# Patient Record
Sex: FEMALE | Race: WHITE | Employment: OTHER | URBAN - METROPOLITAN AREA
[De-identification: names, ages, dates, MRNs, and addresses within clinical notes are randomized per-mention and may not be internally consistent; named-entity substitution may affect disease eponyms.]

---

## 2018-04-30 ENCOUNTER — HOSPITAL ENCOUNTER (INPATIENT)
Age: 83
LOS: 11 days | Discharge: NURSING, REHAB OR LONG-TERM CARE | DRG: 280 | End: 2018-05-11
Attending: EMERGENCY MEDICINE | Admitting: INTERNAL MEDICINE
Payer: MEDICARE

## 2018-04-30 ENCOUNTER — APPOINTMENT (OUTPATIENT)
Dept: ULTRASOUND IMAGING | Age: 83
DRG: 280 | End: 2018-04-30
Attending: EMERGENCY MEDICINE
Payer: MEDICARE

## 2018-04-30 ENCOUNTER — APPOINTMENT (OUTPATIENT)
Dept: GENERAL RADIOLOGY | Age: 83
DRG: 280 | End: 2018-04-30
Attending: EMERGENCY MEDICINE
Payer: MEDICARE

## 2018-04-30 DIAGNOSIS — N18.30 CKD (CHRONIC KIDNEY DISEASE) STAGE 3, GFR 30-59 ML/MIN (HCC): ICD-10-CM

## 2018-04-30 DIAGNOSIS — I48.91 RAPID ATRIAL FIBRILLATION (HCC): ICD-10-CM

## 2018-04-30 DIAGNOSIS — I50.31 ACUTE DIASTOLIC CHF (CONGESTIVE HEART FAILURE) (HCC): ICD-10-CM

## 2018-04-30 DIAGNOSIS — I48.91 ATRIAL FIBRILLATION WITH RAPID VENTRICULAR RESPONSE (HCC): Primary | ICD-10-CM

## 2018-04-30 DIAGNOSIS — J96.01 ACUTE RESPIRATORY FAILURE WITH HYPOXEMIA (HCC): ICD-10-CM

## 2018-04-30 DIAGNOSIS — J44.9 CHRONIC OBSTRUCTIVE PULMONARY DISEASE, UNSPECIFIED COPD TYPE (HCC): ICD-10-CM

## 2018-04-30 DIAGNOSIS — I50.21 ACUTE SYSTOLIC CONGESTIVE HEART FAILURE (HCC): ICD-10-CM

## 2018-04-30 DIAGNOSIS — N39.0 LOWER URINARY TRACT INFECTIOUS DISEASE: ICD-10-CM

## 2018-04-30 DIAGNOSIS — I21.4 NSTEMI (NON-ST ELEVATED MYOCARDIAL INFARCTION) (HCC): ICD-10-CM

## 2018-04-30 LAB
ALBUMIN SERPL-MCNC: 3.3 G/DL (ref 3.5–5)
ALBUMIN/GLOB SERPL: 0.8 {RATIO} (ref 1.1–2.2)
ALP SERPL-CCNC: 67 U/L (ref 45–117)
ALT SERPL-CCNC: 23 U/L (ref 12–78)
ANION GAP SERPL CALC-SCNC: 9 MMOL/L (ref 5–15)
APPEARANCE UR: ABNORMAL
AST SERPL-CCNC: 28 U/L (ref 15–37)
ATRIAL RATE: 127 BPM
BACTERIA URNS QL MICRO: NEGATIVE /HPF
BASOPHILS # BLD: 0.1 K/UL (ref 0–0.1)
BASOPHILS NFR BLD: 1 % (ref 0–1)
BILIRUB SERPL-MCNC: 1.1 MG/DL (ref 0.2–1)
BILIRUB UR QL: NEGATIVE
BNP SERPL-MCNC: ABNORMAL PG/ML (ref 0–450)
BUN SERPL-MCNC: 25 MG/DL (ref 6–20)
BUN/CREAT SERPL: 17 (ref 12–20)
CALCIUM SERPL-MCNC: 9.8 MG/DL (ref 8.5–10.1)
CALCULATED R AXIS, ECG10: -26 DEGREES
CALCULATED T AXIS, ECG11: 32 DEGREES
CHLORIDE SERPL-SCNC: 103 MMOL/L (ref 97–108)
CK SERPL-CCNC: 108 U/L (ref 26–192)
CO2 SERPL-SCNC: 28 MMOL/L (ref 21–32)
COLOR UR: ABNORMAL
CREAT SERPL-MCNC: 1.45 MG/DL (ref 0.55–1.02)
DIAGNOSIS, 93000: NORMAL
DIFFERENTIAL METHOD BLD: ABNORMAL
EOSINOPHIL # BLD: 0 K/UL (ref 0–0.4)
EOSINOPHIL NFR BLD: 0 % (ref 0–7)
EPITH CASTS URNS QL MICRO: ABNORMAL /LPF
ERYTHROCYTE [DISTWIDTH] IN BLOOD BY AUTOMATED COUNT: 13.5 % (ref 11.5–14.5)
GLOBULIN SER CALC-MCNC: 4.3 G/DL (ref 2–4)
GLUCOSE SERPL-MCNC: 141 MG/DL (ref 65–100)
GLUCOSE UR STRIP.AUTO-MCNC: NEGATIVE MG/DL
HCT VFR BLD AUTO: 38.5 % (ref 35–47)
HGB BLD-MCNC: 12.6 G/DL (ref 11.5–16)
HGB UR QL STRIP: NEGATIVE
HYALINE CASTS URNS QL MICRO: ABNORMAL /LPF (ref 0–5)
IMM GRANULOCYTES # BLD: 0.1 K/UL (ref 0–0.04)
IMM GRANULOCYTES NFR BLD AUTO: 1 % (ref 0–0.5)
KETONES UR QL STRIP.AUTO: NEGATIVE MG/DL
LEUKOCYTE ESTERASE UR QL STRIP.AUTO: NEGATIVE
LYMPHOCYTES # BLD: 0.9 K/UL (ref 0.8–3.5)
LYMPHOCYTES NFR BLD: 8 % (ref 12–49)
MAGNESIUM SERPL-MCNC: 2.2 MG/DL (ref 1.6–2.4)
MCH RBC QN AUTO: 29.9 PG (ref 26–34)
MCHC RBC AUTO-ENTMCNC: 32.7 G/DL (ref 30–36.5)
MCV RBC AUTO: 91.2 FL (ref 80–99)
MONOCYTES # BLD: 0.8 K/UL (ref 0–1)
MONOCYTES NFR BLD: 8 % (ref 5–13)
NEUTS SEG # BLD: 8.8 K/UL (ref 1.8–8)
NEUTS SEG NFR BLD: 83 % (ref 32–75)
NITRITE UR QL STRIP.AUTO: NEGATIVE
NRBC # BLD: 0 K/UL (ref 0–0.01)
NRBC BLD-RTO: 0 PER 100 WBC
PH UR STRIP: 6 [PH] (ref 5–8)
PLATELET # BLD AUTO: 299 K/UL (ref 150–400)
PMV BLD AUTO: 10.9 FL (ref 8.9–12.9)
POTASSIUM SERPL-SCNC: 4.1 MMOL/L (ref 3.5–5.1)
PROT SERPL-MCNC: 7.6 G/DL (ref 6.4–8.2)
PROT UR STRIP-MCNC: 30 MG/DL
Q-T INTERVAL, ECG07: 302 MS
QRS DURATION, ECG06: 76 MS
QTC CALCULATION (BEZET), ECG08: 457 MS
RBC # BLD AUTO: 4.22 M/UL (ref 3.8–5.2)
RBC #/AREA URNS HPF: ABNORMAL /HPF (ref 0–5)
SODIUM SERPL-SCNC: 140 MMOL/L (ref 136–145)
SP GR UR REFRACTOMETRY: 1.02 (ref 1–1.03)
TROPONIN I SERPL-MCNC: 0.33 NG/ML
TSH SERPL DL<=0.05 MIU/L-ACNC: 1.38 UIU/ML (ref 0.36–3.74)
UA: UC IF INDICATED,UAUC: ABNORMAL
UROBILINOGEN UR QL STRIP.AUTO: 0.2 EU/DL (ref 0.2–1)
VENTRICULAR RATE, ECG03: 138 BPM
WBC # BLD AUTO: 10.6 K/UL (ref 3.6–11)
WBC URNS QL MICRO: ABNORMAL /HPF (ref 0–4)

## 2018-04-30 PROCEDURE — 74011250636 HC RX REV CODE- 250/636: Performed by: EMERGENCY MEDICINE

## 2018-04-30 PROCEDURE — 96375 TX/PRO/DX INJ NEW DRUG ADDON: CPT

## 2018-04-30 PROCEDURE — 81001 URINALYSIS AUTO W/SCOPE: CPT | Performed by: EMERGENCY MEDICINE

## 2018-04-30 PROCEDURE — 93005 ELECTROCARDIOGRAM TRACING: CPT

## 2018-04-30 PROCEDURE — 74011000250 HC RX REV CODE- 250: Performed by: EMERGENCY MEDICINE

## 2018-04-30 PROCEDURE — 84484 ASSAY OF TROPONIN QUANT: CPT | Performed by: EMERGENCY MEDICINE

## 2018-04-30 PROCEDURE — 96365 THER/PROPH/DIAG IV INF INIT: CPT

## 2018-04-30 PROCEDURE — 74011250637 HC RX REV CODE- 250/637: Performed by: INTERNAL MEDICINE

## 2018-04-30 PROCEDURE — 99285 EMERGENCY DEPT VISIT HI MDM: CPT

## 2018-04-30 PROCEDURE — 77030038269 HC DRN EXT URIN PURWCK BARD -A

## 2018-04-30 PROCEDURE — 65660000000 HC RM CCU STEPDOWN

## 2018-04-30 PROCEDURE — 80053 COMPREHEN METABOLIC PANEL: CPT | Performed by: EMERGENCY MEDICINE

## 2018-04-30 PROCEDURE — 51701 INSERT BLADDER CATHETER: CPT

## 2018-04-30 PROCEDURE — 74011250636 HC RX REV CODE- 250/636: Performed by: INTERNAL MEDICINE

## 2018-04-30 PROCEDURE — 96366 THER/PROPH/DIAG IV INF ADDON: CPT

## 2018-04-30 PROCEDURE — 74011250637 HC RX REV CODE- 250/637: Performed by: EMERGENCY MEDICINE

## 2018-04-30 PROCEDURE — 93970 EXTREMITY STUDY: CPT

## 2018-04-30 PROCEDURE — 83880 ASSAY OF NATRIURETIC PEPTIDE: CPT | Performed by: EMERGENCY MEDICINE

## 2018-04-30 PROCEDURE — 82550 ASSAY OF CK (CPK): CPT | Performed by: EMERGENCY MEDICINE

## 2018-04-30 PROCEDURE — 83735 ASSAY OF MAGNESIUM: CPT | Performed by: EMERGENCY MEDICINE

## 2018-04-30 PROCEDURE — 71045 X-RAY EXAM CHEST 1 VIEW: CPT

## 2018-04-30 PROCEDURE — 36415 COLL VENOUS BLD VENIPUNCTURE: CPT | Performed by: EMERGENCY MEDICINE

## 2018-04-30 PROCEDURE — 85025 COMPLETE CBC W/AUTO DIFF WBC: CPT | Performed by: EMERGENCY MEDICINE

## 2018-04-30 PROCEDURE — 84443 ASSAY THYROID STIM HORMONE: CPT | Performed by: EMERGENCY MEDICINE

## 2018-04-30 PROCEDURE — 77010033678 HC OXYGEN DAILY

## 2018-04-30 RX ORDER — AMLODIPINE BESYLATE 5 MG/1
2.5 TABLET ORAL DAILY
Status: DISCONTINUED | OUTPATIENT
Start: 2018-05-01 | End: 2018-05-02

## 2018-04-30 RX ORDER — OXYBUTYNIN CHLORIDE 5 MG/1
15 TABLET, EXTENDED RELEASE ORAL DAILY
Status: DISCONTINUED | OUTPATIENT
Start: 2018-05-01 | End: 2018-05-02

## 2018-04-30 RX ORDER — CETIRIZINE HCL 10 MG
10 TABLET ORAL
COMMUNITY

## 2018-04-30 RX ORDER — FUROSEMIDE 10 MG/ML
40 INJECTION INTRAMUSCULAR; INTRAVENOUS 2 TIMES DAILY
Status: DISCONTINUED | OUTPATIENT
Start: 2018-04-30 | End: 2018-05-01

## 2018-04-30 RX ORDER — METOPROLOL TARTRATE 25 MG/1
25 TABLET, FILM COATED ORAL 2 TIMES DAILY
Status: DISCONTINUED | OUTPATIENT
Start: 2018-04-30 | End: 2018-05-10

## 2018-04-30 RX ORDER — POLYETHYLENE GLYCOL 3350 17 G/17G
17 POWDER, FOR SOLUTION ORAL DAILY
Status: DISCONTINUED | OUTPATIENT
Start: 2018-05-01 | End: 2018-05-11 | Stop reason: HOSPADM

## 2018-04-30 RX ORDER — OXYBUTYNIN CHLORIDE 5 MG/1
5 TABLET ORAL 3 TIMES DAILY
Status: DISCONTINUED | OUTPATIENT
Start: 2018-04-30 | End: 2018-04-30 | Stop reason: SDUPTHER

## 2018-04-30 RX ORDER — GUAIFENESIN 100 MG/5ML
162 LIQUID (ML) ORAL
Status: COMPLETED | OUTPATIENT
Start: 2018-04-30 | End: 2018-04-30

## 2018-04-30 RX ORDER — SODIUM CHLORIDE 0.9 % (FLUSH) 0.9 %
5-10 SYRINGE (ML) INJECTION EVERY 8 HOURS
Status: DISCONTINUED | OUTPATIENT
Start: 2018-04-30 | End: 2018-05-11 | Stop reason: HOSPADM

## 2018-04-30 RX ORDER — SODIUM CHLORIDE 0.9 % (FLUSH) 0.9 %
5-10 SYRINGE (ML) INJECTION AS NEEDED
Status: DISCONTINUED | OUTPATIENT
Start: 2018-04-30 | End: 2018-05-11 | Stop reason: HOSPADM

## 2018-04-30 RX ORDER — GUAIFENESIN 100 MG/5ML
81 LIQUID (ML) ORAL DAILY
Status: DISCONTINUED | OUTPATIENT
Start: 2018-05-01 | End: 2018-05-11 | Stop reason: HOSPADM

## 2018-04-30 RX ORDER — CLOBETASOL PROPIONATE 0.5 MG/G
CREAM TOPICAL DAILY
COMMUNITY

## 2018-04-30 RX ORDER — AMIODARONE HYDROCHLORIDE 200 MG/1
100 TABLET ORAL DAILY
Status: DISCONTINUED | OUTPATIENT
Start: 2018-05-03 | End: 2018-05-11 | Stop reason: HOSPADM

## 2018-04-30 RX ORDER — ALBUTEROL SULFATE 90 UG/1
2 AEROSOL, METERED RESPIRATORY (INHALATION)
Status: DISCONTINUED | OUTPATIENT
Start: 2018-04-30 | End: 2018-05-11 | Stop reason: HOSPADM

## 2018-04-30 RX ORDER — CLOPIDOGREL BISULFATE 75 MG/1
75 TABLET ORAL DAILY
Status: DISCONTINUED | OUTPATIENT
Start: 2018-05-01 | End: 2018-05-02

## 2018-04-30 RX ORDER — PANTOPRAZOLE SODIUM 40 MG/1
40 GRANULE, DELAYED RELEASE ORAL DAILY
Status: DISCONTINUED | OUTPATIENT
Start: 2018-05-01 | End: 2018-05-11 | Stop reason: HOSPADM

## 2018-04-30 RX ORDER — AMIODARONE HYDROCHLORIDE 200 MG/1
200 TABLET ORAL 3 TIMES DAILY
Status: COMPLETED | OUTPATIENT
Start: 2018-04-30 | End: 2018-05-02

## 2018-04-30 RX ORDER — FUROSEMIDE 10 MG/ML
40 INJECTION INTRAMUSCULAR; INTRAVENOUS
Status: COMPLETED | OUTPATIENT
Start: 2018-04-30 | End: 2018-04-30

## 2018-04-30 RX ORDER — HEPARIN SODIUM 5000 [USP'U]/ML
5000 INJECTION, SOLUTION INTRAVENOUS; SUBCUTANEOUS EVERY 12 HOURS
Status: DISCONTINUED | OUTPATIENT
Start: 2018-04-30 | End: 2018-05-02

## 2018-04-30 RX ORDER — OXYBUTYNIN CHLORIDE 5 MG/1
5 TABLET ORAL 3 TIMES DAILY
Status: ON HOLD | COMMUNITY
End: 2018-05-01

## 2018-04-30 RX ORDER — OLOPATADINE HYDROCHLORIDE 1 MG/ML
1 SOLUTION/ DROPS OPHTHALMIC 2 TIMES DAILY
COMMUNITY

## 2018-04-30 RX ORDER — ONDANSETRON 2 MG/ML
4 INJECTION INTRAMUSCULAR; INTRAVENOUS
Status: DISCONTINUED | OUTPATIENT
Start: 2018-04-30 | End: 2018-05-11 | Stop reason: HOSPADM

## 2018-04-30 RX ORDER — ATORVASTATIN CALCIUM 40 MG/1
40 TABLET, FILM COATED ORAL
Status: DISCONTINUED | OUTPATIENT
Start: 2018-04-30 | End: 2018-05-11 | Stop reason: HOSPADM

## 2018-04-30 RX ORDER — ACETAMINOPHEN 325 MG/1
650 TABLET ORAL
Status: DISCONTINUED | OUTPATIENT
Start: 2018-04-30 | End: 2018-05-11 | Stop reason: HOSPADM

## 2018-04-30 RX ADMIN — Medication 10 ML: at 21:38

## 2018-04-30 RX ADMIN — METOPROLOL TARTRATE 25 MG: 25 TABLET ORAL at 18:57

## 2018-04-30 RX ADMIN — ASPIRIN 81 MG 162 MG: 81 TABLET ORAL at 10:53

## 2018-04-30 RX ADMIN — Medication 10 ML: at 18:58

## 2018-04-30 RX ADMIN — AMIODARONE HYDROCHLORIDE 200 MG: 200 TABLET ORAL at 13:02

## 2018-04-30 RX ADMIN — ATORVASTATIN CALCIUM 40 MG: 40 TABLET, FILM COATED ORAL at 21:31

## 2018-04-30 RX ADMIN — METOPROLOL TARTRATE 25 MG: 25 TABLET ORAL at 13:02

## 2018-04-30 RX ADMIN — FUROSEMIDE 40 MG: 10 INJECTION, SOLUTION INTRAMUSCULAR; INTRAVENOUS at 11:40

## 2018-04-30 RX ADMIN — HEPARIN SODIUM 5000 UNITS: 5000 INJECTION, SOLUTION INTRAVENOUS; SUBCUTANEOUS at 18:57

## 2018-04-30 RX ADMIN — DEXTROSE MONOHYDRATE 5 MG/HR: 50 INJECTION, SOLUTION INTRAVENOUS at 10:44

## 2018-04-30 RX ADMIN — AMIODARONE HYDROCHLORIDE 200 MG: 200 TABLET ORAL at 21:31

## 2018-04-30 RX ADMIN — AMIODARONE HYDROCHLORIDE 200 MG: 200 TABLET ORAL at 18:57

## 2018-04-30 RX ADMIN — NITROGLYCERIN 1.5 INCH: 20 OINTMENT TOPICAL at 10:54

## 2018-04-30 RX ADMIN — FUROSEMIDE 40 MG: 10 INJECTION, SOLUTION INTRAMUSCULAR; INTRAVENOUS at 18:57

## 2018-04-30 NOTE — ED NOTES
MD Lucie Anna at bedside to evaluate patient. Patient urinated into suction cannister, however, it was a small amount in comparison to the lasix that was given. Patient's abdomen is distended. RN bladder scanned patient which showed 420ml of urine. MD Lucie Anna made aware of this and states to straight cath patient one time.  Recheck later to see if the patient continues to retain and then reassess need for gonsalves with MD.

## 2018-04-30 NOTE — H&P
Hospitalist Admission Note    NAME: Bettie Cantu   :  1928   MRN:  [id number]     Date/Time:  2018 5:29 PM    Patient PCP: Tashia Lopez MD  ______________________________________________________________________  Given the patient's current clinical presentation, I have a high level of concern for decompensation if discharged from the emergency department. Complex decision making was performed, which includes reviewing the patient's available past medical records, laboratory results, and x-ray films. My assessment of this patient's clinical condition and my plan of care is as follows. Assessment / Plan:  CHF, POA with hypoxia   Prn oxygen  Titrate as needed  Acute likely diastolic failure ef in 6303 55%   Patient needs to be admitted to Hospital for further work up and management   Admit to Tele, Needs IV LAsix Diuresis, Echo to assess EF, Valves and Pulm pressures, Work up in ED shows no dvt  Cardiology Consult     Afib with RVR:  +troponin  nstemi   Cardiology in the case   Asa/plavix/sttain/ b blcoker/amio  agree with non invasive  procedures, medical management for now  In the ED: on Cardizem drip - cont wean off  EKG  Afib with RVR,   - Amiodarone per cardiology  - will need evl regarding long term ac high risk for falls  - Echo  Plavix/asa/b blocker/statin      Renal:MARGIE  mostly Pre-renal due to hypoperfusion 2/2 afib  - F/U Cr, while lasix if worsen get renal to see      HTN   Home meds  Amlodipine/ b blocker /lasix  Dyslipidemia lipitor  S/p cva s/p tpa in   See above plavix/asa  H/o PVD lft cea  COPD home meds  GERD home meds  R/o uti check ua  Cont ditropan    Code Status: DNR after full d/wpt and family   Surrogate Decision Maker:DTR Sikes Shoulders     DVT Prophylaxis: heparin   GI Prophylaxis: HOME PPI    Baseline: wheelchair   Pt resides @ [facility]        Subjective:   CHIEF COMPLAINT: sob    HISTORY OF PRESENT ILLNESS:     Patience Montaño is a 80 y.o. female with PMHx significant for COPD and stroke, presents via EMS to the ED with cc of SOB x 2-3 days. sx exacerbated by exertion. She also c/o palpitations since onset. Per EMS, pt had SpO2 in 70s at time of their arrival denies pain with inspiration. Daughter states pt does not have hx of MI or stent. Pt denies hx of DM. Per daughter, pt has ambulates with wheelchair since cva. . Pt specifically denies CP, chest pressure, jaw pain, ABD pain, nausea, vomiting, or chills. Per daughter, pt is former smoker. ?uti sx. Poor appetite. + palpitations. Pt resides at [facility]          We were asked to admit for work up and evaluation of the above problems. Past Medical History:   Diagnosis Date    Arthritis     generalized    COPD     GERD (gastroesophageal reflux disease)     Hypertension     Ill-defined condition     Vitamin deficiency    Other ill-defined conditions(799.89)     high cholesterol    Peripheral artery disease (HCC)     Psychiatric disorder     Anxiety disorder    Sleep disorder     Insomnia    TIA (transient ischemic attack)             Social History   Substance Use Topics    Smoking status: Former Smoker    Smokeless tobacco: Not on file      Comment: quit 3 years ago    Alcohol use Yes      Comment: glass of wine rarely        Family History   Problem Relation Age of Onset    Other Mother      carotid endarterectomy/cardiomegaly    Other Father      carotid endarterectomy     Allergies   Allergen Reactions    Penicillins Swelling    Sulfa (Sulfonamide Antibiotics) Itching    Propoxyphene-Acetaminophen Unknown (comments)     Pt. Not sure, thinks it was hallucination.         Prior to Admission medications              Dispense Refill       cetirizine (ZYRTEC) 10 mg tablet Take 10 mg by mouth.        clobetasol (TEMOVATE) 0.05 % topical cream Apply  to affected area two (2) times a day.        olopatadine (PATANOL) 0.1 % ophthalmic solution Administer 2 Drops to both eyes two (2) times a day.        oxybutynin (DITROPAN) 5 mg tablet Take 5 mg by mouth three (3) times daily.        B.infantis-B.ani-B.long-B.bifi (PROBIOTIC 4X) 10-15 mg  Take  by mouth.        amLODIPine (NORVASC) 2.5 mg tablet Take 1 Tab by mouth daily. 30 Tab 2    metoprolol (LOPRESSOR) 25 mg tablet Take 25 mg by mouth two (2) times a day.        pantoprazole (PROTONIX) 40 mg granules for oral suspension Take 40 mg by mouth daily.        albuterol (PROVENTIL HFA, VENTOLIN HFA, PROAIR HFA) 90 mcg/actuation inhaler Take 2 Puffs by inhalation every four (4) hours as needed for Wheezing.        CALCIUM CARBONATE/VITAMIN D3 (CALTRATE-600 PLUS VITAMIN D3 PO) Take 600 mg by mouth two (2) times a day.        MULTIVITS W-FE,OTHER MIN/LUT (CENTRUM SILVER ULTRA WOMEN'S PO) Take 1,000 mg by mouth daily.        atorvastatin (LIPITOR) 40 mg tablet Take 1 Tab by mouth nightly. 30 Tab 2    clopidogrel (PLAVIX) 75 mg tablet Take 1 Tab by mouth daily. 30 Tab 2    trimethoprim (TRIMPEX) 100 mg tablet Take 100 mg by mouth daily.        ezetimibe (ZETIA) 10 mg tablet Take 10 mg by mouth.        alprazolam (XANAX) 0.5 mg tablet Take 0.5 mg by mouth two (2) times a day. Indications: INSOMNIA        calcitonin, salmon, (MIACALCIN) 200 unit/Actuation nasal spray 1 [place] by Nasal route daily. Administer to RX NASAL each/right/left nostril:41777} nostril.         aspirin 81 mg tablet Take 81 mg by mouth daily.        tiotropium (SPIRIVA WITH HANDIHALER) 18 mcg inhalation capsule Take 1 Cap by inhalation daily. Indications: CHRONIC OBSTRUCTIVE PULMONARY DISEASE WITH BRONCHOSPASM           REVIEW OF SYSTEMS:     I am not able to complete the review of systems because:    The patient is intubated and sedated    The patient has altered mental status due to his acute medical problems    The patient has baseline aphasia from prior stroke(s)    The patient has baseline dementia and is not reliable historian    The patient is in acute medical distress and unable to provide information           Total of 12 systems reviewed as follows:       POSITIVE= underlined text  Negative = text not underlined  General:  fever, chills, sweats, generalized weakness, weight loss/gain,      loss of appetite   Eyes:    blurred vision, eye pain, loss of vision, double vision  ENT:    rhinorrhea, pharyngitis   Respiratory:   cough, sputum production, SOB, ALCARAZ, orthopnea wheezing, pleuritic pain   Cardiology:   chest pain, palpitations, orthopnea, PND, edema, syncope   Gastrointestinal:  abdominal pain , N/V, diarrhea, dysphagia, constipation, bleeding   Genitourinary:  frequency, urgency, dysuria, hematuria, incontinence   Muskuloskeletal :  arthralgia, myalgia, back pain  Hematology:  easy bruising, nose or gum bleeding, lymphadenopathy   Dermatological: rash, ulceration, pruritis, color change / jaundice  Endocrine:   hot flashes or polydipsia   Neurological:  headache, dizziness, confusion, focal weakness, paresthesia,     Speech difficulties, memory loss, gait difficulty  Psychological: Feelings of anxiety, depression, agitation    Objective:   VITALS:    Visit Vitals    /87    Pulse 70    Temp 99.3 °F (37.4 °C)    Resp 21    Ht 5' 4\" (1.626 m)    Wt 72.2 kg (159 lb 2.8 oz)    SpO2 93%    BMI 27.32 kg/m2       PHYSICAL EXAM:    General:    Alert, cooperative, no distress, appears stated age. +dentures speaking full sentences   HEENT: Atraumatic, anicteric sclerae, pink conjunctivae     No oral ulcers, mucosa moist, throat clear, see above  Neck:  Supple, symmetrical,  thyroid: non tender  Lungs:   Decrease bs  bilaterally. + Wheezing no Rhonchi. + rales. Chest wall:  No tenderness  No Accessory muscle use. Heart:                 irregularly irregular,  No  murmur   michelle pedal edema no calf pain  Abdomen:   Soft, non-tender. Obese Not distended. Bowel sounds normal  Extremities: No cyanosis.   No clubbing,      Skin turgor normal, Capillary refill normal, Radial dial pulse 2+  Skin:     Not pale. Not Jaundiced  No rashes abrasions rt le   Psych:  Good insight. Not depressed. Not anxious or agitated. Neurologic: EOMs intact. No facial asymmetry. No aphasia or slurred speech. Symmetrical strength, Sensation grossly intact. Alert and oriented X 4.     _______________________________________________________________________  Care Plan discussed with:    Comments   Patient x    Family  x    RN x    Care Manager                    Consultant:      _______________________________________________________________________  Expected  Disposition:   Home with Family    HH/PT/OT/RN    SNF/LTC x   [facility]    ________________________________________________________________________  TOTAL TIME:  79  Minutes    Critical Care Provided     Minutes non procedure based      Comments    x Reviewed previous records   >50% of visit spent in counseling and coordination of care x Discussion with patient and/or family and questions answered       ________________________________________________________________________  Signed: Jhoana Villegas MD    Procedures: see electronic medical records for all procedures/Xrays and details which were not copied into this note but were reviewed prior to creation of Plan. LAB DATA REVIEWED:    Recent Results (from the past 24 hour(s))   EKG, 12 LEAD, INITIAL    Collection Time: 04/30/18  9:34 AM   Result Value Ref Range    Ventricular Rate 138 BPM    Atrial Rate 127 BPM    QRS Duration 76 ms    Q-T Interval 302 ms    QTC Calculation (Bezet) 457 ms    Calculated R Axis -26 degrees    Calculated T Axis 32 degrees    Diagnosis       Atrial fibrillation with rapid ventricular response  Abnormal ECG  When compared with ECG of 18-FEB-2015 11:14,  Atrial fibrillation has replaced Sinus rhythm  Vent.  rate has increased BY  79 BPM  Confirmed by Butch Griffin ([id number]) on 4/30/2018 1:46:13 PM     CBC WITH AUTOMATED DIFF    Collection Time: 04/30/18  9:47 AM   Result Value Ref Range    WBC 10.6 3.6 - 11.0 K/uL    RBC 4.22 3.80 - 5.20 M/uL    HGB 12.6 11.5 - 16.0 g/dL    HCT 38.5 35.0 - 47.0 %    MCV 91.2 80.0 - 99.0 FL    MCH 29.9 26.0 - 34.0 PG    MCHC 32.7 30.0 - 36.5 g/dL    RDW 13.5 11.5 - 14.5 %    PLATELET 058 360 - 014 K/uL    MPV 10.9 8.9 - 12.9 FL    NRBC 0.0 0  WBC    ABSOLUTE NRBC 0.00 0.00 - 0.01 K/uL    NEUTROPHILS 83 (H) 32 - 75 %    LYMPHOCYTES 8 (L) 12 - 49 %    MONOCYTES 8 5 - 13 %    EOSINOPHILS 0 0 - 7 %    BASOPHILS 1 0 - 1 %    IMMATURE GRANULOCYTES 1 (H) 0.0 - 0.5 %    ABS. NEUTROPHILS 8.8 (H) 1.8 - 8.0 K/UL    ABS. LYMPHOCYTES 0.9 0.8 - 3.5 K/UL    ABS. MONOCYTES 0.8 0.0 - 1.0 K/UL    ABS. EOSINOPHILS 0.0 0.0 - 0.4 K/UL    ABS. BASOPHILS 0.1 0.0 - 0.1 K/UL    ABS. IMM. GRANS. 0.1 (H) 0.00 - 0.04 K/UL    DF AUTOMATED     METABOLIC PANEL, COMPREHENSIVE    Collection Time: 04/30/18  9:47 AM   Result Value Ref Range    Sodium 140 136 - 145 mmol/L    Potassium 4.1 3.5 - 5.1 mmol/L    Chloride 103 97 - 108 mmol/L    CO2 28 21 - 32 mmol/L    Anion gap 9 5 - 15 mmol/L    Glucose 141 (H) 65 - 100 mg/dL    BUN 25 (H) 6 - 20 MG/DL    Creatinine 1.45 (H) 0.55 - 1.02 MG/DL    BUN/Creatinine ratio 17 12 - 20      GFR est AA 41 (L) >60 ml/min/1.73m2    GFR est non-AA 34 (L) >60 ml/min/1.73m2    Calcium 9.8 8.5 - 10.1 MG/DL    Bilirubin, total 1.1 (H) 0.2 - 1.0 MG/DL    ALT (SGPT) 23 12 - 78 U/L    AST (SGOT) 28 15 - 37 U/L    Alk.  phosphatase 67 45 - 117 U/L    Protein, total 7.6 6.4 - 8.2 g/dL    Albumin 3.3 (L) 3.5 - 5.0 g/dL    Globulin 4.3 (H) 2.0 - 4.0 g/dL    A-G Ratio 0.8 (L) 1.1 - 2.2     CK W/ REFLX CKMB    Collection Time: 04/30/18  9:47 AM   Result Value Ref Range     26 - 192 U/L   TROPONIN I    Collection Time: 04/30/18  9:47 AM   Result Value Ref Range    Troponin-I, Qt. 0.33 (H) <0.05 ng/mL   NT-PRO BNP    Collection Time: 04/30/18  9:47 AM   Result Value Ref Range    NT pro-BNP >56425 (H) 0 - 450 PG/ML   MAGNESIUM    Collection Time: 04/30/18 9:47 AM   Result Value Ref Range    Magnesium 2.2 1.6 - 2.4 mg/dL   TSH 3RD GENERATION    Collection Time: 04/30/18  9:47 AM   Result Value Ref Range    TSH 1.38 0.36 - 3.74 uIU/mL   URINALYSIS W/ REFLEX CULTURE    Collection Time: 04/30/18 10:21 AM   Result Value Ref Range    Color YELLOW/STRAW      Appearance CLOUDY (A) CLEAR      Specific gravity 1.017 1.003 - 1.030      pH (UA) 6.0 5.0 - 8.0      Protein 30 (A) NEG mg/dL    Glucose NEGATIVE  NEG mg/dL    Ketone NEGATIVE  NEG mg/dL    Bilirubin NEGATIVE  NEG      Blood NEGATIVE  NEG      Urobilinogen 0.2 0.2 - 1.0 EU/dL    Nitrites NEGATIVE  NEG      Leukocyte Esterase NEGATIVE  NEG      WBC 0-4 0 - 4 /hpf    RBC 5-10 0 - 5 /hpf    Epithelial cells MODERATE (A) FEW /lpf    Bacteria NEGATIVE  NEG /hpf    UA:UC IF INDICATED CULTURE NOT INDICATED BY UA RESULT CNI      Hyaline cast 5-10 0 - 5 /lpf   EKG, 12 LEAD, INITIAL    Collection Time: 04/30/18  4:01 PM   Result Value Ref Range    Ventricular Rate 73 BPM    Atrial Rate 73 BPM    P-R Interval 152 ms    QRS Duration 82 ms    Q-T Interval 448 ms    QTC Calculation (Bezet) 493 ms    Calculated R Axis -8 degrees    Calculated T Axis 7 degrees    Diagnosis       Sinus rhythm with premature atrial complexes  Inferior infarct , age undetermined  Possible Anterior infarct , age undetermined  Abnormal ECG  When compared with ECG of 30-APR-2018 09:34,  Sinus rhythm has replaced Atrial fibrillation  Vent.  rate has decreased BY  65 BPM  Inferior infarct is now present  Inverted T waves have replaced nonspecific T wave abnormality in Inferior   leads

## 2018-04-30 NOTE — ED NOTES
Patient removed from non rebreather at this time and placed on 6L of oxygen NC. Patient instructed to breathe in her nose and out her mouth. Patient maintaining oxygen saturations in 93-95% at this time.

## 2018-04-30 NOTE — ED NOTES
Xray at bedside. Xray tech states he will have to come back because of issues with machine. MD Lara Guillory at bedside evaluating patient.

## 2018-04-30 NOTE — ED NOTES
Patient placed on 4L NC and increased to 86%. Patient a mouth breather and had to be repeatedly reminded to breathe in her nose and out her mouth. Patient placed on 10L non rebreather and increased to 96%.

## 2018-04-30 NOTE — PROGRESS NOTES
Bedside and Verbal shift change report given to PORFIRIO flores (oncoming nurse). Report included the following information SBAR, Kardex, ED Summary, Procedure Summary, Intake/Output, MAR and Recent Results. SHIFT SUMMARY:  Pt arrived at 645 pm. Vitals stable. Pt denies pain. Pt given meds and \"tucked in\" for night shift.

## 2018-04-30 NOTE — ED NOTES
Patient presents to ED via EMS from [facility]. When EMS arrived, the patient was 70% on room air. She was receiving a nebulizer treatment when EMS arrived on the scene. EMS administered another breathing treatment and was able to bring the patient's saturations to low 90%. Patient on the monitor x3, call bell within reach. Daughter at bedside. Patient ambulated to stretcher with x2 assist. Patient states when she exerts herself, that is when she has trouble breathing.

## 2018-04-30 NOTE — ED NOTES
Patient no longer in A. Fib. Patient converted to a sinus rhythm. Cardiologist is aware as the patient was in this rhythm while he was in the room. Spoke with MD Mira Alfaro who states there is no need to call cardiologist and to remove patient from cardizem drip at this time.

## 2018-04-30 NOTE — ED NOTES
RN offered patient meal tray. Patient denies. Patient urinated a small amount in suction canister via purewick. Patient states \"I had a hard time trying to go. \"

## 2018-04-30 NOTE — ED NOTES
Removed patient off of non rebreather and placed on 6L NC while she drank orange juice. Patient's oxygen saturations decreased to 83%. Patient placed back on 8L of oxygen via non rebreather. Pure Wick applied to patient at this time. Family remains at bedside. Patient and family updated on plan of care.

## 2018-04-30 NOTE — ED NOTES
TRANSFER - OUT REPORT:    Verbal report given to Calista Mares RN (name) on Jacqueline Fowler  being transferred to [address] (unit) for routine progression of care       Report consisted of patients Situation, Background, Assessment and   Recommendations(SBAR). Information from the following report(s) SBAR, Kardex, ED Summary, Intake/Output, MAR, Recent Results, Med Rec Status and Cardiac Rhythm Sinus Rhythm was reviewed with the receiving nurse. Lines:   Peripheral IV 04/30/18 Left Hand (Active)   Site Assessment Clean, dry, & intact 4/30/2018 12:24 PM   Phlebitis Assessment 0 4/30/2018 12:24 PM   Infiltration Assessment 0 4/30/2018 12:24 PM   Dressing Status Clean, dry, & intact 4/30/2018 12:24 PM   Dressing Type Tape;Transparent 4/30/2018 12:24 PM   Hub Color/Line Status Pink;Flushed 4/30/2018 12:24 PM        Opportunity for questions and clarification was provided.       Patient transported with:   O2 @ 6 liters   Family  Patient's belongings (shoes, socks, pants, and shirt)

## 2018-04-30 NOTE — IP AVS SNAPSHOT
Hernandes Sophiaside [facility]. 
[phone] Patient: Cordell Storey MRN: [id number] JRD:[birth year] A check juli indicates which time of day the medication should be taken. My Medications START taking these medications Instructions Each Dose to Equal  
 Morning Noon Evening Bedtime  
 amiodarone 100 mg tablet Commonly known as:  Olegario Modi Your last dose was: Your next dose is: Take 1 Tab by mouth daily. 100 mg  
    
   
   
   
  
 apixaban 2.5 mg tablet Commonly known as:  Mary Ann Miami Your last dose was: Your next dose is: Take 1 Tab by mouth two (2) times a day. 2.5 mg  
    
   
   
   
  
 aspirin 81 mg chewable tablet Replaces:  aspirin 81 mg tablet Your last dose was: Your next dose is: Take 1 Tab by mouth daily. 81 mg  
    
   
   
   
  
 furosemide 40 mg tablet Commonly known as:  LASIX Your last dose was: Your next dose is: Take 1 Tab by mouth two (2) times a day. 40 mg  
    
   
   
   
  
 metoprolol succinate 25 mg XL tablet Commonly known as:  TOPROL-XL Your last dose was: Your next dose is: Take 1 Tab by mouth daily. 25 mg  
    
   
   
   
  
 potassium chloride SR 10 mEq tablet Commonly known as:  KLOR-CON 10 Your last dose was: Your next dose is: Take 1 Tab by mouth two (2) times a day. 10 mEq CHANGE how you take these medications Instructions Each Dose to Equal  
 Morning Noon Evening Bedtime  
 albuterol-ipratropium 2.5 mg-0.5 mg/3 ml Nebu Commonly known as:  Lisa Cleary What changed:  Another medication with the same name was removed. Continue taking this medication, and follow the directions you see here. Your last dose was: Your next dose is: 3 mL by Nebulization route every four (4) hours as needed (Shortness of Breath). 3 mL CONTINUE taking these medications Instructions Each Dose to Equal  
 Morning Noon Evening Bedtime  
 albuterol 90 mcg/actuation inhaler Commonly known as:  PROVENTIL HFA, VENTOLIN HFA, PROAIR HFA Your last dose was: Your next dose is: Take 2 Puffs by inhalation every six (6) hours as needed for Wheezing. 2 Puff  
    
   
   
   
  
 amLODIPine 5 mg tablet Commonly known as:  Isaura Galvez Your last dose was: Your next dose is: Take 5 mg by mouth daily. 5 mg  
    
   
   
   
  
 ammonium lactate 12 % lotion Commonly known as:  LAC-HYDRIN Your last dose was: Your next dose is:    
   
   
 Apply  to affected area every twelve (12) hours as needed (Apply to chest, back, arms as needed for eczema). rub in to affected area well  
     
   
   
   
  
 atorvastatin 40 mg tablet Commonly known as:  LIPITOR Your last dose was: Your next dose is: Take 1 Tab by mouth nightly. 40 mg CENTRUM SILVER ULTRA WOMEN'S PO Your last dose was: Your next dose is: Take 1 Tab by mouth daily. 1 Tab  
    
   
   
   
  
 cetirizine 10 mg tablet Commonly known as:  ZYRTEC Your last dose was: Your next dose is: Take 10 mg by mouth nightly. 10 mg  
    
   
   
   
  
 clobetasol 0.05 % topical cream  
Commonly known as:  Brian Juba Your last dose was: Your next dose is:    
   
   
 Apply  to affected area every Monday, Wednesday, Friday. In the evening on Monday, Wednesday, and Friday  
     
   
   
   
  
 diphenhydrAMINE-zinc acetate 1%-0.1% topical cream  
Commonly known as:  BENADRYL Your last dose was: Your next dose is: Apply  to affected area every six (6) hours as needed for Itching. DULCOLAX (BISACODYL) 10 mg suppository Generic drug:  bisacodyl Your last dose was: Your next dose is: Insert 10 mg into rectum as needed (Every 96 hours as needed for constipation). 10 mg  
    
   
   
   
  
 FLEET ENEMA 19-7 gram/118 mL enema Generic drug:  sodium phosphate Your last dose was: Your next dose is: Insert 1 Enema into rectum. Every 120 hours as needed for constipation if no results from suppository 1 Enema  
    
   
   
   
  
 guaiFENesin 100 mg/5 mL liquid Commonly known as:  ROBITUSSIN Your last dose was: Your next dose is: Take 200 mg by mouth every four (4) hours as needed for Cough. 200 mg LUBRISKIN Lotn lotion Generic drug:  emollient combination no. 1000 StRayspan Your last dose was: Your next dose is:    
   
   
 Apply  to affected area every eight (8) hours as needed for Dry Skin (Apply to both hands). magnesium hydroxide 400 mg/5 mL suspension Commonly known as:  MILK OF MAGNESIA Your last dose was: Your next dose is: Take 10 mL by mouth every seventy-two (72) hours as needed for Constipation (If no BM in 3 days). 10 mL  
    
   
   
   
  
 olopatadine 0.1 % ophthalmic solution Commonly known as:  PATANOL Your last dose was: Your next dose is:    
   
   
 Administer 1 Drop to both eyes two (2) times a day. 1 Drop  
    
   
   
   
  
 oxybutynin chloride XL 5 mg CR tablet Commonly known as:  DITROPAN XL Your last dose was: Your next dose is: Take 5 mg by mouth daily. 5 mg PROBIOTIC 4X 10-15 mg Tbec Generic drug:  B.infantis-B.ani-B.long-B.bifi Your last dose was: Your next dose is: Take 1 Tab by mouth two (2) times a day. 1 Tab  
    
   
   
   
  
 promethazine 12.5 mg tablet Commonly known as:  PHENERGAN Your last dose was: Your next dose is: Take 12.5 mg by mouth every six (6) hours as needed for Nausea. 12.5 mg PROTONIX 20 mg tablet Generic drug:  pantoprazole Your last dose was: Your next dose is: Take 20 mg by mouth Before breakfast and dinner. 20 mg  
    
   
   
   
  
 RULOX 200-200-20 mg/5 mL Susp Generic drug:  alum-mag hydroxide-simeth Your last dose was: Your next dose is: Take 20 mL by mouth every four (4) hours as needed. 20 mL  
    
   
   
   
  
 tiotropium 18 mcg inhalation capsule Commonly known as:  [address] Your last dose was: Your next dose is: Take 1 Cap by inhalation daily. Indications: Chronic Obstructive Pulmonary Disease with Bronchospasms 1 Cap  
    
   
   
   
  
 triamcinolone 0.5 % topical cream  
Commonly known as:  ARISTOCORT Your last dose was: Your next dose is:    
   
   
 Apply  to affected area two (2) times daily as needed for Skin Irritation. use thin layer TYLENOL 325 mg tablet Generic drug:  acetaminophen Your last dose was: Your next dose is: Take 650 mg by mouth every four (4) hours as needed for Pain. 650 mg  
    
   
   
   
  
  
STOP taking these medications   
 aspirin 81 mg tablet Replaced by:  aspirin 81 mg chewable tablet  
   
  
 metoprolol tartrate 25 mg tablet Commonly known as:  LOPRESSOR  
   
  
 OYSTER SHELL CALCIUM-VIT D3 250-125 mg-unit tablet Generic drug:  calcium-vitamin D Where to Get Your Medications Information on where to get these meds will be given to you by the nurse or doctor. ! Ask your nurse or doctor about these medications amiodarone 100 mg tablet  
 apixaban 2.5 mg tablet  
 atorvastatin 40 mg tablet  
 furosemide 40 mg tablet  
 metoprolol succinate 25 mg XL tablet  
 potassium chloride SR 10 mEq tablet  
 tiotropium 18 mcg inhalation capsule

## 2018-04-30 NOTE — CONSULTS
Consult    NAME: Nohemy Maldonado   :  1928   MRN:  [id number]     Date/Time:  2018 12:39 PM    Patient PCP: Jose Ramon Parra MD  ________________________________________________________________________     Assessment:     1. Hypoxia, acute diastolic CHF, prox afib with RVR back in sinus, increased troponin consistent with type II NSTEMI  2. No hx of CAD  3. Echo  with EF 55%, mild to mod MR, mild AI  4. Prox Afib currently in sinus with LVH  5. HTN, with hypertensive heart disease with heart failure, and CKD cr 1.4  6. Dyslipidemia  7. Hx of CVA s/p TPA in   8. PVD with hx of left CEA  9. COPD has had home O2 in past  10. GERD  11. DJD  12. , lives at [facility], poor functional capacity in a wheelchair, sleeps most of the day        Plan:     Poor functional capacity, since CVA mostly in wheelchair, sleeps in between meals. Some increase in dyspnea. No chest pain. Afib with RVR back in sinus on cardizem ggt  pulm edema on cxr currently on non-rebreather  Increased troponin, consistent with type II NSTEMI, discussed with family would like to avoid invasive therapy, plan for medical management of CAD. Check echo    1. Cont ASA  2. Cont plavix for now, consider change to eliquis not ideal anticoagulation candidate  3. Back in sinus, would stop cardizem ggt after this bag, add amiodarone 200mg tid, discharge on 100mg daily  4. Cont metoprolol 25mg bid  5. Cont amlodipine  6. Diurese with lasix 40mg iv bid, follow bmp, taper O2  7. Cont atorvastatin      [x]        High complexity decision making was performed        Subjective:   CHIEF COMPLAINT: Dyspnea    HISTORY OF PRESENT ILLNESS:       Nohemy Maldonado, 80 y.o. female with PMHx significant for COPD and stroke, presents via EMS to the ED with cc of SOB x 2-3 days. Pt reports her symptoms are exacerbated by exertion. She also c/o palpitations since onset.  Per EMS, pt had SpO2 in 70s at time of their arrival. Daughter states pt does not use O2 at baseline, but notes \"her oxygen has been dropping for a few days\" per nursing staff at [facility]. Pt denies orthopnea since onset, and denies pain with inspiration. Daughter states pt does not have hx of MI or stent. Pt denies hx of DM. Per daughter, pt has difficulty ambulating at baseline. Pt specifically denies CP, chest pressure, jaw pain, ABD pain, nausea, vomiting, or chills. Per daughter, pt is former smoker. In ED, still on non-rebreather, no chest pain, +dyspnea, mild edema, no palptiations, no syncope. We were asked to consult for work up and evaluation of the above problems. Past Medical History:   Diagnosis Date    Arthritis     generalized    COPD     GERD (gastroesophageal reflux disease)     Hypertension     Ill-defined condition     Vitamin deficiency    Other ill-defined conditions(799.89)     high cholesterol    Peripheral artery disease (HCC)     Psychiatric disorder     Anxiety disorder    Sleep disorder     Insomnia    TIA (transient ischemic attack)       Past Surgical History:   Procedure Laterality Date    HX CAROTID ENDARTERECTOMY      left 2 or 3 years ago.  HX OTHER SURGICAL      colonoscopy    HX TONSILLECTOMY       Allergies   Allergen Reactions    Penicillins Swelling    Sulfa (Sulfonamide Antibiotics) Itching    Propoxyphene-Acetaminophen Unknown (comments)     Pt. Not sure, thinks it was hallucination. Meds:  See below  Social History   Substance Use Topics    Smoking status: Former Smoker    Smokeless tobacco: Not on file      Comment: quit 3 years ago    Alcohol use Yes      Comment: glass of wine rarely      Family History   Problem Relation Age of Onset    Other Mother      carotid endarterectomy/cardiomegaly    Other Father      carotid endarterectomy       REVIEW OF SYSTEMS:     []         Unable to obtain  ROS due to ---   [x]         Total of 12 systems reviewed as follows:     Total of 12 systems reviewed as follows:       POSITIVE= Bold text  Negative = normal text  General:  fever, chills, sweats, generalized weakness, weight loss/gain,      loss of appetite   Eyes:    blurred vision, eye pain, loss of vision, double vision  ENT:    rhinorrhea, pharyngitis   Respiratory:   cough, sputum production, SOB, ALCARAZ, wheezing, pleuritic pain   Cardiology:   chest pain, palpitations, orthopnea, PND, edema, syncope   Gastrointestinal:  abdominal pain , N/V, diarrhea, dysphagia, constipation, bleeding   Genitourinary:  frequency, urgency, dysuria, hematuria, incontinence   Muskuloskeletal :  arthralgia, myalgia, back pain  Hematology:  easy bruising, nose or gum bleeding, lymphadenopathy   Dermatological: rash, ulceration, pruritis, color change / jaundice  Endocrine:   hot flashes or polydipsia   Neurological:  headache, dizziness, confusion, focal weakness, paresthesia,     Speech difficulties, memory loss, gait difficulty  Psychological: Feelings of anxiety, depression, agitation    Objective:      Physical Exam:    Last 24hrs VS reviewed since prior progress note. Most recent are:    Visit Vitals    BP (!) 166/100    Pulse 95    Temp 99.3 °F (37.4 °C)    Resp (!) 37    Ht 5' 4\" (1.626 m)    Wt 72.2 kg (159 lb 2.8 oz)    SpO2 95%    BMI 27.32 kg/m2     No intake or output data in the 24 hours ending 04/30/18 1239     General Appearance: Well developed, elderly alert & oriented x 3,    no acute distress. Ears/Nose/Mouth/Throat: Pupils equal and round, Hearing grossly normal.  Neck: Supple. JVP within normal limits. Carotids good upstrokes, with no bruit. Chest: Lungs clear to auscultation bilaterally. Cardiovascular: Regular rate and rhythm, S1S2 normal, no murmur, rubs, gallops. Abdomen: Soft, non-tender, bowel sounds are active. No organomegaly. Extremities: Mild edema bilaterally. Femoral pulses +2, Distal Pulses +1. Skin: Warm and dry.   Neuro: CN II-XII grossly intact, Strength and sensation grossly intact. Data:          Recent Results (from the past 24 hour(s))   EKG, 12 LEAD, INITIAL    Collection Time: 04/30/18  9:34 AM   Result Value Ref Range    Ventricular Rate 138 BPM    Atrial Rate 127 BPM    QRS Duration 76 ms    Q-T Interval 302 ms    QTC Calculation (Bezet) 457 ms    Calculated R Axis -26 degrees    Calculated T Axis 32 degrees    Diagnosis       Atrial fibrillation with rapid ventricular response  Abnormal ECG  When compared with ECG of 18-FEB-2015 11:14,  Atrial fibrillation has replaced Sinus rhythm  Vent. rate has increased BY  79 BPM     CBC WITH AUTOMATED DIFF    Collection Time: 04/30/18  9:47 AM   Result Value Ref Range    WBC 10.6 3.6 - 11.0 K/uL    RBC 4.22 3.80 - 5.20 M/uL    HGB 12.6 11.5 - 16.0 g/dL    HCT 38.5 35.0 - 47.0 %    MCV 91.2 80.0 - 99.0 FL    MCH 29.9 26.0 - 34.0 PG    MCHC 32.7 30.0 - 36.5 g/dL    RDW 13.5 11.5 - 14.5 %    PLATELET 331 367 - 248 K/uL    MPV 10.9 8.9 - 12.9 FL    NRBC 0.0 0  WBC    ABSOLUTE NRBC 0.00 0.00 - 0.01 K/uL    NEUTROPHILS 83 (H) 32 - 75 %    LYMPHOCYTES 8 (L) 12 - 49 %    MONOCYTES 8 5 - 13 %    EOSINOPHILS 0 0 - 7 %    BASOPHILS 1 0 - 1 %    IMMATURE GRANULOCYTES 1 (H) 0.0 - 0.5 %    ABS. NEUTROPHILS 8.8 (H) 1.8 - 8.0 K/UL    ABS. LYMPHOCYTES 0.9 0.8 - 3.5 K/UL    ABS. MONOCYTES 0.8 0.0 - 1.0 K/UL    ABS. EOSINOPHILS 0.0 0.0 - 0.4 K/UL    ABS. BASOPHILS 0.1 0.0 - 0.1 K/UL    ABS. IMM.  GRANS. 0.1 (H) 0.00 - 0.04 K/UL    DF AUTOMATED     METABOLIC PANEL, COMPREHENSIVE    Collection Time: 04/30/18  9:47 AM   Result Value Ref Range    Sodium 140 136 - 145 mmol/L    Potassium 4.1 3.5 - 5.1 mmol/L    Chloride 103 97 - 108 mmol/L    CO2 28 21 - 32 mmol/L    Anion gap 9 5 - 15 mmol/L    Glucose 141 (H) 65 - 100 mg/dL    BUN 25 (H) 6 - 20 MG/DL    Creatinine 1.45 (H) 0.55 - 1.02 MG/DL    BUN/Creatinine ratio 17 12 - 20      GFR est AA 41 (L) >60 ml/min/1.73m2    GFR est non-AA 34 (L) >60 ml/min/1.73m2    Calcium 9.8 8.5 - 10.1 MG/DL    Bilirubin, total 1.1 (H) 0.2 - 1.0 MG/DL    ALT (SGPT) 23 12 - 78 U/L    AST (SGOT) 28 15 - 37 U/L    Alk.  phosphatase 67 45 - 117 U/L    Protein, total 7.6 6.4 - 8.2 g/dL    Albumin 3.3 (L) 3.5 - 5.0 g/dL    Globulin 4.3 (H) 2.0 - 4.0 g/dL    A-G Ratio 0.8 (L) 1.1 - 2.2     CK W/ REFLX CKMB    Collection Time: 04/30/18  9:47 AM   Result Value Ref Range     26 - 192 U/L   TROPONIN I    Collection Time: 04/30/18  9:47 AM   Result Value Ref Range    Troponin-I, Qt. 0.33 (H) <0.05 ng/mL   NT-PRO BNP    Collection Time: 04/30/18  9:47 AM   Result Value Ref Range    NT pro-BNP >08726 (H) 0 - 450 PG/ML   MAGNESIUM    Collection Time: 04/30/18  9:47 AM   Result Value Ref Range    Magnesium 2.2 1.6 - 2.4 mg/dL   TSH 3RD GENERATION    Collection Time: 04/30/18  9:47 AM   Result Value Ref Range    TSH 1.38 0.36 - 3.74 uIU/mL   URINALYSIS W/ REFLEX CULTURE    Collection Time: 04/30/18 10:21 AM   Result Value Ref Range    Color YELLOW/STRAW      Appearance CLOUDY (A) CLEAR      Specific gravity 1.017 1.003 - 1.030      pH (UA) 6.0 5.0 - 8.0      Protein 30 (A) NEG mg/dL    Glucose NEGATIVE  NEG mg/dL    Ketone NEGATIVE  NEG mg/dL    Bilirubin NEGATIVE  NEG      Blood NEGATIVE  NEG      Urobilinogen 0.2 0.2 - 1.0 EU/dL    Nitrites NEGATIVE  NEG      Leukocyte Esterase NEGATIVE  NEG      WBC 0-4 0 - 4 /hpf    RBC 5-10 0 - 5 /hpf    Epithelial cells MODERATE (A) FEW /lpf    Bacteria NEGATIVE  NEG /hpf    UA:UC IF INDICATED CULTURE NOT INDICATED BY UA RESULT CNI      Hyaline cast 5-10 0 - 5 /lpf

## 2018-04-30 NOTE — PROGRESS NOTES
TRANSFER - IN REPORT:    Verbal report received from francine (name) on Raoul Fudge  being received from ED (unit) for routine progression of care      Report consisted of patients Situation, Background, Assessment and   Recommendations(SBAR). Information from the following report(s) SBAR, Kardex, ED Summary, Procedure Summary, Intake/Output, MAR and Recent Results was reviewed with the receiving nurse. Opportunity for questions and clarification was provided. Assessment completed upon patients arrival to unit and care assumed.

## 2018-04-30 NOTE — IP AVS SNAPSHOT
[address] 
[phone] Patient: Bettie Cantu MRN: [id number] NZL:1[birth year]0/[birth year]20 About your hospitalization You were admitted on:  April 30, 2018 You last received care in the:  [facility] 2 CARDIOPULMONARY CARE You were discharged on:  May 11, 2018 Why you were hospitalized Your primary diagnosis was:  Acute Diastolic Chf (Congestive Heart Failure) ([facility]) Your diagnoses also included:  Rapid Atrial Fibrillation ([facility]), Nstemi (Non-St Elevated Myocardial Infarction) ([facility]), Copd, Hypertension, Ckd (Chronic Kidney Disease) Stage 3, Gfr 30-59 Ml/Min, Acute Respiratory Failure With Hypoxemia (Hcc), Hypokalemia, Lower Urinary Tract Infectious Disease Follow-up Information Follow up With Details Comments Contact Info Tashia Lopez MD Go on 5/16/2018 Hospital follow-up scheduled at 9:40am (If you have questions or need to reschedule please call [address] [facility] 
[phone] Discharge Orders None A check ujli indicates which time of day the medication should be taken. My Medications START taking these medications Instructions Each Dose to Equal  
 Morning Noon Evening Bedtime  
 amiodarone 100 mg tablet Commonly known as:  Saida Zhu Your last dose was: Your next dose is: Take 1 Tab by mouth daily. 100 mg  
    
   
   
   
  
 apixaban 2.5 mg tablet Commonly known as:  Steve Marsha Your last dose was: Your next dose is: Take 1 Tab by mouth two (2) times a day. 2.5 mg  
    
   
   
   
  
 aspirin 81 mg chewable tablet Replaces:  aspirin 81 mg tablet Your last dose was: Your next dose is: Take 1 Tab by mouth daily. 81 mg  
    
   
   
   
  
 furosemide 40 mg tablet Commonly known as:  LASIX Your last dose was: Your next dose is: Take 1 Tab by mouth two (2) times a day. 40 mg  
    
   
   
   
  
 metoprolol succinate 25 mg XL tablet Commonly known as:  TOPROL-XL Your last dose was: Your next dose is: Take 1 Tab by mouth daily. 25 mg  
    
   
   
   
  
 potassium chloride SR 10 mEq tablet Commonly known as:  KLOR-CON 10 Your last dose was: Your next dose is: Take 1 Tab by mouth two (2) times a day. 10 mEq CHANGE how you take these medications Instructions Each Dose to Equal  
 Morning Noon Evening Bedtime  
 albuterol-ipratropium 2.5 mg-0.5 mg/3 ml Nebu Commonly known as:  Ember Ramsay What changed:  Another medication with the same name was removed. Continue taking this medication, and follow the directions you see here. Your last dose was: Your next dose is:    
   
   
 3 mL by Nebulization route every four (4) hours as needed (Shortness of Breath). 3 mL CONTINUE taking these medications Instructions Each Dose to Equal  
 Morning Noon Evening Bedtime  
 albuterol 90 mcg/actuation inhaler Commonly known as:  PROVENTIL HFA, VENTOLIN HFA, PROAIR HFA Your last dose was: Your next dose is: Take 2 Puffs by inhalation every six (6) hours as needed for Wheezing. 2 Puff  
    
   
   
   
  
 amLODIPine 5 mg tablet Commonly known as:  Yoan Gold Your last dose was: Your next dose is: Take 5 mg by mouth daily. 5 mg  
    
   
   
   
  
 ammonium lactate 12 % lotion Commonly known as:  LAC-HYDRIN Your last dose was: Your next dose is:    
   
   
 Apply  to affected area every twelve (12) hours as needed (Apply to chest, back, arms as needed for eczema). rub in to affected area well  
     
   
   
   
  
 atorvastatin 40 mg tablet Commonly known as:  LIPITOR Your last dose was: Your next dose is: Take 1 Tab by mouth nightly. 40 mg CENTRUM SILVER ULTRA WOMEN'S PO Your last dose was: Your next dose is: Take 1 Tab by mouth daily. 1 Tab  
    
   
   
   
  
 cetirizine 10 mg tablet Commonly known as:  ZYRTEC Your last dose was: Your next dose is: Take 10 mg by mouth nightly. 10 mg  
    
   
   
   
  
 clobetasol 0.05 % topical cream  
Commonly known as:  Theopolis Rahul Your last dose was: Your next dose is:    
   
   
 Apply  to affected area every Monday, Wednesday, Friday. In the evening on Monday, Wednesday, and Friday  
     
   
   
   
  
 diphenhydrAMINE-zinc acetate 1%-0.1% topical cream  
Commonly known as:  BENADRYL Your last dose was: Your next dose is:    
   
   
 Apply  to affected area every six (6) hours as needed for Itching. DULCOLAX (BISACODYL) 10 mg suppository Generic drug:  bisacodyl Your last dose was: Your next dose is: Insert 10 mg into rectum as needed (Every 96 hours as needed for constipation). 10 mg  
    
   
   
   
  
 FLEET ENEMA 19-7 gram/118 mL enema Generic drug:  sodium phosphate Your last dose was: Your next dose is: Insert 1 Enema into rectum. Every 120 hours as needed for constipation if no results from suppository 1 Enema  
    
   
   
   
  
 guaiFENesin 100 mg/5 mL liquid Commonly known as:  ROBITUSSIN Your last dose was: Your next dose is: Take 200 mg by mouth every four (4) hours as needed for Cough. 200 mg LUBRISKIN Lotn lotion Generic drug:  emollient combination no. [facility] Your last dose was: Your next dose is:    
   
   
 Apply  to affected area every eight (8) hours as needed for Dry Skin (Apply to both hands). magnesium hydroxide 400 mg/5 mL suspension Commonly known as:  MILK OF MAGNESIA Your last dose was: Your next dose is: Take 10 mL by mouth every seventy-two (72) hours as needed for Constipation (If no BM in 3 days). 10 mL  
    
   
   
   
  
 olopatadine 0.1 % ophthalmic solution Commonly known as:  PATANOL Your last dose was: Your next dose is:    
   
   
 Administer 1 Drop to both eyes two (2) times a day. 1 Drop  
    
   
   
   
  
 oxybutynin chloride XL 5 mg CR tablet Commonly known as:  DITROPAN XL Your last dose was: Your next dose is: Take 5 mg by mouth daily. 5 mg PROBIOTIC 4X 10-15 mg Tbec Generic drug:  B.infantis-B.ani-B.long-B.bifi Your last dose was: Your next dose is: Take 1 Tab by mouth two (2) times a day. 1 Tab  
    
   
   
   
  
 promethazine 12.5 mg tablet Commonly known as:  PHENERGAN Your last dose was: Your next dose is: Take 12.5 mg by mouth every six (6) hours as needed for Nausea. 12.5 mg PROTONIX 20 mg tablet Generic drug:  pantoprazole Your last dose was: Your next dose is: Take 20 mg by mouth Before breakfast and dinner. 20 mg  
    
   
   
   
  
 RULOX 200-200-20 mg/5 mL Susp Generic drug:  alum-mag hydroxide-simeth Your last dose was: Your next dose is: Take 20 mL by mouth every four (4) hours as needed. 20 mL  
    
   
   
   
  
 tiotropium 18 mcg inhalation capsule Commonly known as:  [address] Your last dose was: Your next dose is: Take 1 Cap by inhalation daily. Indications: Chronic Obstructive Pulmonary Disease with Bronchospasms 1 Cap  
    
   
   
   
  
 triamcinolone 0.5 % topical cream  
Commonly known as:  ARISTOCORT Your last dose was: Your next dose is:    
   
   
 Apply  to affected area two (2) times daily as needed for Skin Irritation. use thin layer TYLENOL 325 mg tablet Generic drug:  acetaminophen Your last dose was: Your next dose is: Take 650 mg by mouth every four (4) hours as needed for Pain. 650 mg  
    
   
   
   
  
  
STOP taking these medications   
 aspirin 81 mg tablet Replaced by:  aspirin 81 mg chewable tablet  
   
  
 metoprolol tartrate 25 mg tablet Commonly known as:  LOPRESSOR  
   
  
 OYSTER SHELL CALCIUM-VIT D3 250-125 mg-unit tablet Generic drug:  calcium-vitamin D Where to Get Your Medications Information on where to get these meds will be given to you by the nurse or doctor. ! Ask your nurse or doctor about these medications  
  amiodarone 100 mg tablet  
 apixaban 2.5 mg tablet  
 atorvastatin 40 mg tablet  
 furosemide 40 mg tablet  
 metoprolol succinate 25 mg XL tablet  
 potassium chloride SR 10 mEq tablet  
 tiotropium 18 mcg inhalation capsule Discharge Instructions Weigh yourself Daily  Keep Log/Record Notify Dr. Kacy Connelly office for a weight gain of 3 pounds or greater in one day or 5 pounds in 5 days. Low Sodium Diet as per CHF Education [facility]. [address] [facility], [address] [place] [address], [address] [place] [address] 
[phone] Patient Discharge Instructions Steve Lr / [id number] : 1928 Admitted 2018 Discharged: 2018 Take Home Medications · It is important that you take the medication exactly as they are prescribed. · Keep your medication in the bottles provided by the pharmacist and keep a list of the medication names, dosages, and times to be taken in your wallet. · Do not take other medications without consulting your doctor. What to do at [facility] Recommended diet: Cardiac Diet,  
 
 Recommended activity: Activity as tolerated, Follow-up with Dr Radha Erickson if discharged from skilled nursing facility Information obtained by : 
I understand that if any problems occur once I am at home I am to contact my physician. I understand and acknowledge receipt of the instructions indicated above. Physician's or R.N.'s Signature                                                                  Date/Time Patient or Representative Signature                                                          Date/Time [employer] Announcement We are excited to announce that we are making your provider's discharge notes available to you in [employer]. You will see these notes when they are completed and signed by the physician that discharged you from your recent hospital stay. If you have any questions or concerns about any information you see in [employer], please call the Health Information Department where you were seen or reach out to your Primary Care Provider for more information about your plan of care. Introducing [facility]! Erik Parker introduces [employer] patient portal. Now you can access parts of your medical record, email your doctor's office, and request medication refills online. 1. In your internet browser, go to [email or web address] 2. Click on the First Time User? Click Here link in the Sign In box. You will see the New Member Sign Up page. 3. Enter your [employer] Access Code exactly as it appears below. You will not need to use this code after youve completed the sign-up process. If you do not sign up before the expiration date, you must request a new code. · [employer] Access Code: [email or web address] Expires: 7/29/2018 10:37 AM 
 
4. Enter the last four digits of your Social Security Number (xxxx) and Date of Birth (mm/dd/yyyy) as indicated and click Submit. You will be taken to the next sign-up page. 5. Create a [employer] ID. This will be your [employer] login ID and cannot be changed, so think of one that is secure and easy to remember. 6. Create a [employer] password. You can change your password at any time. 7. Enter your Password Reset Question and Answer. This can be used at a later time if you forget your password. 8. Enter your e-mail address. You will receive e-mail notification when new information is available in [address] 9. Click Sign Up. You can now view and download portions of your medical record. 10. Click the Download Summary menu link to download a portable copy of your medical information. If you have questions, please visit the Frequently Asked Questions section of the [employer] website. Remember, [employer] is NOT to be used for urgent needs. For medical emergencies, dial 911. Now available from your iPhone and Android! Introducing Nav Vences As a [employer] patient, I wanted to make you aware of our electronic visit tool called Nav Pranavnirajciara. [employer] 24/7 allows you to connect within minutes with a medical provider 24 hours a day, seven days a week via a mobile device or tablet or logging into a secure website from your computer. You can access Nav Vences from anywhere in the [place]. A virtual visit might be right for you when you have a simple condition and feel like you just dont want to get out of bed, or cant get away from work for an appointment, when your regular [employer] provider is not available (evenings, weekends or holidays), or when youre out of town and need minor care.   Electronic visits cost only $49 and if the [employer] [facility] 24/7 provider determines a prescription is needed to treat your condition, one can be electronically transmitted to a nearby pharmacy*. Please take a moment to enroll today if you have not already done so. The enrollment process is free and takes just a few minutes. To enroll, please download the [employer] 24/7 lionel to your tablet or phone, or visit [email or web address] to enroll on your computer. And, as an [address] [place] [address] patient with a [employer] account, the results of your visits will be scanned into your electronic medical record and your primary care provider will be able to view the scanned results. We urge you to continue to see your regular [employer] [facility] provider for your ongoing medical care. And while your primary care provider may not be the one available when you seek a mGenerator virtual visit, the peace of mind you get from getting a real diagnosis real time can be priceless. For more information on mGenerator, view our Frequently Asked Questions (FAQs) at [email or web address]. Sincerely, 
 
Channing Reese MD 
Chief Medical Officer [facility] Linda Peter *:  certain medications cannot be prescribed via mGenerator Providers Seen During Your Hospitalization Provider Specialty Primary office phone Josselin Amor DO Emergency Medicine [phone] [employer]. Ludwin Calle MD Emergency Medicine [phone] Darlene Quezada MD Hospitalist [phone] Tawnya Machado MD Internal Medicine [phone] Your Primary Care Physician (PCP) Primary Care Physician Office Phone Office Fax Jennifer Crooks [phone] You are allergic to the following Allergen Reactions Penicillins Swelling Sulfa (Sulfonamide Antibiotics) Itching Propoxyphene-Acetaminophen Unknown (comments) Pt. Not sure, thinks it was hallucination. Recent Documentation Height Weight BMI Smoking Status 1.626 m 63.5 kg 24.03 kg/m2 Former Smoker Emergency Contacts Name Discharge Info Relation Home Work Mobile [facility] N/A  AT THIS TIME [6] Child [2] [phone] [facility] N/A  AT THIS TIME [6] Child [2] [phone] Patient Belongings The following personal items are in your possession at time of discharge: 
     Visual Aid: Glasses, With patient Discharge Instructions Attachments/References HEART FAILURE: AVOIDING TRIGGERS (ENGLISH) HEART FAILURE: LIMITING SODIUM AND FLUIDS (ENGLISH) Patient Handouts Avoiding Triggers With Heart Failure: Care Instructions Your Care Instructions Triggers are anything that make your heart failure flare up. A flare-up is also called \"sudden heart failure\" or \"acute heart failure. \" When you have a flare-up, fluid builds up in your lungs, and you have problems breathing. You might need to go to the hospital. By watching for changes in your condition and avoiding triggers, you can prevent heart failure flare-ups. Follow-up care is a key part of your treatment and safety. Be sure to make and go to all appointments, and call your doctor if you are having problems. It's also a good idea to know your test results and keep a list of the medicines you take. How can you care for yourself at home? Watch for changes in your weight and condition · Weigh yourself without clothing at the same time each day. Record your weight. Call your doctor if you have sudden weight gain, such as more than 2 to 3 pounds in a day or 5 pounds in a week. (Your doctor may suggest a different range of weight gain.) A sudden weight gain may mean that your heart failure is getting worse. · Keep a daily record of your symptoms. Write down any changes in how you feel, such as new shortness of breath, cough, or problems eating.  Also record if your ankles are more swollen than usual and if you feel more tired than usual. Note anything that you ate or did that could have triggered these changes. Limit sodium Sodium causes your body to hold on to extra water. This may cause your heart failure symptoms to get worse. People get most of their sodium from processed foods. Fast food and restaurant meals also tend to be very high in sodium. · Your doctor may suggest that you limit sodium to 2,000 milligrams (mg) a day or less. That is less than 1 teaspoon of salt a day, including all the salt you eat in cooking or in packaged foods. · Read food labels on cans and food packages. They tell you how much sodium you get in one serving. Check the serving size. If you eat more than one serving, you are getting more sodium. · Be aware that sodium can come in forms other than salt, including monosodium glutamate (MSG), sodium citrate, and sodium bicarbonate (baking soda). MSG is often added to Asian food. You can sometimes ask for food without MSG or salt. · Slowly reducing salt will help you adjust to the taste. Take the salt shaker off the table. · Flavor your food with garlic, lemon juice, onion, vinegar, herbs, and spices instead of salt. Do not use soy sauce, steak sauce, onion salt, garlic salt, mustard, or ketchup on your food, unless it is labeled \"low-sodium\" or \"low-salt. \" 
· Make your own salad dressings, sauces, and ketchup without adding salt. · Use fresh or frozen ingredients, instead of canned ones, whenever you can. Choose low-sodium canned goods. · Eat less processed food and food from restaurants, including fast food. Exercise as directed Moderate, regular exercise is very good for your heart. It improves your blood flow and helps control your weight. But too much exercise can stress your heart and cause a heart failure flare-up.  
· Check with your doctor before you start an exercise program. 
 · Walking is an easy way to get exercise. Start out slowly. Gradually increase the length and pace of your walk. Swimming, riding a bike, and using a treadmill are also good forms of exercise. · When you exercise, watch for signs that your heart is working too hard. You are pushing yourself too hard if you cannot talk while you are exercising. If you become short of breath or dizzy or have chest pain, stop, sit down, and rest. 
· Do not exercise when you do not feel well. Take medicines correctly · Take your medicines exactly as prescribed. Call your doctor if you think you are having a problem with your medicine. · Make a list of all the medicines you take. Include those prescribed to you by other doctors and any over-the-counter medicines, vitamins, or supplements you take. Take this list with you when you go to any doctor. · Take your medicines at the same time every day. It may help you to post a list of all the medicines you take every day and what time of day you take them. · Make taking your medicine as simple as you can. Plan times to take your medicines when you are doing other things, such as eating a meal or getting ready for bed. This will make it easier to remember to take your medicines. · Get organized. Use helpful tools, such as daily or weekly pill containers. When should you call for help? Call 911 if you have symptoms of sudden heart failure such as: 
? · You have severe trouble breathing. ? · You cough up pink, foamy mucus. ? · You have a new irregular or rapid heartbeat. ?Call your doctor now or seek immediate medical care if: 
? · You have new or increased shortness of breath. ? · You are dizzy or lightheaded, or you feel like you may faint. ? · You have sudden weight gain, such as more than 2 to 3 pounds in a day or 5 pounds in a week. (Your doctor may suggest a different range of weight gain.) ? · You have increased swelling in your legs, ankles, or feet. ? · You are suddenly so tired or weak that you cannot do your usual activities. ? Watch closely for changes in your health, and be sure to contact your doctor if you develop new symptoms. Where can you learn more? Go to http://yani-jori.info/. Enter R651 in the search box to learn more about \"Avoiding Triggers With Heart Failure: Care Instructions. \" Current as of: September 21, 2016 Content Version: 11.4 © 9136-0856 Serge[employer]. Care instructions adapted under license by [employer] (which disclaims liability or warranty for this information). If you have questions about a medical condition or this instruction, always ask your healthcare professional. [address] any warranty or liability for your use of this information. Limiting Sodium and Fluids With Heart Failure: Care Instructions Your Care Instructions Sodium causes your body to hold on to extra water. This may cause your heart failure symptoms to get worse. Limiting sodium may help you feel better and lower your risk of having to go to the hospital. 
People get most of their sodium from processed foods. Fast food and restaurant meals also tend to be very high in sodium. Your doctor may suggest that you limit sodium to 2,000 milligrams (mg) a day or less. That is less than 1 teaspoon of salt a day, including all the salt you eat in cooked or packaged foods. Usually, you have to limit the amount of liquids you drink only if your heart failure is severe. Limiting sodium alone often is enough to help your body get rid of extra fluids. However, your doctor may tell you to limit your fluid intake to a set amount each day. Follow-up care is a key part of your treatment and safety. Be sure to make and go to all appointments, and call your doctor if you are having problems. It's also a good idea to know your test results and keep a list of the medicines you take. How can you care for yourself at home? Read food labels · Read food labels on cans and food packages. The labels tell you how much sodium is in each serving. Make sure that you look at the serving size. If you eat more than the serving size, you have eaten more sodium than is listed for one serving. · Food labels also tell you the Percent Daily Value. If the Percent Daily Value says 50%, it means that you will get at least 50% of all the sodium you need for the entire day in one serving. Choose products with low Percent Daily Values for sodium. · Be aware that sodium can come in forms other than salt, including monosodium glutamate (MSG), sodium citrate, and sodium bicarbonate (baking soda). MSG is often added to Asian food. You can sometimes ask for food without MSG or salt. Buy low-sodium foods · Buy foods that are labeled \"unsalted\" (no salt added), \"sodium-free\" (less than 5 mg of sodium per serving), or \"low-sodium\" (less than 140 mg of sodium per serving). A food labeled \"light sodium\" has less than half of the full-sodium version of that food. Foods labeled \"reduced-sodium\" may still have too much sodium. · Buy fresh vegetables or plain, frozen vegetables. Buy low-sodium versions of canned vegetables, soups, and other canned goods. Prepare low-sodium meals · Use less salt each day when cooking. Reducing salt in this way will help you adjust to the taste. Do not add salt after cooking. Take the salt shaker off the table. · Flavor your food with garlic, lemon juice, onion, vinegar, herbs, and spices instead of salt. Do not use soy sauce, steak sauce, onion salt, garlic salt, mustard, or ketchup on your food. · Make your own salad dressings, sauces, and ketchup without adding salt. · Use less salt (or none) when recipes call for it. You can often use half the salt a recipe calls for without losing flavor. Other dishes like rice, pasta, and grains do not need added salt. · Rinse canned vegetables. This removes some-but not all-of the salt. · Avoid water that has a naturally high sodium content or that has been treated with water softeners, which add sodium. Call your local water company to find out the sodium content of your water supply. If you buy bottled water, read the label and choose a sodium-free brand. Avoid high-sodium foods, such as: 
· Smoked, cured, salted, and canned meat, fish, and poultry. · Ham, nesbitt, hot dogs, and luncheon meats. · Regular, hard, and processed cheese and regular peanut butter. · Crackers with salted tops. · Frozen prepared meals. · Canned and dried soups, broths, and bouillon, unless labeled sodium-free or low-sodium. · Canned vegetables, unless labeled sodium-free or low-sodium. · Salted snack foods such as chips and pretzels. · [place] fries, pizza, tacos, and other fast foods. · Pickles, olives, ketchup, and other condiments, especially soy sauce, unless labeled sodium-free or low-sodium. If you cannot cook for yourself · Have family members or friends help you, or have someone cook low-sodium meals. · Check with your local senior nutrition program to find out where meals are served and whether they offer a low-sodium option. You can often find these programs through your local health department or hospital. 
· Have meals delivered to your home. Most [facility] have a Meals on [employer]. These programs provide one hot meal a day for older adults, delivered to their homes. Ask whether these meals are low-sodium. Let them know that you are on a low-sodium diet. Limiting fluid intake · Find a method that works for you. You might simply write down how much you drink every time you do. Some people keep a container filled with the amount of fluid allowed for that day. If they drink from a source other than the container, then they pour out that amount.  
· Measure your regular drinking glasses to find out how much fluid each one holds. Once you know this, you will not have to measure every time. · Besides water, milk, juices, and other drinks, some foods have a lot of fluid. Count any foods that will melt (such as ice cream or gelatin dessert) or liquid foods (such as soup) as part of your fluid intake for the day. Where can you learn more? Go to http://yani-jori.info/. Enter A166 in the search box to learn more about \"Limiting Sodium and Fluids With Heart Failure: Care Instructions. \" Current as of: September 21, 2016 Content Version: 11.4 © 0069-3901 [employer]. Care instructions adapted under license by [employer] (which disclaims liability or warranty for this information). If you have questions about a medical condition or this instruction, always ask your healthcare professional. [address] any warranty or liability for your use of this information. Please provide this summary of care documentation to your next provider. Signatures-by signing, you are acknowledging that this After Visit Summary has been reviewed with you and you have received a copy. Patient Signature:  ____________________________________________________________ Date:  ____________________________________________________________  
  
[facility] Provider Signature:  ____________________________________________________________ Date:  ____________________________________________________________

## 2018-04-30 NOTE — ED PROVIDER NOTES
EMERGENCY DEPARTMENT HISTORY AND PHYSICAL EXAM      Date: 4/30/2018  Patient Name: Douglas Landry    History of Presenting Illness     Chief Complaint   Patient presents with    Respiratory Distress     x3 hours. 70% on room air. 2 duonebs given POA       History Provided By: Patient, Patient's Daughter and EMS    HPI: Douglas Landry, 80 y.o. female with PMHx significant for COPD and stroke, presents via EMS to the ED with cc of SOB x 2-3 days. Pt reports her symptoms are exacerbated by exertion. She also c/o palpitations since onset. Per EMS, pt had SpO2 in 70s at time of their arrival. Daughter states pt does not use O2 at baseline, but notes \"her oxygen has been dropping for a few days\" per nursing staff at [facility]. Pt denies orthopnea since onset, and denies pain with inspiration. Daughter states pt does not have hx of MI or stent. Pt denies hx of DM. Per daughter, pt has difficulty ambulating at baseline. Pt specifically denies CP, chest pressure, jaw pain, ABD pain, nausea, vomiting, or chills. Per daughter, pt is former smoker. There are no other complaints, changes, or physical findings at this time.     PCP: Toshia Garza MD    Current Facility-Administered Medications   Medication Dose Route Frequency Provider Last Rate Last Dose    furosemide (LASIX) injection 40 mg  40 mg IntraVENous DAILY Toshia Garza MD        potassium chloride (K-DUR, KLOR-CON) SR tablet 40 mEq  40 mEq Oral NOW Kamari Carranza MD        [START ON 5/2/2018] potassium chloride SR (KLOR-CON 10) tablet 10 mEq  10 mEq Oral DAILY Josué Shah MD        amiodarone (CORDARONE) tablet 200 mg  200 mg Oral TID Kamari Carranza MD   200 mg at 04/30/18 2131    [START ON 5/3/2018] amiodarone (CORDARONE) tablet 100 mg  100 mg Oral DAILY Josué Shah MD        metoprolol tartrate (LOPRESSOR) tablet 25 mg  25 mg Oral BID Josué Shah MD   25 mg at 04/30/18 1857    albuterol (PROVENTIL HFA, VENTOLIN HFA, PROAIR HFA) inhaler 2 Puff  2 Puff Inhalation Q4H PRN Cindi Dick MD        amLODIPine (NORVASC) tablet 2.5 mg  2.5 mg Oral DAILY Cindi Dick MD        aspirin chewable tablet 81 mg  81 mg Oral DAILY Cindi Dick MD        atorvastatin (LIPITOR) tablet 40 mg  40 mg Oral QHS Cindi Dick MD   40 mg at 04/30/18 2131    clopidogrel (PLAVIX) tablet 75 mg  75 mg Oral DAILY Cindi Dick MD        pantoprazole (PROTONIX) granules for oral suspension 40 mg  40 mg Oral DAILY Cindi Dick MD        umeclidinium (INCRUSE ELLIPTA) 62.5 mcg/actuation  1 Puff Inhalation DAILY Cindi Dick MD        sodium chloride (NS) flush 5-10 mL  5-10 mL IntraVENous Q8H Cindi Dick MD   10 mL at 05/01/18 0556    sodium chloride (NS) flush 5-10 mL  5-10 mL IntraVENous PRN Cindi Dick MD        acetaminophen (TYLENOL) tablet 650 mg  650 mg Oral Q4H PRN Cindi Dick MD        ondansetron [facility]) injection 4 mg  4 mg IntraVENous Q4H PRN Cindi Dick MD        heparin (porcine) injection 5,000 Units  5,000 Units SubCUTAneous Q12H Cindi Dick MD   5,000 Units at 05/01/18 0556    polyethylene glycol (MIRALAX) packet 17 g  17 g Oral DAILY Cindi Dick MD        oxybutynin chloride XL (DITROPAN XL) tablet 15 mg  15 mg Oral DAILY Cindi Dick MD           Past History     Past Medical History:  Past Medical History:   Diagnosis Date    Arthritis     generalized    COPD     GERD (gastroesophageal reflux disease)     Hypertension     Ill-defined condition     Vitamin deficiency    Other ill-defined conditions(799.89)     high cholesterol    Peripheral artery disease ([facility]     Psychiatric disorder     Anxiety disorder    Sleep disorder     Insomnia    TIA (transient ischemic attack)        Past Surgical History:  Past Surgical History:   Procedure Laterality Date    HX CAROTID ENDARTERECTOMY      left 2 or 3 years ago.     HX OTHER SURGICAL      colonoscopy    HX TONSILLECTOMY         Family History:  Family History   Problem Relation Age of Onset    Other Mother      carotid endarterectomy/cardiomegaly    Other Father      carotid endarterectomy       Social History:  Social History   Substance Use Topics    Smoking status: Former Smoker    Smokeless tobacco: None      Comment: quit 3 years ago    Alcohol use Yes      Comment: glass of wine rarely       Allergies: Allergies   Allergen Reactions    Penicillins Swelling    Sulfa (Sulfonamide Antibiotics) Itching    Propoxyphene-Acetaminophen Unknown (comments)     Pt. Not sure, thinks it was hallucination. Review of Systems   Review of Systems   Constitutional: Negative for chills and fever. HENT: Negative for congestion.         -jaw pain   Eyes: Negative for visual disturbance. Respiratory: Positive for shortness of breath. Negative for chest tightness. -orthopnea   Cardiovascular: Negative for chest pain and leg swelling. Gastrointestinal: Negative for abdominal pain, nausea and vomiting. Endocrine: Negative for polyuria. Genitourinary: Negative for dysuria and frequency. Musculoskeletal: Negative for myalgias. Skin: Negative for color change. Allergic/Immunologic: Negative for immunocompromised state. Neurological: Negative for numbness. Physical Exam   Physical Exam     Nursing note and vitals reviewed.   General appearance: mild distress  Eyes: PERRL, EOMI, conjunctiva normal, anicteric sclera  HEENT: dentures in place, mucous membranes dry, oropharynx is clear; no JVD  Pulmonary: mild respiratory distress, tachypneic, on NRB; bibasilar crackles; diffuse expiratory wheeze; prolonged expiratory phase; slight dullness to percussion in B/L bases  Cardiac: tachycardic, irregularly irregular, no murmurs, gallops, or rubs, 2+DP pulses, 2+ radial pulses  Abdomen: soft, minimal nonfocal TTP; slightly distended; mild tympany; bowel sounds present  MSK: 1+ edema B/L lower extremities  Neuro: Alert, answers questions appropriately  Skin: capillary refill brisk; healing RLE abrasions      Diagnostic Study Results     Labs -     Recent Results (from the past 12 hour(s))   EKG, 12 LEAD, INITIAL    Collection Time: 04/30/18  9:34 AM   Result Value Ref Range    Ventricular Rate 138 BPM    Atrial Rate 127 BPM    QRS Duration 76 ms    Q-T Interval 302 ms    QTC Calculation (Bezet) 457 ms    Calculated R Axis -26 degrees    Calculated T Axis 32 degrees    Diagnosis       Atrial fibrillation with rapid ventricular response  Abnormal ECG  When compared with ECG of 18-FEB-2015 11:14,  Atrial fibrillation has replaced Sinus rhythm  Vent. rate has increased BY  79 BPM  Confirmed by Nida Dancer ([id number]) on 4/30/2018 1:46:13 PM     CBC WITH AUTOMATED DIFF    Collection Time: 04/30/18  9:47 AM   Result Value Ref Range    WBC 10.6 3.6 - 11.0 K/uL    RBC 4.22 3.80 - 5.20 M/uL    HGB 12.6 11.5 - 16.0 g/dL    HCT 38.5 35.0 - 47.0 %    MCV 91.2 80.0 - 99.0 FL    MCH 29.9 26.0 - 34.0 PG    MCHC 32.7 30.0 - 36.5 g/dL    RDW 13.5 11.5 - 14.5 %    PLATELET 588 348 - 612 K/uL    MPV 10.9 8.9 - 12.9 FL    NRBC 0.0 0  WBC    ABSOLUTE NRBC 0.00 0.00 - 0.01 K/uL    NEUTROPHILS 83 (H) 32 - 75 %    LYMPHOCYTES 8 (L) 12 - 49 %    MONOCYTES 8 5 - 13 %    EOSINOPHILS 0 0 - 7 %    BASOPHILS 1 0 - 1 %    IMMATURE GRANULOCYTES 1 (H) 0.0 - 0.5 %    ABS. NEUTROPHILS 8.8 (H) 1.8 - 8.0 K/UL    ABS. LYMPHOCYTES 0.9 0.8 - 3.5 K/UL    ABS. MONOCYTES 0.8 0.0 - 1.0 K/UL    ABS. EOSINOPHILS 0.0 0.0 - 0.4 K/UL    ABS. BASOPHILS 0.1 0.0 - 0.1 K/UL    ABS. IMM.  GRANS. 0.1 (H) 0.00 - 0.04 K/UL    DF AUTOMATED     METABOLIC PANEL, COMPREHENSIVE    Collection Time: 04/30/18  9:47 AM   Result Value Ref Range    Sodium 140 136 - 145 mmol/L    Potassium 4.1 3.5 - 5.1 mmol/L    Chloride 103 97 - 108 mmol/L    CO2 28 21 - 32 mmol/L    Anion gap 9 5 - 15 mmol/L    Glucose 141 (H) 65 - 100 mg/dL    BUN 25 (H) 6 - 20 MG/DL    Creatinine 1.45 (H) 0.55 - 1.02 MG/DL    BUN/Creatinine ratio 17 12 - 20      GFR est AA 41 (L) >60 ml/min/1.73m2    GFR est non-AA 34 (L) >60 ml/min/1.73m2    Calcium 9.8 8.5 - 10.1 MG/DL    Bilirubin, total 1.1 (H) 0.2 - 1.0 MG/DL    ALT (SGPT) 23 12 - 78 U/L    AST (SGOT) 28 15 - 37 U/L    Alk.  phosphatase 67 45 - 117 U/L    Protein, total 7.6 6.4 - 8.2 g/dL    Albumin 3.3 (L) 3.5 - 5.0 g/dL    Globulin 4.3 (H) 2.0 - 4.0 g/dL    A-G Ratio 0.8 (L) 1.1 - 2.2     CK W/ REFLX CKMB    Collection Time: 04/30/18  9:47 AM   Result Value Ref Range     26 - 192 U/L   TROPONIN I    Collection Time: 04/30/18  9:47 AM   Result Value Ref Range    Troponin-I, Qt. 0.33 (H) <0.05 ng/mL   NT-PRO BNP    Collection Time: 04/30/18  9:47 AM   Result Value Ref Range    NT pro-BNP >38084 (H) 0 - 450 PG/ML   MAGNESIUM    Collection Time: 04/30/18  9:47 AM   Result Value Ref Range    Magnesium 2.2 1.6 - 2.4 mg/dL   TSH 3RD GENERATION    Collection Time: 04/30/18  9:47 AM   Result Value Ref Range    TSH 1.38 0.36 - 3.74 uIU/mL   URINALYSIS W/ REFLEX CULTURE    Collection Time: 04/30/18 10:21 AM   Result Value Ref Range    Color YELLOW/STRAW      Appearance CLOUDY (A) CLEAR      Specific gravity 1.017 1.003 - 1.030      pH (UA) 6.0 5.0 - 8.0      Protein 30 (A) NEG mg/dL    Glucose NEGATIVE  NEG mg/dL    Ketone NEGATIVE  NEG mg/dL    Bilirubin NEGATIVE  NEG      Blood NEGATIVE  NEG      Urobilinogen 0.2 0.2 - 1.0 EU/dL    Nitrites NEGATIVE  NEG      Leukocyte Esterase NEGATIVE  NEG      WBC 0-4 0 - 4 /hpf    RBC 5-10 0 - 5 /hpf    Epithelial cells MODERATE (A) FEW /lpf    Bacteria NEGATIVE  NEG /hpf    UA:UC IF INDICATED CULTURE NOT INDICATED BY UA RESULT CNI      Hyaline cast 5-10 0 - 5 /lpf       Radiologic Studies -   DUPLEX LOWER EXT VENOUS BILAT   Final Result   INDICATION:  Bilateral leg swelling and pain, DVT suspected     COMPARISON: None.     FINDINGS: Duplex Doppler sonography of the right and left lower extremities was  performed from the groin to the calf. The right and left common femoral, femoral  and popliteal veins are compressible with normal color-flow and wave forms and  response to physiologic maneuvers including Valsalva and augmentation.     IMPRESSION  IMPRESSION:  No deep venous thrombosis identified. CXR Results  (Last 48 hours)               04/30/18 1007  XR CHEST PORT Final result    Impression:  IMPRESSION: Mild interstitial edema and small pleural effusions. Narrative:  EXAM:  XR CHEST PORT. INDICATION: Shortness of breath. COMPARISON: 2/18/2015. FINDINGS:    A portable AP radiograph of the chest was obtained at 1002 hours. Lines and tubes: The patient is on a cardiac monitor. Lungs: There is mild interstitial disease throughout the lungs there is mild   interstitial edema throughout the lungs. Pleura: There are small pleural effusions with some atelectasis at the lung   bases. Mediastinum: The cardiac silhouette is enlarged and unchanged. The aorta is   atherosclerotic and tortuous. Bones and soft tissues: The bones and soft tissues are grossly within normal   limits. Medical Decision Making   I am the first provider for this patient. I reviewed the vital signs, available nursing notes, past medical history, past surgical history, family history and social history. Vital Signs-Reviewed the patient's vital signs.   Patient Vitals for the past 12 hrs:   Temp Pulse Resp BP SpO2   04/30/18 1410 - 73 28 - 95 %   04/30/18 1400 - 74 25 173/83 96 %   04/30/18 1300 - 93 28 170/82 95 %   04/30/18 1200 - 95 (!) 37 (!) 166/100 95 %   04/30/18 1140 - 92 - (!) 145/99 -   04/30/18 1135 - (!) 105 30 - 93 %   04/30/18 1130 - (!) 147 (!) 35 (!) 145/99 94 %   04/30/18 1054 - (!) 135 - (!) 162/104 -   04/30/18 1030 - (!) 141 25 (!) 162/104 97 %   04/30/18 1028 99.3 °F (37.4 °C) - - - -   04/30/18 0951 - (!) 142 24 - 96 %   04/30/18 0947 - (!) 138 23 - (!) 85 %   04/30/18 0937 98.7 °F (37.1 °C) (!) 152 (!) 36 (!) 157/102 (!) 76 %       Pulse Oximetry Analysis - 96% on 10 L NRB    Cardiac Monitor:   Rate: 138 bpm  Rhythm: Atrial Fibrillation      EKG interpretation:  09:34  Rhythm: A-fib with RVR. Rate (approx.): 138; Axis: LAD; QRS interval: normal ; ST/T wave: no ST elevation, no ST depression;   QRS 76 ms,  ms, QTc 457 ms. Records Reviewed: Nursing Notes and Old Medical Records    Provider Notes (Medical Decision Making):     DDx: PNA, bronchitis, COPD flare, CHF, dehydration, metabolic disorder    Ventricular rate control w/ diltiazem. Asa for elevated trop, nitro paste and lasix for pulmonary edema. ED Course:   Initial assessment performed. The patients presenting problems have been discussed, and they are in agreement with the care plan formulated and outlined with them. I have encouraged them to ask questions as they arise throughout their visit. 10:39 AM  BP elevated, HR elevated, start diltiazem drip; CXR reviewed, positive edema, will apply nitro paste. Consult Note:  11:33 AM  Nidhi Cooper. Nicole Hennessy MD spoke with Buck Patino MD  Specialty: Cardiology  Discussed pts hx, disposition, and available diagnostic and imaging results. Reviewed care plans. Consultant agrees with plans as outlined. He will see pt in ED, agrees with diltiazem, and recommends admission to Hospitalist. Avoiding beta blocker given bronchospasm. ? New onset CHF also    11:48 AM  HR improved to 90-100s on diltiazem 7.5 mg drip. CONSULT NOTE:   12:00 PM  Nidhi Cooper. Nicole Hennessy MD spoke with Isabella Mason MD,   Specialty: Hospitalist  Discussed pt's hx, disposition, and available diagnostic and imaging results. Reviewed care plans. Consultant will evaluate pt for admission.     Critical Care Time:   CRITICAL CARE NOTE :    IMPENDING DETERIORATION -Respiratory and Cardiovascular  ASSOCIATED RISK FACTORS - Hypoxia and Dysrhythmia  MANAGEMENT- Bedside Assessment and Supervision of Care  INTERPRETATION -  Xrays, ECG and Blood Pressure  INTERVENTIONS - O2, preload reduction, diuretics, ventricular rate control  CASE REVIEW - Hospitalist, Medical Sub-Specialist, Nursing and Family  TREATMENT RESPONSE -Improved and Stable  PERFORMED BY - Self    I have spent 45 minutes of critical care time involved in lab review, consultations with specialist, family decision- making, bedside attention and documentation. During this entire length of time I was immediately available to the patient . Doris Beard. Niles Blizzard, MD    Disposition:  ADMIT NOTE:  12:00 PM  The patient is being admitted to the hospital.  The results of their tests and reasons for their admission have been discussed with the patient and/or available family. They convey agreement and understanding for the need to be admitted and for their admission diagnosis. PLAN:  1. Admit to Hospitalist    Diagnosis     Clinical Impression:   1. Atrial fibrillation with rapid ventricular response ([address]    2. Acute systolic congestive heart failure ([address]        Attestations: This note is prepared by Arik Crane, acting as Scribe for Doris Beard. Niles Blizzard, MD.    Doris Beard. Niles Blizzard, MD: The scribe's documentation has been prepared under my direction and personally reviewed by me in its entirety. I confirm that the note above accurately reflects all work, treatment, procedures, and medical decision making performed by me.

## 2018-04-30 NOTE — ED NOTES
Ultrasound called to complete patient's duplex. Ultrasound informed that the test needs to be done bedside.

## 2018-04-30 NOTE — ED NOTES
Called echo to check on status of patient's test. Echo tech states she is the only one over there and she will get to it when she can.

## 2018-04-30 NOTE — ED NOTES
Patient's bladder emptied of 425ml via straight catheter by this RN. Patient's incontinence brief remains dry. PureWick reapplied to patient at this time.

## 2018-04-30 NOTE — PROGRESS NOTES
Primary Nurse Damian Zhu RN and mark RN performed a dual skin assessment on this patient Impairment noted- see wound doc flow sheet    Pt has bruising and scabbing on RLE  Toes are pink and blanching    Rosas score is 14

## 2018-04-30 NOTE — ED NOTES
Spoke with MD Garrett Kaur about a standing order for obtaining urine via straight cath or gonsalves catheter insertion. MD Garrett Kaur states \"If she has a post void residual greater than 200, gonsalves catheter can be inserted. \"

## 2018-05-01 PROBLEM — I21.4 NSTEMI (NON-ST ELEVATED MYOCARDIAL INFARCTION) (HCC): Status: ACTIVE | Noted: 2018-05-01

## 2018-05-01 PROBLEM — I10 HYPERTENSION: Status: ACTIVE | Noted: 2018-05-01

## 2018-05-01 PROBLEM — N18.30 CKD (CHRONIC KIDNEY DISEASE) STAGE 3, GFR 30-59 ML/MIN (HCC): Status: ACTIVE | Noted: 2018-05-01

## 2018-05-01 PROBLEM — I50.31 ACUTE DIASTOLIC CHF (CONGESTIVE HEART FAILURE) (HCC): Status: ACTIVE | Noted: 2018-05-01

## 2018-05-01 LAB
ALBUMIN SERPL-MCNC: 2.9 G/DL (ref 3.5–5)
ALBUMIN/GLOB SERPL: 0.8 {RATIO} (ref 1.1–2.2)
ALP SERPL-CCNC: 59 U/L (ref 45–117)
ALT SERPL-CCNC: 18 U/L (ref 12–78)
ANION GAP SERPL CALC-SCNC: 7 MMOL/L (ref 5–15)
AST SERPL-CCNC: 22 U/L (ref 15–37)
ATRIAL RATE: 73 BPM
BASOPHILS # BLD: 0.1 K/UL (ref 0–0.1)
BASOPHILS NFR BLD: 1 % (ref 0–1)
BILIRUB SERPL-MCNC: 0.9 MG/DL (ref 0.2–1)
BUN SERPL-MCNC: 27 MG/DL (ref 6–20)
BUN/CREAT SERPL: 21 (ref 12–20)
CALCIUM SERPL-MCNC: 8.5 MG/DL (ref 8.5–10.1)
CALCULATED R AXIS, ECG10: -8 DEGREES
CALCULATED T AXIS, ECG11: 7 DEGREES
CHLORIDE SERPL-SCNC: 102 MMOL/L (ref 97–108)
CK SERPL-CCNC: 138 U/L (ref 26–192)
CO2 SERPL-SCNC: 30 MMOL/L (ref 21–32)
CREAT SERPL-MCNC: 1.29 MG/DL (ref 0.55–1.02)
DIAGNOSIS, 93000: NORMAL
DIFFERENTIAL METHOD BLD: ABNORMAL
EOSINOPHIL # BLD: 0.2 K/UL (ref 0–0.4)
EOSINOPHIL NFR BLD: 2 % (ref 0–7)
ERYTHROCYTE [DISTWIDTH] IN BLOOD BY AUTOMATED COUNT: 13.4 % (ref 11.5–14.5)
GLOBULIN SER CALC-MCNC: 3.8 G/DL (ref 2–4)
GLUCOSE SERPL-MCNC: 91 MG/DL (ref 65–100)
HCT VFR BLD AUTO: 34.4 % (ref 35–47)
HGB BLD-MCNC: 11.1 G/DL (ref 11.5–16)
IMM GRANULOCYTES # BLD: 0 K/UL (ref 0–0.04)
IMM GRANULOCYTES NFR BLD AUTO: 0 % (ref 0–0.5)
LYMPHOCYTES # BLD: 1.2 K/UL (ref 0.8–3.5)
LYMPHOCYTES NFR BLD: 13 % (ref 12–49)
MCH RBC QN AUTO: 29.6 PG (ref 26–34)
MCHC RBC AUTO-ENTMCNC: 32.3 G/DL (ref 30–36.5)
MCV RBC AUTO: 91.7 FL (ref 80–99)
MONOCYTES # BLD: 0.8 K/UL (ref 0–1)
MONOCYTES NFR BLD: 9 % (ref 5–13)
NEUTS SEG # BLD: 6.9 K/UL (ref 1.8–8)
NEUTS SEG NFR BLD: 76 % (ref 32–75)
NRBC # BLD: 0 K/UL (ref 0–0.01)
NRBC BLD-RTO: 0 PER 100 WBC
P-R INTERVAL, ECG05: 152 MS
PLATELET # BLD AUTO: 265 K/UL (ref 150–400)
PMV BLD AUTO: 10.6 FL (ref 8.9–12.9)
POTASSIUM SERPL-SCNC: 3.2 MMOL/L (ref 3.5–5.1)
PROT SERPL-MCNC: 6.7 G/DL (ref 6.4–8.2)
Q-T INTERVAL, ECG07: 448 MS
QRS DURATION, ECG06: 82 MS
QTC CALCULATION (BEZET), ECG08: 493 MS
RBC # BLD AUTO: 3.75 M/UL (ref 3.8–5.2)
SODIUM SERPL-SCNC: 139 MMOL/L (ref 136–145)
TROPONIN I SERPL-MCNC: 0.28 NG/ML
VENTRICULAR RATE, ECG03: 73 BPM
WBC # BLD AUTO: 9.1 K/UL (ref 3.6–11)

## 2018-05-01 PROCEDURE — 36415 COLL VENOUS BLD VENIPUNCTURE: CPT | Performed by: INTERNAL MEDICINE

## 2018-05-01 PROCEDURE — 77030038269 HC DRN EXT URIN PURWCK BARD -A

## 2018-05-01 PROCEDURE — 74011250636 HC RX REV CODE- 250/636: Performed by: INTERNAL MEDICINE

## 2018-05-01 PROCEDURE — 51798 US URINE CAPACITY MEASURE: CPT

## 2018-05-01 PROCEDURE — 84484 ASSAY OF TROPONIN QUANT: CPT | Performed by: INTERNAL MEDICINE

## 2018-05-01 PROCEDURE — G8988 SELF CARE GOAL STATUS: HCPCS

## 2018-05-01 PROCEDURE — 97535 SELF CARE MNGMENT TRAINING: CPT

## 2018-05-01 PROCEDURE — 74011250637 HC RX REV CODE- 250/637: Performed by: INTERNAL MEDICINE

## 2018-05-01 PROCEDURE — 85025 COMPLETE CBC W/AUTO DIFF WBC: CPT | Performed by: INTERNAL MEDICINE

## 2018-05-01 PROCEDURE — 97165 OT EVAL LOW COMPLEX 30 MIN: CPT

## 2018-05-01 PROCEDURE — G8987 SELF CARE CURRENT STATUS: HCPCS

## 2018-05-01 PROCEDURE — 80053 COMPREHEN METABOLIC PANEL: CPT | Performed by: INTERNAL MEDICINE

## 2018-05-01 PROCEDURE — 97530 THERAPEUTIC ACTIVITIES: CPT | Performed by: PHYSICAL THERAPIST

## 2018-05-01 PROCEDURE — 93306 TTE W/DOPPLER COMPLETE: CPT

## 2018-05-01 PROCEDURE — 82550 ASSAY OF CK (CPK): CPT | Performed by: INTERNAL MEDICINE

## 2018-05-01 PROCEDURE — 65660000000 HC RM CCU STEPDOWN

## 2018-05-01 PROCEDURE — 97161 PT EVAL LOW COMPLEX 20 MIN: CPT | Performed by: PHYSICAL THERAPIST

## 2018-05-01 PROCEDURE — 77010033678 HC OXYGEN DAILY

## 2018-05-01 RX ORDER — MAG HYDROX/ALUMINUM HYD/SIMETH 200-200-20
20 SUSPENSION, ORAL (FINAL DOSE FORM) ORAL
COMMUNITY
End: 2019-01-21

## 2018-05-01 RX ORDER — POTASSIUM CHLORIDE 20 MEQ/1
40 TABLET, EXTENDED RELEASE ORAL
Status: COMPLETED | OUTPATIENT
Start: 2018-05-01 | End: 2018-05-01

## 2018-05-01 RX ORDER — PROMETHAZINE HYDROCHLORIDE 12.5 MG/1
12.5 TABLET ORAL
COMMUNITY
End: 2019-08-19

## 2018-05-01 RX ORDER — IPRATROPIUM BROMIDE AND ALBUTEROL SULFATE 2.5; .5 MG/3ML; MG/3ML
3 SOLUTION RESPIRATORY (INHALATION)
COMMUNITY
End: 2019-08-19

## 2018-05-01 RX ORDER — AMMONIUM LACTATE 12 G/100G
LOTION TOPICAL
COMMUNITY

## 2018-05-01 RX ORDER — FUROSEMIDE 10 MG/ML
40 INJECTION INTRAMUSCULAR; INTRAVENOUS DAILY
Status: DISCONTINUED | OUTPATIENT
Start: 2018-05-01 | End: 2018-05-03

## 2018-05-01 RX ORDER — TRIAMCINOLONE ACETONIDE 5 MG/G
CREAM TOPICAL
COMMUNITY
End: 2019-01-21

## 2018-05-01 RX ORDER — FACIAL-BODY WIPES
10 EACH TOPICAL AS NEEDED
COMMUNITY
End: 2019-01-21

## 2018-05-01 RX ORDER — POTASSIUM CHLORIDE 750 MG/1
10 TABLET, FILM COATED, EXTENDED RELEASE ORAL DAILY
Status: DISCONTINUED | OUTPATIENT
Start: 2018-05-02 | End: 2018-05-03

## 2018-05-01 RX ORDER — OXYBUTYNIN CHLORIDE 5 MG/1
5 TABLET, EXTENDED RELEASE ORAL DAILY
COMMUNITY

## 2018-05-01 RX ORDER — IPRATROPIUM BROMIDE AND ALBUTEROL SULFATE 2.5; .5 MG/3ML; MG/3ML
3 SOLUTION RESPIRATORY (INHALATION) EVERY 8 HOURS
COMMUNITY
End: 2018-05-11

## 2018-05-01 RX ORDER — AMLODIPINE BESYLATE 5 MG/1
5 TABLET ORAL DAILY
COMMUNITY

## 2018-05-01 RX ORDER — GUAIFENESIN 100 MG/5ML
200 SOLUTION ORAL
COMMUNITY
End: 2019-01-21

## 2018-05-01 RX ORDER — ACETAMINOPHEN 325 MG/1
650 TABLET ORAL
COMMUNITY

## 2018-05-01 RX ORDER — PANTOPRAZOLE SODIUM 20 MG/1
20 TABLET, DELAYED RELEASE ORAL
COMMUNITY

## 2018-05-01 RX ORDER — ADHESIVE BANDAGE
10 BANDAGE TOPICAL
COMMUNITY
End: 2019-01-21

## 2018-05-01 RX ORDER — CALCIUM CARBONATE/VITAMIN D3 250-3.125
1 TABLET ORAL 3 TIMES DAILY
COMMUNITY
End: 2018-05-11

## 2018-05-01 RX ADMIN — POLYETHYLENE GLYCOL 3350 17 G: 17 POWDER, FOR SOLUTION ORAL at 10:06

## 2018-05-01 RX ADMIN — METOPROLOL TARTRATE 25 MG: 25 TABLET ORAL at 10:14

## 2018-05-01 RX ADMIN — CLOPIDOGREL BISULFATE 75 MG: 75 TABLET ORAL at 10:17

## 2018-05-01 RX ADMIN — AMIODARONE HYDROCHLORIDE 200 MG: 200 TABLET ORAL at 23:11

## 2018-05-01 RX ADMIN — HEPARIN SODIUM 5000 UNITS: 5000 INJECTION, SOLUTION INTRAVENOUS; SUBCUTANEOUS at 17:34

## 2018-05-01 RX ADMIN — AMIODARONE HYDROCHLORIDE 200 MG: 200 TABLET ORAL at 10:17

## 2018-05-01 RX ADMIN — AMLODIPINE BESYLATE 2.5 MG: 5 TABLET ORAL at 10:15

## 2018-05-01 RX ADMIN — OXYBUTYNIN CHLORIDE 15 MG: 5 TABLET, FILM COATED, EXTENDED RELEASE ORAL at 10:10

## 2018-05-01 RX ADMIN — Medication 10 ML: at 14:51

## 2018-05-01 RX ADMIN — PANTOPRAZOLE SODIUM 40 MG: 40 GRANULE, DELAYED RELEASE ORAL at 10:18

## 2018-05-01 RX ADMIN — FUROSEMIDE 40 MG: 10 INJECTION, SOLUTION INTRAMUSCULAR; INTRAVENOUS at 10:08

## 2018-05-01 RX ADMIN — HEPARIN SODIUM 5000 UNITS: 5000 INJECTION, SOLUTION INTRAVENOUS; SUBCUTANEOUS at 05:56

## 2018-05-01 RX ADMIN — ATORVASTATIN CALCIUM 40 MG: 40 TABLET, FILM COATED ORAL at 23:11

## 2018-05-01 RX ADMIN — ASPIRIN 81 MG 81 MG: 81 TABLET ORAL at 10:15

## 2018-05-01 RX ADMIN — AMIODARONE HYDROCHLORIDE 200 MG: 200 TABLET ORAL at 16:36

## 2018-05-01 RX ADMIN — POTASSIUM CHLORIDE 40 MEQ: 20 TABLET, EXTENDED RELEASE ORAL at 10:11

## 2018-05-01 RX ADMIN — METOPROLOL TARTRATE 25 MG: 25 TABLET ORAL at 17:08

## 2018-05-01 RX ADMIN — Medication 10 ML: at 05:56

## 2018-05-01 RX ADMIN — Medication 10 ML: at 23:12

## 2018-05-01 NOTE — PROGRESS NOTES
Problem: Mobility Impaired (Adult and Pediatric)  Goal: *Acute Goals and Plan of Care (Insert Text)  Physical Therapy Goals  Initiated 5/1/2018  1. Patient will move from supine to sit and sit to supine  in bed with modified independence within 7 day(s). 2.  Patient will transfer from bed to chair and chair to bed with contact guard assist using the least restrictive device within 7 day(s). 3.  Patient will perform sit to stand with contact guard assist within 7 day(s). 4.  Patient will demo independence with simple seated HEP in maintain strength within 7 days. physical Therapy EVALUATION  Patient: Angela Clarke (61 y.o. female)  Date: 5/1/2018  Primary Diagnosis: Rapid atrial fibrillation [facility])        Precautions:   Fall    ASSESSMENT :  Based on the objective data described below, the patient presents with decreased functional mobility from baseline level of function. At baseline patient lives at [facility] and is normally able to transfer from bed<>w/c on her own but has not ambulated in some time. Per son patient had recently started with PT/OT as she was previously living at [address] [place] [address],[address] and just moved to [facility]. Patient currently needing Izabel x 1 for supine to sit. Sit to stand with Izabel x 2. Able to take a few steps with Izabel x 2 from bed to chair. SpO2 92% on 4 L O2 at rest.  Drops to 83% on 4 L with activity. Increased to 5 L and returns to 89% and briefly to 90%. Increased to 6 L during transfer to chair and maintained to 89-90%. Patient left on 5 L at rest with family present and RN aware. Will continue to follow for mobility progression. Despite that patient has not been amb for some time she seems capable to ambulate with assistance and may be able to begin that once her medical condition improves. Patient will benefit from skilled intervention to address the above impairments.   Patients rehabilitation potential is considered to be Good  Factors which may influence rehabilitation potential include:   []         None noted  []         Mental ability/status  [x]         Medical condition  []         Home/family situation and support systems  []         Safety awareness  []         Pain tolerance/management  []         Other:      PLAN :  Recommendations and Planned Interventions:  [x]           Bed Mobility Training             []    Neuromuscular Re-Education  [x]           Transfer Training                   []    Orthotic/Prosthetic Training  [x]           Gait Training                         []    Modalities  [x]           Therapeutic Exercises           []    Edema Management/Control  [x]           Therapeutic Activities            [x]    Patient and Family Training/Education  []           Other (comment):    Frequency/Duration: Patient will be followed by physical therapy  3 times a week to address goals. Discharge Recommendations: Skilled Nursing Facility  Further Equipment Recommendations for Discharge: TBD     SUBJECTIVE:   Patient stated It doesn't take much to get me short of breath.     OBJECTIVE DATA SUMMARY:   HISTORY:    Past Medical History:   Diagnosis Date    Arthritis     generalized    COPD     GERD (gastroesophageal reflux disease)     Hypertension     Ill-defined condition     Vitamin deficiency    NSTEMI (non-ST elevated myocardial infarction) ([facility] [address] 5/1/2018    Other ill-defined conditions(799.89)     high cholesterol    Peripheral artery disease (HCC)     Psychiatric disorder     Anxiety disorder    Sleep disorder     Insomnia    TIA (transient ischemic attack)      Past Surgical History:   Procedure Laterality Date    HX CAROTID ENDARTERECTOMY      left 2 or 3 years ago.  HX OTHER SURGICAL      colonoscopy    HX TONSILLECTOMY       Prior Level of Function/Home Situation: Able to transfer self to and from w/c without assistance. Has not ambulate in quite some time.   Has assistance with ADL's and had just recently started working with therapy at [facility]  Personal factors and/or comorbidities impacting plan of care:     Home Situation  Home Environment: Long term care  Support Systems: Skilled nursing facility  Patient Expects to be Discharged to[de-identified] Skilled nursing facility  Current DME Used/Available at Home: Wheelchair    EXAMINATION/PRESENTATION/DECISION MAKING:   Critical Behavior:  Neurologic State: Alert  Orientation Level: Oriented X4        Hearing: Auditory  Auditory Impairment: None    Range Of Motion:  AROM: Generally decreased, functional                       Strength:    Strength: Generally decreased, functional     Functional Mobility:  Bed Mobility:     Supine to Sit: Minimum assistance;Assist x1     Scooting: Supervision; Additional time  Transfers:  Sit to Stand: Minimum assistance;Assist x2  Stand to Sit: Minimum assistance;Assist x2        Bed to Chair: Minimum assistance;Assist x2              Balance:   Sitting: Intact  Standing: Impaired  Standing - Static: Constant support; Fair  Standing - Dynamic : Poor  Ambulation/Gait Training:          Functional Measure:  Barthel Index:    Bathin  Bladder: 0  Bowels: 5  Groomin  Dressin  Feeding: 10  Mobility: 0  Stairs: 0  Toilet Use: 5  Transfer (Bed to Chair and Back): 5  Total: 30       Barthel and G-code impairment scale:  Percentage of impairment CH  0% CI  1-19% CJ  20-39% CK  40-59% CL  60-79% CM  80-99% CN  100%   Barthel Score 0-100 100 99-80 79-60 59-40 20-39 1-19   0   Barthel Score 0-20 20 17-19 13-16 9-12 5-8 1-4 0      The Barthel ADL Index: Guidelines  1. The index should be used as a record of what a patient does, not as a record of what a patient could do. 2. The main aim is to establish degree of independence from any help, physical or verbal, however minor and for whatever reason. 3. The need for supervision renders the patient not independent. 4. A patient's performance should be established using the best available evidence.  Asking the patient, friends/relatives and nurses are the usual sources, but direct observation and common sense are also important. However direct testing is not needed. 5. Usually the patient's performance over the preceding 24-48 hours is important, but occasionally longer periods will be relevant. 6. Middle categories imply that the patient supplies over 50 per cent of the effort. 7. Use of aids to be independent is allowed. [employer]., Barthel, D.W. (3670). Functional evaluation: the Barthel Index. [address] [facility] (14)2. Donovan Che naomie TAWNY Wheatley, Vitaly Wheeler., Dani Campbell., [place], [facility] (1999). Measuring the change indisability after inpatient rehabilitation; comparison of the responsiveness of the Barthel Index and Functional Bremer Measure. Journal of Neurology, Neurosurgery, and Psychiatry, 66(4), 881-558. Christina Romero, NMikeJ.A, RAFAEL Benitez, & Erinn Boucher M.A. (2004.) Assessment of post-stroke quality of life in cost-effectiveness studies: The usefulness of the Barthel Index and the EuroQoL-5D. Quality of Life Research, 13, 707-92         G codes: In compliance with CMSs Claims Based Outcome Reporting, the following G-code set was chosen for this patient based on their primary functional limitation being treated: The outcome measure chosen to determine the severity of the functional limitation was the Barthel with a score of 30/100 which was correlated with the impairment scale. ? Mobility - Walking and Moving Around:     - CURRENT STATUS: CL - 60%-79% impaired, limited or restricted    - GOAL STATUS: CK - 40%-59% impaired, limited or restricted    - D/C STATUS:  ---------------To be determined---------------       Pain:  Pain Scale 1: Numeric (0 - 10)  Pain Intensity 1: 0              Activity Tolerance:   VSS  Please refer to the flowsheet for vital signs taken during this treatment.   After treatment:   [x]         Patient left in no apparent distress sitting up in chair  [] Patient left in no apparent distress in bed  [x]         Call bell left within reach  [x]         Nursing notified  [x]         Caregiver present  [x]         Chair alarm activated    COMMUNICATION/EDUCATION:   The patients plan of care was discussed with: Physical Therapist, Occupational Therapist and Registered Nurse. [x]         Fall prevention education was provided and the patient/caregiver indicated understanding. [x]         Patient/family have participated as able in goal setting and plan of care. [x]         Patient/family agree to work toward stated goals and plan of care. []         Patient understands intent and goals of therapy, but is neutral about his/her participation. []         Patient is unable to participate in goal setting and plan of care.     Thank you for this referral.  Sandi Platt, PT, DPT   Time Calculation: 22 mins

## 2018-05-01 NOTE — CARDIO/PULMONARY
Cardiac Rehab: Pt is on the CHF bundle list. Heart Failure education folder, with Low Sodium brochure, given to [employer]. Son at the bedside. Educated using teach back method. Discussed diagnosis definition and assessed patient understanding. Reviewed importance of daily weight monitoring and Low Sodium diet (1518-4689 mg. Daily). Encouraged activity and rest periods within symptom limitations and as ordered by physician. Reviewed furosemide, metoprolol , purpose of medication, potential side effects, compliance, and what to do if dose is missed. Discussed importance of reporting signs and symptoms of exacerbation, and when to report them to the doctor, to prevent re-hospitalization. Kellen Glez was encouraged to keep all appointments with doctor. Son at bedside engaged in teaching session. Correctly identified s&s of fluid overload and list foods to be avoided. He is supportive of the supplies (scale) and foods she will need at [facility]. Discussed the Cardiac Rehab Program, benefits, format, and encouraged enrollment. Pt unable to stand and she walks very little. She uses a wheelchair most times. She is currently in physical therapy at home. Encouraged leg exercises while sitting to help promote circulation. Kellen Thomas and son verbalized understanding. Abel Ellison

## 2018-05-01 NOTE — CARDIO/PULMONARY
CP Rehab Note: chart review    Admit: respiratory distress, NSTEMI    Mhx: HTN, PAD, COPD, GERD, TIA, anxiety    Former smoker. Echo ordered. Per cardiology note 5/1/18: Poor functional capacity, since CVA mostly in wheelchair, sleeps in between meals. Some increase in dyspnea. No chest pain. Afib with RVR back in sinus on cardizem ggt  pulm edema on cxr currently on non-rebreather  Increased troponin, consistent with type II NSTEMI, discussed with family would like to avoid invasive therapy, plan for medical management of CAD. CP Rehab will monitor for teaching if appropriate.

## 2018-05-01 NOTE — PROGRESS NOTES
Initial Assessment/High    Reason for Admission:   Rapid Atrial Fibrilation               RRAT Score:   39               Resources/supports as identified by patient/family:  Children                 Top Challenges facing patient (as identified by patient/family and CM): Finances/Medication cost?    No issues reported at this time. Transportation? No issues reported at this time. Support system or lack thereof? No issues reported at this time. Living arrangements? No issues reported at this time. Self-care/ADLs/Cognition? No issues reported at this time. Current Advanced Directive/Advance Care Plan: Not on file. Plan for utilizing home health:   No recommendations from treatment team at this time. Likelihood of readmission:  moderate                 Transition of Care Plan: To be determined by treatment team. Likely follow-ups. Pt is a 81 yo  female admitted on 4/30/18 for Rapid Atrial Fibrilation. During assessment, Pt appeared alert and oriented X4. Pt son was bedside. Pt currently lives at [facility]. Pt is reported to be independent in with some assistance in managing ADLs. Pt is reported to have access to wheelchair and rollator. Pt reported that she recently received a hospital bed as well. Pt reports that her children are primary supporters with daughter Evan Murray being the main person of contact. Pt Has received PT/OT services from [address] [place] [address] in the past.     Pt reports that [facility] fulfills her prescription needs. Pt has not had any home health services. Upon discharge, Pt anticipates daughter transporting her back to [facility]. CM will follow-up as needed. Care Management Interventions  PCP Verified by CM:  Yes  Mode of Transport at Discharge:  (Private Vehicle Daughter)  Transition of Care Consult (CM Consult): Discharge Planning  Physical Therapy Consult: Yes  Occupational Therapy Consult: Yes  Current Support Network: Assisted Living (Children)  Plan discussed with Pt/Family/Caregiver: Yes (Pt and Son)  Discharge Location  Discharge Placement:  (Anticipated to [facility])      Alberta Tijerina [facility], [address] [place] [address],[address]   [phone]

## 2018-05-01 NOTE — PROGRESS NOTES
0700:  Bedside shift change report given to Florencio Vee RN (oncoming nurse) by Dai Alvarez RN (offgoing nurse). Report included the following information SBAR and Kardex. 1900:   Bedside shift change report given to Dai Alvarez RN (oncoming nurse). Report included the following information SBAR and Kardex. SHIFT SUMMARY:            [address] NURSING NOTE   Admission Date 4/30/2018   Admission Diagnosis Rapid atrial fibrillation ([address]   Consults IP CONSULT TO HOSPITALIST      Cardiac Monitoring [x] Yes [] No      Purposeful Hourly Rounding [x] Yes    Arjun Score Total Score: 3   Arjun score 3 or > [x] Bed Alarm [] Avasys [] 1:1 sitter [] Patient refused (Signed refusal form in chart)   Rosas Score Rosas Score: 14   Rosas score 14 or < [] PMT consult [] Wound Care consult    []  Specialty bed  [] Nutrition consult      Influenza Vaccine Received Flu Vaccine for Current Season (usually Sept-March): Not Flu Season           Oxygen needs? [] Room air Oxygen @  []1L    []2L    []3L   []4L    [x]5L   []6L via  NC   Chronic home O2 use?  [x] Yes [] No  Perform O2 challenge test and document in progress note using smartphrase (.Homeoxygen)      Last bowel movement Last Bowel Movement Date: 04/30/18      Urinary Catheter       External Female Catheter 05/01/18-Urine Output (mL): 550 ml     LDAs               Peripheral IV 04/30/18 Left Hand (Active)   Site Assessment Clean, dry, & intact 5/1/2018  2:00 PM   Phlebitis Assessment 0 5/1/2018  2:00 PM   Infiltration Assessment 0 5/1/2018  2:00 PM   Dressing Status Clean, dry, & intact 5/1/2018  2:00 PM   Dressing Type Transparent 5/1/2018  2:00 PM   Hub Color/Line Status Pink;Flushed 5/1/2018  2:00 PM          External Female Catheter 05/01/18 (Active)   Site Assessment Clean, dry, & intact 5/1/2018  5:39 PM   Repositioned No 5/1/2018  5:39 PM   Perineal Care No 5/1/2018  5:39 PM   Wick Changed No 5/1/2018  5:39 PM   Suction Canister/Tubing Changed No 5/1/2018  5:39 PM   Urine Output (mL) 550 ml 5/1/2018  5:39 PM                   Readmission Risk Assessment Tool Score High Risk            41       Total Score        3 Has Seen PCP in Last 6 Months (Yes=3, No=0)    2 . Living with Significant Other. Assisted Living. LTAC. SNF. or   Rehab    5 Pt.  Coverage (Medicare=5 , Medicaid, or Self-Pay=4)    31 Charlson Comorbidity Score (Age + Comorbid Conditions)        Criteria that do not apply:    Patient Length of Stay (>5 days = 3)    IP Visits Last 12 Months (1-3=4, 4=9, >4=11)       Expected Length of Stay 4d 7h   Actual Length of Stay 1

## 2018-05-01 NOTE — PROGRESS NOTES
[address] [place] [address]  [phone]      Medical Progress Note      NAME: Angela Clarke   :  1928  MRM:  [id number]    Date/Time: 2018  5:56 AM         Subjective:     Alert. Admitted with rapid afib, diastolic CHF, NSEMI. Patient alert. No complaints of chest pain or SOB. Past Medical History:   Diagnosis Date    Arthritis     generalized    COPD     GERD (gastroesophageal reflux disease)     Hypertension     Ill-defined condition     Vitamin deficiency    NSTEMI (non-ST elevated myocardial infarction) ([facility] [address] 2018    Other ill-defined conditions(799.89)     high cholesterol    Peripheral artery disease (HCC)     Psychiatric disorder     Anxiety disorder    Sleep disorder     Insomnia    TIA (transient ischemic attack)        ROS:  General: negative for fever, chills, sweats, weakness  Ear Nose and Throat: negative for rhinorrhea, pharyngitis, otalgia, tinnitus, speech or swallowing difficulties  Respiratory:  negative for cough, sputum production, SOB, wheezing, ALCARAZ, pleuritic pain  Cardiology:  negative for chest pain, palpitations, orthopnea, PND, edema, syncope   Gastrointestinal: negative for abdominal pain, N/V, dysphagia, change in bowel habits, bleeding  Muskuloskeletal : negative for arthralgia, myalgia  Dermatological: negative for rash, ulceration, mole change, new lesion  Neurological: negative for headache, dizziness, confusion, focal weakness, paresthesia, memory loss, gait disturbance  Psychological: negative for anxiety, depression, agitation  Review of systems otherwise negative         Objective:       Vitals:        Last 24hrs VS reviewed since prior progress note.  Most recent are:    Visit Vitals    /78    Pulse 63    Temp 97.9 °F (36.6 °C)    Resp 20    Ht 5' 4\" (1.626 m)    Wt 159 lb 2.8 oz (72.2 kg)    SpO2 93%    BMI 27.32 kg/m2     SpO2 Readings from Last 6 Encounters:   18 93% 02/23/15 95%   03/14/11 95%    O2 Flow Rate (L/min): 4 l/min     Intake/Output Summary (Last 24 hours) at 05/01/18 0556  Last data filed at 05/01/18 5531   Gross per 24 hour   Intake                0 ml   Output              625 ml   Net             -625 ml        Telemetry reviewed:   normal sinus rhythm  Tubes:   N/A  X-Ray:  Chest xray - Mild interstitial edema and small pleural effusions    Venous duplex of LE's - No deep venous thrombosis identified    Procedures:   EKG - When compared with ECG of 30-APR-2018 09:34,   Sinus rhythm has replaced Atrial fibrillation   Vent. rate has decreased BY  65 BPM   Inferior infarct is now present   Inverted T waves have replaced nonspecific T wave abnormality in Inferior   leads     Exam:     General   well developed, well nourished, appears stated age, in no acute distress  Neck   Supple without nodes or mass. No thyromegaly or bruit  Respiratory   Clear To Auscultation bilaterally - no wheezes, rales, rhonchi, or crackles  Cardiology  Regular Rate and Rythmn  - no murmurs, rubs or gallops  Abdominal  Soft, non-tender, non-distended, positive bowel sounds, no hepatosplenomegaly  Extremities  No clubbing, cyanosis, or edema. Pulses intact. Skin  Normal skin turgor. No rashes or skin ulcers noted  Neurological  No focal neurological deficits noted  Psychological  Oriented x 3. Normal affect.   Exam otherwise negative     Lab Data Reviewed: (see below)    Medications Reviewed: (see below)    ______________________________________________________________________    Medications:     Current Facility-Administered Medications   Medication Dose Route Frequency    amiodarone (CORDARONE) tablet 200 mg  200 mg Oral TID    [START ON 5/3/2018] amiodarone (CORDARONE) tablet 100 mg  100 mg Oral DAILY    furosemide (LASIX) injection 40 mg  40 mg IntraVENous BID    metoprolol tartrate (LOPRESSOR) tablet 25 mg  25 mg Oral BID    albuterol (PROVENTIL HFA, VENTOLIN HFA, PROAIR HFA) inhaler 2 Puff  2 Puff Inhalation Q4H PRN    amLODIPine (NORVASC) tablet 2.5 mg  2.5 mg Oral DAILY    aspirin chewable tablet 81 mg  81 mg Oral DAILY    atorvastatin (LIPITOR) tablet 40 mg  40 mg Oral QHS    clopidogrel (PLAVIX) tablet 75 mg  75 mg Oral DAILY    pantoprazole (PROTONIX) granules for oral suspension 40 mg  40 mg Oral DAILY    umeclidinium (INCRUSE ELLIPTA) 62.5 mcg/actuation  1 Puff Inhalation DAILY    sodium chloride (NS) flush 5-10 mL  5-10 mL IntraVENous Q8H    sodium chloride (NS) flush 5-10 mL  5-10 mL IntraVENous PRN    acetaminophen (TYLENOL) tablet 650 mg  650 mg Oral Q4H PRN    ondansetron (ZOFRAN) injection 4 mg  4 mg IntraVENous Q4H PRN    heparin (porcine) injection 5,000 Units  5,000 Units SubCUTAneous Q12H    polyethylene glycol (MIRALAX) packet 17 g  17 g Oral DAILY    oxybutynin chloride XL (DITROPAN XL) tablet 15 mg  15 mg Oral DAILY            Lab Review:     Recent Labs      05/01/18 0058  04/30/18   0947   WBC  9.1  10.6   HGB  11.1*  12.6   HCT  34.4*  38.5   PLT  265  299     Recent Labs      05/01/18 0058  04/30/18   0947   NA  139  140   K  3.2*  4.1   CL  102  103   CO2  30  28   GLU  91  141*   BUN  27*  25*   CREA  1.29*  1.45*   CA  8.5  9.8   MG   --   2.2   ALB  2.9*  3.3*   TBILI  0.9  1.1*   SGOT  22  28   ALT  18  23     Lab Results   Component Value Date/Time    Glucose (POC) 104 02/21/2015 07:27 AM    Glucose (POC) 108 (H) 02/20/2015 09:16 PM    Glucose (POC) 110 (H) 02/20/2015 04:17 PM    Glucose (POC) 131 (H) 02/20/2015 11:18 AM    Glucose (POC) 130 (H) 02/20/2015 06:26 AM     No results for input(s): PH, PCO2, PO2, HCO3, FIO2 in the last 72 hours. No results for input(s): INR in the last 72 hours.     No lab exists for component: INREXT         Assessment:     Principal Problem:    Acute diastolic CHF (congestive heart failure) ([facility] (5/1/2018)    Active Problems:    NSTEMI (non-ST elevated myocardial infarction) ([facility] (5/1/2018)      Rapid atrial fibrillation ([facility] [address] (4/30/2018)      COPD (5/1/2018)      Hypertension (5/1/2018)      CKD (chronic kidney disease) stage 3, GFR 30-59 ml/min (5/1/2018)           Plan:     Risk of deterioration: medium             1. Continue telemetry  2. Amiodarone per cardiology   3. Continue ASA, plavix, BB  4. Reduce lasix to once daily  5. Monitor renal function  6. Echo pending   7.  PT/OT eval                    Care Plan discussed with: Patient    Discussed:  Care Plan    Prophylaxis:  Hep SQ and H2B/PPI    Disposition:  SNF/LTC           ___________________________________________________    Attending Physician: Noemy Rico MD

## 2018-05-01 NOTE — PROGRESS NOTES
Completed what I could of the admission database. Patient is not a strong self . I recommend to finish data base with family present.

## 2018-05-01 NOTE — PROGRESS NOTES
Pharmacy Medication Reconciliation     Allergy Update: No changes    Recommendations/Findings: The following amendments were made to the patient's active medication list on file at [address]:   1) Additions:       - Duonebs      - Promethazine      - Rulox      - Acetaminophen      - Milk of magnesia      - Lubriskin      - Triamcinolone      - Ammonium lactate      - Benadryl cream      - Dulcolax suppository      - Fleets enema      - Guaifenesin    2) Deletions:       - Alprazolam      - Aspirin       - Atorvastatin      - Calcitonin      - Clopidogrel      - Ezetimibe      - Trimethoprim      - Spiriva    3) Changes:       - Albuterol every 6 hours as needed      - Amlodipine 5 mg daily      - Probiotic 1 tablet twice daily      - Calcium-carbonate 1 tablet three times daily      - Clobetasol apply Monday, Wednesday, and Friday in the evenings      - Olopatadine 1 drop in both eyes twice daily      - Oxybutynin XR 5 mg daily      - Protonix 20 mg twice daily      -Clarified PTA med list with medication list from [employer]. PTA medication list was corrected to the following:     Prior to Admission Medications   Prescriptions Last Dose Informant Patient Reported? Taking? B.infantis-B.ani-B.long-B.bifi (PROBIOTIC 4X) 10-15 mg TbEC   Yes Yes   Sig: Take 1 Tab by mouth two (2) times a day. MULTIVITS W-FE,OTHER MIN/LUT (CENTRUM SILVER ULTRA WOMEN'S PO)   Yes Yes   Sig: Take 1 Tab by mouth daily. acetaminophen (TYLENOL) 325 mg tablet   Yes Yes   Sig: Take 650 mg by mouth every four (4) hours as needed for Pain. albuterol (PROVENTIL HFA, VENTOLIN HFA, PROAIR HFA) 90 mcg/actuation inhaler   Yes Yes   Sig: Take 2 Puffs by inhalation every six (6) hours as needed for Wheezing. albuterol-ipratropium (DUO-NEB) 2.5 mg-0.5 mg/3 ml nebu   Yes Yes   Sig: 3 mL by Nebulization route every eight (8) hours.    albuterol-ipratropium (DUO-NEB) 2.5 mg-0.5 mg/3 ml nebu   Yes Yes   Sig: 3 mL by Nebulization route every four (4) hours as needed (Shortness of Breath). alum-mag hydroxide-simeth (RULOX) 200-200-20 mg/5 mL susp   Yes Yes   Sig: Take 20 mL by mouth every four (4) hours as needed. amLODIPine (NORVASC) 5 mg tablet   Yes Yes   Sig: Take 5 mg by mouth daily. ammonium lactate (LAC-HYDRIN) 12 % lotion   Yes Yes   Sig: Apply  to affected area every twelve (12) hours as needed (Apply to chest, back, arms as needed for eczema). rub in to affected area well   bisacodyl (DULCOLAX, BISACODYL,) 10 mg suppository   Yes Yes   Sig: Insert 10 mg into rectum as needed (Every 96 hours as needed for constipation). calcium-vitamin D (OYSTER SHELL CALCIUM-VIT D3) 250-125 mg-unit tablet   Yes Yes   Sig: Take 1 Tab by mouth three (3) times daily. cetirizine (ZYRTEC) 10 mg tablet   Yes Yes   Sig: Take 10 mg by mouth nightly. clobetasol (TEMOVATE) 0.05 % topical cream   Yes Yes   Sig: Apply  to affected area every Monday, Wednesday, Friday. In the evening on Monday, Wednesday, and Friday   diphenhydrAMINE-zinc acetate 1%-0.1% (BENADRYL) topical cream   Yes Yes   Sig: Apply  to affected area every six (6) hours as needed for Itching.   emollient combination no. 108 (LUBRISKIN) lotn lotion   Yes Yes   Sig: Apply  to affected area every eight (8) hours as needed for Dry Skin (Apply to both hands). guaiFENesin (ROBITUSSIN) 100 mg/5 mL liquid   Yes Yes   Sig: Take 200 mg by mouth every four (4) hours as needed for Cough.   magnesium hydroxide (MILK OF MAGNESIA) 400 mg/5 mL suspension   Yes Yes   Sig: Take 10 mL by mouth every seventy-two (72) hours as needed for Constipation (If no BM in 3 days). metoprolol (LOPRESSOR) 25 mg tablet   Yes Yes   Sig: Take 25 mg by mouth two (2) times a day. olopatadine (PATANOL) 0.1 % ophthalmic solution   Yes Yes   Sig: Administer 1 Drop to both eyes two (2) times a day. oxybutynin chloride XL (DITROPAN XL) 5 mg CR tablet   Yes Yes   Sig: Take 5 mg by mouth daily.    pantoprazole (PROTONIX) 20 mg tablet Yes Yes   Sig: Take 20 mg by mouth Before breakfast and dinner. promethazine (PHENERGAN) 12.5 mg tablet   Yes Yes   Sig: Take 12.5 mg by mouth every six (6) hours as needed for Nausea. sodium phosphate (FLEET ENEMA) 19-7 gram/118 mL enema   Yes Yes   Sig: Insert 1 Enema into rectum. Every 120 hours as needed for constipation if no results from suppository   triamcinolone (ARISTOCORT) 0.5 % topical cream   Yes Yes   Sig: Apply  to affected area two (2) times daily as needed for Skin Irritation.  use thin layer      Facility-Administered Medications: None          Thank you,  Ezekiel Rosenberg, PHARMD

## 2018-05-01 NOTE — PROGRESS NOTES
Problem: Self Care Deficits Care Plan (Adult)  Goal: *Acute Goals and Plan of Care (Insert Text)  Occupational Therapy Goals  Initiated 5/1/2018  1. Patient will perform grooming with S within 7 day(s). 2.  Patient will perform upper body dressing with moderate assistance  within 7 day(s). 3.  Patient will perform lower body dressing with maximal assistance within 7 day(s). 4.  Patient will perform toilet transfers with minimal assistance/contact guard assist within 7 day(s). 5.  Patient will perform all aspects of toileting with minimal assistance/contact guard assist within 7 day(s). 6.  Patient will participate in upper extremity therapeutic exercise/activities with independence for 5 minutes within 7 day(s). 7.  Patient will utilize energy conservation techniques during functional activities with verbal cues within 7 day(s). Occupational Therapy EVALUATION  Patient: Kristen Jean (48 y.o. female)  Date: 5/1/2018  Primary Diagnosis: Rapid atrial fibrillation [facility])        Precautions:   Fall    ASSESSMENT :  Based on the objective data described below, the patient presents with decreased endurance and activity tolerance s/p admission now on 4L requiring up to 6L with minimal activity from bed mobility to up to chair with min A x2 with SPT, patient aware of symptoms and states she is a mouth breather, currently total A for LB ADLs and toileting, max A to setup for UB ADLs, facility staff does assist with all ADLs at baseline but patient was participating with therapy recently and regaining strength standing, required extended sitting rest breaks and pacing to minimal t 80s-90% on up ot 6L, placed back on 5L and alerted nursing at rest. Will need return to SNF at discharge. .    Patient will benefit from skilled intervention to address the above impairments.   Patients rehabilitation potential is considered to be Good  Factors which may influence rehabilitation potential include:   [] None noted  []             Mental ability/status  [x]             Medical condition  []             Home/family situation and support systems  []             Safety awareness  []             Pain tolerance/management  []             Other:      PLAN :  Recommendations and Planned Interventions:  [x]               Self Care Training                  [x]        Therapeutic Activities  [x]               Functional Mobility Training    []        Cognitive Retraining  [x]               Therapeutic Exercises           [x]        Endurance Activities  [x]               Balance Training                   []        Neuromuscular Re-Education  []               Visual/Perceptual Training     []   Home Safety Training  [x]               Patient Education                 []        Family Training/Education  []               Other (comment):    Frequency/Duration: Patient will be followed by occupational therapy 3 times a week to address goals. Discharge Recommendations: Rolo Corrales  Further Equipment Recommendations for Discharge: TBD at facility     SUBJECTIVE:   Patient stated I get like this with exursion.     OBJECTIVE DATA SUMMARY:   HISTORY:   Past Medical History:   Diagnosis Date    Arthritis     generalized    COPD     GERD (gastroesophageal reflux disease)     Hypertension     Ill-defined condition     Vitamin deficiency    NSTEMI (non-ST elevated myocardial infarction) ([facility] [address] 5/1/2018    Other ill-defined conditions(799.89)     high cholesterol    Peripheral artery disease (HCC)     Psychiatric disorder     Anxiety disorder    Sleep disorder     Insomnia    TIA (transient ischemic attack)      Past Surgical History:   Procedure Laterality Date    HX CAROTID ENDARTERECTOMY      left 2 or 3 years ago.     HX OTHER SURGICAL      colonoscopy    HX TONSILLECTOMY         Prior Level of Function/Environment/Context: lives at [facility] recently moved there was participating with therapy they do assist with all ADLs except feeding, max A to setup for ADLs, SPT only to wheelchair with SB to CG assist it seems per patient report, wheelchair only, SPT to toilet from wheelchair as well. Occupations in which the patient is/was successful, what are the barriers preventing that success:   Performance Patterns (routines, roles, habits, and rituals):   Personal Interests and/or values:   Expanded or extensive additional review of patient history:     Home Situation  Home Environment: Long term care  Support Systems: Skilled nursing facility  Patient Expects to be Discharged to[de-identified] Skilled nursing facility  Current DME Used/Available at Home: Wheelchair  [x]  Right hand dominant   []  Left hand dominant    EXAMINATION OF PERFORMANCE DEFICITS:  Cognitive/Behavioral Status:  Neurologic State: Alert  Orientation Level: Appropriate for age  Cognition: Appropriate decision making; Follows commands  Perception: Appears intact  Perseveration: No perseveration noted  Safety/Judgement: Awareness of environment; Insight into deficits    Skin: intact    Edema: none noted    Hearing: Auditory  Auditory Impairment: None    Vision/Perceptual:                           Acuity: Within Defined Limits;Able to read normal print without difficulty         Range of Motion:  B UE shoulder flexion >120 R UE, > 100* L UE, good ROM of elbows, wrists an dhands  AROM: Generally decreased, functional                         Strength:  B UE 3/5- 4/5  Strength: Generally decreased, functional                Coordination: Tone & Sensation:  Bilateral tone normal, sensation normal.              Balance:  Sitting: Intact  Standing: Impaired  Standing - Static: Constant support; Fair  Standing - Dynamic : Poor    Functional Mobility and Transfers for ADLs:  Bed Mobility:  Supine to Sit: Minimum assistance;Assist x1  Scooting: Supervision; Additional time    Transfers:  Sit to Stand: Minimum assistance;Assist x2  Stand to Sit: Minimum assistance;Assist x2  Bed to Chair: Minimum assistance;Assist x2  Toilet Transfer : Minimum assistance; Additional time;Assist x2 (BSC only)    ADL Assessment:  Feeding: Setup    Oral Facial Hygiene/Grooming: Setup    Bathing: Maximum assistance    Upper Body Dressing: Maximum assistance    Lower Body Dressing: Total assistance    Toileting: Total assistance                ADL Intervention and task modifications:         Completed OT evaluation and ADLs seated EOB and standing as able with Min A x2 SPT from bed to chair with constant support for balance. Educated on safety and endurance training with encouragement for full participation in ADLs while in hospital. Good understanding noted. Patient instructed and indicated understanding the benefits of maintaining activity tolerance, functional mobility, and independence with self care tasks during acute stay  to ensure safe return home and to baseline. Encouraged patient to increase frequency and duration OOB, be out of bed for all meals, perform daily ADLs (as approved by RN/MD regarding bathing etc), and performing functional mobility to/from bathroom. Cognitive Retraining  Safety/Judgement: Awareness of environment; Insight into deficits    Therapeutic Exercise:  .toaco     Functional Measure:  Barthel Index:    Bathin  Bladder: 0  Bowels: 5  Groomin  Dressin  Feeding: 10  Mobility: 0  Stairs: 0  Toilet Use: 5  Transfer (Bed to Chair and Back): 5  Total: 30       Barthel and G-code impairment scale:  Percentage of impairment CH  0% CI  1-19% CJ  20-39% CK  40-59% CL  60-79% CM  80-99% CN  100%   Barthel Score 0-100 100 99-80 79-60 59-40 20-39 1-19   0   Barthel Score 0-20 20 17-19 13-16 9-12 5-8 1-4 0      The Barthel ADL Index: Guidelines  1. The index should be used as a record of what a patient does, not as a record of what a patient could do.   2. The main aim is to establish degree of independence from any help, physical or verbal, however minor and for whatever reason. 3. The need for supervision renders the patient not independent. 4. A patient's performance should be established using the best available evidence. Asking the patient, friends/relatives and nurses are the usual sources, but direct observation and common sense are also important. However direct testing is not needed. 5. Usually the patient's performance over the preceding 24-48 hours is important, but occasionally longer periods will be relevant. 6. Middle categories imply that the patient supplies over 50 per cent of the effort. 7. Use of aids to be independent is allowed. Ana Paula Mckinney., Barthel, D.W. (4387). Functional evaluation: the Barthel Index. [facility] (14)2. Hussein Davila naomie TAWNY Wheatley, Mansoor Elliott., Lenord Shone., [place], [address] [place] [address] (1999). Measuring the change indisability after inpatient rehabilitation; comparison of the responsiveness of the Barthel Index and Functional Hamilton Measure. Journal of Neurology, Neurosurgery, and Psychiatry, 66(4), 892-072. Anneliese Vanessa, N.JMOMO, RAFAEL Benitez, & Madi Thomas MMOMO. (2004.) Assessment of post-stroke quality of life in cost-effectiveness studies: The usefulness of the Barthel Index and the EuroQoL-5D. Quality of Life Research, 13, 750-27         G codes: In compliance with CMSs Claims Based Outcome Reporting, the following G-code set was chosen for this patient based on their primary functional limitation being treated: The outcome measure chosen to determine the severity of the functional limitation was the barthel index with a score of 30/100 which was correlated with the impairment scale. ?  Self Care:     - CURRENT STATUS: CL - 60%-79% impaired, limited or restricted    - GOAL STATUS: CK - 40%-59% impaired, limited or restricted    - D/C STATUS:  ---------------To be determined---------------     Occupational Therapy Evaluation Charge Determination   History Examination Decision-Making   MEDIUM Complexity : Expanded review of history including physical, cognitive and psychosocial  history  MEDIUM Complexity : 3-5 performance deficits relating to physical, cognitive , or psychosocial skils that result in activity limitations and / or participation restrictions MEDIUM Complexity : Patient may present with comorbidities that affect occupational performnce. Miniml to moderate modification of tasks or assistance (eg, physical or verbal ) with assesment(s) is necessary to enable patient to complete evaluation       Based on the above components, the patient evaluation is determined to be of the following complexity level: MEDIUM  Pain:  Pain Scale 1: Numeric (0 - 10)  Pain Intensity 1: 0              Activity Tolerance:   POOR- SOB with activity, required increase O2 with activity  Please refer to the flowsheet for vital signs taken during this treatment. After treatment:   [x] Patient left in no apparent distress sitting up in chair  [] Patient left in no apparent distress in bed  [x] Call bell left within reach  [x] Nursing notified  [x] Caregiver present  [x] Bed alarm activated-chair    COMMUNICATION/EDUCATION:   The patients plan of care was discussed with: Physical Therapist and Registered Nurse. [x] Home safety education was provided and the patient/caregiver indicated understanding. [x] Patient/family have participated as able in goal setting and plan of care. [x] Patient/family agree to work toward stated goals and plan of care. [] Patient understands intent and goals of therapy, but is neutral about his/her participation. [] Patient is unable to participate in goal setting and plan of care. This patients plan of care is appropriate for delegation to \[facility]\"".     Thank you for this referral.  Jorge L Fuentes OT  Time Calculation: 22 mins

## 2018-05-01 NOTE — CDMP QUERY
Account Number: [de-identified]  MRN: [id number]  Patient: Katie Sykes  Created: [id number]  Clinician Name: Ashley Huston MD :  Please clarify if this patient is (was) being treated/managed for:     => Hypokalemia as evidenced by K 3.2 req kcl x1  => Other explanation of clinical findings  => Clinically Undetermined (no explanation for clinical findings)    The medical record reflects the following clinical findings, treatment, and risk factors. Risk Factors:  89f adm w/ nstemi, acute chf, on lasix  Clinical Indicators:K 3.2    Treatment: po kcl    Please clarify and document your clinical opinion in the progress notes and discharge summary including the definitive and/or presumptive diagnosis, (suspected or probable), related to the above clinical findings. Please include clinical findings supporting your diagnosis.   Thank Seda Isidro RN/CDMP

## 2018-05-01 NOTE — CDMP QUERY
Account Number: [de-identified]  MRN: [id number]  Patient: Sandra Bond  Created: [id number]  Clinician Name: Elsa Thompson MD :  Please clarify if this patient is (was) being treated/managed for:     => Acute respiratory failure with hypoxia  as evidenced by 76%ra--96%nrb, rr 30--37 req tele, lasix, nrb  => Other explanation of clinical findings  => Clinically Undetermined (no explanation for clinical findings)    The medical record reflects the following clinical findings, treatment, and risk factors. Risk Factors:  89f adm w/ nstemi, acute chf  Clinical Indicators:  Removed patient off of non rebreather and placed on 6L NC while she drank orange juice. Patient's oxygen saturations decreased to 83%  Patient placed back on 8L of oxygen via non rebreather. 76%ra--96%nrb, rr 30--37  Treatment: tele, lasix, NRB    Please clarify and document your clinical opinion in the progress notes and discharge summary including the definitive and/or presumptive diagnosis, (suspected or probable), related to the above clinical findings. Please include clinical findings supporting your diagnosis.   Thank Deann Lopez RN/CDMP

## 2018-05-02 ENCOUNTER — APPOINTMENT (OUTPATIENT)
Dept: GENERAL RADIOLOGY | Age: 83
DRG: 280 | End: 2018-05-02
Attending: INTERNAL MEDICINE
Payer: MEDICARE

## 2018-05-02 PROBLEM — J96.01 ACUTE RESPIRATORY FAILURE WITH HYPOXEMIA (HCC): Status: ACTIVE | Noted: 2018-05-02

## 2018-05-02 PROBLEM — E87.6 HYPOKALEMIA: Status: ACTIVE | Noted: 2018-05-02

## 2018-05-02 LAB
ANION GAP SERPL CALC-SCNC: 7 MMOL/L (ref 5–15)
APPEARANCE UR: CLEAR
BACTERIA URNS QL MICRO: NEGATIVE /HPF
BILIRUB UR QL: NEGATIVE
BUN SERPL-MCNC: 25 MG/DL (ref 6–20)
BUN/CREAT SERPL: 20 (ref 12–20)
CALCIUM SERPL-MCNC: 8.4 MG/DL (ref 8.5–10.1)
CHLORIDE SERPL-SCNC: 101 MMOL/L (ref 97–108)
CO2 SERPL-SCNC: 29 MMOL/L (ref 21–32)
COLOR UR: ABNORMAL
CREAT SERPL-MCNC: 1.27 MG/DL (ref 0.55–1.02)
EPITH CASTS URNS QL MICRO: ABNORMAL /LPF
ERYTHROCYTE [DISTWIDTH] IN BLOOD BY AUTOMATED COUNT: 13.3 % (ref 11.5–14.5)
GLUCOSE SERPL-MCNC: 96 MG/DL (ref 65–100)
GLUCOSE UR STRIP.AUTO-MCNC: NEGATIVE MG/DL
HCT VFR BLD AUTO: 36 % (ref 35–47)
HGB BLD-MCNC: 11.7 G/DL (ref 11.5–16)
HGB UR QL STRIP: NEGATIVE
HYALINE CASTS URNS QL MICRO: ABNORMAL /LPF (ref 0–5)
KETONES UR QL STRIP.AUTO: NEGATIVE MG/DL
LEUKOCYTE ESTERASE UR QL STRIP.AUTO: ABNORMAL
MCH RBC QN AUTO: 29.5 PG (ref 26–34)
MCHC RBC AUTO-ENTMCNC: 32.5 G/DL (ref 30–36.5)
MCV RBC AUTO: 90.7 FL (ref 80–99)
NITRITE UR QL STRIP.AUTO: NEGATIVE
NRBC # BLD: 0 K/UL (ref 0–0.01)
NRBC BLD-RTO: 0 PER 100 WBC
PH UR STRIP: 7 [PH] (ref 5–8)
PLATELET # BLD AUTO: 285 K/UL (ref 150–400)
PMV BLD AUTO: 10.9 FL (ref 8.9–12.9)
POTASSIUM SERPL-SCNC: 3.8 MMOL/L (ref 3.5–5.1)
PROT UR STRIP-MCNC: NEGATIVE MG/DL
RBC # BLD AUTO: 3.97 M/UL (ref 3.8–5.2)
RBC #/AREA URNS HPF: ABNORMAL /HPF (ref 0–5)
SODIUM SERPL-SCNC: 137 MMOL/L (ref 136–145)
SP GR UR REFRACTOMETRY: 1.01 (ref 1–1.03)
UROBILINOGEN UR QL STRIP.AUTO: 0.2 EU/DL (ref 0.2–1)
WBC # BLD AUTO: 8.4 K/UL (ref 3.6–11)
WBC URNS QL MICRO: ABNORMAL /HPF (ref 0–4)

## 2018-05-02 PROCEDURE — 80048 BASIC METABOLIC PNL TOTAL CA: CPT | Performed by: INTERNAL MEDICINE

## 2018-05-02 PROCEDURE — 74011250637 HC RX REV CODE- 250/637: Performed by: INTERNAL MEDICINE

## 2018-05-02 PROCEDURE — 65660000000 HC RM CCU STEPDOWN

## 2018-05-02 PROCEDURE — 87186 SC STD MICRODIL/AGAR DIL: CPT | Performed by: INTERNAL MEDICINE

## 2018-05-02 PROCEDURE — 36415 COLL VENOUS BLD VENIPUNCTURE: CPT | Performed by: INTERNAL MEDICINE

## 2018-05-02 PROCEDURE — 87077 CULTURE AEROBIC IDENTIFY: CPT | Performed by: INTERNAL MEDICINE

## 2018-05-02 PROCEDURE — 77010033678 HC OXYGEN DAILY

## 2018-05-02 PROCEDURE — 74011250636 HC RX REV CODE- 250/636: Performed by: INTERNAL MEDICINE

## 2018-05-02 PROCEDURE — 87086 URINE CULTURE/COLONY COUNT: CPT | Performed by: INTERNAL MEDICINE

## 2018-05-02 PROCEDURE — 85027 COMPLETE CBC AUTOMATED: CPT | Performed by: INTERNAL MEDICINE

## 2018-05-02 PROCEDURE — 71045 X-RAY EXAM CHEST 1 VIEW: CPT

## 2018-05-02 PROCEDURE — 51798 US URINE CAPACITY MEASURE: CPT

## 2018-05-02 PROCEDURE — 81001 URINALYSIS AUTO W/SCOPE: CPT | Performed by: INTERNAL MEDICINE

## 2018-05-02 RX ORDER — AMLODIPINE BESYLATE 5 MG/1
5 TABLET ORAL DAILY
Status: DISCONTINUED | OUTPATIENT
Start: 2018-05-02 | End: 2018-05-11 | Stop reason: HOSPADM

## 2018-05-02 RX ORDER — OXYBUTYNIN CHLORIDE 5 MG/1
5 TABLET, EXTENDED RELEASE ORAL DAILY
Status: DISCONTINUED | OUTPATIENT
Start: 2018-05-03 | End: 2018-05-11 | Stop reason: HOSPADM

## 2018-05-02 RX ORDER — LORAZEPAM 2 MG/ML
0.5 INJECTION INTRAMUSCULAR ONCE
Status: COMPLETED | OUTPATIENT
Start: 2018-05-02 | End: 2018-05-02

## 2018-05-02 RX ORDER — FUROSEMIDE 10 MG/ML
40 INJECTION INTRAMUSCULAR; INTRAVENOUS ONCE
Status: COMPLETED | OUTPATIENT
Start: 2018-05-02 | End: 2018-05-02

## 2018-05-02 RX ORDER — LORAZEPAM 2 MG/ML
0.5 INJECTION INTRAMUSCULAR
Status: DISCONTINUED | OUTPATIENT
Start: 2018-05-02 | End: 2018-05-05

## 2018-05-02 RX ADMIN — AMIODARONE HYDROCHLORIDE 200 MG: 200 TABLET ORAL at 17:05

## 2018-05-02 RX ADMIN — HEPARIN SODIUM 5000 UNITS: 5000 INJECTION, SOLUTION INTRAVENOUS; SUBCUTANEOUS at 05:58

## 2018-05-02 RX ADMIN — POLYETHYLENE GLYCOL 3350 17 G: 17 POWDER, FOR SOLUTION ORAL at 09:18

## 2018-05-02 RX ADMIN — Medication 10 ML: at 13:32

## 2018-05-02 RX ADMIN — AMLODIPINE BESYLATE 5 MG: 5 TABLET ORAL at 09:21

## 2018-05-02 RX ADMIN — ASPIRIN 81 MG 81 MG: 81 TABLET ORAL at 09:21

## 2018-05-02 RX ADMIN — POTASSIUM CHLORIDE 10 MEQ: 750 TABLET, FILM COATED, EXTENDED RELEASE ORAL at 09:32

## 2018-05-02 RX ADMIN — APIXABAN 2.5 MG: 2.5 TABLET, FILM COATED ORAL at 17:05

## 2018-05-02 RX ADMIN — Medication 10 ML: at 21:19

## 2018-05-02 RX ADMIN — METOPROLOL TARTRATE 25 MG: 25 TABLET ORAL at 09:21

## 2018-05-02 RX ADMIN — APIXABAN 2.5 MG: 2.5 TABLET, FILM COATED ORAL at 10:29

## 2018-05-02 RX ADMIN — PANTOPRAZOLE SODIUM 40 MG: 40 GRANULE, DELAYED RELEASE ORAL at 09:23

## 2018-05-02 RX ADMIN — AMIODARONE HYDROCHLORIDE 200 MG: 200 TABLET ORAL at 21:26

## 2018-05-02 RX ADMIN — OXYBUTYNIN CHLORIDE 15 MG: 5 TABLET, FILM COATED, EXTENDED RELEASE ORAL at 09:19

## 2018-05-02 RX ADMIN — METOPROLOL TARTRATE 25 MG: 25 TABLET ORAL at 17:04

## 2018-05-02 RX ADMIN — ATORVASTATIN CALCIUM 40 MG: 40 TABLET, FILM COATED ORAL at 21:26

## 2018-05-02 RX ADMIN — AMIODARONE HYDROCHLORIDE 200 MG: 200 TABLET ORAL at 09:22

## 2018-05-02 RX ADMIN — FUROSEMIDE 40 MG: 10 INJECTION, SOLUTION INTRAMUSCULAR; INTRAVENOUS at 09:26

## 2018-05-02 RX ADMIN — FUROSEMIDE 40 MG: 10 INJECTION, SOLUTION INTRAMUSCULAR; INTRAVENOUS at 13:32

## 2018-05-02 RX ADMIN — LORAZEPAM 0.5 MG: 2 INJECTION, SOLUTION INTRAMUSCULAR; INTRAVENOUS at 01:40

## 2018-05-02 RX ADMIN — LORAZEPAM 0.5 MG: 2 INJECTION, SOLUTION INTRAMUSCULAR; INTRAVENOUS at 21:18

## 2018-05-02 NOTE — PROGRESS NOTES
Progress Note      5/2/2018 8:31 AM  NAME: Donna Gonzalez   MRN:  [id number]   Admit Diagnosis: Rapid atrial fibrillation [facility])                          Assessment:                 1. Hypoxia, acute diastolic CHF, prox afib with RVR back in sinus, increased troponin consistent with type II NSTEMI  2. No hx of CAD  3. Echo5/2018 EF 55%, mod MR, mild pHTN  4. Prox Afib currently in sinus with LVH  5. HTN, with hypertensive heart disease with heart failure, and CKD cr 1.4  6. Dyslipidemia  7. Hx of CVA s/p TPA in 2015  8. PVD with hx of left CEA  9. COPD has had home O2 in past  10. GERD  11. DJD  12. , lives at [facility], poor functional capacity in a wheelchair, sleeps most of the day                            Plan:                  Poor functional capacity, since CVA mostly in wheelchair, sleeps in between meals. Some increase in dyspnea. No chest pain. Afib with RVR back in sinus currently on amiodarone, given CHADS2 score of 5 will try anticoagulation  pulm edema on cxr still on supplemental O2  Increased troponin, consistent with type II NSTEMI, preserved EF, discussed with family would like to avoid invasive therapy, plan for medical management of CAD.         1. Add eliquis 2.5mg po bid, given such high CHADS2 score, all risk/benefits/alternatives disucssed with patient and family  2. Cont ASA likely stop in one month due to age  1. Cont added amiodarone 200mg tid, discharge on 100mg daily  4. Cont metoprolol 25mg bid  5. Cont amlodipine  6. Cont added lasix 40mg iv every day, replete K, follow bmp, taper O2 currently on 5L, check repeat CXR today  7. Cont atorvastatin    PT/OT  Assess for home O2       [x]        High complexity decision making was performed         Subjective:     Flor Allen denies chest pain, dyspnea. Discussed with RN events overnight.      Review of Systems:    Symptom Y/N Comments  Symptom Y/N Comments   Fever/Chills N   Chest Pain N    Poor Appetite N Edema N    Cough N   Abdominal Pain N    Sputum N   Joint Pain N    SOB/ALCARAZ N   Pruritis/Rash N    Nausea/vomit N   Tolerating PT/OT Y    Diarrhea N   Tolerating Diet Y    Constipation N   Other       Could NOT obtain due to:      Objective:      Physical Exam:    Last 24hrs VS reviewed since prior progress note. Most recent are:    Visit Vitals    /87 (BP 1 Location: Left arm, BP Patient Position: At rest)    Pulse 75    Temp 97.6 °F (36.4 °C)    Resp 20    Ht 5' 4\" (1.626 m)    Wt 71.4 kg (157 lb 4.8 oz)    SpO2 93%    BMI 27 kg/m2       Intake/Output Summary (Last 24 hours) at 05/02/18 0852  Last data filed at 05/02/18 0550   Gross per 24 hour   Intake              680 ml   Output              800 ml   Net             -120 ml        General Appearance: Well developed, well nourished, alert & oriented x 3,    no acute distress. Ears/Nose/Mouth/Throat: Hearing grossly normal.  Neck: Supple. Chest: Lungs clear to auscultation bilaterally. Cardiovascular: Regular rate and rhythm, S1S2 normal, no murmur. Abdomen: Soft, non-tender, bowel sounds are active. Extremities: No edema bilaterally. Skin: Warm and dry. PMH/SH reviewed - no change compared to H&P    Data Review    Telemetry: normal sinus rhythm     Lab Data Personally Reviewed:    Recent Labs      05/02/18 0458 05/01/18 0058   WBC  8.4  9.1   HGB  11.7  11.1*   HCT  36.0  34.4*   PLT  285  265     No results for input(s): INR, PTP, APTT in the last 72 hours.     No lab exists for component: Dorothy Moe   Recent Labs      05/02/18 0458 05/01/18 0058 04/30/18   0947   NA  137  139  140   K  3.8  3.2*  4.1   CL  101  102  103   CO2  29  30  28   BUN  25*  27*  25*   CREA  1.27*  1.29*  1.45*   GLU  96  91  141*   CA  8.4*  8.5  9.8   MG   --    --   2.2     Recent Labs      05/01/18 0058 04/30/18   0947   TROIQ  0.28*  0.33*     Lab Results   Component Value Date/Time    Cholesterol, total 182 02/19/2015 04:30 AM    HDL Cholesterol 38 02/19/2015 04:30 AM    LDL, calculated 99.4 02/19/2015 04:30 AM    Triglyceride 223 (H) 02/19/2015 04:30 AM    CHOL/HDL Ratio 4.8 02/19/2015 04:30 AM       Recent Labs      05/01/18   0058  04/30/18   0947   SGOT  22  28   AP  59  67   TP  6.7  7.6   ALB  2.9*  3.3*   GLOB  3.8  4.3*     No results for input(s): PH, PCO2, PO2 in the last 72 hours.     Medications Personally Reviewed:    Current Facility-Administered Medications   Medication Dose Route Frequency    LORazepam (ATIVAN) injection 0.5 mg  0.5 mg IntraVENous Q4H PRN    amLODIPine (NORVASC) tablet 5 mg  5 mg Oral DAILY    apixaban (ELIQUIS) tablet 2.5 mg  2.5 mg Oral BID    furosemide (LASIX) injection 40 mg  40 mg IntraVENous DAILY    potassium chloride SR (KLOR-CON 10) tablet 10 mEq  10 mEq Oral DAILY    amiodarone (CORDARONE) tablet 200 mg  200 mg Oral TID    [START ON 5/3/2018] amiodarone (CORDARONE) tablet 100 mg  100 mg Oral DAILY    metoprolol tartrate (LOPRESSOR) tablet 25 mg  25 mg Oral BID    albuterol (PROVENTIL HFA, VENTOLIN HFA, PROAIR HFA) inhaler 2 Puff  2 Puff Inhalation Q4H PRN    aspirin chewable tablet 81 mg  81 mg Oral DAILY    atorvastatin (LIPITOR) tablet 40 mg  40 mg Oral QHS    pantoprazole (PROTONIX) granules for oral suspension 40 mg  40 mg Oral DAILY    umeclidinium (INCRUSE ELLIPTA) 62.5 mcg/actuation  1 Puff Inhalation DAILY    sodium chloride (NS) flush 5-10 mL  5-10 mL IntraVENous Q8H    sodium chloride (NS) flush 5-10 mL  5-10 mL IntraVENous PRN    acetaminophen (TYLENOL) tablet 650 mg  650 mg Oral Q4H PRN    ondansetron (ZOFRAN) injection 4 mg  4 mg IntraVENous Q4H PRN    polyethylene glycol (MIRALAX) packet 17 g  17 g Oral DAILY    oxybutynin chloride XL (DITROPAN XL) tablet 15 mg  15 mg Oral DAILY         Bakari Magana MD

## 2018-05-02 NOTE — PROGRESS NOTES
0700: Bedside shift change report given to Sade Stein RN (oncoming nurse) by Amanda Hogan RN (offgoing nurse). Report included the following information SBAR.     1900:   Bedside shift change report given to PORFIRIO Leslie (oncoming nurse). Report included the following information SBAR. SHIFT SUMMARY:            [address] NURSING NOTE   Admission Date 4/30/2018   Admission Diagnosis Rapid atrial fibrillation ([address]   Consults IP CONSULT TO HOSPITALIST      Cardiac Monitoring [x] Yes [] No      Purposeful Hourly Rounding [x] Yes    Arjun Score Total Score: 3   Arjun score 3 or > [x] Bed Alarm [] Avasys [] 1:1 sitter [] Patient refused (Signed refusal form in chart)   Rosas Score Rosas Score: 15   Rosas score 14 or < [] PMT consult [] Wound Care consult    []  Specialty bed  [] Nutrition consult      Influenza Vaccine Received Flu Vaccine for Current Season (usually Sept-March): Not Flu Season           Oxygen needs? [] Room air Oxygen @  []1L    []2L    []3L   []4L    [x]5L   []6L via  NC   Chronic home O2 use?  [x] Yes [] No  Perform O2 challenge test and document in progress note using smartphrase (.Homeoxygen)      Last bowel movement Last Bowel Movement Date: 05/02/18      Urinary Catheter       External Female Catheter 05/01/18-Urine Output (mL): 350 ml     LDAs               Peripheral IV 04/30/18 Left Hand (Active)   Site Assessment Clean, dry, & intact 5/2/2018  2:00 PM   Phlebitis Assessment 0 5/2/2018  2:00 PM   Infiltration Assessment 0 5/2/2018  2:00 PM   Dressing Status Clean, dry, & intact 5/2/2018  2:00 PM   Dressing Type Transparent 5/2/2018  2:00 PM   Hub Color/Line Status Flushed;Pink 5/2/2018  2:00 PM          External Female Catheter 05/01/18 (Active)   Site Assessment Clean, dry, & intact 5/2/2018  2:00 PM   Repositioned Yes 5/2/2018  2:00 PM   Perineal Care Yes 5/2/2018  2:00 PM   Wick Changed Yes 5/2/2018  2:00 PM   Suction Canister/Tubing Changed No 5/2/2018  2:00 PM   Urine Output (mL) 350 ml 5/2/2018  2:00 PM                   Readmission Risk Assessment Tool Score High Risk            41       Total Score        3 Has Seen PCP in Last 6 Months (Yes=3, No=0)    2 . Living with Significant Other. Assisted Living. LTAC. SNF. or   Rehab    5 Pt.  Coverage (Medicare=5 , Medicaid, or Self-Pay=4)    31 Charlson Comorbidity Score (Age + Comorbid Conditions)        Criteria that do not apply:    Patient Length of Stay (>5 days = 3)    IP Visits Last 12 Months (1-3=4, 4=9, >4=11)       Expected Length of Stay 4d 7h   Actual Length of Stay 2

## 2018-05-02 NOTE — PROGRESS NOTES
Physical Therapy  Attempted PT treatment. Patient declines to participate stating that she wants to sleep. Attempted to encourage however she continues to decline. Will continue to follow.   Edgardo Yeboah, PT, DPT

## 2018-05-02 NOTE — PROGRESS NOTES
Heart Failure Care Coordinator visited the patient in room. Provided introduction to self, and explanation of the Care Coordinator role. Patient was provided a copy of the [facility] letter. Contact information provided should any questions arise before or after discharge.

## 2018-05-02 NOTE — PROGRESS NOTES
[address] [place] [address]  [phone]      Medical Progress Note      NAME: Reginald Holbrook   :  1928  MRM:  [id number]    Date/Time: 2018  5:40 AM         Subjective:     Alert. Admitted with rapid afib, diastolic CHF, NSEMI. Patient alert. No complaints of chest pain or SOB. Confused and agitated overnight - given ativan. Past Medical History:   Diagnosis Date    Arthritis     generalized    COPD     GERD (gastroesophageal reflux disease)     Hypertension     Ill-defined condition     Vitamin deficiency    [facility]STEM[facility] (non-ST elevated [facility]) [facility] 2018    Other ill-defined conditions(799.89)     high cholesterol    Peripheral artery disease (HCC)     Psychiatric disorder     Anxiety disorder    Sleep disorder     Insomnia    TIA (transient ischemic attack)        ROS:  General: negative for fever, chills, sweats, weakness  Ear Nose and Throat: negative for rhinorrhea, pharyngitis, otalgia, tinnitus, speech or swallowing difficulties  Respiratory:  negative for cough, sputum production, SOB, wheezing, ALCARAZ, pleuritic pain  Cardiology:  negative for chest pain, palpitations, orthopnea, PND, edema, syncope   Gastrointestinal: negative for abdominal pain, N/V, dysphagia, change in bowel habits, bleeding  Muskuloskeletal : negative for arthralgia, myalgia  Dermatological: negative for rash, ulceration, mole change, new lesion  Neurological: negative for headache, dizziness, confusion, focal weakness, paresthesia, memory loss, gait disturbance  Psychological: positive for confusion, agitation overnight  Review of systems otherwise negative         Objective:       Vitals:        Last 24hrs VS reviewed since prior progress note.  Most recent are:    Visit Vitals    /82    Pulse 71    Temp 98.4 °F (36.9 °C)    Resp 20    Ht 5' 4\" (1.626 m)    Wt 159 lb 2.8 oz (72.2 kg)    SpO2 94%    BMI 27.32 kg/m2     SpO2 Readings from Last 6 Encounters:   05/02/18 94%   02/23/15 95%   03/14/11 95%    O2 Flow Rate (L/min): 4 l/min       Intake/Output Summary (Last 24 hours) at 05/02/18 0547  Last data filed at 05/01/18 1818   Gross per 24 hour   Intake              680 ml   Output              750 ml   Net              -70 ml        Telemetry reviewed:   normal sinus rhythm  Tubes:   N/A  X-Ray:  Chest xray - Mild interstitial edema and small pleural effusions    Venous duplex of LE's - No deep venous thrombosis identified    Procedures:   EKG - When compared with ECG of 30-APR-2018 09:34,   Sinus rhythm has replaced Atrial fibrillation   Vent. rate has decreased BY  65 BPM   Inferior infarct is now present   Inverted T waves have replaced nonspecific T wave abnormality in Inferior   leads     Echo - Left ventricle: Systolic function was normal. Ejection fraction was  estimated in the range of 55 % to 60 %. No obvious wall motion  abnormalities identified in the views obtained. Wall thickness was mildly  to moderately increased. Hypertrophy was noted. Mitral valve: There was moderate regurgitation. Aortic valve: The valve was trileaflet. Leaflets exhibited mildly  increased thickness, calcification, normal cuspal separation, and  sclerosis. Tricuspid valve: There was mild to moderate regurgitation. Exam:     General   well developed, well nourished, appears stated age, in no acute distress  Neck   Supple without nodes or mass. No thyromegaly or bruit  Respiratory   Clear To Auscultation bilaterally - no wheezes, rales, rhonchi, or crackles  Cardiology  Regular Rate and Rythmn  - no murmurs, rubs or gallops  Abdominal  Soft, non-tender, non-distended, positive bowel sounds, no hepatosplenomegaly  Extremities  No clubbing, cyanosis, or edema. Pulses intact. Skin  Normal skin turgor. No rashes or skin ulcers noted  Neurological  No focal neurological deficits noted  Psychological  Oriented x 2. Normal affect.   Exam otherwise negative Lab Data Reviewed: (see below)    Medications Reviewed: (see below)    ______________________________________________________________________    Medications:     Current Facility-Administered Medications   Medication Dose Route Frequency    LORazepam (ATIVAN) injection 0.5 mg  0.5 mg IntraVENous Q4H PRN    furosemide (LASIX) injection 40 mg  40 mg IntraVENous DAILY    potassium chloride SR (KLOR-CON 10) tablet 10 mEq  10 mEq Oral DAILY    amiodarone (CORDARONE) tablet 200 mg  200 mg Oral TID    [START ON 5/3/2018] amiodarone (CORDARONE) tablet 100 mg  100 mg Oral DAILY    metoprolol tartrate (LOPRESSOR) tablet 25 mg  25 mg Oral BID    albuterol (PROVENTIL HFA, VENTOLIN HFA, PROAIR HFA) inhaler 2 Puff  2 Puff Inhalation Q4H PRN    amLODIPine (NORVASC) tablet 2.5 mg  2.5 mg Oral DAILY    aspirin chewable tablet 81 mg  81 mg Oral DAILY    atorvastatin (LIPITOR) tablet 40 mg  40 mg Oral QHS    clopidogrel (PLAVIX) tablet 75 mg  75 mg Oral DAILY    pantoprazole (PROTONIX) granules for oral suspension 40 mg  40 mg Oral DAILY    umeclidinium (INCRUSE ELLIPTA) 62.5 mcg/actuation  1 Puff Inhalation DAILY    sodium chloride (NS) flush 5-10 mL  5-10 mL IntraVENous Q8H    sodium chloride (NS) flush 5-10 mL  5-10 mL IntraVENous PRN    acetaminophen (TYLENOL) tablet 650 mg  650 mg Oral Q4H PRN    ondansetron (ZOFRAN) injection 4 mg  4 mg IntraVENous Q4H PRN    heparin (porcine) injection 5,000 Units  5,000 Units SubCUTAneous Q12H    polyethylene glycol (MIRALAX) packet 17 g  17 g Oral DAILY    oxybutynin chloride XL (DITROPAN XL) tablet 15 mg  15 mg Oral DAILY            Lab Review:     Recent Labs      05/02/18   0458  05/01/18   0058  04/30/18   0947   WBC  8.4  9.1  10.6   HGB  11.7  11.1*  12.6   HCT  36.0  34.4*  38.5   PLT  285  265  299     Recent Labs      05/02/18   0458  05/01/18   0058  04/30/18   0947   NA  137  139  140   K  3.8  3.2*  4.1   CL  101  102  103   CO2  29  30  28   GLU  96  91 141*   BUN  25*  27*  25*   CREA  1.27*  1.29*  1.45*   CA  8.4*  8.5  9.8   MG   --    --   2.2   ALB   --   2.9*  3.3*   TBILI   --   0.9  1.1*   SGOT   --   22  28   ALT   --   18  23     Lab Results   Component Value Date/Time    Glucose (POC) 104 02/21/2015 07:27 AM    Glucose (POC) 108 (H) 02/20/2015 09:16 PM    Glucose (POC) 110 (H) 02/20/2015 04:17 PM    Glucose (POC) 131 (H) 02/20/2015 11:18 AM    Glucose (POC) 130 (H) 02/20/2015 06:26 AM     No results for input(s): PH, PCO2, PO2, HCO3, FIO2 in the last 72 hours. No results for input(s): INR in the last 72 hours. No lab exists for component: INREXT, INREXT         Assessment:     Principal Problem:    Acute diastolic CHF (congestive heart failure) ([facility] (5/1/2018)    Active Problems:    NSTEMI (non-ST elevated myocardial infarction) ([facility] (5/1/2018)      Rapid atrial fibrillation (HCC) (4/30/2018)      COPD (5/1/2018)      Hypertension (5/1/2018)      CKD (chronic kidney disease) stage 3, GFR 30-59 ml/min (5/1/2018)      Acute respiratory failure with hypoxemia ([facility] (5/2/2018)      Hypokalemia (5/2/2018)           Plan:     Risk of deterioration: medium             1. Continue telemetry  2. Amiodarone per cardiology   3. Continue ASA, plavix, BB  4. Continue lasix  once daily  5. Monitor renal function - stable  6. PT/OT   7. PRN ativan for agitation  8. Discussed with son this AM  9.  Note to CDMP query: patient had acute respiratory failure with hypoxemia and hypokalemia on admission                    Care Plan discussed with: Patient    Discussed:  Care Plan    Prophylaxis:  Hep SQ and H2B/PPI    Disposition:  SNF/LTC           ___________________________________________________    Attending Physician: Darlin Smith MD

## 2018-05-02 NOTE — PROGRESS NOTES
2300: Patient rang out. When asked patient stated that er heart was racing. I then asked if she was having chest pain or tightness. The patient then stated her heart was acting up but cause I gave her something and if I was experimenting with her heart. I stated that I was providing care. Patient remained visibly agitated. I asked Pranav Childress RN and Aldo Wiggins to talk with patient. patient stated that Andre Short,  And Myself were scheming and she did not trusted us. Patient wanted to call family and Jovita Tobar helped dial that family members phone number. Family was reached and came in an hour later. MD Mirna Castillo was also paged and 0.5mg iv ativan was ordered to help patient sleep.

## 2018-05-02 NOTE — PROGRESS NOTES
Occupational Therapy    Nursing cleared for theray. Attempted OT services, patient reporting poor sleep last night. Politely requesting to rest and sleep. Will follow up for OT services.       Thank you,    Daniel Gastelum OTR/L

## 2018-05-02 NOTE — CARDIO/PULMONARY
Cardiopulmonary Rehab Nursing Entry:    Pt is on CHF Bundle List    Chart reviewed. Pt 81 yo admitted with respiratory distress, NSTEMI. PMHx of HTN, PAD, COPD, GERD, TIA, anxiety. Met with pt lying in bed. No family present during this education session. CHF folder present in room. This was a follow-up visit to answer questions and reinforce prior teaching re: CHF, S&Ss, medication management, Low NA diet, daily weights and when to call the doctor. Pt will be returning to [facility]. She is able to report that she will have restrictions on her salt intake. She stated, \"They tell me no more breakfast meats are allowed. \"  Her medication will be provided by SNF staff, the staff weighs her, per pt only every few weeks. Advised pt that the frequency will change on her return to [facility]. Encouraged to communicate symptoms of SOB, swelling, cough, chest congestion to staff. Pt has impaired physical mobility. She is up out of bed to wheelchair during the day and independent in ADLs. Encouraged pt to remain as active as tolerated. Pt verbalized understanding.

## 2018-05-03 LAB
ANION GAP SERPL CALC-SCNC: 5 MMOL/L (ref 5–15)
BUN SERPL-MCNC: 16 MG/DL (ref 6–20)
BUN/CREAT SERPL: 16 (ref 12–20)
CALCIUM SERPL-MCNC: 8.1 MG/DL (ref 8.5–10.1)
CHLORIDE SERPL-SCNC: 101 MMOL/L (ref 97–108)
CO2 SERPL-SCNC: 32 MMOL/L (ref 21–32)
CREAT SERPL-MCNC: 1.03 MG/DL (ref 0.55–1.02)
GLUCOSE SERPL-MCNC: 94 MG/DL (ref 65–100)
MAGNESIUM SERPL-MCNC: 2.1 MG/DL (ref 1.6–2.4)
POTASSIUM SERPL-SCNC: 3.4 MMOL/L (ref 3.5–5.1)
SODIUM SERPL-SCNC: 138 MMOL/L (ref 136–145)

## 2018-05-03 PROCEDURE — 36415 COLL VENOUS BLD VENIPUNCTURE: CPT | Performed by: INTERNAL MEDICINE

## 2018-05-03 PROCEDURE — 74011250637 HC RX REV CODE- 250/637: Performed by: INTERNAL MEDICINE

## 2018-05-03 PROCEDURE — 97535 SELF CARE MNGMENT TRAINING: CPT

## 2018-05-03 PROCEDURE — 77030038269 HC DRN EXT URIN PURWCK BARD -A

## 2018-05-03 PROCEDURE — 74011250636 HC RX REV CODE- 250/636: Performed by: INTERNAL MEDICINE

## 2018-05-03 PROCEDURE — 80048 BASIC METABOLIC PNL TOTAL CA: CPT | Performed by: INTERNAL MEDICINE

## 2018-05-03 PROCEDURE — 65660000000 HC RM CCU STEPDOWN

## 2018-05-03 PROCEDURE — 83735 ASSAY OF MAGNESIUM: CPT | Performed by: INTERNAL MEDICINE

## 2018-05-03 RX ORDER — NITROFURANTOIN 25; 75 MG/1; MG/1
100 CAPSULE ORAL 2 TIMES DAILY
Status: DISCONTINUED | OUTPATIENT
Start: 2018-05-03 | End: 2018-05-06

## 2018-05-03 RX ORDER — FUROSEMIDE 10 MG/ML
40 INJECTION INTRAMUSCULAR; INTRAVENOUS 2 TIMES DAILY
Status: DISCONTINUED | OUTPATIENT
Start: 2018-05-03 | End: 2018-05-07

## 2018-05-03 RX ORDER — POTASSIUM CHLORIDE 750 MG/1
10 TABLET, FILM COATED, EXTENDED RELEASE ORAL 3 TIMES DAILY
Status: DISCONTINUED | OUTPATIENT
Start: 2018-05-03 | End: 2018-05-05

## 2018-05-03 RX ADMIN — OXYBUTYNIN CHLORIDE 5 MG: 5 TABLET, EXTENDED RELEASE ORAL at 08:56

## 2018-05-03 RX ADMIN — POTASSIUM CHLORIDE 10 MEQ: 750 TABLET, FILM COATED, EXTENDED RELEASE ORAL at 21:41

## 2018-05-03 RX ADMIN — POTASSIUM CHLORIDE 10 MEQ: 750 TABLET, FILM COATED, EXTENDED RELEASE ORAL at 17:24

## 2018-05-03 RX ADMIN — FUROSEMIDE 40 MG: 10 INJECTION, SOLUTION INTRAMUSCULAR; INTRAVENOUS at 17:25

## 2018-05-03 RX ADMIN — UMECLIDINIUM 1 PUFF: 62.5 AEROSOL, POWDER ORAL at 08:56

## 2018-05-03 RX ADMIN — POTASSIUM CHLORIDE 10 MEQ: 750 TABLET, FILM COATED, EXTENDED RELEASE ORAL at 08:56

## 2018-05-03 RX ADMIN — PANTOPRAZOLE SODIUM 40 MG: 40 GRANULE, DELAYED RELEASE ORAL at 08:56

## 2018-05-03 RX ADMIN — AMLODIPINE BESYLATE 5 MG: 5 TABLET ORAL at 08:56

## 2018-05-03 RX ADMIN — ASPIRIN 81 MG 81 MG: 81 TABLET ORAL at 08:55

## 2018-05-03 RX ADMIN — METOPROLOL TARTRATE 25 MG: 25 TABLET ORAL at 08:56

## 2018-05-03 RX ADMIN — AMIODARONE HYDROCHLORIDE 100 MG: 200 TABLET ORAL at 08:55

## 2018-05-03 RX ADMIN — Medication 10 ML: at 06:00

## 2018-05-03 RX ADMIN — Medication 10 ML: at 21:41

## 2018-05-03 RX ADMIN — FUROSEMIDE 40 MG: 10 INJECTION, SOLUTION INTRAMUSCULAR; INTRAVENOUS at 08:54

## 2018-05-03 RX ADMIN — APIXABAN 2.5 MG: 2.5 TABLET, FILM COATED ORAL at 08:56

## 2018-05-03 RX ADMIN — NITROFURANTOIN MONOHYDRATE/MACROCRYSTALLINE 100 MG: 25; 75 CAPSULE ORAL at 17:24

## 2018-05-03 RX ADMIN — POLYETHYLENE GLYCOL 3350 17 G: 17 POWDER, FOR SOLUTION ORAL at 08:54

## 2018-05-03 RX ADMIN — ATORVASTATIN CALCIUM 40 MG: 40 TABLET, FILM COATED ORAL at 21:41

## 2018-05-03 RX ADMIN — NITROFURANTOIN MONOHYDRATE/MACROCRYSTALLINE 100 MG: 25; 75 CAPSULE ORAL at 14:44

## 2018-05-03 RX ADMIN — Medication 10 ML: at 14:44

## 2018-05-03 RX ADMIN — APIXABAN 2.5 MG: 2.5 TABLET, FILM COATED ORAL at 17:24

## 2018-05-03 RX ADMIN — METOPROLOL TARTRATE 25 MG: 25 TABLET ORAL at 17:24

## 2018-05-03 NOTE — PROGRESS NOTES
Bedside and Verbal shift change report given to radha medrano RN (oncoming nurse). Report included the following information SBAR, Kardex, ED Summary, Procedure Summary, Intake/Output, MAR and Recent Results. SHIFT SUMMARY:  Uneventful shift.

## 2018-05-03 NOTE — PROGRESS NOTES
Progress Note      5/3/2018 8:31 AM  NAME: Flakito Price   MRN:  [id number]   Admit Diagnosis: Rapid atrial fibrillation [facility])                          Assessment:                 1. Hypoxia, acute diastolic CHF, prox afib with RVR back in sinus, increased troponin consistent with type II NSTEMI  2. No hx of CAD  3. Echo5/2018 EF 55%, mod MR, mild pHTN  4. Prox Afib currently in sinus with LVH  5. HTN, with hypertensive heart disease with heart failure, and CKD cr 1.4  6. Dyslipidemia  7. Hx of CVA s/p TPA in 2015  8. PVD with hx of left CEA  9. COPD has had home O2 in past  10. GERD  11. DJD  12. , lives at [facility], poor functional capacity in a wheelchair, sleeps most of the day                            Plan:                  Poor functional capacity, since CVA mostly in wheelchair, sleeps in between meals. Some increase in dyspnea. No chest pain. Afib with RVR back in sinus currently on amiodarone, given CHADS2 score of 5 will try anticoagulation  pulm edema on cxr still on supplemental O2  Increased troponin, consistent with type II NSTEMI, preserved EF, discussed with family would like to avoid invasive therapy, plan for medical management of CAD. On abx for gm negative UTI     1. Cont added eliquis 2.5mg po bid, given CHADS2 of 5  2. Cont ASA likely stop in one month due to age  1. Cont added amiodarone 100mg daily  4. Cont metoprolol 25mg bid  5. Cont amlodipine  6. Increase added lasix 40mg iv bid, replete K, follow bmp, taper O2 currently on 6L  7. Cont atorvastatin    PT/OT  Assess for home O2       [x]        High complexity decision making was performed         Subjective:     Swetha Staelisabet Allen denies chest pain, dyspnea. Discussed with RN events overnight.      Review of Systems:    Symptom Y/N Comments  Symptom Y/N Comments   Fever/Chills N   Chest Pain N    Poor Appetite N   Edema N    Cough N   Abdominal Pain N    Sputum N   Joint Pain N    SOB/ALCARAZ N   Pruritis/Rash N Nausea/vomit N   Tolerating PT/OT Y    Diarrhea N   Tolerating Diet Y    Constipation N   Other       Could NOT obtain due to:      Objective:      Physical Exam:    Last 24hrs VS reviewed since prior progress note. Most recent are:    Visit Vitals    /63 (BP 1 Location: Left arm, BP Patient Position: At rest)    Pulse 66    Temp 98.4 °F (36.9 °C)    Resp 20    Ht 5' 4\" (1.626 m)    Wt 69.5 kg (153 lb 4.8 oz)    SpO2 93%    BMI 26.31 kg/m2       Intake/Output Summary (Last 24 hours) at 05/03/18 1223  Last data filed at 05/03/18 0605   Gross per 24 hour   Intake               50 ml   Output             1100 ml   Net            -1050 ml        General Appearance: Well developed, well nourished, alert & oriented x 3,    no acute distress. Ears/Nose/Mouth/Throat: Hearing grossly normal.  Neck: Supple. Chest: Lungs clear to auscultation bilaterally. Cardiovascular: Regular rate and rhythm, S1S2 normal, no murmur. Abdomen: Soft, non-tender, bowel sounds are active. Extremities: No edema bilaterally. Skin: Warm and dry. PMH/SH reviewed - no change compared to H&P    Data Review    Telemetry: normal sinus rhythm     Lab Data Personally Reviewed:    Recent Labs      05/02/18 0458 05/01/18 0058   WBC  8.4  9.1   HGB  11.7  11.1*   HCT  36.0  34.4*   PLT  285  265     No results for input(s): INR, PTP, APTT in the last 72 hours.     No lab exists for component: Eri Deshpande   Recent Labs      05/03/18   0417  05/02/18 0458 05/01/18 0058   NA  138  137  139   K  3.4*  3.8  3.2*   CL  101  101  102   CO2  32  29  30   BUN  16  25*  27*   CREA  1.03*  1.27*  1.29*   GLU  94  96  91   CA  8.1*  8.4*  8.5   MG  2.1   --    --      Recent Labs      05/01/18 0058   TROIQ  0.28*     Lab Results   Component Value Date/Time    Cholesterol, total 182 02/19/2015 04:30 AM    HDL Cholesterol 38 02/19/2015 04:30 AM    LDL, calculated 99.4 02/19/2015 04:30 AM    Triglyceride 223 (H) 02/19/2015 04:30 AM CHOL/HDL Ratio 4.8 02/19/2015 04:30 AM       Recent Labs      05/01/18   0058   SGOT  22   AP  59   TP  6.7   ALB  2.9*   GLOB  3.8     No results for input(s): PH, PCO2, PO2 in the last 72 hours.     Medications Personally Reviewed:    Current Facility-Administered Medications   Medication Dose Route Frequency    potassium chloride SR (KLOR-CON 10) tablet 10 mEq  10 mEq Oral TID    furosemide (LASIX) injection 40 mg  40 mg IntraVENous BID    nitrofurantoin (macrocrystal-monohydrate) (MACROBID) capsule 100 mg  100 mg Oral BID    LORazepam (ATIVAN) injection 0.5 mg  0.5 mg IntraVENous Q4H PRN    amLODIPine (NORVASC) tablet 5 mg  5 mg Oral DAILY    apixaban (ELIQUIS) tablet 2.5 mg  2.5 mg Oral BID    oxybutynin chloride XL (DITROPAN XL) tablet 5 mg  5 mg Oral DAILY    amiodarone (CORDARONE) tablet 100 mg  100 mg Oral DAILY    metoprolol tartrate (LOPRESSOR) tablet 25 mg  25 mg Oral BID    albuterol (PROVENTIL HFA, VENTOLIN HFA, PROAIR HFA) inhaler 2 Puff  2 Puff Inhalation Q4H PRN    aspirin chewable tablet 81 mg  81 mg Oral DAILY    atorvastatin (LIPITOR) tablet 40 mg  40 mg Oral QHS    pantoprazole (PROTONIX) granules for oral suspension 40 mg  40 mg Oral DAILY    umeclidinium (INCRUSE ELLIPTA) 62.5 mcg/actuation  1 Puff Inhalation DAILY    sodium chloride (NS) flush 5-10 mL  5-10 mL IntraVENous Q8H    sodium chloride (NS) flush 5-10 mL  5-10 mL IntraVENous PRN    acetaminophen (TYLENOL) tablet 650 mg  650 mg Oral Q4H PRN    ondansetron (ZOFRAN) injection 4 mg  4 mg IntraVENous Q4H PRN    polyethylene glycol (MIRALAX) packet 17 g  17 g Oral DAILY         Laura Lo MD

## 2018-05-03 NOTE — PROGRESS NOTES
Problem: Pressure Injury - Risk of  Goal: *Prevention of pressure injury  Document Rosas Scale and appropriate interventions in the flowsheet.    Outcome: Progressing Towards Goal  Pressure Injury Interventions:  Sensory Interventions: Assess changes in LOC, Chair cushion, Check visual cues for pain, Float heels, Keep linens dry and wrinkle-free    Moisture Interventions: Absorbent underpads, Minimize layers, Limit adult briefs    Activity Interventions: Chair cushion, Increase time out of bed, Pressure redistribution bed/mattress(bed type), PT/OT evaluation    Mobility Interventions: Chair cushion, Float heels, HOB 30 degrees or less, Pressure redistribution bed/mattress (bed type), PT/OT evaluation    Nutrition Interventions: Document food/fluid/supplement intake, Offer support with meals,snacks and hydration    Friction and Shear Interventions: Feet elevated on foot rest, Foam dressings/transparent film/skin sealants, HOB 30 degrees or less, Lift sheet

## 2018-05-03 NOTE — PROGRESS NOTES
Problem: Self Care Deficits Care Plan (Adult)  Goal: *Acute Goals and Plan of Care (Insert Text)  Occupational Therapy Goals  Initiated 5/1/2018  1. Patient will perform grooming with S within 7 day(s). 2.  Patient will perform upper body dressing with moderate assistance  within 7 day(s). 3.  Patient will perform lower body dressing with maximal assistance within 7 day(s). 4.  Patient will perform toilet transfers with minimal assistance/contact guard assist within 7 day(s). 5.  Patient will perform all aspects of toileting with minimal assistance/contact guard assist within 7 day(s). 6.  Patient will participate in upper extremity therapeutic exercise/activities with independence for 5 minutes within 7 day(s). 7.  Patient will utilize energy conservation techniques during functional activities with verbal cues within 7 day(s). Occupational Therapy TREATMENT  Patient: Elio Paz (12 y.o. female)  Date: 5/3/2018  Diagnosis: Rapid atrial fibrillation ([facility] [address] Acute diastolic CHF (congestive heart failure) ([facility] [address]       Precautions: Fall  Chart, occupational therapy assessment, plan of care, and goals were reviewed. ASSESSMENT:  Pt was cleared to be seen for therapy and all VSS. Pt was on 6 liters of NC and her O2% was 93% at rest.  Pt was min assist for bed mobility and her O2% remained at 93% while sitting. Pt was able to come to stand with min to CGA of 1 and with use of RW she was CGA for transfer to toilet and she was woodrow to toilet self. Her O2% remained 92 to 93% toileting. Pt was CGA to stand at the sink to wash her hands and then walked back to the chair with CGA. Pt does have decreased vision due to her past CVA and her hearing is decreased more on the right and better on the left. Pt was left sitting in recliner with bed alarm activated and nursing notified. Pts O2% remains at or above 92% on  6 liters and pt states that she feels better today.   Recommend that pt have further therapy at discharge, home care PT and return to [facility]. Progression toward goals:  [x]       Improving appropriately and progressing toward goals  []       Improving slowly and progressing toward goals  []       Not making progress toward goals and plan of care will be adjusted     PLAN:  Patient continues to benefit from skilled intervention to address the above impairments. Continue treatment per established plan of care. Discharge Recommendations:  Home Health and [facility] for OT and PT  Further Equipment Recommendations for Discharge:  tbd     SUBJECTIVE:   Patient stated I was only at [facility] for a few weeks.     OBJECTIVE DATA SUMMARY:   Cognitive/Behavioral Status:  Neurologic State: Alert  Orientation Level: Appropriate for age  Cognition: Appropriate decision making  Perception: Appears intact  Perseveration: No perseveration noted  Safety/Judgement: Awareness of environment    Functional Mobility and Transfers for ADLs:  Bed Mobility:       Transfers:     Functional Transfers  Bathroom Mobility: Contact guard assistance  Toilet Transfer : Contact guard assistance       Balance:  Sitting: High guard  Standing: Impaired; Without support  Standing - Static: Good;Constant support  Standing - Dynamic : Fair    ADL Intervention:  Feeding  Feeding Assistance: Independent    Grooming  Washing Hands: Contact guard assistance       Toileting  Toileting Assistance: Contact guard assistance  Bladder Hygiene: Contact guard assistance  Bowel Hygiene: Contact guard assistance    Cognitive Retraining  Safety/Judgement: Awareness of environment        Pain:  Pain Scale 1: Numeric (0 - 10)  Pain Intensity 1: 0              Activity Tolerance:   vss  Please refer to the flowsheet for vital signs taken during this treatment.   After treatment:   [x] Patient left in no apparent distress sitting up in chair  [] Patient left in no apparent distress in bed  [x] Call bell left within reach  [x] Nursing notified  [] Caregiver present  [x] Bed alarm activated    COMMUNICATION/COLLABORATION:   The patients plan of care was discussed with: Physical Therapist and Registered Nurse    Diana Code, OT  Time Calculation: 37 mins

## 2018-05-03 NOTE — PROGRESS NOTES
[address] [place] [address]  [phone]      Medical Progress Note      NAME: Natalie Lama   :  1928  MRM:  [id number]    Date/Time: 5/3/2018  5:47 AM         Subjective:     Alert. Admitted with rapid afib, diastolic CHF, NSEMI. Patient alert. No complaints of chest pain or SOB. Had better night but O2 sat's low 90's now on 6 lpm. Chest xray yesterday showed interstitial edema    Past Medical History:   Diagnosis Date    Arthritis     generalized    COPD     GERD (gastroesophageal reflux disease)     Hypertension     Ill-defined condition     Vitamin deficiency    NSTEMI (non-ST elevated myocardial infarction) ([facility] [address] 2018    Other ill-defined conditions(799.89)     high cholesterol    Peripheral artery disease (HCC)     Psychiatric disorder     Anxiety disorder    Sleep disorder     Insomnia    TIA (transient ischemic attack)        ROS:  General: negative for fever, chills, sweats, weakness  Ear Nose and Throat: negative for rhinorrhea, pharyngitis, otalgia, tinnitus, speech or swallowing difficulties  Respiratory:  negative for cough, sputum production, SOB, wheezing, ALCARAZ, pleuritic pain  Cardiology:  negative for chest pain, palpitations, orthopnea, PND, edema, syncope   Gastrointestinal: negative for abdominal pain, N/V, dysphagia, change in bowel habits, bleeding  Muskuloskeletal : negative for arthralgia, myalgia  Dermatological: negative for rash, ulceration, mole change, new lesion  Neurological: negative for headache, dizziness, confusion, focal weakness, paresthesia, memory loss, gait disturbance  Psychological: positive for confusion, agitation overnight  Review of systems otherwise negative         Objective:       Vitals:        Last 24hrs VS reviewed since prior progress note.  Most recent are:    Visit Vitals    /85 (BP 1 Location: Left arm, BP Patient Position: At rest)    Pulse 69    Temp 98.2 °F (36.8 °C)    Resp 20    Ht 5' 4\" (1.626 m)    Wt 153 lb 4.8 oz (69.5 kg)    SpO2 91%    BMI 26.31 kg/m2     SpO2 Readings from Last 6 Encounters:   05/03/18 91%   02/23/15 95%   03/14/11 95%    O2 Flow Rate (L/min): 6 l/min       Intake/Output Summary (Last 24 hours) at 05/03/18 0526  Last data filed at 05/03/18 0423   Gross per 24 hour   Intake                0 ml   Output             1350 ml   Net            -1350 ml        Telemetry reviewed:   normal sinus rhythm  Tubes:   N/A  X-Ray:  Chest xray - Mild interstitial edema and small pleural effusions    Venous duplex of LE's - No deep venous thrombosis identified    Procedures:   EKG - When compared with ECG of 30-APR-2018 09:34,   Sinus rhythm has replaced Atrial fibrillation   Vent. rate has decreased BY  65 BPM   Inferior infarct is now present   Inverted T waves have replaced nonspecific T wave abnormality in Inferior   leads     Echo - Left ventricle: Systolic function was normal. Ejection fraction was  estimated in the range of 55 % to 60 %. No obvious wall motion  abnormalities identified in the views obtained. Wall thickness was mildly  to moderately increased. Hypertrophy was noted. Mitral valve: There was moderate regurgitation. Aortic valve: The valve was trileaflet. Leaflets exhibited mildly  increased thickness, calcification, normal cuspal separation, and  sclerosis. Tricuspid valve: There was mild to moderate regurgitation. Exam:     General   well developed, well nourished, appears stated age, in no acute distress  Neck   Supple without nodes or mass. No thyromegaly or bruit  Respiratory  Generally clear with some rales in bases  Cardiology  Regular Rate and Rythmn  - no murmurs, rubs or gallops  Abdominal  Soft, non-tender, non-distended, positive bowel sounds, no hepatosplenomegaly  Extremities  No clubbing, cyanosis, or edema. Pulses intact. Skin  Normal skin turgor.   No rashes or skin ulcers noted  Neurological  No focal neurological deficits noted  Psychological  Oriented x 2. Normal affect.   Exam otherwise negative     Lab Data Reviewed: (see below)    Medications Reviewed: (see below)    ______________________________________________________________________    Medications:     Current Facility-Administered Medications   Medication Dose Route Frequency    LORazepam (ATIVAN) injection 0.5 mg  0.5 mg IntraVENous Q4H PRN    amLODIPine (NORVASC) tablet 5 mg  5 mg Oral DAILY    apixaban (ELIQUIS) tablet 2.5 mg  2.5 mg Oral BID    oxybutynin chloride XL (DITROPAN XL) tablet 5 mg  5 mg Oral DAILY    furosemide (LASIX) injection 40 mg  40 mg IntraVENous DAILY    potassium chloride SR (KLOR-CON 10) tablet 10 mEq  10 mEq Oral DAILY    amiodarone (CORDARONE) tablet 100 mg  100 mg Oral DAILY    metoprolol tartrate (LOPRESSOR) tablet 25 mg  25 mg Oral BID    albuterol (PROVENTIL HFA, VENTOLIN HFA, PROAIR HFA) inhaler 2 Puff  2 Puff Inhalation Q4H PRN    aspirin chewable tablet 81 mg  81 mg Oral DAILY    atorvastatin (LIPITOR) tablet 40 mg  40 mg Oral QHS    pantoprazole (PROTONIX) granules for oral suspension 40 mg  40 mg Oral DAILY    umeclidinium (INCRUSE ELLIPTA) 62.5 mcg/actuation  1 Puff Inhalation DAILY    sodium chloride (NS) flush 5-10 mL  5-10 mL IntraVENous Q8H    sodium chloride (NS) flush 5-10 mL  5-10 mL IntraVENous PRN    acetaminophen (TYLENOL) tablet 650 mg  650 mg Oral Q4H PRN    ondansetron (ZOFRAN) injection 4 mg  4 mg IntraVENous Q4H PRN    polyethylene glycol (MIRALAX) packet 17 g  17 g Oral DAILY            Lab Review:     Recent Labs      05/02/18   0458  05/01/18   0058  04/30/18   0947   WBC  8.4  9.1  10.6   HGB  11.7  11.1*  12.6   HCT  36.0  34.4*  38.5   PLT  285  265  299     Recent Labs      05/03/18   0417  05/02/18   0458  05/01/18   0058  04/30/18   0947   NA  138  137  139  140   K  3.4*  3.8  3.2*  4.1   CL  101  101  102  103   CO2  32  29  30  28   GLU  94  96  91  141*   BUN  16  25*  27*  25*   CREA  1.03* 1.27*  1.29*  1.45*   CA  8.1*  8.4*  8.5  9.8   MG  2.1   --    --   2.2   ALB   --    --   2.9*  3.3*   TBILI   --    --   0.9  1.1*   SGOT   --    --   22  28   ALT   --    --   18  23     Lab Results   Component Value Date/Time    Glucose (POC) 104 02/21/2015 07:27 AM    Glucose (POC) 108 (H) 02/20/2015 09:16 PM    Glucose (POC) 110 (H) 02/20/2015 04:17 PM    Glucose (POC) 131 (H) 02/20/2015 11:18 AM    Glucose (POC) 130 (H) 02/20/2015 06:26 AM     No results for input(s): PH, PCO2, PO2, HCO3, FIO2 in the last 72 hours. No results for input(s): INR in the last 72 hours. No lab exists for component: INREXT, INREXT         Assessment:     Principal Problem:    Acute diastolic CHF (congestive heart failure) ([facility] [address] (5/1/2018)    Active Problems:    NSTEMI (non-ST elevated myocardial infarction) ([facility] [address] (5/1/2018)      Rapid atrial fibrillation (HCC) (4/30/2018)      COPD (5/1/2018)      Hypertension (5/1/2018)      CKD (chronic kidney disease) stage 3, GFR 30-59 ml/min (5/1/2018)      Acute respiratory failure with hypoxemia ([facility] [address] (5/2/2018)      Hypokalemia (5/2/2018)           Plan:     Risk of deterioration: medium             1. Continue telemetry  2. Amiodarone per cardiology   3. Continue ASA, plavix, BB  4. Increase lasix back to BID  5. Monitor renal function - improved  6. K+ slightly low and will increase dose  7. PT/OT   8. PRN ativan for agitation  9.  Note to CDMP query: patient had acute respiratory failure with hypoxemia and hypokalemia on admission                    Care Plan discussed with: Patient    Discussed:  Care Plan    Prophylaxis:  Hep SQ and H2B/PPI    Disposition:  SNF/LTC           ___________________________________________________    Attending Physician: Miguel Guzman MD

## 2018-05-04 ENCOUNTER — APPOINTMENT (OUTPATIENT)
Dept: GENERAL RADIOLOGY | Age: 83
DRG: 280 | End: 2018-05-04
Attending: INTERNAL MEDICINE
Payer: MEDICARE

## 2018-05-04 LAB
ANION GAP SERPL CALC-SCNC: 5 MMOL/L (ref 5–15)
BACTERIA SPEC CULT: ABNORMAL
BACTERIA SPEC CULT: ABNORMAL
BUN SERPL-MCNC: 16 MG/DL (ref 6–20)
BUN/CREAT SERPL: 15 (ref 12–20)
CALCIUM SERPL-MCNC: 8.4 MG/DL (ref 8.5–10.1)
CC UR VC: ABNORMAL
CHLORIDE SERPL-SCNC: 98 MMOL/L (ref 97–108)
CO2 SERPL-SCNC: 32 MMOL/L (ref 21–32)
CREAT SERPL-MCNC: 1.09 MG/DL (ref 0.55–1.02)
ERYTHROCYTE [DISTWIDTH] IN BLOOD BY AUTOMATED COUNT: 13.2 % (ref 11.5–14.5)
GLUCOSE SERPL-MCNC: 146 MG/DL (ref 65–100)
HCT VFR BLD AUTO: 39.5 % (ref 35–47)
HGB BLD-MCNC: 12.8 G/DL (ref 11.5–16)
MCH RBC QN AUTO: 29.4 PG (ref 26–34)
MCHC RBC AUTO-ENTMCNC: 32.4 G/DL (ref 30–36.5)
MCV RBC AUTO: 90.6 FL (ref 80–99)
NRBC # BLD: 0 K/UL (ref 0–0.01)
NRBC BLD-RTO: 0 PER 100 WBC
PLATELET # BLD AUTO: 342 K/UL (ref 150–400)
PMV BLD AUTO: 10.7 FL (ref 8.9–12.9)
POTASSIUM SERPL-SCNC: 3.6 MMOL/L (ref 3.5–5.1)
RBC # BLD AUTO: 4.36 M/UL (ref 3.8–5.2)
SERVICE CMNT-IMP: ABNORMAL
SODIUM SERPL-SCNC: 135 MMOL/L (ref 136–145)
WBC # BLD AUTO: 14.2 K/UL (ref 3.6–11)

## 2018-05-04 PROCEDURE — 74011250637 HC RX REV CODE- 250/637: Performed by: INTERNAL MEDICINE

## 2018-05-04 PROCEDURE — 74011250636 HC RX REV CODE- 250/636: Performed by: INTERNAL MEDICINE

## 2018-05-04 PROCEDURE — 65660000000 HC RM CCU STEPDOWN

## 2018-05-04 PROCEDURE — 36415 COLL VENOUS BLD VENIPUNCTURE: CPT | Performed by: INTERNAL MEDICINE

## 2018-05-04 PROCEDURE — 71045 X-RAY EXAM CHEST 1 VIEW: CPT

## 2018-05-04 PROCEDURE — 51798 US URINE CAPACITY MEASURE: CPT

## 2018-05-04 PROCEDURE — 80048 BASIC METABOLIC PNL TOTAL CA: CPT | Performed by: INTERNAL MEDICINE

## 2018-05-04 PROCEDURE — 77010033678 HC OXYGEN DAILY

## 2018-05-04 PROCEDURE — 97530 THERAPEUTIC ACTIVITIES: CPT | Performed by: PHYSICAL THERAPIST

## 2018-05-04 PROCEDURE — 85027 COMPLETE CBC AUTOMATED: CPT | Performed by: INTERNAL MEDICINE

## 2018-05-04 RX ADMIN — FUROSEMIDE 40 MG: 10 INJECTION, SOLUTION INTRAMUSCULAR; INTRAVENOUS at 10:23

## 2018-05-04 RX ADMIN — APIXABAN 2.5 MG: 2.5 TABLET, FILM COATED ORAL at 10:22

## 2018-05-04 RX ADMIN — METOPROLOL TARTRATE 25 MG: 25 TABLET ORAL at 18:57

## 2018-05-04 RX ADMIN — FUROSEMIDE 40 MG: 10 INJECTION, SOLUTION INTRAMUSCULAR; INTRAVENOUS at 18:56

## 2018-05-04 RX ADMIN — Medication 10 ML: at 06:21

## 2018-05-04 RX ADMIN — ASPIRIN 81 MG 81 MG: 81 TABLET ORAL at 10:22

## 2018-05-04 RX ADMIN — AMIODARONE HYDROCHLORIDE 100 MG: 200 TABLET ORAL at 10:22

## 2018-05-04 RX ADMIN — Medication 10 ML: at 18:57

## 2018-05-04 RX ADMIN — UMECLIDINIUM 1 PUFF: 62.5 AEROSOL, POWDER ORAL at 10:23

## 2018-05-04 RX ADMIN — NITROFURANTOIN MONOHYDRATE/MACROCRYSTALLINE 100 MG: 25; 75 CAPSULE ORAL at 18:57

## 2018-05-04 RX ADMIN — NITROFURANTOIN MONOHYDRATE/MACROCRYSTALLINE 100 MG: 25; 75 CAPSULE ORAL at 10:22

## 2018-05-04 RX ADMIN — PANTOPRAZOLE SODIUM 40 MG: 40 GRANULE, DELAYED RELEASE ORAL at 10:22

## 2018-05-04 RX ADMIN — APIXABAN 2.5 MG: 2.5 TABLET, FILM COATED ORAL at 18:57

## 2018-05-04 RX ADMIN — ATORVASTATIN CALCIUM 40 MG: 40 TABLET, FILM COATED ORAL at 21:16

## 2018-05-04 RX ADMIN — LORAZEPAM 0.5 MG: 2 INJECTION, SOLUTION INTRAMUSCULAR; INTRAVENOUS at 22:43

## 2018-05-04 RX ADMIN — AMLODIPINE BESYLATE 5 MG: 5 TABLET ORAL at 10:21

## 2018-05-04 RX ADMIN — POTASSIUM CHLORIDE 10 MEQ: 750 TABLET, FILM COATED, EXTENDED RELEASE ORAL at 10:22

## 2018-05-04 RX ADMIN — METOPROLOL TARTRATE 25 MG: 25 TABLET ORAL at 10:22

## 2018-05-04 RX ADMIN — POLYETHYLENE GLYCOL 3350 17 G: 17 POWDER, FOR SOLUTION ORAL at 10:21

## 2018-05-04 RX ADMIN — OXYBUTYNIN CHLORIDE 5 MG: 5 TABLET, EXTENDED RELEASE ORAL at 10:22

## 2018-05-04 RX ADMIN — POTASSIUM CHLORIDE 10 MEQ: 750 TABLET, FILM COATED, EXTENDED RELEASE ORAL at 18:57

## 2018-05-04 RX ADMIN — Medication 10 ML: at 21:16

## 2018-05-04 RX ADMIN — POTASSIUM CHLORIDE 10 MEQ: 750 TABLET, FILM COATED, EXTENDED RELEASE ORAL at 21:16

## 2018-05-04 NOTE — PROGRESS NOTES
Problem: Pressure Injury - Risk of  Goal: *Prevention of pressure injury  Document Rossa Scale and appropriate interventions in the flowsheet.    Outcome: Progressing Towards Goal  Pressure Injury Interventions:  Sensory Interventions: Assess changes in LOC, Assess need for specialty bed, Chair cushion, Check visual cues for pain, Discuss PT/OT consult with provider, Float heels, Keep linens dry and wrinkle-free, Maintain/enhance activity level, Minimize linen layers    Moisture Interventions: Absorbent underpads, Assess need for specialty bed, Limit adult briefs, Minimize layers    Activity Interventions: Chair cushion, Increase time out of bed    Mobility Interventions: Chair cushion, HOB 30 degrees or less, PT/OT evaluation    Nutrition Interventions: Document food/fluid/supplement intake, Discuss nutritional consult with provider, Offer support with meals,snacks and hydration    Friction and Shear Interventions: Feet elevated on foot rest, Foam dressings/transparent film/skin sealants, HOB 30 degrees or less, Lift sheet, Minimize layers

## 2018-05-04 NOTE — PROGRESS NOTES
Progress Note      5/4/2018 8:31 AM  NAME: Anthony Aguirre   MRN:  [id number]   Admit Diagnosis: Rapid atrial fibrillation [facility])                          Assessment:                 1. Hypoxia, acute diastolic CHF, prox afib with RVR back in sinus, increased troponin consistent with type II NSTEMI  2. No hx of CAD  3. Echo5/2018 EF 55%, mod MR, mild pHTN  4. Prox Afib currently in sinus with LVH  5. HTN, with hypertensive heart disease with heart failure, and CKD cr 1.4  6. Dyslipidemia  7. Hx of CVA s/p TPA in 2015  8. PVD with hx of left CEA  9. COPD has had home O2 in past  10. GERD  11. DJD  12. , lives at [facility], poor functional capacity in a wheelchair, sleeps most of the day                            Plan:                  Poor functional capacity, since CVA mostly in wheelchair, sleeps in between meals. Some increase in dyspnea. No chest pain. Afib with RVR back in sinus currently on amiodarone, given CHADS2 score of 5 will try anticoagulation  pulm edema on cxr still on supplemental O2  Increased troponin, consistent with type II NSTEMI, preserved EF, discussed with family would like to avoid invasive therapy, plan for medical management of CAD. On abx for gm negative UTI     1. Cont added eliquis 2.5mg po bid, given CHADS2 of 5  2. Cont ASA likely stop in one month due to age  1. Cont added amiodarone 100mg daily  4. Cont metoprolol 25mg bid  5. Cont amlodipine  6. Increase added lasix 40mg iv bid, replete K, follow bmp, taper O2 currently on 6L  7. Cont atorvastatin    PT/OT  Keep on IV diuretic until oxygen requirement improved, may need home O2.         [x]        High complexity decision making was performed         Subjective:     Dea Allen denies chest pain, dyspnea. Discussed with RN events overnight.      Review of Systems:    Symptom Y/N Comments  Symptom Y/N Comments   Fever/Chills N   Chest Pain N    Poor Appetite N   Edema N    Cough N   Abdominal Pain N Sputum N   Joint Pain N    SOB/ALCARAZ N   Pruritis/Rash N    Nausea/vomit N   Tolerating PT/OT Y    Diarrhea N   Tolerating Diet Y    Constipation N   Other       Could NOT obtain due to:      Objective:      Physical Exam:    Last 24hrs VS reviewed since prior progress note. Most recent are:    Visit Vitals    /63 (BP 1 Location: Right arm)    Pulse 79    Temp 98.9 °F (37.2 °C)    Resp 18    Ht 5' 4\" (1.626 m)    Wt 69 kg (152 lb 1.9 oz)    SpO2 95%    BMI 26.11 kg/m2       Intake/Output Summary (Last 24 hours) at 05/04/18 1019  Last data filed at 05/04/18 0331   Gross per 24 hour   Intake                0 ml   Output             1100 ml   Net            -1100 ml        General Appearance: Well developed, well nourished, alert & oriented x 3,    no acute distress. Ears/Nose/Mouth/Throat: Hearing grossly normal.  Neck: Supple. Chest: Lungs clear to auscultation bilaterally. Cardiovascular: Regular rate and rhythm, S1S2 normal, no murmur. Abdomen: Soft, non-tender, bowel sounds are active. Extremities: No edema bilaterally. Skin: Warm and dry. PMH/SH reviewed - no change compared to H&P    Data Review    Telemetry: normal sinus rhythm     Lab Data Personally Reviewed:    Recent Labs      05/04/18   0114 05/02/18 0458   WBC  14.2*  8.4   HGB  12.8  11.7   HCT  39.5  36.0   PLT  342  285     No results for input(s): INR, PTP, APTT in the last 72 hours. No lab exists for component: Pemaa Old   Recent Labs      05/04/18   0114  05/03/18   0417  05/02/18   0458   NA  135*  138  137   K  3.6  3.4*  3.8   CL  98  101  101   CO2  32  32  29   BUN  16  16  25*   CREA  1.09*  1.03*  1.27*   GLU  146*  94  96   CA  8.4*  8.1*  8.4*   MG   --   2.1   --      No results for input(s): CPK, CKNDX, TROIQ in the last 72 hours.     No lab exists for component: CPKMB  Lab Results   Component Value Date/Time    Cholesterol, total 182 02/19/2015 04:30 AM    HDL Cholesterol 38 02/19/2015 04:30 AM    LDL, calculated 99.4 02/19/2015 04:30 AM    Triglyceride 223 (H) 02/19/2015 04:30 AM    CHOL/HDL Ratio 4.8 02/19/2015 04:30 AM       No results for input(s): SGOT, GPT, AP, TBIL, TP, ALB, GLOB, GGT, AML, LPSE in the last 72 hours. No lab exists for component: AMYP, HLPSE  No results for input(s): PH, PCO2, PO2 in the last 72 hours.     Medications Personally Reviewed:    Current Facility-Administered Medications   Medication Dose Route Frequency    potassium chloride SR (KLOR-CON 10) tablet 10 mEq  10 mEq Oral TID    furosemide (LASIX) injection 40 mg  40 mg IntraVENous BID    nitrofurantoin (macrocrystal-monohydrate) (MACROBID) capsule 100 mg  100 mg Oral BID    LORazepam (ATIVAN) injection 0.5 mg  0.5 mg IntraVENous Q4H PRN    amLODIPine (NORVASC) tablet 5 mg  5 mg Oral DAILY    apixaban (ELIQUIS) tablet 2.5 mg  2.5 mg Oral BID    oxybutynin chloride XL (DITROPAN XL) tablet 5 mg  5 mg Oral DAILY    amiodarone (CORDARONE) tablet 100 mg  100 mg Oral DAILY    metoprolol tartrate (LOPRESSOR) tablet 25 mg  25 mg Oral BID    albuterol (PROVENTIL HFA, VENTOLIN HFA, PROAIR HFA) inhaler 2 Puff  2 Puff Inhalation Q4H PRN    aspirin chewable tablet 81 mg  81 mg Oral DAILY    atorvastatin (LIPITOR) tablet 40 mg  40 mg Oral QHS    pantoprazole (PROTONIX) granules for oral suspension 40 mg  40 mg Oral DAILY    umeclidinium (INCRUSE ELLIPTA) 62.5 mcg/actuation  1 Puff Inhalation DAILY    sodium chloride (NS) flush 5-10 mL  5-10 mL IntraVENous Q8H    sodium chloride (NS) flush 5-10 mL  5-10 mL IntraVENous PRN    acetaminophen (TYLENOL) tablet 650 mg  650 mg Oral Q4H PRN    ondansetron (ZOFRAN) injection 4 mg  4 mg IntraVENous Q4H PRN    polyethylene glycol (MIRALAX) packet 17 g  17 g Oral DAILY         Oj Archer MD

## 2018-05-04 NOTE — PROGRESS NOTES
Problem: Mobility Impaired (Adult and Pediatric)  Goal: *Acute Goals and Plan of Care (Insert Text)  Physical Therapy Goals  Initiated 5/1/2018  1. Patient will move from supine to sit and sit to supine  in bed with modified independence within 7 day(s). 2.  Patient will transfer from bed to chair and chair to bed with contact guard assist using the least restrictive device within 7 day(s). 3.  Patient will perform sit to stand with contact guard assist within 7 day(s). 4.  Patient will demo independence with simple seated HEP in maintain strength within 7 days. physical Therapy TREATMENT  Patient: Harpal Baer (45 y.o. female)  Date: 5/4/2018  Diagnosis: Rapid atrial fibrillation ([facility] [address] Acute diastolic CHF (congestive heart failure) ([facility] [address]       Precautions: Fall  Chart, physical therapy assessment, plan of care and goals were reviewed. ASSESSMENT:  Patient making steady progress toward goals. Patient received in bed and needs Izabel for bed mobility. Sit to stand with Izabel and able to take a few steps to commode. Patient able to have successful BM and able to assist with clean up. Amb from commode to chair with Izabel x 1 and RW. Patient left up in chair with needs in reach and RN aware. SpO2 drops to 88% on 5 L during session. Progression toward goals:  [x]    Improving appropriately and progressing toward goals  []    Improving slowly and progressing toward goals  []    Not making progress toward goals and plan of care will be adjusted     PLAN:  Patient continues to benefit from skilled intervention to address the above impairments. Continue treatment per established plan of care. Discharge Recommendations:  Skilled Nursing Facility  Further Equipment Recommendations for Discharge:  TBD     SUBJECTIVE:   Patient stated I get so short of breath with minimal activity.     OBJECTIVE DATA SUMMARY:   Critical Behavior:  Neurologic State: Alert, Confused  Orientation Level: Oriented to person, Oriented to place, Disoriented to time, Disoriented to situation  Cognition: Memory loss, Follows commands  Safety/Judgement: Awareness of environment  Functional Mobility Training:  Bed Mobility:     Supine to Sit: Minimum assistance;Assist x1     Scooting: Supervision        Transfers:  Sit to Stand: Minimum assistance; Additional time  Stand to Sit: Minimum assistance; Additional time                             Balance:  Sitting: Intact  Standing: Impaired  Standing - Static: Constant support;Good  Standing - Dynamic : Fair  Ambulation/Gait Training:  Distance (ft): 5 Feet (ft)  Assistive Device: Gait belt;Walker, rolling  Ambulation - Level of Assistance: Contact guard assistance;Assist x1        Gait Abnormalities: Decreased step clearance; Path deviations; Shuffling gait        Base of Support: Widened     Speed/Vera: Pace decreased (<100 feet/min); Shuffled; Slow  Step Length: Left shortened;Right shortened          Pain:  Pain Scale 1: Numeric (0 - 10)  Pain Intensity 1: 0              Activity Tolerance:   SpO2 88% on 5 L O2 with minimal activity  Please refer to the flowsheet for vital signs taken during this treatment.   After treatment:   [x]    Patient left in no apparent distress sitting up in chair  []    Patient left in no apparent distress in bed  [x]    Call bell left within reach  [x]    Nursing notified  []    Caregiver present  []    Bed alarm activated    COMMUNICATION/COLLABORATION:   The patients plan of care was discussed with: Physical Therapist and Registered Nurse    Zayra Landa, PT, DPT   Time Calculation: 24 mins

## 2018-05-04 NOTE — CARDIO/PULMONARY
Cardiopulmonary Rehab Nursing Entry:     Pt is on CHF Bundle List     Chart reviewed. Pt 81 yo admitted with respiratory distress, NSTEMI. PMHx of HTN, PAD, COPD, GERD, TIA, anxiety. Former smoker    LVEF 55-60%. Met with pt sitting up in be eating breakfast.No family at bedside. This was a follow-up visit to answer questions and reinforce prior teaching re: CHF, S&Ss, medication management, Low NA diet, daily weights, when to call the doctor and balancing rest/activity. Pt wanted to know when she would be going home-directed to speak with her nurse. States at [facility] they do have means to weigh her but usually not every day. Encouraged to let staff here and at [facility] if any food tastes too salty so to exchange it for something with less sodium. Reminded to not eat soups or nesbitt,ham,sausage etc.    States she usually gets herself to bathroom by wheelchair. Teaching also done with son present by Cardiac Rehab on 5/1/2018. All questions answered. Understanding verbalized.

## 2018-05-04 NOTE — PROGRESS NOTES
Bedside and Verbal shift change report given to radha medrano RN (oncoming nurse). Report included the following information SBAR, Kardex, ED Summary, Procedure Summary, Intake/Output, MAR and Recent Results. SHIFT SUMMARY:  10 am: mepilex applied to pt's sacrum/coccyx. Uneventful shift.

## 2018-05-04 NOTE — PROGRESS NOTES
[address] [place] [address]  [phone]      Medical Progress Note      NAME: Nohemy Maldonado   :  1928  MRM:  [id number]    Date/Time: 2018  5:44 AM         Subjective:     Alert. Admitted with rapid afib, diastolic CHF, NSEMI. Patient alert. No complaints of chest pain or SOB. Had better night but O2 sat's low 90's now on 6 lpm. Urine growing >100K GNR's - started on macrodantin yesterday. Past Medical History:   Diagnosis Date    Arthritis     generalized    COPD     GERD (gastroesophageal reflux disease)     Hypertension     Ill-defined condition     Vitamin deficiency    NSTEMI (non-ST elevated myocardial infarction) ([facility] [facility] 2018    Other ill-defined conditions(799.89)     high cholesterol    Peripheral artery disease (HCC)     Psychiatric disorder     Anxiety disorder    Sleep disorder     Insomnia    TIA (transient ischemic attack)        ROS:  General: negative for fever, chills, sweats, weakness  Ear Nose and Throat: negative for rhinorrhea, pharyngitis, otalgia, tinnitus, speech or swallowing difficulties  Respiratory:  negative for cough, sputum production, SOB, wheezing, ALCARAZ, pleuritic pain  Cardiology:  negative for chest pain, palpitations, orthopnea, PND, edema, syncope   Gastrointestinal: negative for abdominal pain, N/V, dysphagia, change in bowel habits, bleeding  Muskuloskeletal : negative for arthralgia, myalgia  Dermatological: negative for rash, ulceration, mole change, new lesion  Neurological: negative for headache, dizziness, confusion, focal weakness, paresthesia, memory loss, gait disturbance  Psychological: positive for confusion, agitation overnight  Review of systems otherwise negative         Objective:       Vitals:        Last 24hrs VS reviewed since prior progress note.  Most recent are:    Visit Vitals    /64    Pulse 74    Temp 99.1 °F (37.3 °C)    Resp 18    Ht 5' 4\" (1.626 m)    Wt 153 lb 4.8 oz (69.5 kg)    SpO2 94%    BMI 26.31 kg/m2     SpO2 Readings from Last 6 Encounters:   05/04/18 94%   02/23/15 95%   03/14/11 95%    O2 Flow Rate (L/min): 6 l/min       Intake/Output Summary (Last 24 hours) at 05/04/18 0544  Last data filed at 05/04/18 0331   Gross per 24 hour   Intake               50 ml   Output             1100 ml   Net            -1050 ml        Telemetry reviewed:   normal sinus rhythm  Tubes:   N/A  X-Ray:  Chest xray - Mild interstitial edema and small pleural effusions    Venous duplex of LE's - No deep venous thrombosis identified    Procedures:   EKG - When compared with ECG of 30-APR-2018 09:34,   Sinus rhythm has replaced Atrial fibrillation   Vent. rate has decreased BY  65 BPM   Inferior infarct is now present   Inverted T waves have replaced nonspecific T wave abnormality in Inferior   leads     Echo - Left ventricle: Systolic function was normal. Ejection fraction was  estimated in the range of 55 % to 60 %. No obvious wall motion  abnormalities identified in the views obtained. Wall thickness was mildly  to moderately increased. Hypertrophy was noted. Mitral valve: There was moderate regurgitation. Aortic valve: The valve was trileaflet. Leaflets exhibited mildly  increased thickness, calcification, normal cuspal separation, and  sclerosis. Tricuspid valve: There was mild to moderate regurgitation. Exam:     General   well developed, well nourished, appears stated age, in no acute distress  Neck   Supple without nodes or mass. No thyromegaly or bruit  Respiratory  Generally clear with some rales in bases  Cardiology  Regular Rate and Rythmn  - no murmurs, rubs or gallops  Abdominal  Soft, non-tender, non-distended, positive bowel sounds, no hepatosplenomegaly  Extremities  No clubbing, cyanosis, or edema. Pulses intact. Skin  Normal skin turgor. No rashes or skin ulcers noted  Neurological  No focal neurological deficits noted  Psychological  Oriented x 2.   Normal affect.   Exam otherwise negative     Lab Data Reviewed: (see below)    Medications Reviewed: (see below)    ______________________________________________________________________    Medications:     Current Facility-Administered Medications   Medication Dose Route Frequency    potassium chloride SR (KLOR-CON 10) tablet 10 mEq  10 mEq Oral TID    furosemide (LASIX) injection 40 mg  40 mg IntraVENous BID    nitrofurantoin (macrocrystal-monohydrate) (MACROBID) capsule 100 mg  100 mg Oral BID    LORazepam (ATIVAN) injection 0.5 mg  0.5 mg IntraVENous Q4H PRN    amLODIPine (NORVASC) tablet 5 mg  5 mg Oral DAILY    apixaban (ELIQUIS) tablet 2.5 mg  2.5 mg Oral BID    oxybutynin chloride XL (DITROPAN XL) tablet 5 mg  5 mg Oral DAILY    amiodarone (CORDARONE) tablet 100 mg  100 mg Oral DAILY    metoprolol tartrate (LOPRESSOR) tablet 25 mg  25 mg Oral BID    albuterol (PROVENTIL HFA, VENTOLIN HFA, PROAIR HFA) inhaler 2 Puff  2 Puff Inhalation Q4H PRN    aspirin chewable tablet 81 mg  81 mg Oral DAILY    atorvastatin (LIPITOR) tablet 40 mg  40 mg Oral QHS    pantoprazole (PROTONIX) granules for oral suspension 40 mg  40 mg Oral DAILY    umeclidinium (INCRUSE ELLIPTA) 62.5 mcg/actuation  1 Puff Inhalation DAILY    sodium chloride (NS) flush 5-10 mL  5-10 mL IntraVENous Q8H    sodium chloride (NS) flush 5-10 mL  5-10 mL IntraVENous PRN    acetaminophen (TYLENOL) tablet 650 mg  650 mg Oral Q4H PRN    ondansetron (ZOFRAN) injection 4 mg  4 mg IntraVENous Q4H PRN    polyethylene glycol (MIRALAX) packet 17 g  17 g Oral DAILY            Lab Review:     Recent Labs      05/04/18   0114 05/02/18   0458   WBC  14.2*  8.4   HGB  12.8  11.7   HCT  39.5  36.0   PLT  342  285     Recent Labs      05/04/18   0114  05/03/18   0417  05/02/18   0458   NA  135*  138  137   K  3.6  3.4*  3.8   CL  98  101  101   CO2  32  32  29   GLU  146*  94  96   BUN  16  16  25*   CREA  1.09*  1.03*  1.27*   CA  8.4*  8.1*  8.4*   MG   -- 2.1   --      Lab Results   Component Value Date/Time    Glucose (POC) 104 02/21/2015 07:27 AM    Glucose (POC) 108 (H) 02/20/2015 09:16 PM    Glucose (POC) 110 (H) 02/20/2015 04:17 PM    Glucose (POC) 131 (H) 02/20/2015 11:18 AM    Glucose (POC) 130 (H) 02/20/2015 06:26 AM     No results for input(s): PH, PCO2, PO2, HCO3, FIO2 in the last 72 hours. No results for input(s): INR in the last 72 hours. No lab exists for component: INREXT, INREXT         Assessment:     Principal Problem:    Acute diastolic CHF (congestive heart failure) ([facility] [address] (5/1/2018)    Active Problems:    NSTEMI (non-ST elevated myocardial infarction) ([facility] [address] (5/1/2018)      Rapid atrial fibrillation (HCC) (4/30/2018)      COPD (5/1/2018)      Hypertension (5/1/2018)      CKD (chronic kidney disease) stage 3, GFR 30-59 ml/min (5/1/2018)      Acute respiratory failure with hypoxemia ([facility] [address] (5/2/2018)      Hypokalemia (5/2/2018)           Plan:     Risk of deterioration: medium             1. Continue telemetry  2. Amiodarone per cardiology   3. Continue ASA,, BB  4. Continue IV BID lasix - ? Change to po over weekend  5. Monitor renal function - improved  6. Continue potassium supplementation  7. Recheck chest xray today  8. Await urine culture/sensitivities  9. PT/OT   10.  PRN ativan for agitation                    Care Plan discussed with: Patient    Discussed:  Care Plan    Prophylaxis:  Hep SQ and H2B/PPI    Disposition:  SNF/LTC           ___________________________________________________    Attending Physician: Darlin Smith MD

## 2018-05-04 NOTE — PROGRESS NOTES
Problem: Pressure Injury - Risk of  Goal: *Prevention of pressure injury  Document Rosas Scale and appropriate interventions in the flowsheet.    Outcome: Progressing Towards Goal  Pressure Injury Interventions:  Sensory Interventions: Assess changes in LOC, Assess need for specialty bed, Chair cushion, Check visual cues for pain, Discuss PT/OT consult with provider, Float heels, Keep linens dry and wrinkle-free, Maintain/enhance activity level, Minimize linen layers    Moisture Interventions: Absorbent underpads, Assess need for specialty bed, Limit adult briefs, Minimize layers    Activity Interventions: Chair cushion, Increase time out of bed    Mobility Interventions: Chair cushion, HOB 30 degrees or less, PT/OT evaluation    Nutrition Interventions: Document food/fluid/supplement intake, Discuss nutritional consult with provider, Offer support with meals,snacks and hydration    Friction and Shear Interventions: Feet elevated on foot rest, Foam dressings/transparent film/skin sealants, HOB 30 degrees or less, Lift sheet, Minimize layers

## 2018-05-05 LAB
ANION GAP SERPL CALC-SCNC: 6 MMOL/L (ref 5–15)
BUN SERPL-MCNC: 13 MG/DL (ref 6–20)
BUN/CREAT SERPL: 14 (ref 12–20)
CALCIUM SERPL-MCNC: 8.2 MG/DL (ref 8.5–10.1)
CHLORIDE SERPL-SCNC: 99 MMOL/L (ref 97–108)
CO2 SERPL-SCNC: 32 MMOL/L (ref 21–32)
CREAT SERPL-MCNC: 0.96 MG/DL (ref 0.55–1.02)
ERYTHROCYTE [DISTWIDTH] IN BLOOD BY AUTOMATED COUNT: 13.1 % (ref 11.5–14.5)
GLUCOSE SERPL-MCNC: 86 MG/DL (ref 65–100)
HCT VFR BLD AUTO: 36.5 % (ref 35–47)
HGB BLD-MCNC: 11.7 G/DL (ref 11.5–16)
MCH RBC QN AUTO: 28.9 PG (ref 26–34)
MCHC RBC AUTO-ENTMCNC: 32.1 G/DL (ref 30–36.5)
MCV RBC AUTO: 90.1 FL (ref 80–99)
NRBC # BLD: 0 K/UL (ref 0–0.01)
NRBC BLD-RTO: 0 PER 100 WBC
PLATELET # BLD AUTO: 303 K/UL (ref 150–400)
PMV BLD AUTO: 11.3 FL (ref 8.9–12.9)
POTASSIUM SERPL-SCNC: 3.5 MMOL/L (ref 3.5–5.1)
RBC # BLD AUTO: 4.05 M/UL (ref 3.8–5.2)
SODIUM SERPL-SCNC: 137 MMOL/L (ref 136–145)
WBC # BLD AUTO: 9.6 K/UL (ref 3.6–11)

## 2018-05-05 PROCEDURE — 74011250637 HC RX REV CODE- 250/637: Performed by: INTERNAL MEDICINE

## 2018-05-05 PROCEDURE — 65660000000 HC RM CCU STEPDOWN

## 2018-05-05 PROCEDURE — 80048 BASIC METABOLIC PNL TOTAL CA: CPT | Performed by: INTERNAL MEDICINE

## 2018-05-05 PROCEDURE — 85027 COMPLETE CBC AUTOMATED: CPT | Performed by: INTERNAL MEDICINE

## 2018-05-05 PROCEDURE — 77010033678 HC OXYGEN DAILY

## 2018-05-05 PROCEDURE — 36415 COLL VENOUS BLD VENIPUNCTURE: CPT | Performed by: INTERNAL MEDICINE

## 2018-05-05 PROCEDURE — 77030038269 HC DRN EXT URIN PURWCK BARD -A

## 2018-05-05 PROCEDURE — 74011250636 HC RX REV CODE- 250/636: Performed by: INTERNAL MEDICINE

## 2018-05-05 RX ORDER — LORAZEPAM 0.5 MG/1
0.25 TABLET ORAL
Status: DISCONTINUED | OUTPATIENT
Start: 2018-05-05 | End: 2018-05-11 | Stop reason: HOSPADM

## 2018-05-05 RX ORDER — POTASSIUM CHLORIDE 750 MG/1
20 TABLET, FILM COATED, EXTENDED RELEASE ORAL 2 TIMES DAILY
Status: DISCONTINUED | OUTPATIENT
Start: 2018-05-05 | End: 2018-05-08

## 2018-05-05 RX ADMIN — OXYBUTYNIN CHLORIDE 5 MG: 5 TABLET, EXTENDED RELEASE ORAL at 09:13

## 2018-05-05 RX ADMIN — UMECLIDINIUM 1 PUFF: 62.5 AEROSOL, POWDER ORAL at 09:15

## 2018-05-05 RX ADMIN — FUROSEMIDE 40 MG: 10 INJECTION, SOLUTION INTRAMUSCULAR; INTRAVENOUS at 09:15

## 2018-05-05 RX ADMIN — Medication 10 ML: at 06:35

## 2018-05-05 RX ADMIN — POTASSIUM CHLORIDE 20 MEQ: 750 TABLET, EXTENDED RELEASE ORAL at 18:32

## 2018-05-05 RX ADMIN — POLYETHYLENE GLYCOL 3350 17 G: 17 POWDER, FOR SOLUTION ORAL at 09:14

## 2018-05-05 RX ADMIN — ATORVASTATIN CALCIUM 40 MG: 40 TABLET, FILM COATED ORAL at 21:04

## 2018-05-05 RX ADMIN — POTASSIUM CHLORIDE 20 MEQ: 750 TABLET, EXTENDED RELEASE ORAL at 09:14

## 2018-05-05 RX ADMIN — NITROFURANTOIN MONOHYDRATE/MACROCRYSTALLINE 100 MG: 25; 75 CAPSULE ORAL at 18:32

## 2018-05-05 RX ADMIN — FUROSEMIDE 40 MG: 10 INJECTION, SOLUTION INTRAMUSCULAR; INTRAVENOUS at 18:33

## 2018-05-05 RX ADMIN — NITROFURANTOIN MONOHYDRATE/MACROCRYSTALLINE 100 MG: 25; 75 CAPSULE ORAL at 09:14

## 2018-05-05 RX ADMIN — METOPROLOL TARTRATE 25 MG: 25 TABLET ORAL at 18:32

## 2018-05-05 RX ADMIN — APIXABAN 2.5 MG: 2.5 TABLET, FILM COATED ORAL at 09:13

## 2018-05-05 RX ADMIN — LORAZEPAM 0.25 MG: 0.5 TABLET ORAL at 21:03

## 2018-05-05 RX ADMIN — METOPROLOL TARTRATE 25 MG: 25 TABLET ORAL at 09:14

## 2018-05-05 RX ADMIN — AMLODIPINE BESYLATE 5 MG: 5 TABLET ORAL at 09:13

## 2018-05-05 RX ADMIN — APIXABAN 2.5 MG: 2.5 TABLET, FILM COATED ORAL at 18:33

## 2018-05-05 RX ADMIN — Medication 10 ML: at 14:00

## 2018-05-05 RX ADMIN — ASPIRIN 81 MG 81 MG: 81 TABLET ORAL at 09:14

## 2018-05-05 RX ADMIN — PANTOPRAZOLE SODIUM 40 MG: 40 GRANULE, DELAYED RELEASE ORAL at 09:15

## 2018-05-05 RX ADMIN — AMIODARONE HYDROCHLORIDE 100 MG: 200 TABLET ORAL at 09:14

## 2018-05-05 RX ADMIN — Medication 10 ML: at 21:04

## 2018-05-05 NOTE — PROGRESS NOTES
PROGRESS NOTE    NAME:  June Kulkarni   :   1928   MRN:   [id number]     Date/Time:  2018 8:19 AM  Subjective:   History: bnreathing comfortably. CXR shows interstitial edema. maintaining NSR.       Medications reviewed:  Current Facility-Administered Medications   Medication Dose Route Frequency    LORazepam (ATIVAN) tablet 0.25 mg  0.25 mg Oral QHS    potassium chloride SR (KLOR-CON 10) tablet 10 mEq  10 mEq Oral TID    furosemide (LASIX) injection 40 mg  40 mg IntraVENous BID    nitrofurantoin (macrocrystal-monohydrate) (MACROBID) capsule 100 mg  100 mg Oral BID    amLODIPine (NORVASC) tablet 5 mg  5 mg Oral DAILY    apixaban (ELIQUIS) tablet 2.5 mg  2.5 mg Oral BID    oxybutynin chloride XL (DITROPAN XL) tablet 5 mg  5 mg Oral DAILY    amiodarone (CORDARONE) tablet 100 mg  100 mg Oral DAILY    metoprolol tartrate (LOPRESSOR) tablet 25 mg  25 mg Oral BID    albuterol (PROVENTIL HFA, VENTOLIN HFA, PROAIR HFA) inhaler 2 Puff  2 Puff Inhalation Q4H PRN    aspirin chewable tablet 81 mg  81 mg Oral DAILY    atorvastatin (LIPITOR) tablet 40 mg  40 mg Oral QHS    pantoprazole (PROTONIX) granules for oral suspension 40 mg  40 mg Oral DAILY    umeclidinium (INCRUSE ELLIPTA) 62.5 mcg/actuation  1 Puff Inhalation DAILY    sodium chloride (NS) flush 5-10 mL  5-10 mL IntraVENous Q8H    sodium chloride (NS) flush 5-10 mL  5-10 mL IntraVENous PRN    acetaminophen (TYLENOL) tablet 650 mg  650 mg Oral Q4H PRN    ondansetron (ZOFRAN) injection 4 mg  4 mg IntraVENous Q4H PRN    polyethylene glycol (MIRALAX) packet 17 g  17 g Oral DAILY        Objective:   Vitals:  Visit Vitals    /72 (BP 1 Location: Left arm, BP Patient Position: At rest)    Pulse 69    Temp 98.3 °F (36.8 °C)    Resp 18    Ht 5' 4\" (1.626 m)    Wt 150 lb (68 kg)    SpO2 96%    BMI 25.75 kg/m2    O2 Flow Rate (L/min): 6 l/min O2 Device: Nasal cannula Temp (24hrs), Av.3 °F (36.8 °C), Min:97.8 °F (36.6 °C), Max:98.9 °F (37.2 °C)      Last 24hr Input/Output:    Intake/Output Summary (Last 24 hours) at 05/05/18 0819  Last data filed at 05/05/18 0340   Gross per 24 hour   Intake              250 ml   Output             1600 ml   Net            -1350 ml        PHYSICAL EXAM:  General:     Alert, cooperative, no distress, appears stated age. Head:    Normocephalic, without obvious abnormality, atraumatic. Eyes:    Conjunctivae/corneas clear. PERRLA  Nose:   Nares normal. No drainage or sinus tenderness. Throat:     Lips, mucosa, and tongue normal.  No Thrush  Neck:   Supple, symmetrical,  no adenopathy, thyroid: non tender     no carotid bruit and  JVD to angle of mandible. Back:     Symmetric,  No CVA tenderness. Lungs:    fine inspiratory rales bilaterally. Heart:    Regular rate and rhythm,  no murmur, rub or gallop. Abdomen:    Soft, non-tender. Not distended. Bowel sounds normal. No masses  Extremities:  Extremities normal, atraumatic, No cyanosis. 1+ edema. No clubbing  Lymph nodes:  Cervical, supraclavicular normal.  Neurologic:  Normal strength, Alert and oriented X 3.    Skin:                No rash      Lab Data Reviewed:    Recent Results (from the past 24 hour(s))   CBC W/O DIFF    Collection Time: 05/05/18  3:05 AM   Result Value Ref Range    WBC 9.6 3.6 - 11.0 K/uL    RBC 4.05 3.80 - 5.20 M/uL    HGB 11.7 11.5 - 16.0 g/dL    HCT 36.5 35.0 - 47.0 %    MCV 90.1 80.0 - 99.0 FL    MCH 28.9 26.0 - 34.0 PG    MCHC 32.1 30.0 - 36.5 g/dL    RDW 13.1 11.5 - 14.5 %    PLATELET 690 705 - 129 K/uL    MPV 11.3 8.9 - 12.9 FL    NRBC 0.0 0  WBC    ABSOLUTE NRBC 0.00 0.00 - 7.15 K/uL   METABOLIC PANEL, BASIC    Collection Time: 05/05/18  3:05 AM   Result Value Ref Range    Sodium 137 136 - 145 mmol/L    Potassium 3.5 3.5 - 5.1 mmol/L    Chloride 99 97 - 108 mmol/L    CO2 32 21 - 32 mmol/L    Anion gap 6 5 - 15 mmol/L    Glucose 86 65 - 100 mg/dL    BUN 13 6 - 20 MG/DL    Creatinine 0.96 0.55 - 1.02 MG/DL BUN/Creatinine ratio 14 12 - 20      GFR est AA >60 >60 ml/min/1.73m2    GFR est non-AA 55 (L) >60 ml/min/1.73m2    Calcium 8.2 (L) 8.5 - 10.1 MG/DL         Assessment/Plan:     Principal Problem:    Acute diastolic CHF (congestive heart failure) ([facility] (5/1/2018)    Active Problems:    Rapid atrial fibrillation (HCC) (4/30/2018)      NSTEMI (non-ST elevated myocardial infarction) ([facility] (5/1/2018)      COPD (5/1/2018)      Hypertension (5/1/2018)      CKD (chronic kidney disease) stage 3, GFR 30-59 ml/min (5/1/2018)      Acute respiratory failure with hypoxemia ([facility] (5/2/2018)      Hypokalemia (5/2/2018)       ___________________________________________________  PLAN:    1. Continue diuresis   2.  Increase K+  3.  :  4.            ___________________________________________________    Attending Physician: Isabela Escalante MD

## 2018-05-05 NOTE — PROGRESS NOTES
Progress Note      5/5/2018 8:31 AM  NAME: Tammie Butler   MRN:  [id number]   Admit Diagnosis: Rapid atrial fibrillation [facility])                          Assessment:                 1. Hypoxia, acute diastolic CHF, prox afib with RVR back in sinus, increased troponin consistent with type II NSTEMI  2. No hx of CAD  3. Echo5/2018 EF 55%, mod MR, mild pHTN  4. Prox Afib currently in sinus with LVH  5. HTN, with hypertensive heart disease with heart failure, and CKD cr 1.4  6. Dyslipidemia  7. Hx of CVA s/p TPA in 2015  8. PVD with hx of left CEA  9. COPD has had home O2 in past  10. GERD  11. DJD  12. , lives at [facility], poor functional capacity in a wheelchair, sleeps most of the day      5/5  Continue with diuresis.                          Plan:                  Poor functional capacity, since CVA mostly in wheelchair, sleeps in between meals. Some increase in dyspnea. No chest pain. Afib with RVR back in sinus currently on amiodarone, given CHADS2 score of 5 will try anticoagulation  pulm edema on cxr still on supplemental O2  Increased troponin, consistent with type II NSTEMI, preserved EF, discussed with family would like to avoid invasive therapy, plan for medical management of CAD. On abx for gm negative UTI     1. Cont added eliquis 2.5mg po bid, given CHADS2 of 5  2. Cont ASA likely stop in one month due to age  1. Cont added amiodarone 100mg daily  4. Cont metoprolol 25mg bid  5. Cont amlodipine  6. Increase added lasix 40mg iv bid, replete K, follow bmp, taper O2 currently on 6L  7. Cont atorvastatin    PT/OT  Keep on IV diuretic until oxygen requirement improved, may need home O2.         [x]        High complexity decision making was performed         Subjective:     Rema Allen denies chest pain, dyspnea. Discussed with RN events overnight.      Review of Systems:    Symptom Y/N Comments  Symptom Y/N Comments   Fever/Chills N   Chest Pain N    Poor Appetite N   Edema N    Cough N   Abdominal Pain N    Sputum N   Joint Pain N    SOB/ALCARAZ N   Pruritis/Rash N    Nausea/vomit N   Tolerating PT/OT Y    Diarrhea N   Tolerating Diet Y    Constipation N   Other       Could NOT obtain due to:      Objective:      Physical Exam:    Last 24hrs VS reviewed since prior progress note. Most recent are:    Visit Vitals    /66 (BP 1 Location: Left arm, BP Patient Position: Sitting)    Pulse 68    Temp 98.1 °F (36.7 °C)    Resp 18    Ht 5' 4\" (1.626 m)    Wt 68 kg (150 lb)    SpO2 96%    BMI 25.75 kg/m2       Intake/Output Summary (Last 24 hours) at 05/05/18 1617  Last data filed at 05/05/18 0340   Gross per 24 hour   Intake              250 ml   Output             1600 ml   Net            -1350 ml        General Appearance: Well developed, well nourished, alert & oriented x 3,    no acute distress. Ears/Nose/Mouth/Throat: Hearing grossly normal.  Neck: Supple. Chest: Lungs clear to auscultation bilaterally. Cardiovascular: Regular rate and rhythm, S1S2 normal, no murmur. Abdomen: Soft, non-tender, bowel sounds are active. Extremities: No edema bilaterally. Skin: Warm and dry. PMH/SH reviewed - no change compared to H&P    Data Review    Telemetry: normal sinus rhythm     Lab Data Personally Reviewed:    Recent Labs      05/05/18   0305  05/04/18   0114   WBC  9.6  14.2*   HGB  11.7  12.8   HCT  36.5  39.5   PLT  303  342     No results for input(s): INR, PTP, APTT in the last 72 hours. No lab exists for component: Anum Duvaln   Recent Labs      05/05/18   0305  05/04/18   0114  05/03/18   0417   NA  137  135*  138   K  3.5  3.6  3.4*   CL  99  98  101   CO2  32  32  32   BUN  13  16  16   CREA  0.96  1.09*  1.03*   GLU  86  146*  94   CA  8.2*  8.4*  8.1*   MG   --    --   2.1     No results for input(s): CPK, CKNDX, TROIQ in the last 72 hours.     No lab exists for component: CPKMB  Lab Results   Component Value Date/Time    Cholesterol, total 182 02/19/2015 04:30 AM HDL Cholesterol 38 02/19/2015 04:30 AM    LDL, calculated 99.4 02/19/2015 04:30 AM    Triglyceride 223 (H) 02/19/2015 04:30 AM    CHOL/HDL Ratio 4.8 02/19/2015 04:30 AM       No results for input(s): SGOT, GPT, AP, TBIL, TP, ALB, GLOB, GGT, AML, LPSE in the last 72 hours. No lab exists for component: AMYP, HLPSE  No results for input(s): PH, PCO2, PO2 in the last 72 hours.     Medications Personally Reviewed:    Current Facility-Administered Medications   Medication Dose Route Frequency    LORazepam (ATIVAN) tablet 0.25 mg  0.25 mg Oral QHS    potassium chloride SR (KLOR-CON 10) tablet 20 mEq  20 mEq Oral BID    furosemide (LASIX) injection 40 mg  40 mg IntraVENous BID    nitrofurantoin (macrocrystal-monohydrate) (MACROBID) capsule 100 mg  100 mg Oral BID    amLODIPine (NORVASC) tablet 5 mg  5 mg Oral DAILY    apixaban (ELIQUIS) tablet 2.5 mg  2.5 mg Oral BID    oxybutynin chloride XL (DITROPAN XL) tablet 5 mg  5 mg Oral DAILY    amiodarone (CORDARONE) tablet 100 mg  100 mg Oral DAILY    metoprolol tartrate (LOPRESSOR) tablet 25 mg  25 mg Oral BID    albuterol (PROVENTIL HFA, VENTOLIN HFA, PROAIR HFA) inhaler 2 Puff  2 Puff Inhalation Q4H PRN    aspirin chewable tablet 81 mg  81 mg Oral DAILY    atorvastatin (LIPITOR) tablet 40 mg  40 mg Oral QHS    pantoprazole (PROTONIX) granules for oral suspension 40 mg  40 mg Oral DAILY    umeclidinium (INCRUSE ELLIPTA) 62.5 mcg/actuation  1 Puff Inhalation DAILY    sodium chloride (NS) flush 5-10 mL  5-10 mL IntraVENous Q8H    sodium chloride (NS) flush 5-10 mL  5-10 mL IntraVENous PRN    acetaminophen (TYLENOL) tablet 650 mg  650 mg Oral Q4H PRN    ondansetron (ZOFRAN) injection 4 mg  4 mg IntraVENous Q4H PRN    polyethylene glycol (MIRALAX) packet 17 g  17 g Oral DAILY         Jd Mcintosh MD

## 2018-05-05 NOTE — PROGRESS NOTES
0353 Pt had a 8 beat v-tach while asleep ,asymptomatic will continue to observe   I am awaiting am labs to evaluate   Will continue to monitor   Dr Benjamin Tinoco covering for Dr Elly Dougherty notified no further orders and no further episodes of v-tach

## 2018-05-05 NOTE — PROGRESS NOTES
Bedside shift change report given to , RN (oncoming nurse). Report included the following information Kardex. SHIFT SUMMARY:  3109 Pt with some mild agitation,restlessness due to what she says is \"lack of sleep \" she is requesting some ativan in which she received a few nights ago . I did give her 0.25 mg Ativan iv and it has worked well . In am if Dr Kel Braun I will ask him for Xanax as pt requested as she has taken in past for anxiety/and insomnia              [facility] NURSING NOTE   Admission Date 4/30/2018   Admission Diagnosis Rapid atrial fibrillation ([address]   Consults IP CONSULT TO HOSPITALIST      Cardiac Monitoring [x] Yes [] No      Purposeful Hourly Rounding [x] Yes    Arjun Score Total Score: 4   Arjun score 3 or > [x] Bed Alarm [] Avasys [] 1:1 sitter [] Patient refused (Signed refusal form in chart)   Rosas Score Rosas Score: 14   Rosas score 14 or < [] PMT consult [] Wound Care consult    []  Specialty bed  [] Nutrition consult      Influenza Vaccine Received Flu Vaccine for Current Season (usually Sept-March): Not Flu Season           Oxygen needs? [] Room air Oxygen @  []1L    []2L    []3L   []4L    []5L   [x]6L via  NC   Chronic home O2 use?  [] Yes [] No  Perform O2 challenge test and document in progress note using smartphrase (.Homeoxygen)      Last bowel movement Last Bowel Movement Date: 05/04/18      Urinary Catheter       External Female Catheter 05/01/18-Urine Output (mL): 600 ml     LDAs               Peripheral IV 05/03/18 Left Antecubital (Active)   Site Assessment Clean, dry, & intact 5/4/2018  7:30 PM   Phlebitis Assessment 0 5/4/2018  7:30 PM   Infiltration Assessment 0 5/4/2018  7:30 PM   Dressing Status Clean, dry, & intact 5/4/2018  7:30 PM   Dressing Type Tape;Transparent 5/4/2018  7:30 PM   Hub Color/Line Status Blue;Capped;Flushed;Patent 5/4/2018  7:30 PM          External Female Catheter 05/01/18 (Active)   Site Assessment Clean, dry, & intact 5/4/2018  7:30 PM Repositioned Yes 5/4/2018  7:30 PM   Perineal Care Yes 5/4/2018  7:30 PM   Wick Changed Yes 5/4/2018  7:30 PM   Suction Canister/Tubing Changed No 5/4/2018  3:31 AM   Urine Output (mL) 600 ml 5/4/2018  3:31 AM                   Readmission Risk Assessment Tool Score High Risk            41       Total Score        3 Has Seen PCP in Last 6 Months (Yes=3, No=0)    2 . Living with Significant Other. Assisted Living. LTAC. SNF. or   Rehab    5 Pt.  Coverage (Medicare=5 , Medicaid, or Self-Pay=4)    31 Charlson Comorbidity Score (Age + Comorbid Conditions)        Criteria that do not apply:    Patient Length of Stay (>5 days = 3)    IP Visits Last 12 Months (1-3=4, 4=9, >4=11)       Expected Length of Stay 4d 7h   Actual Length of Stay 5

## 2018-05-06 LAB
ANION GAP SERPL CALC-SCNC: 7 MMOL/L (ref 5–15)
BUN SERPL-MCNC: 14 MG/DL (ref 6–20)
BUN/CREAT SERPL: 14 (ref 12–20)
CALCIUM SERPL-MCNC: 8.3 MG/DL (ref 8.5–10.1)
CHLORIDE SERPL-SCNC: 98 MMOL/L (ref 97–108)
CO2 SERPL-SCNC: 32 MMOL/L (ref 21–32)
CREAT SERPL-MCNC: 1.01 MG/DL (ref 0.55–1.02)
ERYTHROCYTE [DISTWIDTH] IN BLOOD BY AUTOMATED COUNT: 13.1 % (ref 11.5–14.5)
GLUCOSE SERPL-MCNC: 96 MG/DL (ref 65–100)
HCT VFR BLD AUTO: 37.3 % (ref 35–47)
HGB BLD-MCNC: 12.3 G/DL (ref 11.5–16)
MCH RBC QN AUTO: 29.4 PG (ref 26–34)
MCHC RBC AUTO-ENTMCNC: 33 G/DL (ref 30–36.5)
MCV RBC AUTO: 89.2 FL (ref 80–99)
NRBC # BLD: 0 K/UL (ref 0–0.01)
NRBC BLD-RTO: 0 PER 100 WBC
PLATELET # BLD AUTO: 299 K/UL (ref 150–400)
PMV BLD AUTO: 11 FL (ref 8.9–12.9)
POTASSIUM SERPL-SCNC: 3.7 MMOL/L (ref 3.5–5.1)
RBC # BLD AUTO: 4.18 M/UL (ref 3.8–5.2)
SODIUM SERPL-SCNC: 137 MMOL/L (ref 136–145)
WBC # BLD AUTO: 10.7 K/UL (ref 3.6–11)

## 2018-05-06 PROCEDURE — 80048 BASIC METABOLIC PNL TOTAL CA: CPT | Performed by: INTERNAL MEDICINE

## 2018-05-06 PROCEDURE — 74011250636 HC RX REV CODE- 250/636: Performed by: INTERNAL MEDICINE

## 2018-05-06 PROCEDURE — 36415 COLL VENOUS BLD VENIPUNCTURE: CPT | Performed by: INTERNAL MEDICINE

## 2018-05-06 PROCEDURE — 74011000258 HC RX REV CODE- 258: Performed by: INTERNAL MEDICINE

## 2018-05-06 PROCEDURE — 74011250637 HC RX REV CODE- 250/637: Performed by: INTERNAL MEDICINE

## 2018-05-06 PROCEDURE — 85027 COMPLETE CBC AUTOMATED: CPT | Performed by: INTERNAL MEDICINE

## 2018-05-06 PROCEDURE — 77010033678 HC OXYGEN DAILY

## 2018-05-06 PROCEDURE — 65660000000 HC RM CCU STEPDOWN

## 2018-05-06 RX ADMIN — FUROSEMIDE 40 MG: 10 INJECTION, SOLUTION INTRAMUSCULAR; INTRAVENOUS at 09:37

## 2018-05-06 RX ADMIN — METOPROLOL TARTRATE 25 MG: 25 TABLET ORAL at 09:37

## 2018-05-06 RX ADMIN — NITROFURANTOIN MONOHYDRATE/MACROCRYSTALLINE 100 MG: 25; 75 CAPSULE ORAL at 09:36

## 2018-05-06 RX ADMIN — Medication 10 ML: at 14:00

## 2018-05-06 RX ADMIN — AMLODIPINE BESYLATE 5 MG: 5 TABLET ORAL at 09:36

## 2018-05-06 RX ADMIN — FUROSEMIDE 40 MG: 10 INJECTION, SOLUTION INTRAMUSCULAR; INTRAVENOUS at 17:51

## 2018-05-06 RX ADMIN — UMECLIDINIUM 1 PUFF: 62.5 AEROSOL, POWDER ORAL at 09:37

## 2018-05-06 RX ADMIN — METOPROLOL TARTRATE 25 MG: 25 TABLET ORAL at 17:51

## 2018-05-06 RX ADMIN — PANTOPRAZOLE SODIUM 40 MG: 40 GRANULE, DELAYED RELEASE ORAL at 09:36

## 2018-05-06 RX ADMIN — ASPIRIN 81 MG 81 MG: 81 TABLET ORAL at 09:36

## 2018-05-06 RX ADMIN — LORAZEPAM 0.25 MG: 0.5 TABLET ORAL at 23:23

## 2018-05-06 RX ADMIN — POTASSIUM CHLORIDE 20 MEQ: 750 TABLET, EXTENDED RELEASE ORAL at 17:51

## 2018-05-06 RX ADMIN — APIXABAN 2.5 MG: 2.5 TABLET, FILM COATED ORAL at 17:51

## 2018-05-06 RX ADMIN — Medication 10 ML: at 06:51

## 2018-05-06 RX ADMIN — CEFTRIAXONE 1 G: 1 INJECTION, POWDER, FOR SOLUTION INTRAMUSCULAR; INTRAVENOUS at 13:55

## 2018-05-06 RX ADMIN — OXYBUTYNIN CHLORIDE 5 MG: 5 TABLET, EXTENDED RELEASE ORAL at 09:36

## 2018-05-06 RX ADMIN — AMIODARONE HYDROCHLORIDE 100 MG: 200 TABLET ORAL at 09:36

## 2018-05-06 RX ADMIN — ATORVASTATIN CALCIUM 40 MG: 40 TABLET, FILM COATED ORAL at 23:22

## 2018-05-06 RX ADMIN — APIXABAN 2.5 MG: 2.5 TABLET, FILM COATED ORAL at 09:37

## 2018-05-06 RX ADMIN — Medication 10 ML: at 23:24

## 2018-05-06 RX ADMIN — POTASSIUM CHLORIDE 20 MEQ: 750 TABLET, EXTENDED RELEASE ORAL at 09:36

## 2018-05-06 RX ADMIN — POLYETHYLENE GLYCOL 3350 17 G: 17 POWDER, FOR SOLUTION ORAL at 09:36

## 2018-05-06 NOTE — PROGRESS NOTES
Spiritual Care Assessment/Progress Note  [facility]      NAME: Rosette Diego      MRN: [id number]  AGE: 80 y.o.  SEX: female  Hoahaoism Affiliation: Shinto   Language: English     5/6/2018     Total Time (in minutes): 7     Spiritual Assessment begun in [facility] through conversation with:         [x]Patient        [] Family    [] Friend(s)        Reason for Consult: Initial/Spiritual assessment, patient floor     Spiritual beliefs: (Please include comment if needed)     [x] Identifies with a landon tradition:     [] Supported by a landon community:      [] Claims no spiritual orientation:      [] Seeking spiritual identity:           [] Adheres to an individual form of spirituality:      [] Not able to assess:                     Identified resources for coping:      [] Prayer                               [] Music                  [] Guided Imagery     [x] Family/friends                 [] Pet visits     [] Devotional reading                         [] Unknown     [] Other:                                               Interventions offered during this visit: (See comments for more details)    Patient Interventions: Affirmation of emotions/emotional suffering, Catharsis/review of pertinent events in supportive environment, Iconic (affirming the presence of God/Higher Power), Coping skills reviewed/reinforced, Initial/Spiritual assessment, patient floor, Normalization of emotional/spiritual concerns, Prayer (assurance of)           Plan of Care:     [] Support spiritual and/or cultural needs    [] Support AMD and/or advance care planning process      [] Support grieving process   [] Coordinate Rites and/or Rituals    [] Coordination with community clergy   [x] No spiritual needs identified at this time   [] Detailed Plan of Care below (See Comments)  [] Make referral to Music Therapy  [] Make referral to Pet Therapy     [] Make referral to Addiction services  [] Make referral to [facility]  [] Make referral to Spiritual Care Partner  [] No future visits requested        [] Follow up visits as needed     Comments: Initial visit with patient on [address] Introduced self and role of chaplains in the hospital. Had pleasant conversation with the patient about her family and the ways they support and take care of her. Patient has no specific needs at this time but was very appreciative of 's visit for companionship. Patient is hoping to be discharged in the next few days. Offered spoken prayer and blessing for her travel home and assured of ongoing support as needed and desired. lyubov Hearn M.Div.    Paging Service [phone])

## 2018-05-06 NOTE — PROGRESS NOTES
0700 Pure wick left off at 0530 to rest periarea and to give air ,Bath given and  call bell given if needs bedpan for voiding .

## 2018-05-06 NOTE — PROGRESS NOTES
PROGRESS NOTE    NAME:  June Kulkarni   :   1928   MRN:   [id number]     Date/Time:  2018 8:14 AM  Subjective:   History: weight down 5 lbs. bnreathing better. maintaining NSR.       Medications reviewed:  Current Facility-Administered Medications   Medication Dose Route Frequency    LORazepam (ATIVAN) tablet 0.25 mg  0.25 mg Oral QHS    potassium chloride SR (KLOR-CON 10) tablet 20 mEq  20 mEq Oral BID    furosemide (LASIX) injection 40 mg  40 mg IntraVENous BID    nitrofurantoin (macrocrystal-monohydrate) (MACROBID) capsule 100 mg  100 mg Oral BID    amLODIPine (NORVASC) tablet 5 mg  5 mg Oral DAILY    apixaban (ELIQUIS) tablet 2.5 mg  2.5 mg Oral BID    oxybutynin chloride XL (DITROPAN XL) tablet 5 mg  5 mg Oral DAILY    amiodarone (CORDARONE) tablet 100 mg  100 mg Oral DAILY    metoprolol tartrate (LOPRESSOR) tablet 25 mg  25 mg Oral BID    albuterol (PROVENTIL HFA, VENTOLIN HFA, PROAIR HFA) inhaler 2 Puff  2 Puff Inhalation Q4H PRN    aspirin chewable tablet 81 mg  81 mg Oral DAILY    atorvastatin (LIPITOR) tablet 40 mg  40 mg Oral QHS    pantoprazole (PROTONIX) granules for oral suspension 40 mg  40 mg Oral DAILY    umeclidinium (INCRUSE ELLIPTA) 62.5 mcg/actuation  1 Puff Inhalation DAILY    sodium chloride (NS) flush 5-10 mL  5-10 mL IntraVENous Q8H    sodium chloride (NS) flush 5-10 mL  5-10 mL IntraVENous PRN    acetaminophen (TYLENOL) tablet 650 mg  650 mg Oral Q4H PRN    ondansetron (ZOFRAN) injection 4 mg  4 mg IntraVENous Q4H PRN    polyethylene glycol (MIRALAX) packet 17 g  17 g Oral DAILY        Objective:   Vitals:  Visit Vitals    /72 (BP 1 Location: Left arm, BP Patient Position: At rest)    Pulse 72    Temp 98 °F (36.7 °C)    Resp 18    Ht 5' 4\" (1.626 m)    Wt 145 lb 8 oz (66 kg)    SpO2 94%    BMI 24.98 kg/m2    O2 Flow Rate (L/min): 6 l/min O2 Device: Nasal cannula Temp (24hrs), Av.1 °F (36.7 °C), Min:98 °F (36.7 °C), Max:98.3 °F (36.8 °C)      Last 24hr Input/Output:    Intake/Output Summary (Last 24 hours) at 05/06/18 0814  Last data filed at 05/06/18 0525   Gross per 24 hour   Intake              200 ml   Output              950 ml   Net             -750 ml        PHYSICAL EXAM:  General:     Alert, cooperative, no distress, appears stated age. Head:    Normocephalic, without obvious abnormality, atraumatic. Eyes:    Conjunctivae/corneas clear. PERRLA  Nose:   Nares normal. No drainage or sinus tenderness. Throat:     Lips, mucosa, and tongue normal.  No Thrush  Neck:   Supple, symmetrical,  no adenopathy, thyroid: non tender     no carotid bruit and no JVD. Back:     Symmetric,  No CVA tenderness. Lungs:    decreased BS bilaterally. fine bibasilar rales  Heart:    Regular rate and rhythm,  no murmur, rub or gallop. Abdomen:    Soft, non-tender. Not distended. Bowel sounds normal. No masses  Extremities:  Extremities normal, atraumatic, No cyanosis. No edema. No clubbing  Lymph nodes:  Cervical, supraclavicular normal.  Neurologic:  Normal strength, Alert and oriented X 3.    Skin:                No rash      Lab Data Reviewed:    Recent Results (from the past 24 hour(s))   CBC W/O DIFF    Collection Time: 05/06/18  5:31 AM   Result Value Ref Range    WBC 10.7 3.6 - 11.0 K/uL    RBC 4.18 3.80 - 5.20 M/uL    HGB 12.3 11.5 - 16.0 g/dL    HCT 37.3 35.0 - 47.0 %    MCV 89.2 80.0 - 99.0 FL    MCH 29.4 26.0 - 34.0 PG    MCHC 33.0 30.0 - 36.5 g/dL    RDW 13.1 11.5 - 14.5 %    PLATELET 198 055 - 469 K/uL    MPV 11.0 8.9 - 12.9 FL    NRBC 0.0 0  WBC    ABSOLUTE NRBC 0.00 0.00 - 9.07 K/uL   METABOLIC PANEL, BASIC    Collection Time: 05/06/18  5:31 AM   Result Value Ref Range    Sodium 137 136 - 145 mmol/L    Potassium 3.7 3.5 - 5.1 mmol/L    Chloride 98 97 - 108 mmol/L    CO2 32 21 - 32 mmol/L    Anion gap 7 5 - 15 mmol/L    Glucose 96 65 - 100 mg/dL    BUN 14 6 - 20 MG/DL    Creatinine 1.01 0.55 - 1.02 MG/DL    BUN/Creatinine ratio 14 12 - 20      GFR est AA >60 >60 ml/min/1.73m2    GFR est non-AA 51 (L) >60 ml/min/1.73m2    Calcium 8.3 (L) 8.5 - 10.1 MG/DL         Assessment/Plan:     Principal Problem:    Acute diastolic CHF (congestive heart failure) ([facility] (5/1/2018)    Active Problems:    Rapid atrial fibrillation (HCC) (4/30/2018)      NSTEMI (non-ST elevated myocardial infarction) ([facility] (5/1/2018)      COPD (5/1/2018)      Hypertension (5/1/2018)      CKD (chronic kidney disease) stage 3, GFR 30-59 ml/min (5/1/2018)      Acute respiratory failure with hypoxemia ([facility] (5/2/2018)      Hypokalemia (5/2/2018)       ___________________________________________________  PLAN:    1.   Continue current treatment regimen  2.   3.  :  4.            ___________________________________________________    Attending Physician: Bright Abdi MD

## 2018-05-06 NOTE — PROGRESS NOTES
Progress Note      5/6/2018 8:31 AM  NAME: Flakito Price   MRN:  [id number]   Admit Diagnosis: Rapid atrial fibrillation [facility])                          Assessment:                 1. Hypoxia, acute diastolic CHF, prox afib with RVR back in sinus, increased troponin consistent with type II NSTEMI  2. No hx of CAD  3. Echo5/2018 EF 55%, mod MR, mild pHTN  4. Prox Afib currently in sinus with LVH  5. HTN, with hypertensive heart disease with heart failure, and CKD cr 1.4  6. Dyslipidemia  7. Hx of CVA s/p TPA in 2015  8. PVD with hx of left CEA  9. COPD has had home O2 in past  10. GERD  11. DJD  12. , lives at [facility], poor functional capacity in a wheelchair, sleeps most of the day      5/5  Continue with diuresis. 5/6 cardiac status stable.                              Plan:                  Poor functional capacity, since CVA mostly in wheelchair, sleeps in between meals. Some increase in dyspnea. No chest pain. Afib with RVR back in sinus currently on amiodarone, given CHADS2 score of 5 will try anticoagulation  pulm edema on cxr still on supplemental O2  Increased troponin, consistent with type II NSTEMI, preserved EF, discussed with family would like to avoid invasive therapy, plan for medical management of CAD. On abx for gm negative UTI     1. Cont added eliquis 2.5mg po bid, given CHADS2 of 5  2. Cont ASA likely stop in one month due to age  1. Cont added amiodarone 100mg daily  4. Cont metoprolol 25mg bid  5. Cont amlodipine  6. Increase added lasix 40mg iv bid, replete K, follow bmp, taper O2 currently on 6L  7. Cont atorvastatin    PT/OT  Keep on IV diuretic until oxygen requirement improved, may need home O2.         [x]        High complexity decision making was performed         Subjective:     Swetha Allen denies chest pain, dyspnea. Discussed with RN events overnight.      Review of Systems:    Symptom Y/N Comments  Symptom Y/N Comments   Fever/Chills N   Chest Pain N    Poor Appetite N   Edema N    Cough N   Abdominal Pain N    Sputum N   Joint Pain N    SOB/ALCARAZ N   Pruritis/Rash N    Nausea/vomit N   Tolerating PT/OT Y    Diarrhea N   Tolerating Diet Y    Constipation N   Other       Could NOT obtain due to:      Objective:      Physical Exam:    Last 24hrs VS reviewed since prior progress note. Most recent are:    Visit Vitals    /63 (BP 1 Location: Left arm, BP Patient Position: At rest)    Pulse 61    Temp 98.7 °F (37.1 °C)    Resp 18    Ht 5' 4\" (1.626 m)    Wt 66 kg (145 lb 8 oz)    SpO2 95%    BMI 24.98 kg/m2       Intake/Output Summary (Last 24 hours) at 05/06/18 1608  Last data filed at 05/06/18 1510   Gross per 24 hour   Intake              800 ml   Output             1650 ml   Net             -850 ml        General Appearance: Well developed, well nourished, alert & oriented x 3,    no acute distress. Ears/Nose/Mouth/Throat: Hearing grossly normal.  Neck: Supple. Chest: Lungs clear to auscultation bilaterally. Cardiovascular: Regular rate and rhythm, S1S2 normal, no murmur. Abdomen: Soft, non-tender, bowel sounds are active. Extremities: No edema bilaterally. Skin: Warm and dry. PMH/SH reviewed - no change compared to H&P    Data Review    Telemetry: normal sinus rhythm     Lab Data Personally Reviewed:    Recent Labs      05/06/18   0531  05/05/18   0305   WBC  10.7  9.6   HGB  12.3  11.7   HCT  37.3  36.5   PLT  299  303     No results for input(s): INR, PTP, APTT in the last 72 hours. No lab exists for component: Sarbjit Robertson   Recent Labs      05/06/18   0531  05/05/18   0305  05/04/18   0114   NA  137  137  135*   K  3.7  3.5  3.6   CL  98  99  98   CO2  32  32  32   BUN  14  13  16   CREA  1.01  0.96  1.09*   GLU  96  86  146*   CA  8.3*  8.2*  8.4*     No results for input(s): CPK, CKNDX, TROIQ in the last 72 hours.     No lab exists for component: CPKMB  Lab Results   Component Value Date/Time    Cholesterol, total 182 02/19/2015 04:30 AM    HDL Cholesterol 38 02/19/2015 04:30 AM    LDL, calculated 99.4 02/19/2015 04:30 AM    Triglyceride 223 (H) 02/19/2015 04:30 AM    CHOL/HDL Ratio 4.8 02/19/2015 04:30 AM       No results for input(s): SGOT, GPT, AP, TBIL, TP, ALB, GLOB, GGT, AML, LPSE in the last 72 hours. No lab exists for component: AMYP, HLPSE  No results for input(s): PH, PCO2, PO2 in the last 72 hours.     Medications Personally Reviewed:    Current Facility-Administered Medications   Medication Dose Route Frequency    cefTRIAXone (ROCEPHIN) 1 g in 0.9% sodium chloride (MBP/ADV) 50 mL  1 g IntraVENous Q24H    LORazepam (ATIVAN) tablet 0.25 mg  0.25 mg Oral QHS    potassium chloride SR (KLOR-CON 10) tablet 20 mEq  20 mEq Oral BID    furosemide (LASIX) injection 40 mg  40 mg IntraVENous BID    amLODIPine (NORVASC) tablet 5 mg  5 mg Oral DAILY    apixaban (ELIQUIS) tablet 2.5 mg  2.5 mg Oral BID    oxybutynin chloride XL (DITROPAN XL) tablet 5 mg  5 mg Oral DAILY    amiodarone (CORDARONE) tablet 100 mg  100 mg Oral DAILY    metoprolol tartrate (LOPRESSOR) tablet 25 mg  25 mg Oral BID    albuterol (PROVENTIL HFA, VENTOLIN HFA, PROAIR HFA) inhaler 2 Puff  2 Puff Inhalation Q4H PRN    aspirin chewable tablet 81 mg  81 mg Oral DAILY    atorvastatin (LIPITOR) tablet 40 mg  40 mg Oral QHS    pantoprazole (PROTONIX) granules for oral suspension 40 mg  40 mg Oral DAILY    umeclidinium (INCRUSE ELLIPTA) 62.5 mcg/actuation  1 Puff Inhalation DAILY    sodium chloride (NS) flush 5-10 mL  5-10 mL IntraVENous Q8H    sodium chloride (NS) flush 5-10 mL  5-10 mL IntraVENous PRN    acetaminophen (TYLENOL) tablet 650 mg  650 mg Oral Q4H PRN    ondansetron (ZOFRAN) injection 4 mg  4 mg IntraVENous Q4H PRN    polyethylene glycol (MIRALAX) packet 17 g  17 g Oral DAILY         Naa Bowman MD

## 2018-05-07 LAB
ANION GAP SERPL CALC-SCNC: 6 MMOL/L (ref 5–15)
BUN SERPL-MCNC: 15 MG/DL (ref 6–20)
BUN/CREAT SERPL: 14 (ref 12–20)
CALCIUM SERPL-MCNC: 9.1 MG/DL (ref 8.5–10.1)
CHLORIDE SERPL-SCNC: 96 MMOL/L (ref 97–108)
CO2 SERPL-SCNC: 32 MMOL/L (ref 21–32)
CREAT SERPL-MCNC: 1.05 MG/DL (ref 0.55–1.02)
GLUCOSE SERPL-MCNC: 100 MG/DL (ref 65–100)
POTASSIUM SERPL-SCNC: 3.8 MMOL/L (ref 3.5–5.1)
SODIUM SERPL-SCNC: 134 MMOL/L (ref 136–145)

## 2018-05-07 PROCEDURE — 80048 BASIC METABOLIC PNL TOTAL CA: CPT | Performed by: INTERNAL MEDICINE

## 2018-05-07 PROCEDURE — 36415 COLL VENOUS BLD VENIPUNCTURE: CPT | Performed by: INTERNAL MEDICINE

## 2018-05-07 PROCEDURE — 74011250637 HC RX REV CODE- 250/637: Performed by: INTERNAL MEDICINE

## 2018-05-07 PROCEDURE — 74011250636 HC RX REV CODE- 250/636: Performed by: INTERNAL MEDICINE

## 2018-05-07 PROCEDURE — 74011000258 HC RX REV CODE- 258: Performed by: INTERNAL MEDICINE

## 2018-05-07 PROCEDURE — 77030038269 HC DRN EXT URIN PURWCK BARD -A

## 2018-05-07 PROCEDURE — 65660000000 HC RM CCU STEPDOWN

## 2018-05-07 RX ORDER — FUROSEMIDE 40 MG/1
40 TABLET ORAL
Status: DISCONTINUED | OUTPATIENT
Start: 2018-05-07 | End: 2018-05-11 | Stop reason: HOSPADM

## 2018-05-07 RX ADMIN — POTASSIUM CHLORIDE 20 MEQ: 750 TABLET, EXTENDED RELEASE ORAL at 11:11

## 2018-05-07 RX ADMIN — OXYBUTYNIN CHLORIDE 5 MG: 5 TABLET, EXTENDED RELEASE ORAL at 11:11

## 2018-05-07 RX ADMIN — CEFTRIAXONE 1 G: 1 INJECTION, POWDER, FOR SOLUTION INTRAMUSCULAR; INTRAVENOUS at 15:24

## 2018-05-07 RX ADMIN — Medication 10 ML: at 06:00

## 2018-05-07 RX ADMIN — Medication 10 ML: at 22:30

## 2018-05-07 RX ADMIN — UMECLIDINIUM 1 PUFF: 62.5 AEROSOL, POWDER ORAL at 11:15

## 2018-05-07 RX ADMIN — PANTOPRAZOLE SODIUM 40 MG: 40 GRANULE, DELAYED RELEASE ORAL at 11:12

## 2018-05-07 RX ADMIN — Medication 10 ML: at 15:24

## 2018-05-07 RX ADMIN — METOPROLOL TARTRATE 25 MG: 25 TABLET ORAL at 19:00

## 2018-05-07 RX ADMIN — METOPROLOL TARTRATE 25 MG: 25 TABLET ORAL at 11:12

## 2018-05-07 RX ADMIN — ASPIRIN 81 MG 81 MG: 81 TABLET ORAL at 11:11

## 2018-05-07 RX ADMIN — AMLODIPINE BESYLATE 5 MG: 5 TABLET ORAL at 11:11

## 2018-05-07 RX ADMIN — APIXABAN 2.5 MG: 2.5 TABLET, FILM COATED ORAL at 11:11

## 2018-05-07 RX ADMIN — AMIODARONE HYDROCHLORIDE 100 MG: 200 TABLET ORAL at 11:11

## 2018-05-07 RX ADMIN — FUROSEMIDE 40 MG: 40 TABLET ORAL at 18:57

## 2018-05-07 RX ADMIN — ATORVASTATIN CALCIUM 40 MG: 40 TABLET, FILM COATED ORAL at 22:24

## 2018-05-07 RX ADMIN — APIXABAN 2.5 MG: 2.5 TABLET, FILM COATED ORAL at 22:24

## 2018-05-07 RX ADMIN — POTASSIUM CHLORIDE 20 MEQ: 750 TABLET, EXTENDED RELEASE ORAL at 18:57

## 2018-05-07 RX ADMIN — FUROSEMIDE 40 MG: 40 TABLET ORAL at 11:11

## 2018-05-07 RX ADMIN — LORAZEPAM 0.25 MG: 0.5 TABLET ORAL at 22:24

## 2018-05-07 NOTE — PROGRESS NOTES
[address] [place] [address]  [phone]      Medical Progress Note      NAME: Sánchez Stock   :  1928  MRM:  [id number]    Date/Time: 2018  5:33 AM         Subjective:     Alert. Admitted with rapid afib, diastolic CHF, NSEMI. Patient alert. No complaints of chest pain or SOB. Urine growing E.coli and Proteus - on IV rocephin. Past Medical History:   Diagnosis Date    Arthritis     generalized    COPD     GERD (gastroesophageal reflux disease)     Hypertension     Ill-defined condition     Vitamin deficiency    NSTEM[facility]non-ST elevated [facility]) ([facility] 2018    Other ill-defined conditions(799.89)     high cholesterol    Peripheral artery disease (HCC)     Psychiatric disorder     Anxiety disorder    Sleep disorder     Insomnia    TIA (transient ischemic attack)        ROS:  General: negative for fever, chills, sweats, weakness  Ear Nose and Throat: negative for rhinorrhea, pharyngitis, otalgia, tinnitus, speech or swallowing difficulties  Respiratory:  negative for cough, sputum production, SOB, wheezing, ALCARAZ, pleuritic pain  Cardiology:  negative for chest pain, palpitations, orthopnea, PND, edema, syncope   Gastrointestinal: negative for abdominal pain, N/V, dysphagia, change in bowel habits, bleeding  Muskuloskeletal : negative for arthralgia, myalgia  Dermatological: negative for rash, ulceration, mole change, new lesion  Neurological: negative for headache, dizziness, confusion, focal weakness, paresthesia, memory loss, gait disturbance  Psychological: negative for confusion, agitation overnight  Review of systems otherwise negative         Objective:       Vitals:        Last 24hrs VS reviewed since prior progress note.  Most recent are:    Visit Vitals    /56 (BP 1 Location: Right arm, BP Patient Position: At rest)    Pulse 63    Temp 98.8 °F (37.1 °C)    Resp 18    Ht 5' 4\" (1.626 m)    Wt 145 lb 8 oz (66 kg)    SpO2 95%    BMI 24.98 kg/m2     SpO2 Readings from Last 6 Encounters:   05/07/18 95%   02/23/15 95%   03/14/11 95%    O2 Flow Rate (L/min): 4 l/min       Intake/Output Summary (Last 24 hours) at 05/07/18 0539  Last data filed at 05/06/18 1856   Gross per 24 hour   Intake              600 ml   Output             1300 ml   Net             -700 ml        Telemetry reviewed:   normal sinus rhythm  Tubes:   N/A  X-Ray:  Chest xray - Mild interstitial edema and small pleural effusions    Venous duplex of LE's - No deep venous thrombosis identified    Procedures:   EKG - When compared with ECG of 30-APR-2018 09:34,   Sinus rhythm has replaced Atrial fibrillation   Vent. rate has decreased BY  65 BPM   Inferior infarct is now present   Inverted T waves have replaced nonspecific T wave abnormality in Inferior   leads     Echo - Left ventricle: Systolic function was normal. Ejection fraction was  estimated in the range of 55 % to 60 %. No obvious wall motion  abnormalities identified in the views obtained. Wall thickness was mildly  to moderately increased. Hypertrophy was noted. Mitral valve: There was moderate regurgitation. Aortic valve: The valve was trileaflet. Leaflets exhibited mildly  increased thickness, calcification, normal cuspal separation, and  sclerosis. Tricuspid valve: There was mild to moderate regurgitation.     Urine culture:   ESCHERICHIA COLI      Antibiotic Sensitivity MARLEY Unit Status     Amikacin ($) Susceptible <=16 ug/mL Final     Method: MARLEY     Ampicillin ($) Resistant >16 ug/mL Final     Method: MARLEY     Ampicillin/sulbactam ($) Resistant >16/8 ug/mL Final     Method: MARLEY     Aztreonam ($$$$) Susceptible <=4 ug/mL Final     Method: MARLEY     Cefazolin ($) Susceptible <=8 ug/mL Final     Method: MARLEY     Cefepime ($$) Susceptible <=4 ug/mL Final     Method: MARLEY     Cefotaxime Susceptible <=2 ug/mL Final     Method: MARLEY     Ceftazidime ($) Susceptible <=1 ug/mL Final     Method: MARLEY     Ceftriaxone ($) Susceptible <=1 ug/mL Final     Method: MARLEY     Cefuroxime ($) Susceptible 8 ug/mL Final     Method: MARLEY     Ciprofloxacin ($) Resistant >2 ug/mL Final     Method: MARLEY     Gentamicin ($) Susceptible <=4 ug/mL Final     Method: MARLEY     Imipenem Susceptible <=1 ug/mL Final     Method: MARLEY     Levofloxacin ($) Resistant >4 ug/mL Final     Method: MARLEY     Meropenem ($$) Susceptible <=1 ug/mL Final     Method: MARLEY     Nitrofurantoin Susceptible <=32 ug/mL Final     Method: MARLEY     Piperacillin/Tazobac ($) Susceptible <=16 ug/mL Final     Method: MARLEY     Tobramycin ($) Susceptible <=4 ug/mL Final     Method: MARLEY     Trimeth/Sulfa Susceptible <=2/38 ug/mL Final     Method: MARLEY                  PROTEUS MIRABILIS      Antibiotic Sensitivity MARLEY Unit Status     Amikacin ($) Susceptible <=16 ug/mL Final     Method: MARLEY     Ampicillin ($) Susceptible <=8 ug/mL Final     Method: MARLEY     Ampicillin/sulbactam ($) Susceptible <=8/4 ug/mL Final     Method: MARLEY     Aztreonam ($$$$) Susceptible <=4 ug/mL Final     Method: MARLEY     Cefazolin ($) Susceptible <=8 ug/mL Final     Method: MARLEY     Cefepime ($$) Susceptible <=4 ug/mL Final     Method: MARLEY     Cefotaxime Susceptible <=2 ug/mL Final     Method: MARLEY     Ceftazidime ($) Susceptible <=1 ug/mL Final     Method: MARLEY     Ceftriaxone ($) Susceptible <=1 ug/mL Final     Method: MARLEY     Cefuroxime ($) Susceptible <=4 ug/mL Final     Method: MARLEY     Ciprofloxacin ($) Resistant >2 ug/mL Final     Method: MARLEY     Gentamicin ($) Susceptible <=4 ug/mL Final     Method: MARLEY     Levofloxacin ($) Resistant >4 ug/mL Final     Method: MARLEY     Meropenem ($$) Susceptible <=1 ug/mL Final     Method: MARLEY     Piperacillin/Tazobac ($) Susceptible <=16 ug/mL Final     Method: MARLEY     Tobramycin ($) Susceptible <=4 ug/mL Final     Method: MARLEY     Trimeth/Sulfa Susceptible <=2/38 ug/mL Final     Method: MARLEY                 Exam:     General   well developed, well nourished, appears stated age, in no acute distress  Neck   Supple without nodes or mass. No thyromegaly or bruit  Respiratory  Generally clear   Cardiology  Regular Rate and Rythmn  - no murmurs, rubs or gallops  Abdominal  Soft, non-tender, non-distended, positive bowel sounds, no hepatosplenomegaly  Extremities  No clubbing, cyanosis, or edema. Pulses intact. Skin  Normal skin turgor. No rashes or skin ulcers noted  Neurological  No focal neurological deficits noted  Psychological  Oriented x 2. Normal affect.   Exam otherwise negative     Lab Data Reviewed: (see below)    Medications Reviewed: (see below)    ______________________________________________________________________    Medications:     Current Facility-Administered Medications   Medication Dose Route Frequency    furosemide (LASIX) tablet 40 mg  40 mg Oral ACB&D    cefTRIAXone (ROCEPHIN) 1 g in 0.9% sodium chloride (MBP/ADV) 50 mL  1 g IntraVENous Q24H    LORazepam (ATIVAN) tablet 0.25 mg  0.25 mg Oral QHS    potassium chloride SR (KLOR-CON 10) tablet 20 mEq  20 mEq Oral BID    amLODIPine (NORVASC) tablet 5 mg  5 mg Oral DAILY    apixaban (ELIQUIS) tablet 2.5 mg  2.5 mg Oral BID    oxybutynin chloride XL (DITROPAN XL) tablet 5 mg  5 mg Oral DAILY    amiodarone (CORDARONE) tablet 100 mg  100 mg Oral DAILY    metoprolol tartrate (LOPRESSOR) tablet 25 mg  25 mg Oral BID    albuterol (PROVENTIL HFA, VENTOLIN HFA, PROAIR HFA) inhaler 2 Puff  2 Puff Inhalation Q4H PRN    aspirin chewable tablet 81 mg  81 mg Oral DAILY    atorvastatin (LIPITOR) tablet 40 mg  40 mg Oral QHS    pantoprazole (PROTONIX) granules for oral suspension 40 mg  40 mg Oral DAILY    umeclidinium (INCRUSE ELLIPTA) 62.5 mcg/actuation  1 Puff Inhalation DAILY    sodium chloride (NS) flush 5-10 mL  5-10 mL IntraVENous Q8H    sodium chloride (NS) flush 5-10 mL  5-10 mL IntraVENous PRN    acetaminophen (TYLENOL) tablet 650 mg  650 mg Oral Q4H PRN    ondansetron (ZOFRAN) injection 4 mg  4 mg IntraVENous Q4H PRN    polyethylene glycol (MIRALAX) packet 17 g  17 g Oral DAILY            Lab Review:     Recent Labs      05/06/18   0531  05/05/18   0305   WBC  10.7  9.6   HGB  12.3  11.7   HCT  37.3  36.5   PLT  299  303     Recent Labs      05/07/18   0304  05/06/18   0531  05/05/18   0305   NA  134*  137  137   K  3.8  3.7  3.5   CL  96*  98  99   CO2  32  32  32   GLU  100  96  86   BUN  15  14  13   CREA  1.05*  1.01  0.96   CA  9.1  8.3*  8.2*     Lab Results   Component Value Date/Time    Glucose (POC) 104 02/21/2015 07:27 AM    Glucose (POC) 108 (H) 02/20/2015 09:16 PM    Glucose (POC) 110 (H) 02/20/2015 04:17 PM    Glucose (POC) 131 (H) 02/20/2015 11:18 AM    Glucose (POC) 130 (H) 02/20/2015 06:26 AM     No results for input(s): PH, PCO2, PO2, HCO3, FIO2 in the last 72 hours. No results for input(s): INR in the last 72 hours. No lab exists for component: INREXT, INREXT         Assessment:     Principal Problem:    Acute diastolic CHF (congestive heart failure) ([facility] (5/1/2018)    Active Problems:    NSTEMI (non-ST elevated myocardial infarction) ([facility] (5/1/2018)      Lower urinary tract infectious disease (2/18/2015)      Rapid atrial fibrillation (HCC) (4/30/2018)      COPD (5/1/2018)      Hypertension (5/1/2018)      CKD (chronic kidney disease) stage 3, GFR 30-59 ml/min (5/1/2018)      Acute respiratory failure with hypoxemia ([facility] (5/2/2018)      Hypokalemia (5/2/2018)           Plan:     Risk of deterioration: medium             1. Continue telemetry  2. Amiodarone per cardiology   3. Continue ASA,, BB  4. Change to po lasix  5. Monitor renal function - improved  6. Continue potassium supplementation  7. Continue IV rocephin - no po alternatives due to allergies and drug resistance  8. PT/OT   9. PRN ativan for agitation  10.  [facility] back to [facility] discussed with: Patient    Discussed:  Care Plan    Prophylaxis:  Hep SQ and H2B/PPI    Disposition: SNF/LTC           ___________________________________________________    Attending Physician: Salvatore Farah MD

## 2018-05-07 NOTE — CARDIO/PULMONARY
Cardiopulmonary Rehab:      Chart reviewed. Pt is on CHF Bundle List      Pt is a 80 y.o.  female admitted with respiratory distress, NSTEMI. PMHx of HTN, PAD, COPD, GERD, TIA, anxiety. Former smoker. LVEF 55-60%.     Met with pt sitting up in chair, legs elevated. No family at bedside. Pt visited. This was a follow-up visit to answer questions and reinforce prior teaching re: CHF, S&Ss, medication management, Low NA diet, daily weights and when to call the doctor. Inquired if pt understood rationale for daily weights. Pt unable to identified weight parameters and rationale. Reviewed. Reviewed s&s of fluid overload and when to report to the nurse at [facility].      Pt indicated basic understanding.

## 2018-05-07 NOTE — PROGRESS NOTES
Progress Note      5/7/2018 8:31 AM  NAME: Donna Gonzalez   MRN:  [id number]   Admit Diagnosis: Rapid atrial fibrillation [facility])                          Assessment:                 1. Hypoxia, acute diastolic CHF, prox afib with RVR back in sinus, increased troponin consistent with type II NSTEMI  2. No hx of CAD  3. Echo5/2018 EF 55%, mod MR, mild pHTN  4. Prox Afib currently in sinus with LVH  5. HTN, with hypertensive heart disease with heart failure, and CKD cr 1.4  6. Dyslipidemia  7. Hx of CVA s/p TPA in 2015  8. PVD with hx of left CEA  9. COPD has had home O2 in past  10. GERD  11. DJD  12. , lives at [facility], poor functional capacity in a wheelchair, sleeps most of the day      5/5  Continue with diuresis. 5/6 cardiac status stable. 5/7 Feels well . Maintaining NSR    No changes from cardiac standpoint.                               Plan:                  Poor functional capacity, since CVA mostly in wheelchair, sleeps in between meals. Some increase in dyspnea. No chest pain. Afib with RVR back in sinus currently on amiodarone, given CHADS2 score of 5 will try anticoagulation  pulm edema on cxr still on supplemental O2  Increased troponin, consistent with type II NSTEMI, preserved EF, discussed with family would like to avoid invasive therapy, plan for medical management of CAD. On abx for gm negative UTI     1. Cont added eliquis 2.5mg po bid, given CHADS2 of 5  2. Cont ASA likely stop in one month due to age  1. Cont added amiodarone 100mg daily  4. Cont metoprolol 25mg bid  5. Cont amlodipine  6. Increase added lasix 40mg iv bid, replete K, follow bmp, taper O2 currently on 6L  7. Cont atorvastatin    PT/OT  Keep on IV diuretic until oxygen requirement improved, may need home O2.         [x]        High complexity decision making was performed         Subjective:     Flor Allen denies chest pain, dyspnea. Discussed with RN events overnight.      Review of Systems:    Symptom Y/N Comments  Symptom Y/N Comments   Fever/Chills N   Chest Pain N    Poor Appetite N   Edema N    Cough N   Abdominal Pain N    Sputum N   Joint Pain N    SOB/ALCARAZ N   Pruritis/Rash N    Nausea/vomit N   Tolerating PT/OT Y    Diarrhea N   Tolerating Diet Y    Constipation N   Other       Could NOT obtain due to:      Objective:      Physical Exam:    Last 24hrs VS reviewed since prior progress note. Most recent are:    Visit Vitals    /84 (BP 1 Location: Right arm, BP Patient Position: At rest)    Pulse 82    Temp 98.2 °F (36.8 °C)    Resp 20    Ht 5' 4\" (1.626 m)    Wt 65.5 kg (144 lb 8 oz)    SpO2 95%    BMI 24.8 kg/m2       Intake/Output Summary (Last 24 hours) at 05/07/18 1104  Last data filed at 05/06/18 1856   Gross per 24 hour   Intake              360 ml   Output             1300 ml   Net             -940 ml        General Appearance: Well developed, well nourished, alert & oriented x 3,    no acute distress. Ears/Nose/Mouth/Throat: Hearing grossly normal.  Neck: Supple. Chest: Lungs clear to auscultation bilaterally. Cardiovascular: Regular rate and rhythm, S1S2 normal, no murmur. Abdomen: Soft, non-tender, bowel sounds are active. Extremities: No edema bilaterally. Skin: Warm and dry. PMH/SH reviewed - no change compared to H&P    Data Review    Telemetry: normal sinus rhythm     Lab Data Personally Reviewed:    Recent Labs      05/06/18   0531  05/05/18   0305   WBC  10.7  9.6   HGB  12.3  11.7   HCT  37.3  36.5   PLT  299  303     No results for input(s): INR, PTP, APTT in the last 72 hours. No lab exists for component: Rosalie Wilson   Recent Labs      05/07/18   0304  05/06/18   0531  05/05/18   0305   NA  134*  137  137   K  3.8  3.7  3.5   CL  96*  98  99   CO2  32  32  32   BUN  15  14  13   CREA  1.05*  1.01  0.96   GLU  100  96  86   CA  9.1  8.3*  8.2*     No results for input(s): CPK, CKNDX, TROIQ in the last 72 hours.     No lab exists [facility] component: [facility]  Lab Results   Component Value Date/Time    Cholesterol, total 182 02/19/2015 04:30 AM    HDL Cholesterol 38 02/19/2015 04:30 AM    LDL, calculated 99.4 02/19/2015 04:30 AM    Triglyceride 223 (H) 02/19/2015 04:30 AM    CHOL/HDL Ratio 4.8 02/19/2015 04:30 AM       No results for input(s): SGOT, GPT, AP, TBIL, TP, ALB, GLOB, GGT, AML, LPSE in the last 72 hours. No lab exists for component: AMYP, HLPSE  No results for input(s): PH, PCO2, PO2 in the last 72 hours.     Medications Personally Reviewed:    Current Facility-Administered Medications   Medication Dose Route Frequency    furosemide (LASIX) tablet 40 mg  40 mg Oral ACB&D    cefTRIAXone (ROCEPHIN) 1 g in 0.9% sodium chloride (MBP/ADV) 50 mL  1 g IntraVENous Q24H    LORazepam (ATIVAN) tablet 0.25 mg  0.25 mg Oral QHS    potassium chloride SR (KLOR-CON 10) tablet 20 mEq  20 mEq Oral BID    amLODIPine (NORVASC) tablet 5 mg  5 mg Oral DAILY    apixaban (ELIQUIS) tablet 2.5 mg  2.5 mg Oral BID    oxybutynin chloride XL (DITROPAN XL) tablet 5 mg  5 mg Oral DAILY    amiodarone (CORDARONE) tablet 100 mg  100 mg Oral DAILY    metoprolol tartrate (LOPRESSOR) tablet 25 mg  25 mg Oral BID    albuterol (PROVENTIL HFA, VENTOLIN HFA, PROAIR HFA) inhaler 2 Puff  2 Puff Inhalation Q4H PRN    aspirin chewable tablet 81 mg  81 mg Oral DAILY    atorvastatin (LIPITOR) tablet 40 mg  40 mg Oral QHS    pantoprazole (PROTONIX) granules for oral suspension 40 mg  40 mg Oral DAILY    umeclidinium (INCRUSE ELLIPTA) 62.5 mcg/actuation  1 Puff Inhalation DAILY    sodium chloride (NS) flush 5-10 mL  5-10 mL IntraVENous Q8H    sodium chloride (NS) flush 5-10 mL  5-10 mL IntraVENous PRN    acetaminophen (TYLENOL) tablet 650 mg  650 mg Oral Q4H PRN    ondansetron (ZOFRAN) injection 4 mg  4 mg IntraVENous Q4H PRN    polyethylene glycol (MIRALAX) packet 17 g  17 g Oral DAILY         Josemanuel Birks, MD

## 2018-05-08 LAB
ANION GAP SERPL CALC-SCNC: 7 MMOL/L (ref 5–15)
BUN SERPL-MCNC: 16 MG/DL (ref 6–20)
BUN/CREAT SERPL: 14 (ref 12–20)
CALCIUM SERPL-MCNC: 8.7 MG/DL (ref 8.5–10.1)
CHLORIDE SERPL-SCNC: 99 MMOL/L (ref 97–108)
CO2 SERPL-SCNC: 31 MMOL/L (ref 21–32)
CREAT SERPL-MCNC: 1.17 MG/DL (ref 0.55–1.02)
ERYTHROCYTE [DISTWIDTH] IN BLOOD BY AUTOMATED COUNT: 13.2 % (ref 11.5–14.5)
GLUCOSE SERPL-MCNC: 120 MG/DL (ref 65–100)
HCT VFR BLD AUTO: 37.6 % (ref 35–47)
HGB BLD-MCNC: 12.2 G/DL (ref 11.5–16)
MCH RBC QN AUTO: 29 PG (ref 26–34)
MCHC RBC AUTO-ENTMCNC: 32.4 G/DL (ref 30–36.5)
MCV RBC AUTO: 89.5 FL (ref 80–99)
NRBC # BLD: 0 K/UL (ref 0–0.01)
NRBC BLD-RTO: 0 PER 100 WBC
PLATELET # BLD AUTO: 344 K/UL (ref 150–400)
PMV BLD AUTO: 10.4 FL (ref 8.9–12.9)
POTASSIUM SERPL-SCNC: 4.4 MMOL/L (ref 3.5–5.1)
RBC # BLD AUTO: 4.2 M/UL (ref 3.8–5.2)
SODIUM SERPL-SCNC: 137 MMOL/L (ref 136–145)
WBC # BLD AUTO: 8.3 K/UL (ref 3.6–11)

## 2018-05-08 PROCEDURE — 85027 COMPLETE CBC AUTOMATED: CPT | Performed by: INTERNAL MEDICINE

## 2018-05-08 PROCEDURE — 74011250636 HC RX REV CODE- 250/636: Performed by: INTERNAL MEDICINE

## 2018-05-08 PROCEDURE — 97530 THERAPEUTIC ACTIVITIES: CPT

## 2018-05-08 PROCEDURE — 74011250637 HC RX REV CODE- 250/637: Performed by: INTERNAL MEDICINE

## 2018-05-08 PROCEDURE — 77030038269 HC DRN EXT URIN PURWCK BARD -A

## 2018-05-08 PROCEDURE — 97116 GAIT TRAINING THERAPY: CPT

## 2018-05-08 PROCEDURE — 36415 COLL VENOUS BLD VENIPUNCTURE: CPT | Performed by: INTERNAL MEDICINE

## 2018-05-08 PROCEDURE — 80048 BASIC METABOLIC PNL TOTAL CA: CPT | Performed by: INTERNAL MEDICINE

## 2018-05-08 PROCEDURE — 65660000000 HC RM CCU STEPDOWN

## 2018-05-08 PROCEDURE — 74011000258 HC RX REV CODE- 258: Performed by: INTERNAL MEDICINE

## 2018-05-08 PROCEDURE — 97535 SELF CARE MNGMENT TRAINING: CPT

## 2018-05-08 PROCEDURE — 77010033678 HC OXYGEN DAILY

## 2018-05-08 RX ORDER — POTASSIUM CHLORIDE 750 MG/1
10 TABLET, FILM COATED, EXTENDED RELEASE ORAL 2 TIMES DAILY
Status: DISCONTINUED | OUTPATIENT
Start: 2018-05-08 | End: 2018-05-11 | Stop reason: HOSPADM

## 2018-05-08 RX ADMIN — Medication 10 ML: at 22:55

## 2018-05-08 RX ADMIN — POTASSIUM CHLORIDE 10 MEQ: 750 TABLET, EXTENDED RELEASE ORAL at 18:04

## 2018-05-08 RX ADMIN — UMECLIDINIUM 1 PUFF: 62.5 AEROSOL, POWDER ORAL at 09:13

## 2018-05-08 RX ADMIN — APIXABAN 2.5 MG: 2.5 TABLET, FILM COATED ORAL at 09:07

## 2018-05-08 RX ADMIN — ATORVASTATIN CALCIUM 40 MG: 40 TABLET, FILM COATED ORAL at 22:43

## 2018-05-08 RX ADMIN — FUROSEMIDE 40 MG: 40 TABLET ORAL at 18:03

## 2018-05-08 RX ADMIN — CEFTRIAXONE 1 G: 1 INJECTION, POWDER, FOR SOLUTION INTRAMUSCULAR; INTRAVENOUS at 13:21

## 2018-05-08 RX ADMIN — POLYETHYLENE GLYCOL 3350 17 G: 17 POWDER, FOR SOLUTION ORAL at 09:06

## 2018-05-08 RX ADMIN — AMIODARONE HYDROCHLORIDE 100 MG: 200 TABLET ORAL at 09:08

## 2018-05-08 RX ADMIN — POTASSIUM CHLORIDE 10 MEQ: 750 TABLET, EXTENDED RELEASE ORAL at 09:07

## 2018-05-08 RX ADMIN — APIXABAN 2.5 MG: 2.5 TABLET, FILM COATED ORAL at 18:04

## 2018-05-08 RX ADMIN — OXYBUTYNIN CHLORIDE 5 MG: 5 TABLET, EXTENDED RELEASE ORAL at 09:07

## 2018-05-08 RX ADMIN — ASPIRIN 81 MG 81 MG: 81 TABLET ORAL at 09:08

## 2018-05-08 RX ADMIN — AMLODIPINE BESYLATE 5 MG: 5 TABLET ORAL at 09:07

## 2018-05-08 RX ADMIN — Medication 10 ML: at 13:22

## 2018-05-08 RX ADMIN — LORAZEPAM 0.25 MG: 0.5 TABLET ORAL at 22:43

## 2018-05-08 RX ADMIN — FUROSEMIDE 40 MG: 40 TABLET ORAL at 09:08

## 2018-05-08 RX ADMIN — METOPROLOL TARTRATE 25 MG: 25 TABLET ORAL at 09:07

## 2018-05-08 RX ADMIN — METOPROLOL TARTRATE 25 MG: 25 TABLET ORAL at 18:04

## 2018-05-08 RX ADMIN — PANTOPRAZOLE SODIUM 40 MG: 40 GRANULE, DELAYED RELEASE ORAL at 09:07

## 2018-05-08 NOTE — PROGRESS NOTES
Bedside shift change report given to Dionte Melgar RN (oncoming nurse). Report included the following information SBAR, Kardex, Intake/Output, MAR and Recent Results. SHIFT SUMMARY:  0700: Bedside shift change report given to Sky Foster RN (oncoming nurse) by Dionte Melgar RN (offgoing nurse). Report included the following information SBAR, Kardex, ED Summary, Intake/Output, MAR and Recent Results. 0830: Pts SpO2: 93% on 4L via NC. Pts O2 changed to 3L NC. Will reassess in 15 min. Reassessment on 3L -- SpO2: 94% on 3L pt resting comfortably in bed eating breakfast no complaints of SOB of discomfort at this time     1330: Pts O2 weaned to 2.5L NC. SpO2: 94% Pt resting in recliner comfortably with no complaints of SOB or discomfort. SpO2% dropped breafly to 89% when ambulating with PT to bedside commode -- recovered back to 92% in <2 minutes on 2.5 L NC with no complaints of SOB. [address]  NURSING NOTE   Admission Date 4/30/2018   Admission Diagnosis Rapid atrial fibrillation ([facility] [address]   Consults IP CONSULT TO HOSPITALIST      Cardiac Monitoring [x] Yes [] No      Purposeful Hourly Rounding [x] Yes    Arjun Score Total Score: 3   Arjun score 3 or > [x] Bed Alarm [] Avasys [] 1:1 sitter [] Patient refused (Signed refusal form in chart)   Rsoas Score Rosas Score: 17   Rosas score 14 or < [] PMT consult [] Wound Care consult    []  Specialty bed  [] Nutrition consult      Influenza Vaccine Received Flu Vaccine for Current Season (usually Sept-March): Not Flu Season           Oxygen needs? [x] Room air Oxygen @  []1L    [x]2.5L    []3L   []4L    []5L   []6L via  NC   Chronic home O2 use?  [] Yes [] No  Perform O2 challenge test and document in progress note using smartphrase (.Homeoxygen)      Last bowel movement Last Bowel Movement Date: 05/05/18      Urinary Catheter       External Female Catheter 05/01/18-Urine Output (mL): 350 ml     LDAs               Peripheral IV 05/03/18 Left Antecubital (Active)   Site Assessment Clean, dry, & intact 5/8/2018  2:00 PM   Phlebitis Assessment 0 5/8/2018  2:00 PM   Infiltration Assessment 0 5/8/2018  2:00 PM   Dressing Status Clean, dry, & intact 5/8/2018  2:00 PM   Dressing Type Tape;Transparent 5/8/2018  2:00 PM   Hub Color/Line Status Blue;Patent; Flushed 5/8/2018  2:00 PM          External Female Catheter 05/01/18 (Active)   Site Assessment Clean, dry, & intact 5/8/2018  5:57 AM   Repositioned Yes 5/8/2018  5:57 AM   Perineal Care Yes 5/8/2018  5:57 AM   Wick Changed Yes 5/8/2018  5:57 AM   Suction Canister/Tubing Changed No 5/8/2018  5:57 AM   Urine Output (mL) 350 ml 5/6/2018  5:25 AM                   Readmission Risk Assessment Tool Score High Risk            44       Total Score        3 Has Seen PCP in Last 6 Months (Yes=3, No=0)    2 . Living with Significant Other. Assisted Living. LTAC. SNF. or   Rehab    3 Patient Length of Stay (>5 days = 3)    5 Pt.  Coverage (Medicare=5 , Medicaid, or Self-Pay=4)    31 Charlson Comorbidity Score (Age + Comorbid Conditions)        Criteria that do not apply:    IP Visits Last 12 Months (1-3=4, 4=9, >4=11)       Expected Length of Stay 4d 7h   Actual Length of Stay 8

## 2018-05-08 NOTE — PROGRESS NOTES
Progress Note      5/8/2018 8:31 AM  NAME: Jacqueline Fowler   MRN:  [id number]   Admit Diagnosis: Rapid atrial fibrillation [facility])                          Assessment:                 1. Hypoxia, acute diastolic CHF, prox afib with RVR back in sinus, increased troponin consistent with type II NSTEMI  2. No hx of CAD  3. Echo5/2018 EF 55%, mod MR, mild pHTN  4. Prox Afib currently in sinus with LVH  5. HTN, with hypertensive heart disease with heart failure, and CKD cr 1.4  6. Dyslipidemia  7. Hx of CVA s/p TPA in 2015  8. PVD with hx of left CEA  9. COPD has had home O2 in past  10. GERD  11. DJD  12. , lives at [facility], poor functional capacity in a wheelchair, sleeps most of the day      5/5  Continue with diuresis. 5/6 cardiac status stable. 5/7 Feels well . Maintaining NSR    No changes from cardiac standpoint.                               Plan:                  Poor functional capacity, since CVA mostly in wheelchair, sleeps in between meals. Some increase in dyspnea. No chest pain. Afib with RVR back in sinus currently on amiodarone, given CHADS2 score of 5 will try anticoagulation  pulm edema on cxr still on supplemental O2  Increased troponin, consistent with type II NSTEMI, preserved EF, discussed with family would like to avoid invasive therapy, plan for medical management of CAD. On abx for gm negative UTI     1. Cont added eliquis 2.5mg po bid, given CHADS2 of 5  2. Cont ASA likely stop in one month due to age  1. Cont added amiodarone 100mg daily  4. Cont metoprolol 25mg bid  5. Cont amlodipine  6. Cont lasix 40mg po bid, decrease kcl 10meq bid, follow bmp, taper O2 currently on 4L  7. Cont atorvastatin    PT/OT  Taper O2,  to see what is required to discharge on home O2.         [x]        High complexity decision making was performed         Subjective:     Lesa Allen denies chest pain, dyspnea. Discussed with RN events overnight.      Review of Systems:    Symptom Y/N Comments  Symptom Y/N Comments   Fever/Chills N   Chest Pain N    Poor Appetite N   Edema N    Cough N   Abdominal Pain N    Sputum N   Joint Pain N    SOB/ALCARAZ N   Pruritis/Rash N    Nausea/vomit N   Tolerating PT/OT Y    Diarrhea N   Tolerating Diet Y    Constipation N   Other       Could NOT obtain due to:      Objective:      Physical Exam:    Last 24hrs VS reviewed since prior progress note. Most recent are:    Visit Vitals    /61 (BP 1 Location: Right arm, BP Patient Position: At rest)    Pulse (!) 57    Temp 97.3 °F (36.3 °C)    Resp 18    Ht 5' 4\" (1.626 m)    Wt 67 kg (147 lb 12.8 oz)    SpO2 93%    BMI 25.37 kg/m2       Intake/Output Summary (Last 24 hours) at 05/08/18 1029  Last data filed at 05/08/18 0557   Gross per 24 hour   Intake                0 ml   Output              625 ml   Net             -625 ml        General Appearance: Well developed, well nourished, alert & oriented x 3,    no acute distress. Ears/Nose/Mouth/Throat: Hearing grossly normal.  Neck: Supple. Chest: Lungs clear to auscultation bilaterally. Cardiovascular: Regular rate and rhythm, S1S2 normal, no murmur. Abdomen: Soft, non-tender, bowel sounds are active. Extremities: No edema bilaterally. Skin: Warm and dry. PMH/SH reviewed - no change compared to H&P    Data Review    Telemetry: normal sinus rhythm     Lab Data Personally Reviewed:    Recent Labs      05/08/18   0052  05/06/18   0531   WBC  8.3  10.7   HGB  12.2  12.3   HCT  37.6  37.3   PLT  344  299     No results for input(s): INR, PTP, APTT in the last 72 hours. No lab exists for component: Nguyễn Neither   Recent Labs      05/08/18   0052  05/07/18   0304  05/06/18   0531   NA  137  134*  137   K  4.4  3.8  3.7   CL  99  96*  98   CO2  31  32  32   BUN  16  15  14   CREA  1.17*  1.05*  1.01   GLU  120*  100  96   CA  8.7  9.1  8.3*     No results for input(s): CPK, CKNDX, TROIQ in the last 72 hours.     No lab exists for component: CPKMB  Lab Results   Component Value Date/Time    Cholesterol, total 182 02/19/2015 04:30 AM    HDL Cholesterol 38 02/19/2015 04:30 AM    LDL, calculated 99.4 02/19/2015 04:30 AM    Triglyceride 223 (H) 02/19/2015 04:30 AM    CHOL/HDL Ratio 4.8 02/19/2015 04:30 AM       No results for input(s): SGOT, GPT, AP, TBIL, TP, ALB, GLOB, GGT, AML, LPSE in the last 72 hours. No lab exists for component: AMYP, HLPSE  No results for input(s): PH, PCO2, PO2 in the last 72 hours.     Medications Personally Reviewed:    Current Facility-Administered Medications   Medication Dose Route Frequency    potassium chloride SR (KLOR-CON 10) tablet 10 mEq  10 mEq Oral BID    furosemide (LASIX) tablet 40 mg  40 mg Oral ACB&D    cefTRIAXone (ROCEPHIN) 1 g in 0.9% sodium chloride (MBP/ADV) 50 mL  1 g IntraVENous Q24H    LORazepam (ATIVAN) tablet 0.25 mg  0.25 mg Oral QHS    amLODIPine (NORVASC) tablet 5 mg  5 mg Oral DAILY    apixaban (ELIQUIS) tablet 2.5 mg  2.5 mg Oral BID    oxybutynin chloride XL (DITROPAN XL) tablet 5 mg  5 mg Oral DAILY    amiodarone (CORDARONE) tablet 100 mg  100 mg Oral DAILY    metoprolol tartrate (LOPRESSOR) tablet 25 mg  25 mg Oral BID    albuterol (PROVENTIL HFA, VENTOLIN HFA, PROAIR HFA) inhaler 2 Puff  2 Puff Inhalation Q4H PRN    aspirin chewable tablet 81 mg  81 mg Oral DAILY    atorvastatin (LIPITOR) tablet 40 mg  40 mg Oral QHS    pantoprazole (PROTONIX) granules for oral suspension 40 mg  40 mg Oral DAILY    umeclidinium (INCRUSE ELLIPTA) 62.5 mcg/actuation  1 Puff Inhalation DAILY    sodium chloride (NS) flush 5-10 mL  5-10 mL IntraVENous Q8H    sodium chloride (NS) flush 5-10 mL  5-10 mL IntraVENous PRN    acetaminophen (TYLENOL) tablet 650 mg  650 mg Oral Q4H PRN    ondansetron (ZOFRAN) injection 4 mg  4 mg IntraVENous Q4H PRN    polyethylene glycol (MIRALAX) packet 17 g  17 g Oral DAILY         Gudelia Gore MD

## 2018-05-08 NOTE — CARDIO/PULMONARY
Cardiopulmonary Rehab:      Chart reviewed. Pt is on CHF Bundle List      Pt is a 80 y.o.  female admitted with respiratory distress, NSTEMI. PMHx of HTN, PAD, COPD, GERD, TIA, anxiety. Former smoker. LVEF 55-60%.     Met with pt sitting up in chair, legs elevated. No family at bedside. Pt visited. This was a follow-up visit to answer questions and reinforce prior teaching re: CHF, S&Ss, medication management, Low NA diet, daily weights and when to call the doctor. Pt correctly listed potato chips, soups, nesbitt as high sodium foods to be avoided. Reviewed other common foods provided by [facility] such as gravy, cheeses sandwiches, hot dogs. Pt verbalized understanding.

## 2018-05-08 NOTE — PROGRESS NOTES
0730  Report received from [facility], [facility]. SBAR, Kardex, ED Summary, Procedure Summary, Intake/Output, MAR, Accordion, Recent Results, Med Rec Status and Cardiac Rhythm NSR were discussed.     [address]

## 2018-05-08 NOTE — PROGRESS NOTES
Problem: Mobility Impaired (Adult and Pediatric)  Goal: *Acute Goals and Plan of Care (Insert Text)  Physical Therapy Goals  Initiated 5/1/2018, re-assessed 5/8/2018 goal 5 added, continue progressing  1. Patient will move from supine to sit and sit to supine  in bed with modified independence within 7 day(s). 2.  Patient will transfer from bed to chair and chair to bed with contact guard assist using the least restrictive device within 7 day(s). 3.  Patient will perform sit to stand with contact guard assist within 7 day(s). 4.  Patient will demo independence with simple seated HEP in maintain strength within 7 days. 5.  Patient will ambulate 20ft with CGA with least restrictive assistive device within 7 days. physical Therapy TREATMENT  Patient: Maureen Lopez (83 y.o. female)  Date: 5/8/2018  Diagnosis: Rapid atrial fibrillation ([facility] [address] Acute diastolic CHF (congestive heart failure) ([facility] [address]       Precautions: Fall  Chart, physical therapy assessment, plan of care and goals were reviewed. ASSESSMENT:  Pt making steady progress towards goals. Tolerated session well. Transferred to [facility] with Min A for walker navigation and direction cueing. Pt then transferred back to chair and walked an additional 10ft with Min A-CGA. Maintained saturations 91-93% on 2.5L/min with activtiy. With seated break returned to 96%. Pt happy with progress today and hopeful to ambulate more than she has been despite mostly depending on w/c for mobility at [facility]. Progression toward goals:  [x]    Improving appropriately and progressing toward goals  []    Improving slowly and progressing toward goals  []    Not making progress toward goals and plan of care will be adjusted     PLAN:  Patient continues to benefit from skilled intervention to address the above impairments. Continue treatment per established plan of care.   Discharge Recommendations:  [facility]  Further Equipment Recommendations for Discharge:  None SUBJECTIVE:   Patient stated I would like to walk more.     OBJECTIVE DATA SUMMARY:   Critical Behavior:  Neurologic State: Alert, Appropriate for age  Orientation Level: Oriented X4  Cognition: Appropriate decision making, Appropriate for age attention/concentration, Appropriate safety awareness, Follows commands  Safety/Judgement: Awareness of environment  Functional Mobility Training:  Bed Mobility:                    Transfers:  Sit to Stand: Minimum assistance; Additional time  Stand to Sit: Contact guard assistance  Stand Pivot Transfers: Minimum assistance                          Balance:  Sitting: Intact  Standing: Impaired  Standing - Static: Fair;Constant support  Standing - Dynamic : Fair  Ambulation/Gait Training:  Distance (ft): 10 Feet (ft)  Assistive Device: Gait belt;Walker, rolling  Ambulation - Level of Assistance: Contact guard assistance; Additional time;Assist x1        Gait Abnormalities: Decreased step clearance;Shuffling gait        Base of Support: Narrowed     Speed/Vera: Pace decreased (<100 feet/min)  Step Length: Right shortened;Left shortened                    Activity Tolerance:   Good  Please refer to the flowsheet for vital signs taken during this treatment.   After treatment:   [x]    Patient left in no apparent distress sitting up in chair  []    Patient left in no apparent distress in bed  [x]    Call bell left within reach  [x]    Nursing notified  []    Caregiver present  [x]    Bed alarm activated    COMMUNICATION/COLLABORATION:   The patients plan of care was discussed with: Registered Nurse    Alexis Be, PT   Time Calculation: 26 mins

## 2018-05-08 NOTE — PROGRESS NOTES
Initial Nutrition Assessment:    INTERVENTIONS/RECOMMENDATIONS:   · Continue current diet  · Could check vit D and B12 due to age    ASSESSMENT:   Chart reviewed, medically noted for acute diastolic CHF and PMH shown below. Assessing pt due to LOS. Pt reports a good appetite currently and PTA. She denies unintentional weight loss PTA. Cardiac rehab has already provided low Na diet education, she had no further questions at this time. Past Medical History:   Diagnosis Date    Arthritis     generalized    COPD     GERD (gastroesophageal reflux disease)     Hypertension     Ill-defined condition     Vitamin deficiency    NSTEMI (non-ST elevated myocardial infarction) ([facility] [address] 5/1/2018    Other ill-defined conditions(799.89)     high cholesterol    Peripheral artery disease (HCC)     Psychiatric disorder     Anxiety disorder    Sleep disorder     Insomnia    TIA (transient ischemic attack)        Diet Order: Cardiac  % Eaten:  No data found. Pertinent Medications: [x]Reviewed: lasix, PPI, miralax, KCl,   Pertinent Labs: [x]Reviewed:   Food Allergies: [x]NKFA  []Other   Last BM: 5/7  Edema:        []RUE   []LUE   []RLE   []LLE      Pressure Injury:      [] Stage I   [] Stage II   [] Stage III   [] Stage IV      Wt Readings from Last 30 Encounters:   05/08/18 67 kg (147 lb 12.8 oz)   02/24/15 68.5 kg (151 lb)   02/18/15 75.3 kg (166 lb 0.1 oz)   03/14/11 73.9 kg (163 lb)       Anthropometrics:   Height: 5' 4\" (162.6 cm) Weight: 67 kg (147 lb 12.8 oz)   IBW (%IBW):   ( ) UBW (%UBW):   (  %)   Last Weight Metrics:  Weight Loss Metrics 5/8/2018 2/24/2015 2/18/2015 3/14/2011   Today's Wt 147 lb 12.8 oz 151 lb 166 lb 0.1 oz 163 lb   BMI 25.37 kg/m2 24.38 kg/m2 27.62 kg/m2 27.12 kg/m2       BMI: Body mass index is 25.37 kg/(m^2).     This BMI is indicative of:   []Underweight    []Normal    [x]Overweight    [] Obesity   [] Extreme Obesity (BMI>40)     Estimated Nutrition Needs (Based on):   1300 Kcals/day (BMR: 1075 x 1.2) , 70 g (1 g/kg) Protein  Carbohydrate: At Least 130 g/day  Fluids: 1300 mL/day (1ml/kcal) or per primary team    NUTRITION DIAGNOSES:   Problem:  No nutritional diagnosis at this time      Etiology: related to       Signs/Symptoms: as evidenced by        NUTRITION INTERVENTIONS:  Meals/Snacks: General/healthful diet                  GOAL:   consume >50% of meals in 5-7 days    LEARNING NEEDS (Diet, Food/Nutrient-Drug Interaction):    [x] None Identified   [] Identified and Education Provided/Documented   [] Identified and Pt declined/was not appropriate     Cultureal, Jew, OR Ethnic Dietary Needs:    [x] None Identified   [] Identified and Addressed     [x] Interdisciplinary Care Plan Reviewed/Documented    [x] Discharge Planning:  General healthy diet, limit foods high in Na     MONITORING /EVALUATION:      Food/Nutrient Intake Outcomes:  Total energy intake  Physical Signs/Symptoms Outcomes: Weight/weight change, GI    NUTRITION RISK:    [] High              [] Moderate           [x]  Low  []  Minimal/Uncompromised    PT SEEN FOR:    []  MD Consult: []Calorie Count      []Diabetic Diet Education        []Diet Education     []Electrolyte Management     []General Nutrition Management and Supplements     []Management of Tube Feeding     []TPN Recommendations    []  RN Referral:  []MST score >=2     []Enteral/Parenteral Nutrition PTA     []Pregnant: Gestational DM or Multigestation     []Pressure Ulcer/Wound Care needs        []  Low BMI  [x]  LOS Referral       Jules Lowry RDN  Pager [phone]  Weekend Pager [phone]

## 2018-05-08 NOTE — PROGRESS NOTES
[address] [place] [address]  [phone]      Medical Progress Note      NAME: Tavo Bush   :  1928  MRM:  [id number]    Date/Time: 2018  5:44 AM         Subjective:     Alert. Admitted with rapid afib, diastolic CHF, NSEMI. Patient alert. No complaints of chest pain or SOB. Urine growing E.coli and Proteus - on IV rocephin. Past Medical History:   Diagnosis Date    Arthritis     generalized    COPD     GERD (gastroesophageal reflux disease)     Hypertension     Ill-defined condition     Vitamin deficiency    NSTEMI (non-ST elevated myocardial infarction) ([facility] [address] 2018    Other ill-defined conditions(799.89)     high cholesterol    Peripheral artery disease (HCC)     Psychiatric disorder     Anxiety disorder    Sleep disorder     Insomnia    TIA (transient ischemic attack)        ROS:  General: negative for fever, chills, sweats, weakness  Ear Nose and Throat: negative for rhinorrhea, pharyngitis, otalgia, tinnitus, speech or swallowing difficulties  Respiratory:  negative for cough, sputum production, SOB, wheezing, ALCARAZ, pleuritic pain  Cardiology:  negative for chest pain, palpitations, orthopnea, PND, edema, syncope   Gastrointestinal: negative for abdominal pain, N/V, dysphagia, change in bowel habits, bleeding  Muskuloskeletal : negative for arthralgia, myalgia  Dermatological: negative for rash, ulceration, mole change, new lesion  Neurological: negative for headache, dizziness, confusion, focal weakness, paresthesia, memory loss, gait disturbance  Psychological: negative for confusion, agitation overnight  Review of systems otherwise negative         Objective:       Vitals:        Last 24hrs VS reviewed since prior progress note.  Most recent are:    Visit Vitals    /74 (BP 1 Location: Left arm, BP Patient Position: At rest)    Pulse 60    Temp 98.2 °F (36.8 °C)    Resp 18    Ht 5' 4\" (1.626 m)    Wt 144 lb 8 oz (65.5 kg)    SpO2 94%    BMI 24.8 kg/m2     SpO2 Readings from Last 6 Encounters:   05/08/18 94%   02/23/15 95%   03/14/11 95%    O2 Flow Rate (L/min): 4 l/min     No intake or output data in the 24 hours ending 05/08/18 0544     Telemetry reviewed:   normal sinus rhythm  Tubes:   N/A  X-Ray:  Chest xray - Mild interstitial edema and small pleural effusions    Venous duplex of LE's - No deep venous thrombosis identified    Procedures:   EKG - When compared with ECG of 30-APR-2018 09:34,   Sinus rhythm has replaced Atrial fibrillation   Vent. rate has decreased BY  65 BPM   Inferior infarct is now present   Inverted T waves have replaced nonspecific T wave abnormality in Inferior   leads     Echo - Left ventricle: Systolic function was normal. Ejection fraction was  estimated in the range of 55 % to 60 %. No obvious wall motion  abnormalities identified in the views obtained. Wall thickness was mildly  to moderately increased. Hypertrophy was noted. Mitral valve: There was moderate regurgitation. Aortic valve: The valve was trileaflet. Leaflets exhibited mildly  increased thickness, calcification, normal cuspal separation, and  sclerosis. Tricuspid valve: There was mild to moderate regurgitation.     Urine culture:   ESCHERICHIA COLI      Antibiotic Sensitivity MARLEY Unit Status     Amikacin ($) Susceptible <=16 ug/mL Final     Method: MARLEY     Ampicillin ($) Resistant >16 ug/mL Final     Method: MARLEY     Ampicillin/sulbactam ($) Resistant >16/8 ug/mL Final     Method: MARLEY     Aztreonam ($$$$) Susceptible <=4 ug/mL Final     Method: MARLEY     Cefazolin ($) Susceptible <=8 ug/mL Final     Method: MARLEY     Cefepime ($$) Susceptible <=4 ug/mL Final     Method: MARLEY     Cefotaxime Susceptible <=2 ug/mL Final     Method: MARLEY     Ceftazidime ($) Susceptible <=1 ug/mL Final     Method: MARLEY     Ceftriaxone ($) Susceptible <=1 ug/mL Final     Method: MARLEY     Cefuroxime ($) Susceptible 8 ug/mL Final     Method: MARLEY     Ciprofloxacin ($) Resistant >2 ug/mL Final     Method: MARLEY     Gentamicin ($) Susceptible <=4 ug/mL Final     Method: MARLEY     Imipenem Susceptible <=1 ug/mL Final     Method: MARLEY     Levofloxacin ($) Resistant >4 ug/mL Final     Method: MARLEY     Meropenem ($$) Susceptible <=1 ug/mL Final     Method: MARLEY     Nitrofurantoin Susceptible <=32 ug/mL Final     Method: MARLEY     Piperacillin/Tazobac ($) Susceptible <=16 ug/mL Final     Method: MARLEY     Tobramycin ($) Susceptible <=4 ug/mL Final     Method: MARLEY     Trimeth/Sulfa Susceptible <=2/38 ug/mL Final     Method: MARLEY                  PROTEUS MIRABILIS      Antibiotic Sensitivity MARLEY Unit Status     Amikacin ($) Susceptible <=16 ug/mL Final     Method: MARLEY     Ampicillin ($) Susceptible <=8 ug/mL Final     Method: MARLEY     Ampicillin/sulbactam ($) Susceptible <=8/4 ug/mL Final     Method: MARLEY     Aztreonam ($$$$) Susceptible <=4 ug/mL Final     Method: MARLEY     Cefazolin ($) Susceptible <=8 ug/mL Final     Method: MARLEY     Cefepime ($$) Susceptible <=4 ug/mL Final     Method: MARLEY     Cefotaxime Susceptible <=2 ug/mL Final     Method: MARLEY     Ceftazidime ($) Susceptible <=1 ug/mL Final     Method: MARLEY     Ceftriaxone ($) Susceptible <=1 ug/mL Final     Method: MARLEY     Cefuroxime ($) Susceptible <=4 ug/mL Final     Method: MARLEY     Ciprofloxacin ($) Resistant >2 ug/mL Final     Method: MARLEY     Gentamicin ($) Susceptible <=4 ug/mL Final     Method: MARLEY     Levofloxacin ($) Resistant >4 ug/mL Final     Method: MARLEY     Meropenem ($$) Susceptible <=1 ug/mL Final     Method: MARLEY     Piperacillin/Tazobac ($) Susceptible <=16 ug/mL Final     Method: MARLEY     Tobramycin ($) Susceptible <=4 ug/mL Final     Method: MARLEY     Trimeth/Sulfa Susceptible <=2/38 ug/mL Final     Method: MARLEY                 Exam:     General   well developed, well nourished, appears stated age, in no acute distress  Neck   Supple without nodes or mass.  No thyromegaly or bruit  Respiratory  Generally clear Cardiology  Regular Rate and Rythmn  - no murmurs, rubs or gallops  Abdominal  Soft, non-tender, non-distended, positive bowel sounds, no hepatosplenomegaly  Extremities  No clubbing, cyanosis, or edema. Pulses intact. Skin  Normal skin turgor. No rashes or skin ulcers noted  Neurological  No focal neurological deficits noted  Psychological  Oriented x 2. Normal affect.   Exam otherwise negative     Lab Data Reviewed: (see below)    Medications Reviewed: (see below)    ______________________________________________________________________    Medications:     Current Facility-Administered Medications   Medication Dose Route Frequency    furosemide (LASIX) tablet 40 mg  40 mg Oral ACB&D    cefTRIAXone (ROCEPHIN) 1 g in 0.9% sodium chloride (MBP/ADV) 50 mL  1 g IntraVENous Q24H    LORazepam (ATIVAN) tablet 0.25 mg  0.25 mg Oral QHS    potassium chloride SR (KLOR-CON 10) tablet 20 mEq  20 mEq Oral BID    amLODIPine (NORVASC) tablet 5 mg  5 mg Oral DAILY    apixaban (ELIQUIS) tablet 2.5 mg  2.5 mg Oral BID    oxybutynin chloride XL (DITROPAN XL) tablet 5 mg  5 mg Oral DAILY    amiodarone (CORDARONE) tablet 100 mg  100 mg Oral DAILY    metoprolol tartrate (LOPRESSOR) tablet 25 mg  25 mg Oral BID    albuterol (PROVENTIL HFA, VENTOLIN HFA, PROAIR HFA) inhaler 2 Puff  2 Puff Inhalation Q4H PRN    aspirin chewable tablet 81 mg  81 mg Oral DAILY    atorvastatin (LIPITOR) tablet 40 mg  40 mg Oral QHS    pantoprazole (PROTONIX) granules for oral suspension 40 mg  40 mg Oral DAILY    umeclidinium (INCRUSE ELLIPTA) 62.5 mcg/actuation  1 Puff Inhalation DAILY    sodium chloride (NS) flush 5-10 mL  5-10 mL IntraVENous Q8H    sodium chloride (NS) flush 5-10 mL  5-10 mL IntraVENous PRN    acetaminophen (TYLENOL) tablet 650 mg  650 mg Oral Q4H PRN    ondansetron (ZOFRAN) injection 4 mg  4 mg IntraVENous Q4H PRN    polyethylene glycol (MIRALAX) packet 17 g  17 g Oral DAILY            Lab Review:     Recent Labs 05/08/18   0052  05/06/18   0531   WBC  8.3  10.7   HGB  12.2  12.3   HCT  37.6  37.3   PLT  344  299     Recent Labs      05/08/18   0052  05/07/18   0304  05/06/18   0531   NA  137  134*  137   K  4.4  3.8  3.7   CL  99  96*  98   CO2  31  32  32   GLU  120*  100  96   BUN  16  15  14   CREA  1.17*  1.05*  1.01   CA  8.7  9.1  8.3*     Lab Results   Component Value Date/Time    Glucose (POC) 104 02/21/2015 07:27 AM    Glucose (POC) 108 (H) 02/20/2015 09:16 PM    Glucose (POC) 110 (H) 02/20/2015 04:17 PM    Glucose (POC) 131 (H) 02/20/2015 11:18 AM    Glucose (POC) 130 (H) 02/20/2015 06:26 AM     No results for input(s): PH, PCO2, PO2, HCO3, FIO2 in the last 72 hours. No results for input(s): INR in the last 72 hours. No lab exists for component: INREXT, INREXT         Assessment:     Principal Problem:    Acute diastolic CHF (congestive heart failure) ([facility] [address] (5/1/2018)    Active Problems:    NSTEMI (non-ST elevated myocardial infarction) ([facility] [address] (5/1/2018)      Lower urinary tract infectious disease (2/18/2015)      Rapid atrial fibrillation (HCC) (4/30/2018)      COPD (5/1/2018)      Hypertension (5/1/2018)      CKD (chronic kidney disease) stage 3, GFR 30-59 ml/min (5/1/2018)      Acute respiratory failure with hypoxemia ([facility] [address] (5/2/2018)      Hypokalemia (5/2/2018)           Plan:     Risk of deterioration: medium             1. Continue telemetry  2. Amiodarone per cardiology   3. Continue ASA,, BB  4. Continue po lasix  5. Monitor renal function - improved  6. Continue potassium supplementation  7. Continue IV rocephin - no po alternatives due to allergies and drug resistance  8. PT/OT   9. PRN ativan for agitation  10.  Eventually [facility] - probably Friday after 5 days IV antibiotics                    Care Plan discussed with: Patient    Discussed:  Care Plan    Prophylaxis:  Hep SQ and H2B/PPI    Disposition: SNF/LTC           ___________________________________________________    Attending Physician: Padmini Dillard MD

## 2018-05-08 NOTE — PROGRESS NOTES
Problem: Self Care Deficits Care Plan (Adult)  Goal: *Acute Goals and Plan of Care (Insert Text)  Occupational Therapy Goals  Weekly re-assessment 5/8/18  1. Patient will perform grooming standing at sink with S within 7 day(s). 2.  Patient will perform upper body dressing with minimal assistance  within 7 day(s). 3.  Patient will perform toilet transfers with minimal assistance/contact guard assist within 7 day(s). 4.  Patient will perform all aspects of toileting with minimal assistance/contact guard assist within 7 day(s). 5.  Patient will participate in upper extremity therapeutic exercise/activities with independence for 5 minutes within 7 day(s). Initiated 5/1/2018  1. Patient will perform grooming with S within 7 day(s). modified  2. Patient will perform upper body dressing with moderate assistance  within 7 day(s). Upgrade  3. Patient will perform lower body dressing with maximal assistance within 7 day(s). DC as [facility] will assist  4. Patient will perform toilet transfers with minimal assistance/contact guard assist within 7 day(s). CONT for consistency  5. Patient will perform all aspects of toileting with minimal assistance/contact guard assist within 7 day(s). CONT for consistency  6. Patient will participate in upper extremity therapeutic exercise/activities with independence for 5 minutes within 7 day(s). CONT  7. Patient will utilize energy conservation techniques during functional activities with verbal cues within 7 day(s). DC impaired cognition        Occupational Therapy TREATMENT: WEEKLY REASSESSMENT  Patient: Reginald Holbrook (41 y.o. female)  Date: 5/8/2018  Diagnosis: Rapid atrial fibrillation ([facility] [address] Acute diastolic CHF (congestive heart failure) ([facility] [address]       Precautions: Fall  Chart, occupational therapy assessment, plan of care, and goals were reviewed. ASSESSMENT:  Nursing cleared for therapy. Patient received sitting in chair.   Pleasant and agreeable, STM impairments regarding previous OT sessions and PT session earlier in the day. Completed UB bathing, grooming and dressing with supervision-min A in sitting, limited secondary to impaired vision. Completed multiple sit <> stand with sidestepping/pivoting to increase indep with self care transfers at [employer] level with CGA. SpO2 90-94% on 2.5 L, HR stable. Patient is making fair progression with goals. Demonstrates ability to amb to bathroom at RW level with CGA and complete brief hand hygiene at sink. Decreased need for higher level of supplemental O2. She is limited by impaired STM for carry over of education during session and impaired vision. Recommend dc to [facility] with [facility] services and additional assist with ADL tasks if able to provide necessary assistance vs SNF. Patient would most likely fell more comfortable at known facility with regular routine and staff. .      Progression toward goals:  []            Improving appropriately and progressing toward goals  [x]            Improving slowly and progressing toward goals  []            Not making progress toward goals and plan of care will be adjusted     PLAN:  Goals have been updated based on progression since last assessment. Patient continues to benefit from skilled intervention to address the above impairments. Continue to follow patient 3 times a week to address goals.   Planned Interventions:  [x]                    Self Care Training                  [x]             Therapeutic Activities  [x]                    Functional Mobility Training    []             Cognitive Retraining  [x]                    Therapeutic Exercises           [x]             Endurance Activities  [x]                    Balance Training                   []             Neuromuscular Re-Education  []                    Visual/Perceptual Training     [x]        Home Safety Training  [x]                    Patient Education                 [x]             Family Training/Education  [] Other (comment):  Discharge Recommendations: [facility] vs return to [facility] with [facility] OT and additional assist with ADL tasks. Further Equipment Recommendations for Discharge: none for OT at this time, TBD based on dc plans     SUBJECTIVE:   Patient stated You know, I probably wont remember your name.     OBJECTIVE DATA SUMMARY:   Cognitive/Behavioral Status:  Neurologic State: Alert; Appropriate for age  Orientation Level: Oriented to person;Oriented to place;Oriented to situation  Cognition: Appropriate decision making; Appropriate for age attention/concentration; Appropriate safety awareness; Follows commands             Functional Mobility and Transfers for ADLs:  Bed Mobility:       Transfers:  Sit to Stand: Contact guard assistance           Balance:  Sitting: Intact  Standing: Impaired  Standing - Static: Constant support; Fair  Standing - Dynamic : Fair    ADL Intervention:   Completed ADL tasks in sitting secondary to patient's report or fatigue this afternoon. Completed seated aspects of washing face and UB with supervision. Min A for UB dressing to adjust gown secondary to impaired vision. Grooming  Washing Face: Supervision/set-up (seated)    Upper Body Bathing  Bathing Assistance: Supervision/set-up (seated)     Completed x 5 tirals sit <> stand with min verbal cues for hand placement and posture at RW level. Completed with CGA with brief standing with BUE support and brief sidestepping. Completed in order to prepare for self care transfers. Mild ALCARAZ. SpO2 stable. Pain:  Pain Scale 1: Numeric (0 - 10)  Pain Intensity 1: 0     Activity Tolerance:    SpO2 90-94% on 2.5 L, HR stable.      After treatment:   [x] Patient left in no apparent distress sitting up in chair  [] Patient left in no apparent distress in bed  [x] Call bell left within reach  [x] Nursing notified  [] Caregiver present  [x] Bed alarm activated    COMMUNICATION/COLLABORATION:   The patients plan of care was discussed with: Registered Nurse and Patient    Ginger Anderson, OT  Time Calculation: 24 mins

## 2018-05-09 LAB
ANION GAP SERPL CALC-SCNC: 6 MMOL/L (ref 5–15)
BUN SERPL-MCNC: 14 MG/DL (ref 6–20)
BUN/CREAT SERPL: 13 (ref 12–20)
CALCIUM SERPL-MCNC: 8.9 MG/DL (ref 8.5–10.1)
CHLORIDE SERPL-SCNC: 99 MMOL/L (ref 97–108)
CO2 SERPL-SCNC: 31 MMOL/L (ref 21–32)
CREAT SERPL-MCNC: 1.05 MG/DL (ref 0.55–1.02)
GLUCOSE SERPL-MCNC: 90 MG/DL (ref 65–100)
POTASSIUM SERPL-SCNC: 3.9 MMOL/L (ref 3.5–5.1)
SODIUM SERPL-SCNC: 136 MMOL/L (ref 136–145)

## 2018-05-09 PROCEDURE — 65660000000 HC RM CCU STEPDOWN

## 2018-05-09 PROCEDURE — 97530 THERAPEUTIC ACTIVITIES: CPT

## 2018-05-09 PROCEDURE — 74011250637 HC RX REV CODE- 250/637: Performed by: INTERNAL MEDICINE

## 2018-05-09 PROCEDURE — 77010033678 HC OXYGEN DAILY

## 2018-05-09 PROCEDURE — 36415 COLL VENOUS BLD VENIPUNCTURE: CPT | Performed by: INTERNAL MEDICINE

## 2018-05-09 PROCEDURE — 77030038269 HC DRN EXT URIN PURWCK BARD -A

## 2018-05-09 PROCEDURE — 97535 SELF CARE MNGMENT TRAINING: CPT | Performed by: OCCUPATIONAL THERAPIST

## 2018-05-09 PROCEDURE — 74011250636 HC RX REV CODE- 250/636: Performed by: INTERNAL MEDICINE

## 2018-05-09 PROCEDURE — 74011000258 HC RX REV CODE- 258: Performed by: INTERNAL MEDICINE

## 2018-05-09 PROCEDURE — 97116 GAIT TRAINING THERAPY: CPT

## 2018-05-09 PROCEDURE — 80048 BASIC METABOLIC PNL TOTAL CA: CPT | Performed by: INTERNAL MEDICINE

## 2018-05-09 RX ADMIN — ATORVASTATIN CALCIUM 40 MG: 40 TABLET, FILM COATED ORAL at 21:00

## 2018-05-09 RX ADMIN — APIXABAN 2.5 MG: 2.5 TABLET, FILM COATED ORAL at 17:48

## 2018-05-09 RX ADMIN — OXYBUTYNIN CHLORIDE 5 MG: 5 TABLET, EXTENDED RELEASE ORAL at 09:50

## 2018-05-09 RX ADMIN — CEFTRIAXONE 1 G: 1 INJECTION, POWDER, FOR SOLUTION INTRAMUSCULAR; INTRAVENOUS at 14:32

## 2018-05-09 RX ADMIN — PANTOPRAZOLE SODIUM 40 MG: 40 GRANULE, DELAYED RELEASE ORAL at 09:50

## 2018-05-09 RX ADMIN — LORAZEPAM 0.25 MG: 0.5 TABLET ORAL at 21:00

## 2018-05-09 RX ADMIN — POTASSIUM CHLORIDE 10 MEQ: 750 TABLET, EXTENDED RELEASE ORAL at 17:48

## 2018-05-09 RX ADMIN — METOPROLOL TARTRATE 25 MG: 25 TABLET ORAL at 17:48

## 2018-05-09 RX ADMIN — FUROSEMIDE 40 MG: 40 TABLET ORAL at 17:49

## 2018-05-09 RX ADMIN — METOPROLOL TARTRATE 25 MG: 25 TABLET ORAL at 09:50

## 2018-05-09 RX ADMIN — APIXABAN 2.5 MG: 2.5 TABLET, FILM COATED ORAL at 09:50

## 2018-05-09 RX ADMIN — AMLODIPINE BESYLATE 5 MG: 5 TABLET ORAL at 09:50

## 2018-05-09 RX ADMIN — UMECLIDINIUM 1 PUFF: 62.5 AEROSOL, POWDER ORAL at 09:00

## 2018-05-09 RX ADMIN — POTASSIUM CHLORIDE 10 MEQ: 750 TABLET, EXTENDED RELEASE ORAL at 09:50

## 2018-05-09 RX ADMIN — Medication 10 ML: at 14:32

## 2018-05-09 RX ADMIN — POLYETHYLENE GLYCOL 3350 17 G: 17 POWDER, FOR SOLUTION ORAL at 09:50

## 2018-05-09 RX ADMIN — ASPIRIN 81 MG 81 MG: 81 TABLET ORAL at 09:50

## 2018-05-09 RX ADMIN — Medication 10 ML: at 21:00

## 2018-05-09 RX ADMIN — AMIODARONE HYDROCHLORIDE 100 MG: 200 TABLET ORAL at 09:50

## 2018-05-09 RX ADMIN — FUROSEMIDE 40 MG: 40 TABLET ORAL at 09:50

## 2018-05-09 NOTE — PROGRESS NOTES
[address] [place] [address]  [phone]      Medical Progress Note      NAME: Bettie Cantu   :  1928  MRM:  [id number]    Date/Time: 2018  5:45 AM         Subjective:     Alert. Admitted with rapid afib, diastolic CHF, NSEMI. Patient alert. No complaints of chest pain or SOB. Urine growing E.coli and Proteus - on IV rocephin. Past Medical History:   Diagnosis Date    Arthritis     generalized    COPD     GERD (gastroesophageal reflux disease)     Hypertension     Ill-defined condition     Vitamin deficiency    NSTEMI (non-ST elevated myocardial infarction) ([facility] [facility] 2018    Other ill-defined conditions(799.89)     high cholesterol    Peripheral artery disease (HCC)     Psychiatric disorder     Anxiety disorder    Sleep disorder     Insomnia    TIA (transient ischemic attack)        ROS:  General: negative for fever, chills, sweats, weakness  Ear Nose and Throat: negative for rhinorrhea, pharyngitis, otalgia, tinnitus, speech or swallowing difficulties  Respiratory:  negative for cough, sputum production, SOB, wheezing, ALCARAZ, pleuritic pain  Cardiology:  negative for chest pain, palpitations, orthopnea, PND, edema, syncope   Gastrointestinal: negative for abdominal pain, N/V, dysphagia, change in bowel habits, bleeding  Muskuloskeletal : negative for arthralgia, myalgia  Dermatological: negative for rash, ulceration, mole change, new lesion  Neurological: negative for headache, dizziness, confusion, focal weakness, paresthesia, memory loss, gait disturbance  Psychological: negative for confusion, agitation overnight  Review of systems otherwise negative         Objective:       Vitals:        Last 24hrs VS reviewed since prior progress note.  Most recent are:    Visit Vitals    /56    Pulse (!) 53    Temp 98 °F (36.7 °C)    Resp 20    Ht 5' 4\" (1.626 m)    Wt 147 lb 12.8 oz (67 kg)    SpO2 96%    BMI 25.37 kg/m2     SpO2 Readings from Last 6 Encounters:   05/08/18 96%   02/23/15 95%   03/14/11 95%    O2 Flow Rate (L/min): 2.5 l/min       Intake/Output Summary (Last 24 hours) at 05/09/18 0588  Last data filed at 05/08/18 1400   Gross per 24 hour   Intake              480 ml   Output              575 ml   Net              -95 ml        Telemetry reviewed:   normal sinus rhythm  Tubes:   N/A  X-Ray:  Chest xray - Mild interstitial edema and small pleural effusions    Venous duplex of LE's - No deep venous thrombosis identified    Procedures:   EKG - When compared with ECG of 30-APR-2018 09:34,   Sinus rhythm has replaced Atrial fibrillation   Vent. rate has decreased BY  65 BPM   Inferior infarct is now present   Inverted T waves have replaced nonspecific T wave abnormality in Inferior   leads     Echo - Left ventricle: Systolic function was normal. Ejection fraction was  estimated in the range of 55 % to 60 %. No obvious wall motion  abnormalities identified in the views obtained. Wall thickness was mildly  to moderately increased. Hypertrophy was noted. Mitral valve: There was moderate regurgitation. Aortic valve: The valve was trileaflet. Leaflets exhibited mildly  increased thickness, calcification, normal cuspal separation, and  sclerosis. Tricuspid valve: There was mild to moderate regurgitation.     Urine culture:   ESCHERICHIA COLI      Antibiotic Sensitivity MARLEY Unit Status     Amikacin ($) Susceptible <=16 ug/mL Final     Method: MARLEY     Ampicillin ($) Resistant >16 ug/mL Final     Method: MARLEY     Ampicillin/sulbactam ($) Resistant >16/8 ug/mL Final     Method: MARLEY     Aztreonam ($$$$) Susceptible <=4 ug/mL Final     Method: MARLEY     Cefazolin ($) Susceptible <=8 ug/mL Final     Method: MARLEY     Cefepime ($$) Susceptible <=4 ug/mL Final     Method: MARLEY     Cefotaxime Susceptible <=2 ug/mL Final     Method: MARLEY     Ceftazidime ($) Susceptible <=1 ug/mL Final     Method: MARLEY     Ceftriaxone ($) Susceptible <=1 ug/mL Final     Method: MARLEY     Cefuroxime ($) Susceptible 8 ug/mL Final     Method: MARLEY     Ciprofloxacin ($) Resistant >2 ug/mL Final     Method: MARLEY     Gentamicin ($) Susceptible <=4 ug/mL Final     Method: MARLEY     Imipenem Susceptible <=1 ug/mL Final     Method: MARLEY     Levofloxacin ($) Resistant >4 ug/mL Final     Method: MARLEY     Meropenem ($$) Susceptible <=1 ug/mL Final     Method: MARLEY     Nitrofurantoin Susceptible <=32 ug/mL Final     Method: MARLEY     Piperacillin/Tazobac ($) Susceptible <=16 ug/mL Final     Method: MARLEY     Tobramycin ($) Susceptible <=4 ug/mL Final     Method: MARLEY     Trimeth/Sulfa Susceptible <=2/38 ug/mL Final     Method: MARLEY                  PROTEUS MIRABILIS      Antibiotic Sensitivity MARLEY Unit Status     Amikacin ($) Susceptible <=16 ug/mL Final     Method: MARLEY     Ampicillin ($) Susceptible <=8 ug/mL Final     Method: MARLEY     Ampicillin/sulbactam ($) Susceptible <=8/4 ug/mL Final     Method: MARLEY     Aztreonam ($$$$) Susceptible <=4 ug/mL Final     Method: MARLEY     Cefazolin ($) Susceptible <=8 ug/mL Final     Method: MARLEY     Cefepime ($$) Susceptible <=4 ug/mL Final     Method: MARLEY     Cefotaxime Susceptible <=2 ug/mL Final     Method: MARLEY     Ceftazidime ($) Susceptible <=1 ug/mL Final     Method: MARLEY     Ceftriaxone ($) Susceptible <=1 ug/mL Final     Method: MARLEY     Cefuroxime ($) Susceptible <=4 ug/mL Final     Method: MARLEY     Ciprofloxacin ($) Resistant >2 ug/mL Final     Method: MARLEY     Gentamicin ($) Susceptible <=4 ug/mL Final     Method: MARLEY     Levofloxacin ($) Resistant >4 ug/mL Final     Method: MARLEY     Meropenem ($$) Susceptible <=1 ug/mL Final     Method: MARLEY     Piperacillin/Tazobac ($) Susceptible <=16 ug/mL Final     Method: MARLEY     Tobramycin ($) Susceptible <=4 ug/mL Final     Method: MARLEY     Trimeth/Sulfa Susceptible <=2/38 ug/mL Final     Method: MARLEY                 Exam:     General   well developed, well nourished, appears stated age, in no acute distress  Neck   Supple without nodes or mass. No thyromegaly or bruit  Respiratory  Generally clear   Cardiology  Regular Rate and Rythmn  - no murmurs, rubs or gallops  Abdominal  Soft, non-tender, non-distended, positive bowel sounds, no hepatosplenomegaly  Extremities  No clubbing, cyanosis, or edema. Pulses intact. Skin  Normal skin turgor. No rashes or skin ulcers noted  Neurological  No focal neurological deficits noted  Psychological  Oriented x 2. Normal affect.   Exam otherwise negative     Lab Data Reviewed: (see below)    Medications Reviewed: (see below)    ______________________________________________________________________    Medications:     Current Facility-Administered Medications   Medication Dose Route Frequency    potassium chloride SR (KLOR-CON 10) tablet 10 mEq  10 mEq Oral BID    furosemide (LASIX) tablet 40 mg  40 mg Oral ACB&D    cefTRIAXone (ROCEPHIN) 1 g in 0.9% sodium chloride (MBP/ADV) 50 mL  1 g IntraVENous Q24H    LORazepam (ATIVAN) tablet 0.25 mg  0.25 mg Oral QHS    amLODIPine (NORVASC) tablet 5 mg  5 mg Oral DAILY    apixaban (ELIQUIS) tablet 2.5 mg  2.5 mg Oral BID    oxybutynin chloride XL (DITROPAN XL) tablet 5 mg  5 mg Oral DAILY    amiodarone (CORDARONE) tablet 100 mg  100 mg Oral DAILY    metoprolol tartrate (LOPRESSOR) tablet 25 mg  25 mg Oral BID    albuterol (PROVENTIL HFA, VENTOLIN HFA, PROAIR HFA) inhaler 2 Puff  2 Puff Inhalation Q4H PRN    aspirin chewable tablet 81 mg  81 mg Oral DAILY    atorvastatin (LIPITOR) tablet 40 mg  40 mg Oral QHS    pantoprazole (PROTONIX) granules for oral suspension 40 mg  40 mg Oral DAILY    umeclidinium (INCRUSE ELLIPTA) 62.5 mcg/actuation  1 Puff Inhalation DAILY    sodium chloride (NS) flush 5-10 mL  5-10 mL IntraVENous Q8H    sodium chloride (NS) flush 5-10 mL  5-10 mL IntraVENous PRN    acetaminophen (TYLENOL) tablet 650 mg  650 mg Oral Q4H PRN    ondansetron (ZOFRAN) injection 4 mg  4 mg IntraVENous Q4H PRN    polyethylene glycol (MIRALAX) packet 17 g  17 g Oral DAILY            Lab Review:     Recent Labs      05/08/18   0052   WBC  8.3   HGB  12.2   HCT  37.6   PLT  344     Recent Labs      05/09/18   0358  05/08/18   0052  05/07/18   0304   NA  136  137  134*   K  3.9  4.4  3.8   CL  99  99  96*   CO2  31  31  32   GLU  90  120*  100   BUN  14  16  15   CREA  1.05*  1.17*  1.05*   CA  8.9  8.7  9.1     Lab Results   Component Value Date/Time    Glucose (POC) 104 02/21/2015 07:27 AM    Glucose (POC) 108 (H) 02/20/2015 09:16 PM    Glucose (POC) 110 (H) 02/20/2015 04:17 PM    Glucose (POC) 131 (H) 02/20/2015 11:18 AM    Glucose (POC) 130 (H) 02/20/2015 06:26 AM     No results for input(s): PH, PCO2, PO2, HCO3, FIO2 in the last 72 hours. No results for input(s): INR in the last 72 hours. No lab exists for component: INREXT, INREXT         Assessment:     Principal Problem:    Acute diastolic CHF (congestive heart failure) ([facility] (5/1/2018)    Active Problems:    NSTEMI (non-ST elevated myocardial infarction) ([facility] (5/1/2018)      Lower urinary tract infectious disease (2/18/2015)      Rapid atrial fibrillation (HCC) (4/30/2018)      COPD (5/1/2018)      Hypertension (5/1/2018)      CKD (chronic kidney disease) stage 3, GFR 30-59 ml/min (5/1/2018)      Acute respiratory failure with hypoxemia ([facility] (5/2/2018)      Hypokalemia (5/2/2018)           Plan:     Risk of deterioration: medium             1. Continue telemetry  2. Amiodarone per cardiology   3. Continue ASA,, BB  4. Continue po lasix  5. Monitor renal function - improved  6. Continue potassium supplementation  7. Continue IV rocephin - no po alternatives due to allergies and drug resistance  8. PT/OT   9. PRN ativan for agitation  10. Ask nursing to stop oxygen and document need for O2 at discharge  11.  Eventually back to [facility] - probably Friday after 5 days IV antibiotics                    Care Plan discussed with: Patient    Discussed:  Care Plan    Prophylaxis:  Hep SQ and H2B/PPI    Disposition:  SNF/LTC           ___________________________________________________    Attending Physician: Tashia Lopez MD

## 2018-05-09 NOTE — PROGRESS NOTES
Problem: Mobility Impaired (Adult and Pediatric)  Goal: *Acute Goals and Plan of Care (Insert Text)  Physical Therapy Goals  Initiated 5/1/2018, re-assessed 5/8/2018 goal 5 added, continue progressing  1. Patient will move from supine to sit and sit to supine  in bed with modified independence within 7 day(s). 2.  Patient will transfer from bed to chair and chair to bed with contact guard assist using the least restrictive device within 7 day(s). 3.  Patient will perform sit to stand with contact guard assist within 7 day(s). 4.  Patient will demo independence with simple seated HEP in maintain strength within 7 days. 5.  Patient will ambulate 20ft with CGA with least restrictive assistive device within 7 days. physical Therapy TREATMENT  Patient: June Kulkarni (80 y.o. female)  Date: 5/9/2018  Diagnosis: Rapid atrial fibrillation ([facility] [address] Acute diastolic CHF (congestive heart failure) ([facility] [address]       Precautions: Fall  Chart, physical therapy assessment, plan of care and goals were reviewed. ASSESSMENT:  Patient received sitting in the chair, agreeable to PT. Patient required CGA for sit <> stand with RW. Patient ambulated 30 feet with CGA-min A and RW. Patient with unsteady gait with decreased-poor RW management likely due to vision (old CVA). Patient required min A for RW management. Patient's O2 sats dropped to 85% on 1.5 LO2 with mobility and able to recover to 91% with PLB cues. Patient left sitting in the chair with the bed alarm on at the conclusion of PT treatment session. [facility] at [address] at discharge. Progression toward goals:  [x]    Improving appropriately and progressing toward goals  []    Improving slowly and progressing toward goals  []    Not making progress toward goals and plan of care will be adjusted     PLAN:  Patient continues to benefit from skilled intervention to address the above impairments.   Continue treatment per established plan of care.  Discharge Recommendations:  Home Health at [facility]  Further Equipment Recommendations for Discharge:  none     SUBJECTIVE:   Patient stated I don't walk normally. I haven't walked for a while.     OBJECTIVE DATA SUMMARY:   Critical Behavior:  Neurologic State: Alert  Orientation Level: Oriented X4  Cognition: Follows commands  Safety/Judgement: Awareness of environment  Functional Mobility Training:  Bed Mobility:  Rolling:  (sitting in the chair )                 Transfers:  Sit to Stand: Contact guard assistance  Stand to Sit: Contact guard assistance        Bed to Chair: Contact guard assistance                    Balance:  Sitting: Intact; Without support  Standing: Impaired; With support  Standing - Static: Fair  Standing - Dynamic : Fair  Ambulation/Gait Training:  Distance (ft): 30 Feet (ft)  Assistive Device: Gait belt;Walker, rolling  Ambulation - Level of Assistance: Minimal assistance;Assist x1        Gait Abnormalities: Decreased step clearance;Trunk sway increased; Shuffling gait (poor RW management)        Base of Support: Widened     Speed/Vera: Slow  Step Length: Right shortened;Left shortened                        Therapeutic Exercises:   Educated on seated BLE: hip flexion, LAQ, ankle pumps x 10 B; PLB cues. Pain:  Pain Scale 1: Numeric (0 - 10)  Pain Intensity 1: 0              Activity Tolerance:   Fair, O2 sats dropped to 85% on 1.5 L O2 with mobility. PLB improved O2 sats to 91%. Please refer to the flowsheet for vital signs taken during this treatment.   After treatment:   [x]    Patient left in no apparent distress sitting up in chair  []    Patient left in no apparent distress in bed  [x]    Call bell left within reach  [x]    Nursing notified  []    Caregiver present  [x]    Bed alarm activated    COMMUNICATION/COLLABORATION:   The patients plan of care was discussed with: Occupational Therapist, Registered Nurse and     Bere Avila, PT, DPT   Time Calculation: 24 mins

## 2018-05-09 NOTE — CARDIO/PULMONARY
Cardiopulmonary Rehab Nursing Entry:      Pt is on CHF Bundle List      Chart reviewed. Pt 79 yo admitted with respiratory distress, NSTEMI. PMHx of HTN, PAD, COPD, GERD, TIA, anxiety.     Former smoker     LVEF 55-60%. DIET CARDIAC Regular     For possible discharge to [facility] this Friday. This was a follow-up visit to answer questions and reinforce prior teaching re: CHF, S&Ss, medication management, Low NA diet, daily weights, when to call the doctor and balancing rest/activity. All questions answered. Understanding verbalized.

## 2018-05-09 NOTE — PROGRESS NOTES
Bedside shift change report given to Navdeep Murillo RN (oncoming nurse). Report included the following information SBAR, Kardex, Procedure Summary, Intake/Output, MAR and Recent Results. SHIFT SUMMARY:  0700: Bedside shift change report given to Mita Tian RN (oncoming nurse) by Zach Walker RN (offgoing nurse). Report included the following information SBAR, Kardex, ED Summary, Intake/Output, MAR and Recent Results     0730: Attempted to removed pts O2 completely -- SpO2% dropped to 82%. Pt placed back on 1.5L NC and tolerating well. Will continue to monitor     1515: Pts O2 weaned to 1L NC -- pt resting comfortably in chair SpO2 95% w/ no complaints of SOB      [facility] NURSING NOTE   Admission Date 4/30/2018   Admission Diagnosis Rapid atrial fibrillation ([facility] [address]   Consults IP CONSULT TO HOSPITALIST      Cardiac Monitoring [x] Yes [] No      Purposeful Hourly Rounding [x] Yes    Arjun Score Total Score: 3   Arjun score 3 or > [] Bed Alarm [] Avasys [] 1:1 sitter [] Patient refused (Signed refusal form in chart)   Rosas Score Rosas Score: 17   Rosas score 14 or < [] PMT consult [] Wound Care consult    []  Specialty bed  [] Nutrition consult      Influenza Vaccine Received Flu Vaccine for Current Season (usually Sept-March): Not Flu Season           Oxygen needs? [x] Room air Oxygen @  [x]1L    []2L    []3L   []4L    []5L   []6L via  NC   Chronic home O2 use?  [] Yes [] No  Perform O2 challenge test and document in progress note using smartphrase (.Homeoxygen)      Last bowel movement Last Bowel Movement Date: 05/05/18      Urinary Catheter       External Female Catheter 05/01/18-Urine Output (mL): 150 ml     LDAs               Peripheral IV 05/03/18 Left Antecubital (Active)   Site Assessment Clean, dry, & intact 5/9/2018  7:28 PM   Phlebitis Assessment 0 5/9/2018  7:28 PM   Infiltration Assessment 0 5/9/2018  7:28 PM   Dressing Status Clean, dry, & intact 5/9/2018  7:28 PM   Dressing Type Transparent;Tape 5/9/2018  7:28 PM Hub Color/Line Status Blue;Flushed 5/9/2018  7:28 PM          External Female Catheter 05/01/18 (Active)   Site Assessment Clean, dry, & intact 5/9/2018  2:00 AM   Repositioned Yes 5/9/2018  2:00 AM   Perineal Care Yes 5/9/2018  2:00 AM   Wick Changed Yes 5/9/2018  2:00 AM   Suction Canister/Tubing Changed No 5/9/2018  2:00 AM   Urine Output (mL) 150 ml 5/8/2018  2:00 PM                   Readmission Risk Assessment Tool Score High Risk            44       Total Score        3 Has Seen PCP in Last 6 Months (Yes=3, No=0)    2 . Living with Significant Other. Assisted Living. LTAC. SNF. or   Rehab    3 Patient Length of Stay (>5 days = 3)    5 Pt.  Coverage (Medicare=5 , Medicaid, or Self-Pay=4)    31 Charlson Comorbidity Score (Age + Comorbid Conditions)        Criteria that do not apply:    IP Visits Last 12 Months (1-3=4, 4=9, >4=11)       Expected Length of Stay 4d 7h   Actual Length of Stay 9

## 2018-05-09 NOTE — PROGRESS NOTES
Problem: Self Care Deficits Care Plan (Adult)  Goal: *Acute Goals and Plan of Care (Insert Text)  Occupational Therapy Goals  Weekly re-assessment 5/8/18  1. Patient will perform grooming standing at sink with S within 7 day(s). 2.  Patient will perform upper body dressing with minimal assistance  within 7 day(s). 3.  Patient will perform toilet transfers with minimal assistance/contact guard assist within 7 day(s). 4.  Patient will perform all aspects of toileting with minimal assistance/contact guard assist within 7 day(s). 5.  Patient will participate in upper extremity therapeutic exercise/activities with independence for 5 minutes within 7 day(s). Initiated 5/1/2018  1. Patient will perform grooming with S within 7 day(s). modified  2. Patient will perform upper body dressing with moderate assistance  within 7 day(s). Upgrade  3. Patient will perform lower body dressing with maximal assistance within 7 day(s). DC as JACOBO will assist  4. Patient will perform toilet transfers with minimal assistance/contact guard assist within 7 day(s). CONT for consistency  5. Patient will perform all aspects of toileting with minimal assistance/contact guard assist within 7 day(s). CONT for consistency  6. Patient will participate in upper extremity therapeutic exercise/activities with independence for 5 minutes within 7 day(s). CONT  7. Patient will utilize energy conservation techniques during functional activities with verbal cues within 7 day(s). DC impaired cognition         Occupational Therapy TREATMENT  Patient: Raoul Murray (40 y.o. female)  Date: 5/9/2018  Diagnosis: Rapid atrial fibrillation ([facility] [address] Acute diastolic CHF (congestive heart failure) ([facility]       Precautions: Fall  Chart, occupational therapy assessment, plan of care, and goals were reviewed. ASSESSMENT:  Patient is confused, but participates well in therapy session with cues.   She requires cues throughout for proper techniques and safety. Patient was too fatigued to do much today and kept requested to go back to bed. Continue to recommend skilled nursing facility for rehab upon discharge. Progression toward goals:  []       Improving appropriately and progressing toward goals  [x]       Improving slowly and progressing toward goals  []       Not making progress toward goals and plan of care will be adjusted     PLAN:  Patient continues to benefit from skilled intervention to address the above impairments. Continue treatment per established plan of care. Discharge Recommendations:  Skilled Nursing Facility  Further Equipment Recommendations for Discharge:  Defer to facility     SUBJECTIVE:   Patient stated I already had therapy today. Can't I just do it in the morning?     OBJECTIVE DATA SUMMARY:   Cognitive/Behavioral Status:  Neurologic State: Alert  Orientation Level: Oriented to person;Oriented to place  Cognition: Follows commands  Perception: Appears intact  Perseveration: No perseveration noted       Functional Mobility and Transfers for ADLs:  Bed Mobility:  Rolling:  (sitting in the chair )  Sit to Supine: Minimum assistance    Transfers:  Sit to Stand: Contact guard assistance  Functional Transfers  Toilet Transfer : Contact guard assistance  Cues: Verbal cues provided  Bed to Chair: Contact guard assistance    Balance:  Sitting: Intact; Without support  Standing: Impaired; With support  Standing - Static: Fair  Standing - Dynamic : Fair    ADL Intervention:       Grooming  Washing Hands: Contact guard assistance  Brushing/Combing Hair: Contact guard assistance  Cues: Verbal cues provided                   Lower Body Dressing Assistance  Socks: Moderate assistance  Position Performed: Seated in chair  Cues: Verbal cues provided;Visual cues provided    Toileting  Toileting Assistance: Contact guard assistance  Bladder Hygiene: Contact guard assistance; Compensatory technique training  Cues: Verbal cues provided         Pain:  Pain Scale 1: Numeric (0 - 10)  Pain Intensity 1: 0              Activity Tolerance:   Fair  After treatment:   [] Patient left in no apparent distress sitting up in chair  [x] Patient left in no apparent distress in bed  [x] Call bell left within reach  [x] Nursing notified  [] Caregiver present  [x] Bed alarm activated    COMMUNICATION/COLLABORATION:   The patients plan of care was discussed with: Physical Therapist and Registered Nurse    Rosaline Schaffer OTR/L  Time Calculation: 24 mins

## 2018-05-10 LAB
ANION GAP SERPL CALC-SCNC: 9 MMOL/L (ref 5–15)
BUN SERPL-MCNC: 16 MG/DL (ref 6–20)
BUN/CREAT SERPL: 14 (ref 12–20)
CALCIUM SERPL-MCNC: 8.7 MG/DL (ref 8.5–10.1)
CHLORIDE SERPL-SCNC: 100 MMOL/L (ref 97–108)
CO2 SERPL-SCNC: 30 MMOL/L (ref 21–32)
CREAT SERPL-MCNC: 1.18 MG/DL (ref 0.55–1.02)
GLUCOSE SERPL-MCNC: 104 MG/DL (ref 65–100)
POTASSIUM SERPL-SCNC: 3.9 MMOL/L (ref 3.5–5.1)
SODIUM SERPL-SCNC: 139 MMOL/L (ref 136–145)

## 2018-05-10 PROCEDURE — 74011250636 HC RX REV CODE- 250/636: Performed by: INTERNAL MEDICINE

## 2018-05-10 PROCEDURE — 77010033678 HC OXYGEN DAILY

## 2018-05-10 PROCEDURE — 74011000258 HC RX REV CODE- 258: Performed by: INTERNAL MEDICINE

## 2018-05-10 PROCEDURE — 74011250637 HC RX REV CODE- 250/637: Performed by: INTERNAL MEDICINE

## 2018-05-10 PROCEDURE — 77030038269 HC DRN EXT URIN PURWCK BARD -A

## 2018-05-10 PROCEDURE — 97535 SELF CARE MNGMENT TRAINING: CPT

## 2018-05-10 PROCEDURE — 36415 COLL VENOUS BLD VENIPUNCTURE: CPT | Performed by: INTERNAL MEDICINE

## 2018-05-10 PROCEDURE — 65660000000 HC RM CCU STEPDOWN

## 2018-05-10 PROCEDURE — 80048 BASIC METABOLIC PNL TOTAL CA: CPT | Performed by: INTERNAL MEDICINE

## 2018-05-10 RX ORDER — METOPROLOL SUCCINATE 25 MG/1
25 TABLET, EXTENDED RELEASE ORAL DAILY
Status: DISCONTINUED | OUTPATIENT
Start: 2018-05-10 | End: 2018-05-11 | Stop reason: HOSPADM

## 2018-05-10 RX ADMIN — APIXABAN 2.5 MG: 2.5 TABLET, FILM COATED ORAL at 18:19

## 2018-05-10 RX ADMIN — AMIODARONE HYDROCHLORIDE 100 MG: 200 TABLET ORAL at 10:03

## 2018-05-10 RX ADMIN — POTASSIUM CHLORIDE 10 MEQ: 750 TABLET, EXTENDED RELEASE ORAL at 18:19

## 2018-05-10 RX ADMIN — POLYETHYLENE GLYCOL 3350 17 G: 17 POWDER, FOR SOLUTION ORAL at 10:05

## 2018-05-10 RX ADMIN — UMECLIDINIUM 1 PUFF: 62.5 AEROSOL, POWDER ORAL at 10:06

## 2018-05-10 RX ADMIN — AMLODIPINE BESYLATE 5 MG: 5 TABLET ORAL at 10:04

## 2018-05-10 RX ADMIN — ASPIRIN 81 MG 81 MG: 81 TABLET ORAL at 10:04

## 2018-05-10 RX ADMIN — PANTOPRAZOLE SODIUM 40 MG: 40 GRANULE, DELAYED RELEASE ORAL at 10:05

## 2018-05-10 RX ADMIN — Medication 10 ML: at 06:00

## 2018-05-10 RX ADMIN — CEFTRIAXONE 1 G: 1 INJECTION, POWDER, FOR SOLUTION INTRAMUSCULAR; INTRAVENOUS at 14:33

## 2018-05-10 RX ADMIN — LORAZEPAM 0.25 MG: 0.5 TABLET ORAL at 21:22

## 2018-05-10 RX ADMIN — Medication 10 ML: at 21:22

## 2018-05-10 RX ADMIN — OXYBUTYNIN CHLORIDE 5 MG: 5 TABLET, EXTENDED RELEASE ORAL at 10:05

## 2018-05-10 RX ADMIN — ATORVASTATIN CALCIUM 40 MG: 40 TABLET, FILM COATED ORAL at 21:23

## 2018-05-10 RX ADMIN — FUROSEMIDE 40 MG: 40 TABLET ORAL at 10:04

## 2018-05-10 RX ADMIN — APIXABAN 2.5 MG: 2.5 TABLET, FILM COATED ORAL at 10:04

## 2018-05-10 RX ADMIN — METOPROLOL SUCCINATE 25 MG: 25 TABLET, EXTENDED RELEASE ORAL at 10:45

## 2018-05-10 RX ADMIN — FUROSEMIDE 40 MG: 40 TABLET ORAL at 18:19

## 2018-05-10 RX ADMIN — POTASSIUM CHLORIDE 10 MEQ: 750 TABLET, EXTENDED RELEASE ORAL at 10:05

## 2018-05-10 RX ADMIN — Medication 10 ML: at 14:36

## 2018-05-10 NOTE — PROGRESS NOTES
[address] [place] [address]  [phone]      Medical Progress Note      NAME: Yo Bingham   :  1928  MRM:  [id number]    Date/Time: 5/10/2018  5:58 AM         Subjective:     Alert. Admitted with rapid afib, diastolic CHF, NSEMI. Patient alert. No complaints of chest pain or SOB. Urine growing E.coli and Proteus - on IV rocephin. O2 sat's dropped yesterday with activity. Past Medical History:   Diagnosis Date    Arthritis     generalized    COPD     GERD (gastroesophageal reflux disease)     Hypertension     Ill-defined condition     Vitamin deficiency    [facility]ST [facility]    Other ill-defined conditions(799.89)     high cholesterol    Peripheral artery disease (HCC)     Psychiatric disorder     Anxiety disorder    Sleep disorder     Insomnia    TIA (transient ischemic attack)        ROS:  General: negative for fever, chills, sweats, weakness  Ear Nose and Throat: negative for rhinorrhea, pharyngitis, otalgia, tinnitus, speech or swallowing difficulties  Respiratory:  negative for cough, sputum production, SOB, wheezing, ALCARAZ, pleuritic pain  Cardiology:  negative for chest pain, palpitations, orthopnea, PND, edema, syncope   Gastrointestinal: negative for abdominal pain, N/V, dysphagia, change in bowel habits, bleeding  Muskuloskeletal : negative for arthralgia, myalgia  Dermatological: negative for rash, ulceration, mole change, new lesion  Neurological: negative for headache, dizziness, confusion, focal weakness, paresthesia, memory loss, gait disturbance  Psychological: negative for confusion, agitation overnight  Review of systems otherwise negative         Objective:       Vitals:        Last 24hrs VS reviewed since prior progress note.  Most recent are:    Visit Vitals    /54 (BP 1 Location: Right arm, BP Patient Position: At rest)    Pulse (!) 57    Temp 98.3 °F (36.8 °C)    Resp 20     5' 4\" (1.626 m)    Wt 141 lb 8 oz (64.2 kg)    SpO2 90%    BMI 24.29 kg/m2     SpO2 Readings from Last 6 Encounters:   05/10/18 90%   02/23/15 95%   03/14/11 95%    O2 Flow Rate (L/min): 1 l/min       Intake/Output Summary (Last 24 hours) at 05/10/18 0557  Last data filed at 05/10/18 0441   Gross per 24 hour   Intake              400 ml   Output                0 ml   Net              400 ml        Telemetry reviewed:   normal sinus rhythm  Tubes:   N/A  X-Ray:  Chest xray - Mild interstitial edema and small pleural effusions    Venous duplex of LE's - No deep venous thrombosis identified    Procedures:   EKG - When compared with ECG of 30-APR-2018 09:34,   Sinus rhythm has replaced Atrial fibrillation   Vent. rate has decreased BY  65 BPM   Inferior infarct is now present   Inverted T waves have replaced nonspecific T wave abnormality in Inferior   leads     Echo - Left ventricle: Systolic function was normal. Ejection fraction was  estimated in the range of 55 % to 60 %. No obvious wall motion  abnormalities identified in the views obtained. Wall thickness was mildly  to moderately increased. Hypertrophy was noted. Mitral valve: There was moderate regurgitation. Aortic valve: The valve was trileaflet. Leaflets exhibited mildly  increased thickness, calcification, normal cuspal separation, and  sclerosis. Tricuspid valve: There was mild to moderate regurgitation.     Urine culture:   ESCHERICHIA COLI      Antibiotic Sensitivity MARLEY Unit Status     Amikacin ($) Susceptible <=16 ug/mL Final     Method: MARLEY     Ampicillin ($) Resistant >16 ug/mL Final     Method: MARLEY     Ampicillin/sulbactam ($) Resistant >16/8 ug/mL Final     Method: MARLEY     Aztreonam ($$$$) Susceptible <=4 ug/mL Final     Method: MARLEY     Cefazolin ($) Susceptible <=8 ug/mL Final     Method: MARLEY     Cefepime ($$) Susceptible <=4 ug/mL Final     Method: MARLEY     Cefotaxime Susceptible <=2 ug/mL Final     Method: MARLEY     Ceftazidime ($) Susceptible <=1 ug/mL Final     Method: MARLEY     Ceftriaxone ($) Susceptible <=1 ug/mL Final     Method: MARLEY     Cefuroxime ($) Susceptible 8 ug/mL Final     Method: MARLEY     Ciprofloxacin ($) Resistant >2 ug/mL Final     Method: MARLEY     Gentamicin ($) Susceptible <=4 ug/mL Final     Method: MARLEY     Imipenem Susceptible <=1 ug/mL Final     Method: MARLEY     Levofloxacin ($) Resistant >4 ug/mL Final     Method: MARLEY     Meropenem ($$) Susceptible <=1 ug/mL Final     Method: MARLEY     Nitrofurantoin Susceptible <=32 ug/mL Final     Method: MARLEY     Piperacillin/Tazobac ($) Susceptible <=16 ug/mL Final     Method: MARLEY     Tobramycin ($) Susceptible <=4 ug/mL Final     Method: MARLEY     Trimeth/Sulfa Susceptible <=2/38 ug/mL Final     Method: MARLEY                  PROTEUS MIRABILIS      Antibiotic Sensitivity MARLEY Unit Status     Amikacin ($) Susceptible <=16 ug/mL Final     Method: MARLEY     Ampicillin ($) Susceptible <=8 ug/mL Final     Method: MARLEY     Ampicillin/sulbactam ($) Susceptible <=8/4 ug/mL Final     Method: MARLEY     Aztreonam ($$$$) Susceptible <=4 ug/mL Final     Method: MARLEY     Cefazolin ($) Susceptible <=8 ug/mL Final     Method: MARLEY     Cefepime ($$) Susceptible <=4 ug/mL Final     Method: MARLEY     Cefotaxime Susceptible <=2 ug/mL Final     Method: MARLEY     Ceftazidime ($) Susceptible <=1 ug/mL Final     Method: MARLEY     Ceftriaxone ($) Susceptible <=1 ug/mL Final     Method: MARLEY     Cefuroxime ($) Susceptible <=4 ug/mL Final     Method: MARLEY     Ciprofloxacin ($) Resistant >2 ug/mL Final     Method: MARLEY     Gentamicin ($) Susceptible <=4 ug/mL Final     Method: MARLEY     Levofloxacin ($) Resistant >4 ug/mL Final     Method: MARLEY     Meropenem ($$) Susceptible <=1 ug/mL Final     Method: MARLEY     Piperacillin/Tazobac ($) Susceptible <=16 ug/mL Final     Method: MARLEY     Tobramycin ($) Susceptible <=4 ug/mL Final     Method: MARLEY     Trimeth/Sulfa Susceptible <=2/38 ug/mL Final     Method: MARLEY                 Exam:     General well developed, well nourished, appears stated age, in no acute distress  Neck   Supple without nodes or mass. No thyromegaly or bruit  Respiratory  Generally clear   Cardiology  Regular Rate and Rythmn  - no murmurs, rubs or gallops  Abdominal  Soft, non-tender, non-distended, positive bowel sounds, no hepatosplenomegaly  Extremities  No clubbing, cyanosis, or edema. Pulses intact. Skin  Normal skin turgor. No rashes or skin ulcers noted  Neurological  No focal neurological deficits noted  Psychological  Oriented x 2. Normal affect.   Exam otherwise negative     Lab Data Reviewed: (see below)    Medications Reviewed: (see below)    ______________________________________________________________________    Medications:     Current Facility-Administered Medications   Medication Dose Route Frequency    potassium chloride SR (KLOR-CON 10) tablet 10 mEq  10 mEq Oral BID    furosemide (LASIX) tablet 40 mg  40 mg Oral ACB&D    cefTRIAXone (ROCEPHIN) 1 g in 0.9% sodium chloride (MBP/ADV) 50 mL  1 g IntraVENous Q24H    LORazepam (ATIVAN) tablet 0.25 mg  0.25 mg Oral QHS    amLODIPine (NORVASC) tablet 5 mg  5 mg Oral DAILY    apixaban (ELIQUIS) tablet 2.5 mg  2.5 mg Oral BID    oxybutynin chloride XL (DITROPAN XL) tablet 5 mg  5 mg Oral DAILY    amiodarone (CORDARONE) tablet 100 mg  100 mg Oral DAILY    metoprolol tartrate (LOPRESSOR) tablet 25 mg  25 mg Oral BID    albuterol (PROVENTIL HFA, VENTOLIN HFA, PROAIR HFA) inhaler 2 Puff  2 Puff Inhalation Q4H PRN    aspirin chewable tablet 81 mg  81 mg Oral DAILY    atorvastatin (LIPITOR) tablet 40 mg  40 mg Oral QHS    pantoprazole (PROTONIX) granules for oral suspension 40 mg  40 mg Oral DAILY    umeclidinium (INCRUSE ELLIPTA) 62.5 mcg/actuation  1 Puff Inhalation DAILY    sodium chloride (NS) flush 5-10 mL  5-10 mL IntraVENous Q8H    sodium chloride (NS) flush 5-10 mL  5-10 mL IntraVENous PRN    acetaminophen (TYLENOL) tablet 650 mg  650 mg Oral Q4H PRN    ondansetron (ZOFRAN) injection 4 mg  4 mg IntraVENous Q4H PRN    polyethylene glycol (MIRALAX) packet 17 g  17 g Oral DAILY            Lab Review:     Recent Labs      05/08/18   0052   WBC  8.3   HGB  12.2   HCT  37.6   PLT  344     Recent Labs      05/10/18   0025  05/09/18   0358  05/08/18   0052   NA  139  136  137   K  3.9  3.9  4.4   CL  100  99  99   CO2  30  31  31   GLU  104*  90  120*   BUN  16  14  16   CREA  1.18*  1.05*  1.17*   CA  8.7  8.9  8.7     Lab Results   Component Value Date/Time    Glucose (POC) 104 02/21/2015 07:27 AM    Glucose (POC) 108 (H) 02/20/2015 09:16 PM    Glucose (POC) 110 (H) 02/20/2015 04:17 PM    Glucose (POC) 131 (H) 02/20/2015 11:18 AM    Glucose (POC) 130 (H) 02/20/2015 06:26 AM     No results for input(s): PH, PCO2, PO2, HCO3, FIO2 in the last 72 hours. No results for input(s): INR in the last 72 hours. No lab exists for component: INREXT, INREXT         Assessment:     Principal Problem:    Acute diastolic CHF (congestive heart failure) ([facility] (5/1/2018)    Active Problems:    NSTEMI (non-ST elevated myocardial infarction) ([facility] (5/1/2018)      Lower urinary tract infectious disease (2/18/2015)      Rapid atrial fibrillation (HCC) (4/30/2018)      COPD (5/1/2018)      Hypertension (5/1/2018)      CKD (chronic kidney disease) stage 3, GFR 30-59 ml/min (5/1/2018)      Acute respiratory failure with hypoxemia ([facility] (5/2/2018)      Hypokalemia (5/2/2018)           Plan:     Risk of deterioration: medium             1. Continue telemetry  2. Amiodarone per cardiology   3. Continue ASA,, BB  4. Continue po lasix  5. Monitor renal function - improved  6. Continue potassium supplementation  7. Continue IV rocephin - no po alternatives due to allergies and drug resistance  8. PT/OT   9. PRN ativan for agitation  10.  Will need oxygen at SNF  11. Eventually back to [address] -  Friday after 5 days IV antibiotics                    Care Plan discussed with: Patient    Discussed:  Care Plan    Prophylaxis:  Hep SQ and H2B/PPI    Disposition:  SNF/LTC           ___________________________________________________    Attending Physician: Ryne Rebollar MD

## 2018-05-10 NOTE — PROGRESS NOTES
Problem: Self Care Deficits Care Plan (Adult)  Goal: *Acute Goals and Plan of Care (Insert Text)  Occupational Therapy Goals  Weekly re-assessment 5/8/18  1. Patient will perform grooming standing at sink with S within 7 day(s). 2.  Patient will perform upper body dressing with minimal assistance  within 7 day(s). 3.  Patient will perform toilet transfers with minimal assistance/contact guard assist within 7 day(s). 4.  Patient will perform all aspects of toileting with minimal assistance/contact guard assist within 7 day(s). 5.  Patient will participate in upper extremity therapeutic exercise/activities with independence for 5 minutes within 7 day(s). Initiated 5/1/2018  1. Patient will perform grooming with S within 7 day(s). modified  2. Patient will perform upper body dressing with moderate assistance  within 7 day(s). Upgrade  3. Patient will perform lower body dressing with maximal assistance within 7 day(s). DC as JACOBO will assist  4. Patient will perform toilet transfers with minimal assistance/contact guard assist within 7 day(s). CONT for consistency  5. Patient will perform all aspects of toileting with minimal assistance/contact guard assist within 7 day(s). CONT for consistency  6. Patient will participate in upper extremity therapeutic exercise/activities with independence for 5 minutes within 7 day(s). CONT  7. Patient will utilize energy conservation techniques during functional activities with verbal cues within 7 day(s). DC impaired cognition         Occupational Therapy TREATMENT  Patient: Quang Conway (8 y.o. female)  Date: 5/10/2018  Diagnosis: Rapid atrial fibrillation ([facility] Acute diastolic CHF (congestive heart failure) ([facility]       Precautions: Fall  Chart, occupational therapy assessment, plan of care, and goals were reviewed. ASSESSMENT:  Nursing cleared for OT services. Patient completed toileting tasks with CGA at RW level, no LOB.   Attempted on RA and Spo2 decreased to 87% while on commode, placed on 1L O2 with increased to 92%. Left in bed. Recommend patient amb with nursing at [facility] to maintain functional mobility and activity tolerance for ADL tasks. Discussed with patient however limited carry over secondary to memory impairments. SpO2:  Rest:  RA:94%  Activity: 87% walking to bathroom  1L   RA: 92%  Activity: 92%    Recommend with nursing patient to complete as able in order to maintain strength, endurance and independence: ADLs with set up for seated tasks, OOB to chair 3x/day and mobilizing to the bathroom for toileting with CGA with RW. Progression toward goals:  []       Improving appropriately and progressing toward goals  [x]       Improving slowly and progressing toward goals  []       Not making progress toward goals and plan of care will be adjusted     PLAN:  Patient continues to benefit from skilled intervention to address the above impairments. Continue treatment per established plan of care. Discharge Recommendations:  Home Health at [facility] with assist from staff for ADL tasks   Further Equipment Recommendations for Discharge:  None for OT     SUBJECTIVE:   Patient stated I am tired.     OBJECTIVE DATA SUMMARY:   Cognitive/Behavioral Status:  Neurologic State: Alert; Appropriate for age  Orientation Level: Oriented X4  Cognition: Follows commands       Functional Mobility and Transfers for ADLs:  Bed Mobility:  Sit to Supine: Minimum assistance    Transfers:  Sit to Stand: Contact guard assistance  Functional Transfers  Toilet Transfer : Contact guard assistance       Balance:  Sitting: Intact; Without support  Standing: Impaired  Standing - Static: Good;Constant support  Standing - Dynamic : Fair    ADL Intervention:     Provided verbal and tactile cues for toileting and hand hygiene tasks at RW level. Patient completed toileting tasks with CGA at RW level, no LOB.   Cues for positioning at toilet prior to sitting secondary to impaired vision. Began toileting on RA and Spo2 decreased to 87% while amb to commode, placed on 1L O2 with increased to 92%. Min A for sit >supine.      Grooming  Washing Hands: Contact guard assistance    Toileting  Toileting Assistance: Contact guard assistance  Bladder Hygiene: Contact guard assistance  Clothing Management: Contact guard assistance      Pain:  Denies pain     Activity Tolerance:   SpO2:  Rest:  RA:94%  Activity: 87% walking to bathroom  1L   RA: 92%  Activity: 92%     After treatment:   [] Patient left in no apparent distress sitting up in chair  [x] Patient left in no apparent distress in bed  [x] Call bell left within reach  [x] Nursing notified  [] Caregiver present  [x] Bed alarm activated    COMMUNICATION/COLLABORATION:   The patients plan of care was discussed with: Registered Nurse and Ino [place], OT  Time Calculation: 13 mins

## 2018-05-10 NOTE — CARDIO/PULMONARY
Cardiopulmonary Rehab:      Chart reviewed. Pt is on CHF Bundle List.      Pt is a 80 y. o. female admitted with respiratory distress, NSTEMI. PMHx of HTN, PAD, COPD, GERD, TIA, anxiety. Former smoker. LVEF 55-60%.     Met with pt sitting up in chair. No family at bedside.     Pt visited. This was a follow-up visit to answer questions and reinforce prior teaching re: CHF, S&Ss, medication management, Low NA diet, daily weights and when to call the doctor. Pt unable to identify weight parameters to report to MD. However correctly identified s&s off fluid overload. Reminded to let staff at SNF know if she was feeling more SOB, tired, and swollen than normal.     Pt denies questions at this time.

## 2018-05-10 NOTE — PROGRESS NOTES
Progress Note      5/10/2018 8:31 AM  NAME: Jacqueline Fowler   MRN:  [id number]   Admit Diagnosis: Rapid atrial fibrillation [facility])                          Assessment:                 1. Hypoxia, acute diastolic CHF, prox afib with RVR back in sinus, increased troponin consistent with type II NSTEMI  2. No hx of CAD  3. Echo5/2018 EF 55%, mod MR, mild pHTN  4. Prox Afib currently in sinus with LVH  5. HTN, with hypertensive heart disease with heart failure, and CKD cr 1.4  6. Dyslipidemia  7. Hx of CVA s/p TPA in 2015  8. PVD with hx of left CEA  9. COPD has had home O2 in past  10. GERD  11. DJD  12. , lives at [facility], poor functional capacity in a wheelchair, sleeps most of the day      5/5  Continue with diuresis. 5/6 cardiac status stable. 5/7 Feels well . Maintaining NSR    No changes from cardiac standpoint.                               Plan:                  Poor functional capacity, since CVA mostly in wheelchair, sleeps in between meals. Some increase in dyspnea. No chest pain. Afib with RVR back in sinus currently on amiodarone, given CHADS2 score of 5 will try anticoagulation  pulm edema on cxr still on supplemental O2  Increased troponin, consistent with type II NSTEMI, preserved EF, discussed with family would like to avoid invasive therapy, plan for medical management of CAD. On abx for gm negative UTI     1. Cont added eliquis 2.5mg po bid, given CHADS2 of 5  2. Cont ASA likely stop in one month due to age  1. Cont added amiodarone 100mg daily  4. Change metoprolol 25mg po bid to toprol xl 25mg daily due to yaritza  5. Cont amlodipine  6. Cont lasix 40mg po bid, kcl 10meq bid, follow bmp, taper O2 currently on 1L  7. Cont atorvastatin    PT/OT  Taper O2,  to see what is required to discharge on home O2.         [x]        High complexity decision making was performed         Subjective:     Lesa Allen denies chest pain, dyspnea.   Discussed with RN events overnight. Review of Systems:    Symptom Y/N Comments  Symptom Y/N Comments   Fever/Chills N   Chest Pain N    Poor Appetite N   Edema N    Cough N   Abdominal Pain N    Sputum N   Joint Pain N    SOB/ALCARAZ N   Pruritis/Rash N    Nausea/vomit N   Tolerating PT/OT Y    Diarrhea N   Tolerating Diet Y    Constipation N   Other       Could NOT obtain due to:      Objective:      Physical Exam:    Last 24hrs VS reviewed since prior progress note. Most recent are:    Visit Vitals    /55 (BP 1 Location: Right arm, BP Patient Position: Sitting)    Pulse (!) 50    Temp 98.7 °F (37.1 °C)    Resp 20    Ht 5' 4\" (1.626 m)    Wt 67 kg (147 lb 12.8 oz)    SpO2 93%    BMI 25.37 kg/m2       Intake/Output Summary (Last 24 hours) at 05/10/18 0901  Last data filed at 05/10/18 4864   Gross per 24 hour   Intake              400 ml   Output             1000 ml   Net             -600 ml        General Appearance: Well developed, well nourished, alert & oriented x 3,    no acute distress. Ears/Nose/Mouth/Throat: Hearing grossly normal.  Neck: Supple. Chest: Lungs clear to auscultation bilaterally. Cardiovascular: Regular rate and rhythm, S1S2 normal, no murmur. Abdomen: Soft, non-tender, bowel sounds are active. Extremities: No edema bilaterally. Skin: Warm and dry. PMH/SH reviewed - no change compared to H&P    Data Review    Telemetry: normal sinus rhythm     Lab Data Personally Reviewed:    Recent Labs      05/08/18   0052   WBC  8.3   HGB  12.2   HCT  37.6   PLT  344     No results for input(s): INR, PTP, APTT in the last 72 hours. No lab exists for component: INREXT, INREXT   Recent Labs      05/10/18   0025  05/09/18   0358  05/08/18   0052   NA  139  136  137   K  3.9  3.9  4.4   CL  100  99  99   CO2  30  31  31   BUN  16  14  16   CREA  1.18*  1.05*  1.17*   GLU  104*  90  120*   CA  8.7  8.9  8.7     No results for input(s): CPK, CKNDX, TROIQ in the last 72 hours.     [facility] lab exists [facility] component[facility]  Lab Results   Component Value Date/Time    Cholesterol, total 182 02/19/2015 04:30 AM    HDL Cholesterol 38 02/19/2015 04:30 AM    LDL, calculated 99.4 02/19/2015 04:30 AM    Triglyceride 223 (H) 02/19/2015 04:30 AM    CHOL/HDL Ratio 4.8 02/19/2015 04:30 AM       No results for input(s): SGOT, GPT, AP, TBIL, TP, ALB, GLOB, GGT, AML, LPSE in the last 72 hours. No lab exists for component: AMYP, HLPSE  No results for input(s): PH, PCO2, PO2 in the last 72 hours.     Medications Personally Reviewed:    Current Facility-Administered Medications   Medication Dose Route Frequency    cefTRIAXone (ROCEPHIN) 1 g in 0.9% sodium chloride (MBP/ADV) 50 mL  1 g IntraVENous Q24H    metoprolol succinate (TOPROL-XL) XL tablet 25 mg  25 mg Oral DAILY    potassium chloride SR (KLOR-CON 10) tablet 10 mEq  10 mEq Oral BID    furosemide (LASIX) tablet 40 mg  40 mg Oral ACB&D    LORazepam (ATIVAN) tablet 0.25 mg  0.25 mg Oral QHS    amLODIPine (NORVASC) tablet 5 mg  5 mg Oral DAILY    apixaban (ELIQUIS) tablet 2.5 mg  2.5 mg Oral BID    oxybutynin chloride XL (DITROPAN XL) tablet 5 mg  5 mg Oral DAILY    amiodarone (CORDARONE) tablet 100 mg  100 mg Oral DAILY    albuterol (PROVENTIL HFA, VENTOLIN HFA, PROAIR HFA) inhaler 2 Puff  2 Puff Inhalation Q4H PRN    aspirin chewable tablet 81 mg  81 mg Oral DAILY    atorvastatin (LIPITOR) tablet 40 mg  40 mg Oral QHS    pantoprazole (PROTONIX) granules for oral suspension 40 mg  40 mg Oral DAILY    umeclidinium (INCRUSE ELLIPTA) 62.5 mcg/actuation  1 Puff Inhalation DAILY    sodium chloride (NS) flush 5-10 mL  5-10 mL IntraVENous Q8H    sodium chloride (NS) flush 5-10 mL  5-10 mL IntraVENous PRN    acetaminophen (TYLENOL) tablet 650 mg  650 mg Oral Q4H PRN    ondansetron (ZOFRAN) injection 4 mg  4 mg IntraVENous Q4H PRN    polyethylene glycol (MIRALAX) packet 17 g  17 g Oral DAILY         Belkys Ray MD

## 2018-05-10 NOTE — PROGRESS NOTES
Bedside shift change report given to Tank Bridges RN (oncoming nurse). Report included the following information SBAR, Kardex, Intake/Output, MAR and Recent Results. SHIFT SUMMARY:  0700: Bedside shift change report given to Violet Torres RN (oncoming nurse) by Anya Garrido RN (offgoing nurse). Report included the following information SBAR, Kardex, Procedure Summary, MAR, Recent Results and Cardiac Rhythm NSR     1430: Walking O2 trial completed -- pts SpO2 dropped to 87% during ambulation on RA. 1L NC applied. Pts SpO2 recovered to 92% on 1L in 2 minutes. Home Oxygen Test  Date of test: 5/10  Time of test: 1430    Sa02 94 % on room air AT REST. Sa02 87 % on room air DURING AMBULATION. Sa02  92 % on 1 Liters DURING AMBULATION. Sa02 92 % on 1 Liters AT REST/AFTER AMBULATION. Attempted to wean pt to RA today with no success, pt tolerating 1L well with no complaints      [facility] NURSING NOTE   Admission Date 4/30/2018   Admission Diagnosis Rapid atrial fibrillation ([address]   Consults IP CONSULT TO HOSPITALIST      Cardiac Monitoring [x] Yes [] No      Purposeful Hourly Rounding [x] Yes    Arjun Score Total Score: 3   Arjun score 3 or > [x] Bed Alarm [] Avasys [] 1:1 sitter [] Patient refused (Signed refusal form in chart)   Rosas Score Rosas Score: 17   Rosas score 14 or < [] PMT consult [] Wound Care consult    []  Specialty bed  [] Nutrition consult      Influenza Vaccine Received Flu Vaccine for Current Season (usually Sept-March): Not Flu Season           Oxygen needs? [] Room air Oxygen @  [x]1L    []2L    []3L   []4L    []5L   []6L via  NC   Chronic home O2 use?  [] Yes [] No  Perform O2 challenge test and document in progress note using smartphrase (.Homeoxygen)      Last bowel movement Last Bowel Movement Date: 05/10/18      Urinary Catheter       External Female Catheter 05/01/18-Urine Output (mL): 150 ml     LDAs               Peripheral IV 05/03/18 Left Antecubital (Active)   Site Assessment Clean, dry, & intact 5/10/2018  7:51 PM   Phlebitis Assessment 0 5/10/2018  2:33 PM   Infiltration Assessment 0 5/10/2018  2:33 PM   Dressing Status Clean, dry, & intact 5/10/2018  2:33 PM   Dressing Type Transparent 5/10/2018  2:33 PM   Hub Color/Line Status Blue 5/10/2018  2:33 PM   Action Taken Open ports on tubing capped 5/10/2018  2:33 PM          External Female Catheter 05/01/18 (Active)   Site Assessment Clean, dry, & intact 5/10/2018  4:41 AM   Repositioned Yes 5/10/2018  4:41 AM   Perineal Care Yes 5/10/2018  4:41 AM   Wick Changed Yes 5/10/2018  4:41 AM   Suction Canister/Tubing Changed No 5/9/2018  2:00 AM   Urine Output (mL) 150 ml 5/8/2018  2:00 PM                   Readmission Risk Assessment Tool Score High Risk            44       Total Score        3 Has Seen PCP in Last 6 Months (Yes=3, No=0)    2 . Living with Significant Other. Assisted Living. LTAC. SNF. or   Rehab    3 Patient Length of Stay (>5 days = 3)    5 Pt. Coverage (Medicare=5 , Medicaid, or Self-Pay=4)    31 Charlson Comorbidity Score (Age + Comorbid Conditions)        Criteria that do not apply:    IP Visits Last 12 Months (1-3=4, 4=9, >4=11)       Expected Length of Stay 4d 7h   Actual Length of Stay 10          .

## 2018-05-10 NOTE — PROGRESS NOTES
Progress Note      5/10/2018 8:31 AM  NAME: Heidi Pulido   MRN:  [id number]   Admit Diagnosis: Rapid atrial fibrillation [facility])                          Assessment:                 1. Hypoxia, acute diastolic CHF, prox afib with RVR back in sinus, increased troponin consistent with type II NSTEMI  2. No hx of CAD  3. Echo5/2018 EF 55%, mod MR, mild pHTN  4. Prox Afib currently in sinus with LVH  5. HTN, with hypertensive heart disease with heart failure, and CKD cr 1.4  6. Dyslipidemia  7. Hx of CVA s/p TPA in 2015  8. PVD with hx of left CEA  9. COPD has had home O2 in past  10. GERD  11. DJD  12. , lives at [facility], poor functional capacity in a wheelchair, sleeps most of the day      5/5  Continue with diuresis. 5/6 cardiac status stable. 5/7 Feels well . Maintaining NSR    No changes from cardiac standpoint.                               Plan:                  Poor functional capacity, since CVA mostly in wheelchair, sleeps in between meals. Some increase in dyspnea. No chest pain. Afib with RVR back in sinus currently on amiodarone, given CHADS2 score of 5 will try anticoagulation  pulm edema on cxr still on supplemental O2  Increased troponin, consistent with type II NSTEMI, preserved EF, discussed with family would like to avoid invasive therapy, plan for medical management of CAD. On abx for gm negative UTI     1. Cont added eliquis 2.5mg po bid, given CHADS2 of 5  2. Cont ASA likely stop in one month due to age  1. Cont added amiodarone 100mg daily  4. Cont metoprolol 25mg bid  5. Cont amlodipine  6. Cont lasix 40mg po bid, kcl 10meq bid, follow bmp, taper O2 currently on 2L  7. Cont atorvastatin    PT/OT  Taper O2,  to see what is required to discharge on home O2.         [x]        High complexity decision making was performed         Subjective:     Mauri Allen denies chest pain, dyspnea. Discussed with RN events overnight.      Review of Systems:    Symptom Y/N Comments  Symptom Y/N Comments   Fever/Chills N   Chest Pain N    Poor Appetite N   Edema N    Cough N   Abdominal Pain N    Sputum N   Joint Pain N    SOB/ALCARAZ N   Pruritis/Rash N    Nausea/vomit N   Tolerating PT/OT Y    Diarrhea N   Tolerating Diet Y    Constipation N   Other       Could NOT obtain due to:      Objective:      Physical Exam:    Last 24hrs VS reviewed since prior progress note. Most recent are:    Visit Vitals    /55 (BP 1 Location: Right arm, BP Patient Position: Sitting)    Pulse (!) 50    Temp 98.7 °F (37.1 °C)    Resp 20    Ht 5' 4\" (1.626 m)    Wt 67 kg (147 lb 12.8 oz)    SpO2 93%    BMI 25.37 kg/m2       Intake/Output Summary (Last 24 hours) at 05/10/18 0900  Last data filed at 05/10/18 0517   Gross per 24 hour   Intake              400 ml   Output             1000 ml   Net             -600 ml        General Appearance: Well developed, well nourished, alert & oriented x 3,    no acute distress. Ears/Nose/Mouth/Throat: Hearing grossly normal.  Neck: Supple. Chest: Lungs clear to auscultation bilaterally. Cardiovascular: Regular rate and rhythm, S1S2 normal, no murmur. Abdomen: Soft, non-tender, bowel sounds are active. Extremities: No edema bilaterally. Skin: Warm and dry. PMH/SH reviewed - no change compared to H&P    Data Review    Telemetry: normal sinus rhythm     Lab Data Personally Reviewed:    Recent Labs      05/08/18   0052   WBC  8.3   HGB  12.2   HCT  37.6   PLT  344     No results for input(s): INR, PTP, APTT in the last 72 hours. No lab exists for component: INREXT, INREXT   Recent Labs      05/10/18   0025  05/09/18   0358  05/08/18   0052   NA  139  136  137   K  3.9  3.9  4.4   CL  100  99  99   CO2  30  31  31   BUN  16  14  16   CREA  1.18*  1.05*  1.17*   GLU  104*  90  120*   CA  8.7  8.9  8.7     No results for input(s): CPK, CKNDX, TROIQ in the last 72 hours.     No lab exists for component: CPKMB  Lab Results   Component Value Date/Time    Cholesterol, total 182 02/19/2015 04:30 AM    HDL Cholesterol 38 02/19/2015 04:30 AM    LDL, calculated 99.4 02/19/2015 04:30 AM    Triglyceride 223 (H) 02/19/2015 04:30 AM    CHOL/HDL Ratio 4.8 02/19/2015 04:30 AM       No results for input(s): SGOT, GPT, AP, TBIL, TP, ALB, GLOB, GGT, AML, LPSE in the last 72 hours. No lab exists for component: AMYP, HLPSE  No results for input(s): PH, PCO2, PO2 in the last 72 hours.     Medications Personally Reviewed:    Current Facility-Administered Medications   Medication Dose Route Frequency    cefTRIAXone (ROCEPHIN) 1 g in 0.9% sodium chloride (MBP/ADV) 50 mL  1 g IntraVENous Q24H    metoprolol succinate (TOPROL-XL) XL tablet 25 mg  25 mg Oral DAILY    potassium chloride SR (KLOR-CON 10) tablet 10 mEq  10 mEq Oral BID    furosemide (LASIX) tablet 40 mg  40 mg Oral ACB&D    LORazepam (ATIVAN) tablet 0.25 mg  0.25 mg Oral QHS    amLODIPine (NORVASC) tablet 5 mg  5 mg Oral DAILY    apixaban (ELIQUIS) tablet 2.5 mg  2.5 mg Oral BID    oxybutynin chloride XL (DITROPAN XL) tablet 5 mg  5 mg Oral DAILY    amiodarone (CORDARONE) tablet 100 mg  100 mg Oral DAILY    albuterol (PROVENTIL HFA, VENTOLIN HFA, PROAIR HFA) inhaler 2 Puff  2 Puff Inhalation Q4H PRN    aspirin chewable tablet 81 mg  81 mg Oral DAILY    atorvastatin (LIPITOR) tablet 40 mg  40 mg Oral QHS    pantoprazole (PROTONIX) granules for oral suspension 40 mg  40 mg Oral DAILY    umeclidinium (INCRUSE ELLIPTA) 62.5 mcg/actuation  1 Puff Inhalation DAILY    sodium chloride (NS) flush 5-10 mL  5-10 mL IntraVENous Q8H    sodium chloride (NS) flush 5-10 mL  5-10 mL IntraVENous PRN    acetaminophen (TYLENOL) tablet 650 mg  650 mg Oral Q4H PRN    ondansetron (ZOFRAN) injection 4 mg  4 mg IntraVENous Q4H PRN    polyethylene glycol (MIRALAX) packet 17 g  17 g Oral DAILY         Candido Shade MD

## 2018-05-11 VITALS
BODY MASS INDEX: 23.9 KG/M2 | RESPIRATION RATE: 20 BRPM | HEIGHT: 64 IN | TEMPERATURE: 96.7 F | WEIGHT: 140 LBS | HEART RATE: 77 BPM | DIASTOLIC BLOOD PRESSURE: 69 MMHG | OXYGEN SATURATION: 95 % | SYSTOLIC BLOOD PRESSURE: 118 MMHG

## 2018-05-11 LAB
ANION GAP SERPL CALC-SCNC: 7 MMOL/L (ref 5–15)
BUN SERPL-MCNC: 15 MG/DL (ref 6–20)
BUN/CREAT SERPL: 13 (ref 12–20)
CALCIUM SERPL-MCNC: 9 MG/DL (ref 8.5–10.1)
CHLORIDE SERPL-SCNC: 101 MMOL/L (ref 97–108)
CO2 SERPL-SCNC: 31 MMOL/L (ref 21–32)
CREAT SERPL-MCNC: 1.19 MG/DL (ref 0.55–1.02)
ERYTHROCYTE [DISTWIDTH] IN BLOOD BY AUTOMATED COUNT: 12.9 % (ref 11.5–14.5)
GLUCOSE SERPL-MCNC: 98 MG/DL (ref 65–100)
HCT VFR BLD AUTO: 40.3 % (ref 35–47)
HGB BLD-MCNC: 13.1 G/DL (ref 11.5–16)
MCH RBC QN AUTO: 29 PG (ref 26–34)
MCHC RBC AUTO-ENTMCNC: 32.5 G/DL (ref 30–36.5)
MCV RBC AUTO: 89.4 FL (ref 80–99)
NRBC # BLD: 0 K/UL (ref 0–0.01)
NRBC BLD-RTO: 0 PER 100 WBC
PLATELET # BLD AUTO: 372 K/UL (ref 150–400)
PMV BLD AUTO: 10.5 FL (ref 8.9–12.9)
POTASSIUM SERPL-SCNC: 3.6 MMOL/L (ref 3.5–5.1)
RBC # BLD AUTO: 4.51 M/UL (ref 3.8–5.2)
SODIUM SERPL-SCNC: 139 MMOL/L (ref 136–145)
WBC # BLD AUTO: 8.2 K/UL (ref 3.6–11)

## 2018-05-11 PROCEDURE — 74011250637 HC RX REV CODE- 250/637: Performed by: INTERNAL MEDICINE

## 2018-05-11 PROCEDURE — 85027 COMPLETE CBC AUTOMATED: CPT | Performed by: INTERNAL MEDICINE

## 2018-05-11 PROCEDURE — 80048 BASIC METABOLIC PNL TOTAL CA: CPT | Performed by: INTERNAL MEDICINE

## 2018-05-11 PROCEDURE — 74011000258 HC RX REV CODE- 258: Performed by: INTERNAL MEDICINE

## 2018-05-11 PROCEDURE — 77010033678 HC OXYGEN DAILY

## 2018-05-11 PROCEDURE — 36415 COLL VENOUS BLD VENIPUNCTURE: CPT | Performed by: INTERNAL MEDICINE

## 2018-05-11 PROCEDURE — 74011250636 HC RX REV CODE- 250/636: Performed by: INTERNAL MEDICINE

## 2018-05-11 RX ORDER — AMIODARONE HYDROCHLORIDE 100 MG/1
100 TABLET ORAL DAILY
Qty: 30 TAB | Refills: 0 | Status: SHIPPED
Start: 2018-05-11 | End: 2018-06-07

## 2018-05-11 RX ORDER — ATORVASTATIN CALCIUM 40 MG/1
40 TABLET, FILM COATED ORAL
Qty: 30 TAB | Refills: 0 | Status: SHIPPED
Start: 2018-05-11

## 2018-05-11 RX ORDER — POTASSIUM CHLORIDE 750 MG/1
10 TABLET, FILM COATED, EXTENDED RELEASE ORAL 2 TIMES DAILY
Qty: 60 TAB | Refills: 0 | Status: SHIPPED
Start: 2018-05-11 | End: 2019-01-21

## 2018-05-11 RX ORDER — GUAIFENESIN 100 MG/5ML
81 LIQUID (ML) ORAL DAILY
Status: SHIPPED | COMMUNITY
Start: 2018-05-11 | End: 2019-01-21

## 2018-05-11 RX ORDER — FUROSEMIDE 40 MG/1
40 TABLET ORAL 2 TIMES DAILY
Qty: 60 TAB | Refills: 0 | Status: ON HOLD
Start: 2018-05-11 | End: 2019-08-22 | Stop reason: SDUPTHER

## 2018-05-11 RX ORDER — METOPROLOL SUCCINATE 25 MG/1
25 TABLET, EXTENDED RELEASE ORAL DAILY
Qty: 30 TAB | Refills: 0 | Status: SHIPPED
Start: 2018-05-11

## 2018-05-11 RX ADMIN — UMECLIDINIUM 1 PUFF: 62.5 AEROSOL, POWDER ORAL at 09:41

## 2018-05-11 RX ADMIN — POLYETHYLENE GLYCOL 3350 17 G: 17 POWDER, FOR SOLUTION ORAL at 09:39

## 2018-05-11 RX ADMIN — Medication 10 ML: at 05:38

## 2018-05-11 RX ADMIN — ASPIRIN 81 MG 81 MG: 81 TABLET ORAL at 09:39

## 2018-05-11 RX ADMIN — FUROSEMIDE 40 MG: 40 TABLET ORAL at 09:40

## 2018-05-11 RX ADMIN — OXYBUTYNIN CHLORIDE 5 MG: 5 TABLET, EXTENDED RELEASE ORAL at 09:39

## 2018-05-11 RX ADMIN — METOPROLOL SUCCINATE 25 MG: 25 TABLET, EXTENDED RELEASE ORAL at 09:40

## 2018-05-11 RX ADMIN — AMIODARONE HYDROCHLORIDE 100 MG: 200 TABLET ORAL at 09:39

## 2018-05-11 RX ADMIN — CEFTRIAXONE 1 G: 1 INJECTION, POWDER, FOR SOLUTION INTRAMUSCULAR; INTRAVENOUS at 11:35

## 2018-05-11 RX ADMIN — PANTOPRAZOLE SODIUM 40 MG: 40 GRANULE, DELAYED RELEASE ORAL at 09:40

## 2018-05-11 RX ADMIN — POTASSIUM CHLORIDE 10 MEQ: 750 TABLET, EXTENDED RELEASE ORAL at 09:40

## 2018-05-11 RX ADMIN — APIXABAN 2.5 MG: 2.5 TABLET, FILM COATED ORAL at 09:41

## 2018-05-11 RX ADMIN — AMLODIPINE BESYLATE 5 MG: 5 TABLET ORAL at 09:40

## 2018-05-11 NOTE — PROGRESS NOTES
DISCHARGE SUMMARY from Nurse    Patient stable for discharge. I have reviewed discharge instructions with the patient and daughter. The patient and daughter verbalized understanding. All questions fully answered. Prescriptions and medication handouts given to patient and dayghter. Telemonitor and PIV removed. No personal belongings, home medications and valuables left at patients bedside//safe. patient and daughter verbalized no complaints.

## 2018-05-11 NOTE — ROUTINE PROCESS
PCP [facility] appt scheduled with Dr. Orquidea Grande on May 16 at 9:40am. Appt added to [address] [facility] Specialist

## 2018-05-11 NOTE — DISCHARGE SUMMARY
[address]  [phone]    Hospital Discharge Summary        Patient ID:  Alicia Parker  [id number]  73 y.o.  2/1/1928    Admit date: 4/30/2018  Discharge date and time: 5/11/2018    Admission Diagnoses: Rapid atrial fibrillation [facility])     Discharge Diagnoses:     Principal Problem:    Acute diastolic CHF (congestive heart failure) ([address] (5/1/2018)    Active Problems:    NSTEMI (non-ST elevated myocardial infarction) ([address] (5/1/2018)      Lower urinary tract infectious disease (2/18/2015)      Rapid atrial fibrillation ([address] (4/30/2018)      COPD (5/1/2018)      Hypertension (5/1/2018)      CKD (chronic kidney disease) stage 3, GFR 30-59 ml/min (5/1/2018)      Acute respiratory failure with hypoxemia ([address] (5/2/2018)      Hypokalemia (5/2/2018)         Past Medical History:   Diagnosis Date    Arthritis     generalized    COPD     GERD (gastroesophageal reflux disease)     Hypertension     Ill-defined condition     Vitamin deficiency    NSTEMI (non-ST elevated myocardial infarction) ([address] 5/1/2018    Other ill-defined conditions(799.89)     high cholesterol    Peripheral artery disease ([address]     Psychiatric disorder     Anxiety disorder    Sleep disorder     Insomnia    TIA (transient ischemic attack)          PCP: Sapna Joshi MD    Consults: Cardiology      History of Present Illness: Per hospitalist -   Natalie Jolleys is a 80 y.o.   female with PMHx significant for COPD and stroke, presents via EMS to the ED with cc of SOB x 2-3 days. sx exacerbated by exertion. She also c/o palpitations since onset. Per EMS, pt had SpO2 in 70s at time of their arrival denies pain with inspiration. Daughter states pt does not have hx of MI or stent. Pt denies hx of DM. Per daughter, pt has ambulates with wheelchair since cva. . Pt specifically denies CP, chest pressure, jaw pain, ABD pain, nausea, vomiting, or chills.  Per daughter, pt is former smoker. ?uti sx. Poor appetite. + palpitations. Pt resides at [facility]  We were asked to admit for work up and evaluation of the above problems. Admission Physical Exam: Per hospitalist -   VITALS:         Visit Vitals    /87    Pulse 70    Temp 99.3 °F (37.4 °C)    Resp 21    Ht 5' 4\" (1.626 m)    Wt 72.2 kg (159 lb 2.8 oz)    SpO2 93%    BMI 27.32 kg/m2         PHYSICAL EXAM:     General:                    Alert, cooperative, no distress, appears stated age. +dentures speaking full sentences   HEENT:                     Atraumatic, anicteric sclerae, pink conjunctivae                                              No oral ulcers, mucosa moist, throat clear, see above  Neck:                                   Supple, symmetrical,  thyroid: non tender  Lungs:                       Decrease bs  bilaterally. + Wheezing no Rhonchi. + rales. Chest wall:                No tenderness  No Accessory muscle use. Heart:                           irregularly irregular,  No  murmur   michelle pedal edema no calf pain  Abdomen:                  Soft, non-tender. Obese Not distended. Bowel sounds normal  Extremities:               No cyanosis. No clubbing,                                                Skin turgor normal, Capillary refill normal, Radial dial pulse 2+  Skin:                                    Not pale. Not Jaundiced  No rashes abrasions rt le   Psych:                                 Good insight. Not depressed. Not anxious or agitated. Neurologic:                EOMs intact. No facial asymmetry. No aphasia or slurred speech. Symmetrical strength, Sensation grossly intact. Alert and oriented X 4.          Admission Labs:  CBC: 4/30/2018: HCT 38.5 % (Ref range: 35.0 - 47.0 %);  HGB 12.6 g/dL (Ref range: 11.5 - 16.0 g/dL); PLATELET 441 K/uL (Ref range: 150 - 400 K/uL); RBC 4.22 M/uL (Ref range: 3.80 - 5.20 M/uL); WBC 10.6 K/uL (Ref range: 3.6 - 11.0 K/uL)  5/1/2018: HCT 34.4 %* (Ref range: 35.0 - 47.0 %); HGB 11.1 g/dL* (Ref range: 11.5 - 16.0 g/dL); PLATELET 245 K/uL (Ref range: 150 - 400 K/uL); RBC 3.75 M/uL* (Ref range: 3.80 - 5.20 M/uL); WBC 9.1 K/uL (Ref range: 3.6 - 11.0 K/uL)   BMP: 4/30/2018: BUN 25 MG/DL* (Ref range: 6 - 20 MG/DL); Calcium 9.8 MG/DL (Ref range: 8.5 - 10.1 MG/DL); Chloride 103 mmol/L (Ref range: 97 - 108 mmol/L); CO2 28 mmol/L (Ref range: 21 - 32 mmol/L); Creatinine 1.45 MG/DL* (Ref range: 0.55 - 1.02 MG/DL); Glucose 141 mg/dL* (Ref range: 65 - 100 mg/dL); Potassium 4.1 mmol/L (Ref range: 3.5 - 5.1 mmol/L); Sodium 140 mmol/L (Ref range: 136 - 145 mmol/L)  5/1/2018: BUN 27 MG/DL* (Ref range: 6 - 20 MG/DL); Calcium 8.5 MG/DL (Ref range: 8.5 - 10.1 MG/DL); Chloride 102 mmol/L (Ref range: 97 - 108 mmol/L); CO2 30 mmol/L (Ref range: 21 - 32 mmol/L); Creatinine 1.29 MG/DL* (Ref range: 0.55 - 1.02 MG/DL); Glucose 91 mg/dL (Ref range: 65 - 100 mg/dL); Potassium 3.2 mmol/L* (Ref range: 3.5 - 5.1 mmol/L); Sodium 139 mmol/L (Ref range: 136 - 145 mmol/L)  Coagulation: No results found for requested labs within first 48 hours of the last admission day. Cardiac markers: No results found for requested labs within first 48 hours of the last admission day. ABG: No results found for requested labs within first 48 hours of the last admission day. Liver: 4/30/2018: Albumin 3.3 g/dL* (Ref range: 3.5 - 5.0 g/dL); Alk. phosphatase 67 U/L (Ref range: 45 - 117 U/L); AST (SGOT) 28 U/L (Ref range: 15 - 37 U/L); Protein, total 7.6 g/dL (Ref range: 6.4 - 8.2 g/dL)  5/1/2018: Albumin 2.9 g/dL* (Ref range: 3.5 - 5.0 g/dL); Alk. phosphatase 59 U/L (Ref range: 45 - 117 U/L); AST (SGOT) 22 U/L (Ref range: 15 - 37 U/L);  Protein, total 6.7 g/dL (Ref range: 6.4 - 8.2 g/dL)    Discharge Labs:  Recent Results (from the past 24 hour(s))   METABOLIC PANEL, BASIC    Collection Time: 05/11/18  4:13 AM   Result Value Ref Range    Sodium 139 136 - 145 mmol/L    Potassium 3.6 3.5 - 5.1 mmol/L    Chloride 101 97 - 108 mmol/L    CO2 31 21 - 32 mmol/L    Anion gap 7 5 - 15 mmol/L    Glucose 98 65 - 100 mg/dL    BUN 15 6 - 20 MG/DL    Creatinine 1.19 (H) 0.55 - 1.02 MG/DL    BUN/Creatinine ratio 13 12 - 20      GFR est AA 52 (L) >60 ml/min/1.73m2    GFR est non-AA 43 (L) >60 ml/min/1.73m2    Calcium 9.0 8.5 - 10.1 MG/DL   CBC W/O DIFF    Collection Time: 05/11/18  4:13 AM   Result Value Ref Range    WBC 8.2 3.6 - 11.0 K/uL    RBC 4.51 3.80 - 5.20 M/uL    HGB 13.1 11.5 - 16.0 g/dL    HCT 40.3 35.0 - 47.0 %    MCV 89.4 80.0 - 99.0 FL    MCH 29.0 26.0 - 34.0 PG    MCHC 32.5 30.0 - 36.5 g/dL    RDW 12.9 11.5 - 14.5 %    PLATELET 987 857 - 107 K/uL    MPV 10.5 8.9 - 12.9 FL    NRBC 0.0 0  WBC    ABSOLUTE NRBC 0.00 0.00 - 0.01 K/uL       Significant Diagnostic Studies:   X-Ray:  Chest xray - Mild interstitial edema and small pleural effusions     Venous duplex of LE's - No deep venous thrombosis identified     Procedures:   EKG - When compared with ECG of 30-APR-2018 09:34,   Sinus rhythm has replaced Atrial fibrillation   Vent. rate has decreased BY  65 BPM   Inferior infarct is now present   Inverted T waves have replaced nonspecific T wave abnormality in Inferior   leads      Echo - Left ventricle: Systolic function was normal. Ejection fraction was  estimated in the range of 55 % to 60 %. No obvious wall motion  abnormalities identified in the views obtained. Wall thickness was mildly  to moderately increased. Hypertrophy was noted. Mitral valve: There was moderate regurgitation. Aortic valve: The valve was trileaflet. Leaflets exhibited mildly  increased thickness, calcification, normal cuspal separation, and  sclerosis. Tricuspid valve:  There was mild to moderate regurgitation.     Urine culture:           ESCHERICHIA COLI       Antibiotic Sensitivity AMRLEY Unit Status      Amikacin ($) Susceptible <=16 ug/mL Final      Method: MARLEY      Ampicillin ($) Resistant >16 ug/mL Final      Method: MARLEY      Ampicillin/sulbactam ($) Resistant >16/8 ug/mL Final      Method: MARLEY      Aztreonam ($$$$) Susceptible <=4 ug/mL Final      Method: MARLEY      Cefazolin ($) Susceptible <=8 ug/mL Final      Method: MARLEY      Cefepime ($$) Susceptible <=4 ug/mL Final      Method: MARLEY      Cefotaxime Susceptible <=2 ug/mL Final      Method: MARLEY      Ceftazidime ($) Susceptible <=1 ug/mL Final      Method: MARLEY      Ceftriaxone ($) Susceptible <=1 ug/mL Final      Method: MARLEY      Cefuroxime ($) Susceptible 8 ug/mL Final      Method: MARLEY      Ciprofloxacin ($) Resistant >2 ug/mL Final      Method: MARLEY      Gentamicin ($) Susceptible <=4 ug/mL Final      Method: MARLEY      Imipenem Susceptible <=1 ug/mL Final      Method: MARLEY      Levofloxacin ($) Resistant >4 ug/mL Final      Method: MARLEY      Meropenem ($$) Susceptible <=1 ug/mL Final      Method: MARLEY      Nitrofurantoin Susceptible <=32 ug/mL Final      Method: MARLEY      Piperacillin/Tazobac ($) Susceptible <=16 ug/mL Final      Method: MARLEY      Tobramycin ($) Susceptible <=4 ug/mL Final      Method: MARLEY      Trimeth/Sulfa Susceptible <=2/38 ug/mL Final      Method: MARLEY                               PROTEUS MIRABILIS       Antibiotic Sensitivity MARLEY Unit Status      Amikacin ($) Susceptible <=16 ug/mL Final      Method: MARLEY      Ampicillin ($) Susceptible <=8 ug/mL Final      Method: MARLEY      Ampicillin/sulbactam ($) Susceptible <=8/4 ug/mL Final      Method: MARLEY      Aztreonam ($$$$) Susceptible <=4 ug/mL Final      Method: MARLEY      Cefazolin ($) Susceptible <=8 ug/mL Final      Method: MARLEY      Cefepime ($$) Susceptible <=4 ug/mL Final      Method: MARLEY      Cefotaxime Susceptible <=2 ug/mL Final      Method: MARLEY      Ceftazidime ($) Susceptible <=1 ug/mL Final      Method: MARLEY      Ceftriaxone ($) Susceptible <=1 ug/mL Final      Method: MARLEY      Cefuroxime ($) Susceptible <=4 ug/mL Final      Method: MARLEY      Ciprofloxacin ($) Resistant >2 ug/mL Final      Method: MARLEY      Gentamicin ($) Susceptible <=4 ug/mL Final      Method: MARLEY      Levofloxacin ($) Resistant >4 ug/mL Final      Method: MARLEY      Meropenem ($$) Susceptible <=1 ug/mL Final      Method: MARLEY      Piperacillin/Tazobac ($) Susceptible <=16 ug/mL Final      Method: MARLEY      Tobramycin ($) Susceptible <=4 ug/mL Final      Method: MARLEY      Trimeth/Sulfa Susceptible <=2/38 ug/mL Final      Method: MARLEY   [facility] Course: The patient was admitted to the telemetry unit and started on IV diuresis. IV diltiazem had been started in the emergency room but this was changed to amiodarone by  cardiology and she reverted into sinus rhythm. Patient had a non-STEMI based on cardiac enzyme rise. Because of age and multiple medical problems it was decided to take a conservative course in regards to management. Patient's breathing improved but she continued to require oxygen while in the hospital due to her  diastolic congestive heart failure and COPD. She did become confused and medications were adjusted and she was found to have a urinary tract infection for which no oral alternatives were available. The patient subsequently received IV Rocephin for 5 or 6 days prior to discharge. Her cardiac status remained stable and she participated in PT/OT. The patient desaturated when on room air and arrangements were made for the patient to be discharged to her long-term care facility on oxygen. She was subsequently discharged once stable. Disposition: SNF    Activity: As tolerated    Diet: Cardiac, low-sodium    Follow up appointment: Dr. Shaka Rodriguez if she is discharged from the skilled nursing facility    Patient Instructions:   Current Discharge Medication List      START taking these medications    Details   aspirin 81 mg chewable tablet Take 1 Tab by mouth daily. amiodarone (PACERONE) 100 mg tablet Take 1 Tab by mouth daily.   Qty: 30 Tab, Refills: 0      apixaban (ELIQUIS) 2.5 mg tablet Take 1 Tab by mouth two (2) times a day.  Qty: 30 Tab, Refills: 0      furosemide (LASIX) 40 mg tablet Take 1 Tab by mouth two (2) times a day. Qty: 60 Tab, Refills: 0      metoprolol succinate (TOPROL-XL) 25 mg XL tablet Take 1 Tab by mouth daily. Qty: 30 Tab, Refills: 0      potassium chloride SR (KLOR-CON 10) 10 mEq tablet Take 1 Tab by mouth two (2) times a day. Qty: 60 Tab, Refills: 0         CONTINUE these medications which have CHANGED    Details   atorvastatin (LIPITOR) 40 mg tablet Take 1 Tab by mouth nightly. Qty: 30 Tab, Refills: 0      tiotropium (SPIRIVA WITH HANDIHALER) 18 mcg inhalation capsule Take 1 Cap by inhalation daily. Indications: Chronic Obstructive Pulmonary Disease with Bronchospasms  Qty: 30 Cap, Refills: 0         CONTINUE these medications which have NOT CHANGED    Details   amLODIPine (NORVASC) 5 mg tablet Take 5 mg by mouth daily. oxybutynin chloride XL (DITROPAN XL) 5 mg CR tablet Take 5 mg by mouth daily. pantoprazole (PROTONIX) 20 mg tablet Take 20 mg by mouth Before breakfast and dinner. promethazine (PHENERGAN) 12.5 mg tablet Take 12.5 mg by mouth every six (6) hours as needed for Nausea. alum-mag hydroxide-simeth (RULOX) 200-200-20 mg/5 mL susp Take 20 mL by mouth every four (4) hours as needed. acetaminophen (TYLENOL) 325 mg tablet Take 650 mg by mouth every four (4) hours as needed for Pain.      magnesium hydroxide (MILK OF MAGNESIA) 400 mg/5 mL suspension Take 10 mL by mouth every seventy-two (72) hours as needed for Constipation (If no BM in 3 days). albuterol-ipratropium (DUO-NEB) 2.5 mg-0.5 mg/3 ml nebu 3 mL by Nebulization route every four (4) hours as needed (Shortness of Breath). emollient combination no. 108 (LUBRISKIN) lotn lotion Apply  to affected area every eight (8) hours as needed for Dry Skin (Apply to both hands). triamcinolone (ARISTOCORT) 0.5 % topical cream Apply  to affected area two (2) times daily as needed for Skin Irritation.  use thin layer ammonium lactate (LAC-HYDRIN) 12 % lotion Apply  to affected area every twelve (12) hours as needed (Apply to chest, back, arms as needed for eczema). rub in to affected area well      diphenhydrAMINE-zinc acetate 1%-0.1% (BENADRYL) topical cream Apply  to affected area every six (6) hours as needed for Itching. bisacodyl (DULCOLAX, BISACODYL,) 10 mg suppository Insert 10 mg into rectum as needed (Every 96 hours as needed for constipation). sodium phosphate (FLEET ENEMA) 19-7 gram/118 mL enema Insert 1 Enema into rectum. Every 120 hours as needed for constipation if no results from suppository      guaiFENesin (ROBITUSSIN) 100 mg/5 mL liquid Take 200 mg by mouth every four (4) hours as needed for Cough. cetirizine (ZYRTEC) 10 mg tablet Take 10 mg by mouth nightly. clobetasol (TEMOVATE) 0.05 % topical cream Apply  to affected area every Monday, Wednesday, Friday. In the evening on Monday, Wednesday, and Friday      olopatadine (PATANOL) 0.1 % ophthalmic solution Administer 1 Drop to both eyes two (2) times a day. B.infantis-B.ani-B.long-B.bifi (PROBIOTIC 4X) 10-15 mg TbEC Take 1 Tab by mouth two (2) times a day. albuterol (PROVENTIL HFA, VENTOLIN HFA, PROAIR HFA) 90 mcg/actuation inhaler Take 2 Puffs by inhalation every six (6) hours as needed for Wheezing. MULTIVITS W-FE,OTHER MIN/LUT (CENTRUM SILVER ULTRA WOMEN'S PO) Take 1 Tab by mouth daily.          STOP taking these medications       calcium-vitamin D (OYSTER SHELL CALCIUM-VIT D3) 250-125 mg-unit tablet Comments:   Reason for Stopping:         metoprolol (LOPRESSOR) 25 mg tablet Comments:   Reason for Stopping:         pantoprazole (PROTONIX) 40 mg granules for oral suspension Comments:   Reason for Stopping:         aspirin 81 mg tablet Comments:   Reason for Stopping:               Wound Care: None needed    Signed:  José Manuel Hammond MD  5/11/2018  6:48 AM

## 2018-05-11 NOTE — PROGRESS NOTES
Home Oxygen Test  Date of test: 05/11/2018  Time of test: 0854    Sa02 88 % on room air AT REST  Sa02 94 % on 2 Liters AT REST/AFTER AMBULATION.

## 2018-05-11 NOTE — DISCHARGE INSTRUCTIONS
Weigh yourself Daily  Keep Log/Record    Notify Dr. Treva Young office for a weight gain of 3 pounds or greater in one day or 5 pounds in 5 days. Low Sodium Diet as per CHF Education    [address]  [phone]      Patient Discharge Instructions    Arnoldo Yecenia / [id number] : 1928    Admitted 2018 Discharged: 2018     Take Home Medications        · It is important that you take the medication exactly as they are prescribed. · Keep your medication in the bottles provided by the pharmacist and keep a list of the medication names, dosages, and times to be taken in your wallet. · Do not take other medications without consulting your doctor. What to do at Home    Recommended diet: Cardiac Diet,     Recommended activity: Activity as tolerated,     Follow-up with Dr Jacy Haney if discharged from skilled nursing facility        Information obtained by :  I understand that if any problems occur once I am at home I am to contact my physician. I understand and acknowledge receipt of the instructions indicated above.                                                                                                                                            Physician's or R.N.'s Signature                                                                  Date/Time                                                                                                                                              Patient or Representative Signature                                                          Date/Time

## 2018-05-11 NOTE — PROGRESS NOTES
8:30 CM contacted Pt [facility] and notified home of discharge. CM spoke with Nessa Valdovinos. Nirmala Perez requested H&P, updated medication list, and therapy notes. 8:35 CM faxed requested documents. CM awaiting follow-up on acceptance. 8:42: CM attempted multiple contacts with attending to sign orders for O2.     9:45 CM phoned Pt daughter and notified of discharge. Daughter stated that she would be available to transport Pt to facility. CM informed daughter she would follow-up with discharge time. CM inquired about Pt receiving [facility] services. Pt daughter informed CM that the home would provide services to Pt.     10:05 CM spoke with nursing staff at [address] [place] [address] to inform Attending of O2 orders. CM sent order via fax. 10:30: CM was contacted by Nirmala Perez informing CM Pt was accepted back to facility. 10:40 CM received signed order from Attending via fax      12:00pm CM sent referral to [facility] via all scripts. 1:00-3:00pm CM was requested to send referral documentation multiple times due to some difficulty in needs of documentation. Pt was approved for O2 and would be set up at [facility]. CM Notified daughter of approval.    3:30pm Pt was given portable tank bedside. Daughter was present to take Pt home. Care Management Interventions  PCP Verified by CM: Yes  Mode of Transport at Discharge: Other (see comment) (Private vehicl daughter)  Transition of Care Consult (CM Consult): [facility]isted Living ([facility])  Physical Therapy Consult: Yes  Occupational Therapy Consult: Yes  Current Support Network: Assisted Living (Children)  Plan discussed with Pt/Family/Caregiver: Yes (Pt and Son)  Discharge Location  Discharge Placement: Assisted Living ([facility])        Elle Hardy [facility], [place]  [facility]  [phone]        .

## 2018-05-11 NOTE — PROGRESS NOTES
Progress Note      5/11/2018 8:31 AM  NAME: Tawnya Yeager   MRN:  [id number]   Admit Diagnosis: Rapid atrial fibrillation [facility])                          Assessment:                 1. Hypoxia, acute diastolic CHF, prox afib with RVR back in sinus, increased troponin consistent with type II NSTEMI  2. No hx of CAD  3. Echo5/2018 EF 55%, mod MR, mild pHTN  4. Prox Afib currently in sinus with LVH  5. HTN, with hypertensive heart disease with heart failure, and CKD cr 1.4  6. Dyslipidemia  7. Hx of CVA s/p TPA in 2015  8. PVD with hx of left CEA  9. COPD has had home O2 in past  10. GERD  11. DJD  12. , lives at [facility], poor functional capacity in a wheelchair, sleeps most of the day      5/5  Continue with diuresis. 5/6 cardiac status stable. 5/7 Feels well . Maintaining NSR    No changes from cardiac standpoint.                               Plan:                  Poor functional capacity, since CVA mostly in wheelchair, sleeps in between meals. Some increase in dyspnea. No chest pain. Afib with RVR back in sinus currently on amiodarone, given CHADS2 score of 5 will try anticoagulation  pulm edema on cxr still on supplemental O2  Increased troponin, consistent with type II NSTEMI, preserved EF, discussed with family would like to avoid invasive therapy, plan for medical management of CAD. On abx for gm negative UTI     1. Cont added eliquis 2.5mg po bid, given CHADS2 of 5  2. Cont ASA likely stop in one month due to age  1. Cont added amiodarone 100mg daily  4. Change metoprolol 25mg po bid to toprol xl 25mg daily due to yaritza  5. Cont amlodipine  6. Cont lasix 40mg po bid, kcl 10meq bid, follow bmp, taper O2 currently on 1L  7. Cont atorvastatin    PT/OT  O2 tapered.         [x]        High complexity decision making was performed         Subjective:     Linsey Jaswinder Allen denies chest pain, dyspnea. Discussed with RN events overnight.      Review of Systems:    Symptom Y/N Comments  Symptom Y/N Comments   Fever/Chills N   Chest Pain N    Poor Appetite N   Edema N    Cough N   Abdominal Pain N    Sputum N   Joint Pain N    SOB/ALCARAZ N   Pruritis/Rash N    Nausea/vomit N   Tolerating PT/OT Y    Diarrhea N   Tolerating Diet Y    Constipation N   Other       Could NOT obtain due to:      Objective:      Physical Exam:    Last 24hrs VS reviewed since prior progress note. Most recent are:    Visit Vitals    /71 (BP 1 Location: Right arm, BP Patient Position: At rest)    Pulse 78    Temp 98.3 °F (36.8 °C)    Resp 20    Ht 5' 4\" (1.626 m)    Wt 63.5 kg (140 lb)    SpO2 95%    BMI 24.03 kg/m2       Intake/Output Summary (Last 24 hours) at 05/11/18 1255  Last data filed at 05/11/18 0820   Gross per 24 hour   Intake              440 ml   Output             1300 ml   Net             -860 ml        General Appearance: Well developed, well nourished, alert & oriented x 3,    no acute distress. Ears/Nose/Mouth/Throat: Hearing grossly normal.  Neck: Supple. Chest: Lungs clear to auscultation bilaterally. Cardiovascular: Regular rate and rhythm, S1S2 normal, no murmur. Abdomen: Soft, non-tender, bowel sounds are active. Extremities: No edema bilaterally. Skin: Warm and dry. PMH/SH reviewed - no change compared to H&P    Data Review    Telemetry: normal sinus rhythm     Lab Data Personally Reviewed:    Recent Labs      05/11/18   0413   WBC  8.2   HGB  13.1   HCT  40.3   PLT  372     No results for input(s): INR, PTP, APTT in the last 72 hours. No lab exists for component: INREXT, INREXT   Recent Labs      05/11/18   0413  05/10/18   0025  05/09/18   0358   NA  139  139  136   K  3.6  3.9  3.9   CL  101  100  99   CO2  31  30  31   BUN  15  16  14   CREA  1.19*  1.18*  1.05*   GLU  98  104*  90   CA  9.0  8.7  8.9     No results for input(s): CPK, CKNDX, TROIQ in the last 72 hours.     No lab exists for component: CPKMB  Lab Results   Component Value Date/Time    Cholesterol, total 182 02/19/2015 04:30 AM    HDL Cholesterol 38 02/19/2015 04:30 AM    LDL, calculated 99.4 02/19/2015 04:30 AM    Triglyceride 223 (H) 02/19/2015 04:30 AM    CHOL/HDL Ratio 4.8 02/19/2015 04:30 AM       No results for input(s): SGOT, GPT, AP, TBIL, TP, ALB, GLOB, GGT, AML, LPSE in the last 72 hours. No lab exists for component: AMYP, HLPSE  No results for input(s): PH, PCO2, PO2 in the last 72 hours.     Medications Personally Reviewed:    Current Facility-Administered Medications   Medication Dose Route Frequency    cefTRIAXone (ROCEPHIN) 1 g in 0.9% sodium chloride (MBP/ADV) 50 mL  1 g IntraVENous Q24H    metoprolol succinate (TOPROL-XL) XL tablet 25 mg  25 mg Oral DAILY    potassium chloride SR (KLOR-CON 10) tablet 10 mEq  10 mEq Oral BID    furosemide (LASIX) tablet 40 mg  40 mg Oral ACB&D    LORazepam (ATIVAN) tablet 0.25 mg  0.25 mg Oral QHS    amLODIPine (NORVASC) tablet 5 mg  5 mg Oral DAILY    apixaban (ELIQUIS) tablet 2.5 mg  2.5 mg Oral BID    oxybutynin chloride XL (DITROPAN XL) tablet 5 mg  5 mg Oral DAILY    amiodarone (CORDARONE) tablet 100 mg  100 mg Oral DAILY    albuterol (PROVENTIL HFA, VENTOLIN HFA, PROAIR HFA) inhaler 2 Puff  2 Puff Inhalation Q4H PRN    aspirin chewable tablet 81 mg  81 mg Oral DAILY    atorvastatin (LIPITOR) tablet 40 mg  40 mg Oral QHS    pantoprazole (PROTONIX) granules for oral suspension 40 mg  40 mg Oral DAILY    umeclidinium (INCRUSE ELLIPTA) 62.5 mcg/actuation  1 Puff Inhalation DAILY    sodium chloride (NS) flush 5-10 mL  5-10 mL IntraVENous Q8H    sodium chloride (NS) flush 5-10 mL  5-10 mL IntraVENous PRN    acetaminophen (TYLENOL) tablet 650 mg  650 mg Oral Q4H PRN    ondansetron (ZOFRAN) injection 4 mg  4 mg IntraVENous Q4H PRN    polyethylene glycol (MIRALAX) packet 17 g  17 g Oral DAILY         Samir White MD

## 2018-05-11 NOTE — PROGRESS NOTES
[address] [place] [address]  [phone]      Medical Progress Note      NAME: Tavo Bush   :  1928  MRM:  [id number]    Date/Time: 2018  6:00 AM         Subjective:     Alert. Admitted with rapid afib, diastolic CHF, NSEMI. Patient alert. No complaints of chest pain or SOB. Urine growing E.coli and Proteus - on IV rocephin. Stable on oxygen    Past Medical History:   Diagnosis Date    Arthritis     generalized    COPD     GERD (gastroesophageal reflux disease)     Hypertension     Ill-defined condition     Vitamin deficiency    NSTEMI (non-ST elevated myocardial infarction) ([facility] [address] 2018    Other ill-defined conditions(799.89)     high cholesterol    Peripheral artery disease (HCC)     Psychiatric disorder     Anxiety disorder    Sleep disorder     Insomnia    TIA (transient ischemic attack)        ROS:  General: negative for fever, chills, sweats, weakness  Ear Nose and Throat: negative for rhinorrhea, pharyngitis, otalgia, tinnitus, speech or swallowing difficulties  Respiratory:  negative for cough, sputum production, SOB, wheezing, ALCARAZ, pleuritic pain  Cardiology:  negative for chest pain, palpitations, orthopnea, PND, edema, syncope   Gastrointestinal: negative for abdominal pain, N/V, dysphagia, change in bowel habits, bleeding  Muskuloskeletal : negative for arthralgia, myalgia  Dermatological: negative for rash, ulceration, mole change, new lesion  Neurological: negative for headache, dizziness, confusion, focal weakness, paresthesia, memory loss, gait disturbance  Psychological: negative for confusion, agitation overnight  Review of systems otherwise negative         Objective:       Vitals:        Last 24hrs VS reviewed since prior progress note.  Most recent are:    Visit Vitals    /55 (BP 1 Location: Right arm, BP Patient Position: At rest)    Pulse (!) 58    Temp 98.4 °F (36.9 °C)    Resp 20    Ht 5' 4\" (1.626 m)  Wt 140 lb (63.5 kg)    SpO2 93%    BMI 24.03 kg/m2     SpO2 Readings from Last 6 Encounters:   05/11/18 93%   02/23/15 95%   03/14/11 95%    O2 Flow Rate (L/min): 1 l/min       Intake/Output Summary (Last 24 hours) at 05/11/18 0600  Last data filed at 05/11/18 0440   Gross per 24 hour   Intake              200 ml   Output             1800 ml   Net            -1600 ml        Telemetry reviewed:   normal sinus rhythm  Tubes:   N/A  X-Ray:  Chest xray - Mild interstitial edema and small pleural effusions    Venous duplex of LE's - No deep venous thrombosis identified    Procedures:   EKG - When compared with ECG of 30-APR-2018 09:34,   Sinus rhythm has replaced Atrial fibrillation   Vent. rate has decreased BY  65 BPM   Inferior infarct is now present   Inverted T waves have replaced nonspecific T wave abnormality in Inferior   leads     Echo - Left ventricle: Systolic function was normal. Ejection fraction was  estimated in the range of 55 % to 60 %. No obvious wall motion  abnormalities identified in the views obtained. Wall thickness was mildly  to moderately increased. Hypertrophy was noted. Mitral valve: There was moderate regurgitation. Aortic valve: The valve was trileaflet. Leaflets exhibited mildly  increased thickness, calcification, normal cuspal separation, and  sclerosis. Tricuspid valve: There was mild to moderate regurgitation.     Urine culture:   ESCHERICHIA COLI      Antibiotic Sensitivity MARLEY Unit Status     Amikacin ($) Susceptible <=16 ug/mL Final     Method: MARLEY     Ampicillin ($) Resistant >16 ug/mL Final     Method: MARLEY     Ampicillin/sulbactam ($) Resistant >16/8 ug/mL Final     Method: MARLEY     Aztreonam ($$$$) Susceptible <=4 ug/mL Final     Method: MARLEY     Cefazolin ($) Susceptible <=8 ug/mL Final     Method: MARLEY     Cefepime ($$) Susceptible <=4 ug/mL Final     Method: MARLEY     Cefotaxime Susceptible <=2 ug/mL Final     Method: MARLEY     Ceftazidime ($) Susceptible <=1 ug/mL Final     Method: MARLEY     Ceftriaxone ($) Susceptible <=1 ug/mL Final     Method: MARLEY     Cefuroxime ($) Susceptible 8 ug/mL Final     Method: MARLEY     Ciprofloxacin ($) Resistant >2 ug/mL Final     Method: MARLEY     Gentamicin ($) Susceptible <=4 ug/mL Final     Method: MARLEY     Imipenem Susceptible <=1 ug/mL Final     Method: MARLEY     Levofloxacin ($) Resistant >4 ug/mL Final     Method: MARLEY     Meropenem ($$) Susceptible <=1 ug/mL Final     Method: MARLEY     Nitrofurantoin Susceptible <=32 ug/mL Final     Method: MARLEY     Piperacillin/Tazobac ($) Susceptible <=16 ug/mL Final     Method: MARLEY     Tobramycin ($) Susceptible <=4 ug/mL Final     Method: MARLEY     Trimeth/Sulfa Susceptible <=2/38 ug/mL Final     Method: MARLEY                  PROTEUS MIRABILIS      Antibiotic Sensitivity MARLEY Unit Status     Amikacin ($) Susceptible <=16 ug/mL Final     Method: MARLEY     Ampicillin ($) Susceptible <=8 ug/mL Final     Method: MARLEY     Ampicillin/sulbactam ($) Susceptible <=8/4 ug/mL Final     Method: MARLEY     Aztreonam ($$$$) Susceptible <=4 ug/mL Final     Method: MARLEY     Cefazolin ($) Susceptible <=8 ug/mL Final     Method: MARLEY     Cefepime ($$) Susceptible <=4 ug/mL Final     Method: MARLEY     Cefotaxime Susceptible <=2 ug/mL Final     Method: MARLEY     Ceftazidime ($) Susceptible <=1 ug/mL Final     Method: MARLEY     Ceftriaxone ($) Susceptible <=1 ug/mL Final     Method: MARLEY     Cefuroxime ($) Susceptible <=4 ug/mL Final     Method: MARLEY     Ciprofloxacin ($) Resistant >2 ug/mL Final     Method: MARLEY     Gentamicin ($) Susceptible <=4 ug/mL Final     Method: MARLEY     Levofloxacin ($) Resistant >4 ug/mL Final     Method: MARLEY     Meropenem ($$) Susceptible <=1 ug/mL Final     Method: MARLEY     Piperacillin/Tazobac ($) Susceptible <=16 ug/mL Final     Method: MARLEY     Tobramycin ($) Susceptible <=4 ug/mL Final     Method: MARLEY     Trimeth/Sulfa Susceptible <=2/38 ug/mL Final     Method: MARLEY                 Exam:     General   well developed, well nourished, appears stated age, in no acute distress  Neck   Supple without nodes or mass. No thyromegaly or bruit  Respiratory  Generally clear   Cardiology  Regular Rate and Rythmn  - no murmurs, rubs or gallops  Abdominal  Soft, non-tender, non-distended, positive bowel sounds, no hepatosplenomegaly  Extremities  No clubbing, cyanosis, or edema. Pulses intact. Skin  Normal skin turgor. No rashes or skin ulcers noted  Neurological  No focal neurological deficits noted  Psychological  Oriented x 2. Normal affect.   Exam otherwise negative     Lab Data Reviewed: (see below)    Medications Reviewed: (see below)    ______________________________________________________________________    Medications:     Current Facility-Administered Medications   Medication Dose Route Frequency    cefTRIAXone (ROCEPHIN) 1 g in 0.9% sodium chloride (MBP/ADV) 50 mL  1 g IntraVENous Q24H    metoprolol succinate (TOPROL-XL) XL tablet 25 mg  25 mg Oral DAILY    potassium chloride SR (KLOR-CON 10) tablet 10 mEq  10 mEq Oral BID    furosemide (LASIX) tablet 40 mg  40 mg Oral ACB&D    LORazepam (ATIVAN) tablet 0.25 mg  0.25 mg Oral QHS    amLODIPine (NORVASC) tablet 5 mg  5 mg Oral DAILY    apixaban (ELIQUIS) tablet 2.5 mg  2.5 mg Oral BID    oxybutynin chloride XL (DITROPAN XL) tablet 5 mg  5 mg Oral DAILY    amiodarone (CORDARONE) tablet 100 mg  100 mg Oral DAILY    albuterol (PROVENTIL HFA, VENTOLIN HFA, PROAIR HFA) inhaler 2 Puff  2 Puff Inhalation Q4H PRN    aspirin chewable tablet 81 mg  81 mg Oral DAILY    atorvastatin (LIPITOR) tablet 40 mg  40 mg Oral QHS    pantoprazole (PROTONIX) granules for oral suspension 40 mg  40 mg Oral DAILY    umeclidinium (INCRUSE ELLIPTA) 62.5 mcg/actuation  1 Puff Inhalation DAILY    sodium chloride (NS) flush 5-10 mL  5-10 mL IntraVENous Q8H    sodium chloride (NS) flush 5-10 mL  5-10 mL IntraVENous PRN    acetaminophen (TYLENOL) tablet 650 mg  650 mg Oral Q4H PRN    ondansetron (ZOFRAN) injection 4 mg  4 mg IntraVENous Q4H PRN    polyethylene glycol (MIRALAX) packet 17 g  17 g Oral DAILY            Lab Review:     Recent Labs      05/11/18   0413   WBC  8.2   HGB  13.1   HCT  40.3   PLT  372     Recent Labs      05/11/18   0413  05/10/18   0025  05/09/18   0358   NA  139  139  136   K  3.6  3.9  3.9   CL  101  100  99   CO2  31  30  31   GLU  98  104*  90   BUN  15  16  14   CREA  1.19*  1.18*  1.05*   CA  9.0  8.7  8.9     Lab Results   Component Value Date/Time    Glucose (POC) 104 02/21/2015 07:27 AM    Glucose (POC) 108 (H) 02/20/2015 09:16 PM    Glucose (POC) 110 (H) 02/20/2015 04:17 PM    Glucose (POC) 131 (H) 02/20/2015 11:18 AM    Glucose (POC) 130 (H) 02/20/2015 06:26 AM     No results for input(s): PH, PCO2, PO2, HCO3, FIO2 in the last 72 hours. No results for input(s): INR in the last 72 hours. No lab exists for component: INREXT, INREXT         Assessment:     Principal Problem:    Acute diastolic CHF (congestive heart failure) ([facility] [address] (5/1/2018)    Active Problems:    NSTEMI (non-ST elevated myocardial infarction) ([facility] [address] (5/1/2018)      Lower urinary tract infectious disease (2/18/2015)      Rapid atrial fibrillation (HCC) (4/30/2018)      COPD (5/1/2018)      Hypertension (5/1/2018)      CKD (chronic kidney disease) stage 3, GFR 30-59 ml/min (5/1/2018)      Acute respiratory failure with hypoxemia ([facility] [address] (5/2/2018)      Hypokalemia (5/2/2018)           Plan:     Risk of deterioration: medium             1. Receive last dose of IV rocephin today  2. Oxygen arranged for SNF  3.  Transfer to SNF today                    Care Plan discussed with: Patient    Discussed:  Care Plan    Prophylaxis:  Hep SQ and H2B/PPI    Disposition:  SNF/LTC           ___________________________________________________    Attending Physician: Padmini Dillard MD

## 2018-05-11 NOTE — PROGRESS NOTES
1800: Nurse called over to [facility] to give report; Max Joshua answered the phone and stated will relay message to the nurse to call back for report

## 2018-05-14 ENCOUNTER — TELEPHONE (OUTPATIENT)
Dept: INTERNAL MEDICINE CLINIC | Age: 83
End: 2018-05-14

## 2018-05-14 NOTE — TELEPHONE ENCOUNTER
Patient was discharged from the the hospital to [facility] with an order for a Probiotic which she was taking while on an antibiotic she is not longer on the antibiotic should she still be taking a probiotic?

## 2018-05-16 ENCOUNTER — OFFICE VISIT (OUTPATIENT)
Dept: INTERNAL MEDICINE CLINIC | Age: 83
End: 2018-05-16

## 2018-05-16 VITALS
HEART RATE: 74 BPM | SYSTOLIC BLOOD PRESSURE: 126 MMHG | DIASTOLIC BLOOD PRESSURE: 70 MMHG | OXYGEN SATURATION: 94 % | RESPIRATION RATE: 18 BRPM | TEMPERATURE: 98 F

## 2018-05-16 DIAGNOSIS — I48.91 RAPID ATRIAL FIBRILLATION (HCC): ICD-10-CM

## 2018-05-16 DIAGNOSIS — I21.4 NSTEMI (NON-ST ELEVATED MYOCARDIAL INFARCTION) (HCC): ICD-10-CM

## 2018-05-16 DIAGNOSIS — N39.0 LOWER URINARY TRACT INFECTIOUS DISEASE: ICD-10-CM

## 2018-05-16 DIAGNOSIS — J96.01 ACUTE RESPIRATORY FAILURE WITH HYPOXEMIA (HCC): Primary | ICD-10-CM

## 2018-05-16 DIAGNOSIS — N18.30 CKD (CHRONIC KIDNEY DISEASE) STAGE 3, GFR 30-59 ML/MIN (HCC): ICD-10-CM

## 2018-05-16 DIAGNOSIS — I50.31 ACUTE DIASTOLIC CHF (CONGESTIVE HEART FAILURE) (HCC): ICD-10-CM

## 2018-05-16 LAB
ALBUMIN SERPL-MCNC: 3.8 G/DL (ref 3.9–5.4)
ALKALINE PHOS POC: 78 U/L (ref 38–126)
ALT SERPL-CCNC: 28 U/L (ref 9–52)
AST SERPL-CCNC: 23 U/L (ref 14–36)
BUN BLD-MCNC: 20 MG/DL (ref 7–17)
CALCIUM BLD-MCNC: 9.6 MG/DL (ref 8.4–10.2)
CHLORIDE BLD-SCNC: 99 MMOL/L (ref 98–107)
CO2 POC: 31 MMOL/L (ref 22–32)
CREAT BLD-MCNC: 1.2 MG/DL (ref 0.7–1.2)
EGFR (POC): 39.8
GLUCOSE POC: 120 MG/DL (ref 65–105)
GRAN# POC: 6.1 K/UL (ref 2–7.8)
GRAN% POC: 73.8 % (ref 37–92)
HCT VFR BLD CALC: 39.3 % (ref 37–51)
HGB BLD-MCNC: 12.8 G/DL (ref 12–18)
LY# POC: 1.6 K/UL (ref 0.6–4.1)
LY% POC: 20.1 % (ref 10–58.5)
MCH RBC QN: 29.4 PG (ref 26–32)
MCHC RBC-ENTMCNC: 32.7 G/DL (ref 30–36)
MCV RBC: 90 FL (ref 80–97)
MID #, POC: 0.4 K/UL (ref 0–1.8)
MID% POC: 6.1 % (ref 0.1–24)
PLATELET # BLD: 396 K/UL (ref 140–440)
POTASSIUM SERPL-SCNC: 4.2 MMOL/L (ref 3.6–5)
PROT SERPL-MCNC: 7.3 G/DL (ref 6.3–8.2)
RBC # BLD: 4.38 M/UL (ref 4.2–6.3)
SODIUM SERPL-SCNC: 143 MMOL/L (ref 137–145)
TOTAL BILIRUBIN POC: 0.7 MG/DL (ref 0.2–1.3)
WBC # BLD: 8.1 K/UL (ref 4.1–10.9)

## 2018-05-16 NOTE — PATIENT INSTRUCTIONS
Atrial Fibrillation: Care Instructions  Your Care Instructions    Atrial fibrillation is an irregular and often fast heartbeat. Treating this condition is important for several reasons. It can cause blood clots, which can travel from your heart to your brain and cause a stroke. If you have a fast heartbeat, you may feel lightheaded, dizzy, and weak. An irregular heartbeat can also increase your risk for heart failure. Atrial fibrillation is often the result of another heart condition, such as high blood pressure or coronary artery disease. Making changes to improve your heart condition will help you stay healthy and active. Follow-up care is a key part of your treatment and safety. Be sure to make and go to all appointments, and call your doctor if you are having problems. It's also a good idea to know your test results and keep a list of the medicines you take. How can you care for yourself at home? Medicines  ? · Take your medicines exactly as prescribed. Call your doctor if you think you are having a problem with your medicine. You will get more details on the specific medicines your doctor prescribes. ? · If your doctor has given you a blood thinner to prevent a stroke, be sure you get instructions about how to take your medicine safely. Blood thinners can cause serious bleeding problems. ? · Do not take any vitamins, over-the-counter drugs, or herbal products without talking to your doctor first.   ? Lifestyle changes  ? · Do not smoke. Smoking can increase your chance of a stroke and heart attack. If you need help quitting, talk to your doctor about stop-smoking programs and medicines. These can increase your chances of quitting for good. ? · Eat a heart-healthy diet. ? · Stay at a healthy weight. Lose weight if you need to.   ? · Limit alcohol to 2 drinks a day for men and 1 drink a day for women. Too much alcohol can cause health problems. ? · Avoid colds and flu.  Get a pneumococcal vaccine shot. If you have had one before, ask your doctor whether you need another dose. Get a flu shot every year. If you must be around people with colds or flu, wash your hands often. Activity  ? · If your doctor recommends it, get more exercise. Walking is a good choice. Bit by bit, increase the amount you walk every day. Try for at least 30 minutes on most days of the week. You also may want to swim, bike, or do other activities. Your doctor may suggest that you join a cardiac rehabilitation program so that you can have help increasing your physical activity safely. ? · Start light exercise if your doctor says it is okay. Even a small amount will help you get stronger, have more energy, and manage stress. Walking is an easy way to get exercise. Start out by walking a little more than you did in the hospital. Gradually increase the amount you walk. ? · When you exercise, watch for signs that your heart is working too hard. You are pushing too hard if you cannot talk while you are exercising. If you become short of breath or dizzy or have chest pain, sit down and rest immediately. ? · Check your pulse regularly. Place two fingers on the artery at the palm side of your wrist, in line with your thumb. If your heartbeat seems uneven or fast, talk to your doctor. When should you call for help? Call 911 anytime you think you may need emergency care. For example, call if:  ? · You have symptoms of a heart attack. These may include:  ¨ Chest pain or pressure, or a strange feeling in the chest.  ¨ Sweating. ¨ Shortness of breath. ¨ Nausea or vomiting. ¨ Pain, pressure, or a strange feeling in the back, neck, jaw, or upper belly or in one or both shoulders or arms. ¨ Lightheadedness or sudden weakness. ¨ A fast or irregular heartbeat. After you call 911, the  may tell you to chew 1 adult-strength or 2 to 4 low-dose aspirin. Wait for an ambulance. Do not try to drive yourself.    ? · You have symptoms of a stroke. These may include:  ¨ Sudden numbness, tingling, weakness, or loss of movement in your face, arm, or leg, especially on only one side of your body. ¨ Sudden vision changes. ¨ Sudden trouble speaking. ¨ Sudden confusion or trouble understanding simple statements. ¨ Sudden problems with walking or balance. ¨ A sudden, severe headache that is different from past headaches. ? · You passed out (lost consciousness). ?Call your doctor now or seek immediate medical care if:  ? · You have new or increased shortness of breath. ? · You feel dizzy or lightheaded, or you feel like you may faint. ? · Your heart rate becomes irregular. ? · You can feel your heart flutter in your chest or skip heartbeats. Tell your doctor if these symptoms are new or worse. ? Watch closely for changes in your health, and be sure to contact your doctor if you have any problems. Where can you learn more? Go to http://yani-jori.info/. Enter U020 in the search box to learn more about \"Atrial Fibrillation: Care Instructions. \"  Current as of: September 21, 2016  Content Version: 11.4  © 4399-9579 [employer]. Care instructions adapted under license by [employer] (which disclaims liability or warranty for this information). If you have questions about a medical condition or this instruction, always ask your healthcare professional. [address] any warranty or liability for your use of this information.

## 2018-05-16 NOTE — MR AVS SNAPSHOT
[address] [place] [address] 70 [address] [id number] [phone] Patient: June Kulkarni MRN: [id number] HCS:7[birth year]9/2289 Visit Information Date & Time Provider Department Dept. Phone Encounter #  
 5/16/2018  9:40 AM Alejo Syed MD [facility] [id number] Follow-up Instructions Return for As previously scheduled. Upcoming Health Maintenance Date Due DTaP/Tdap/Td series (1 - Tdap) 2/1/1949 ZOSTER VACCINE AGE 60> 12/1/1987 GLAUCOMA SCREENING Q2Y 2/1/1993 Bone Densitometry (Dexa) Screening 2/1/1993 Pneumococcal 65+ High/Highest Risk (2 of 2 - PPSV23) 10/1/2015 MEDICARE YEARLY EXAM 3/20/2018 Influenza Age 5 to Adult 8/1/2018 Allergies as of 5/16/2018  Review Complete On: 5/16/2018 By: Alejo Syed MD  
  
 Severity Noted Reaction Type Reactions Penicillins High 03/14/2011   Systemic Swelling Sulfa (Sulfonamide Antibiotics) Medium 03/14/2011   Systemic Itching Propoxyphene-acetaminophen  03/14/2011   Side Effect Unknown (comments) Pt. Not sure, thinks it was hallucination. Current Immunizations  Reviewed on 2/19/2015 Name Date Influenza Vaccine 10/1/2014 Pneumococcal Vaccine (Unspecified Type) 10/1/2010 Not reviewed this visit You Were Diagnosed With   
  
 Codes Comments Acute respiratory failure with hypoxemia (HCC)    -  Primary ICD-10-CM: J96.01 
ICD-9-CM: 518.81 NSTEMI (non-ST elevated myocardial infarction) ([facility]     ICD-10-CM: I21.4 ICD-9-CM: 410.70 Rapid atrial fibrillation (HCC)     ICD-10-CM: I48.91 
ICD-9-CM: 427.31 Lower urinary tract infectious disease     ICD-10-CM: N39.0 ICD-9-CM: 599.0 Acute diastolic CHF (congestive heart failure) (HCC)     ICD-10-CM: I50.31 ICD-9-CM: 428.31, 428.0 CKD (chronic kidney disease) stage 3, GFR 30-59 ml/min     ICD-10-CM: N18.3 ICD-9-CM: 585.3 Vitals BP Pulse Temp Resp SpO2 Smoking Status 126/70 74 98 °F (36.7 °C) 18 94% Former Smoker Your Updated Medication List  
  
   
This list is accurate as of 5/16/18  9:46 AM.  Always use your most recent med list.  
  
  
  
  
 albuterol 90 mcg/actuation inhaler Commonly known as:  PROVENTIL HFA, VENTOLIN HFA, PROAIR HFA Take 2 Puffs by inhalation every six (6) hours as needed for Wheezing. albuterol-ipratropium 2.5 mg-0.5 mg/3 ml Nebu Commonly known as:  DUO-NEB  
3 mL by Nebulization route every four (4) hours as needed (Shortness of Breath). amiodarone 100 mg tablet Commonly known as:  Nicolle Zazueta Take 1 Tab by mouth daily. amLODIPine 5 mg tablet Commonly known as:  Gulshan Dumont Take 5 mg by mouth daily. ammonium lactate 12 % lotion Commonly known as:  LAC-HYDRIN Apply  to affected area every twelve (12) hours as needed (Apply to chest, back, arms as needed for eczema). rub in to affected area well  
  
 apixaban 2.5 mg tablet Commonly known as:  Cherylin Guardian Take 1 Tab by mouth two (2) times a day. aspirin 81 mg chewable tablet Take 1 Tab by mouth daily. atorvastatin 40 mg tablet Commonly known as:  LIPITOR Take 1 Tab by mouth nightly. CENTRUM SILVER ULTRA WOMEN'S PO Take 1 Tab by mouth daily. cetirizine 10 mg tablet Commonly known as:  ZYRTEC Take 10 mg by mouth nightly. clobetasol 0.05 % topical cream  
Commonly known as:  Beverkalyn Blayne Apply  to affected area every Monday, Wednesday, Friday. In the evening on Monday, Wednesday, and Friday  
  
 diphenhydrAMINE-zinc acetate 1%-0.1% topical cream  
Commonly known as:  BENADRYL Apply  to affected area every six (6) hours as needed for Itching. DULCOLAX (BISACODYL) 10 mg suppository Generic drug:  bisacodyl Insert 10 mg into rectum as needed (Every 96 hours as needed for constipation). FLEET ENEMA 19-7 gram/118 mL enema Generic drug:  sodium phosphate Insert 1 Enema into rectum. Every 120 hours as needed for constipation if no results from suppository  
  
 furosemide 40 mg tablet Commonly known as:  LASIX Take 1 Tab by mouth two (2) times a day. guaiFENesin 100 mg/5 mL liquid Commonly known as:  ROBITUSSIN Take 200 mg by mouth every four (4) hours as needed for Cough. LUBRISKIN Lotn lotion Generic drug:  emollient combination no. 80 Apply  to affected area every eight (8) hours as needed for Dry Skin (Apply to both hands). magnesium hydroxide 400 mg/5 mL suspension Commonly known as:  MILK OF MAGNESIA Take 10 mL by mouth every seventy-two (72) hours as needed for Constipation (If no BM in 3 days). metoprolol succinate 25 mg XL tablet Commonly known as:  TOPROL-XL Take 1 Tab by mouth daily. olopatadine 0.1 % ophthalmic solution Commonly known as:  PATANOL Administer 1 Drop to both eyes two (2) times a day. oxybutynin chloride XL 5 mg CR tablet Commonly known as:  DITROPAN XL Take 5 mg by mouth daily. potassium chloride SR 10 mEq tablet Commonly known as:  KLOR-CON 10 Take 1 Tab by mouth two (2) times a day. PROBIOTIC 4X 10-15 mg Tbec Generic drug:  B.infantis-B.ani-B.long-B.bifi Take 1 Tab by mouth two (2) times a day. promethazine 12.5 mg tablet Commonly known as:  PHENERGAN Take 12.5 mg by mouth every six (6) hours as needed for Nausea. PROTONIX 20 mg tablet Generic drug:  pantoprazole Take 20 mg by mouth Before breakfast and dinner. RULOX 200-200-20 mg/5 mL Susp Generic drug:  alum-mag hydroxide-simeth Take 20 mL by mouth every four (4) hours as needed. tiotropium 18 mcg inhalation capsule Commonly known as:  Lela [address] Take 1 Cap by inhalation daily.  Indications: Chronic Obstructive Pulmonary Disease with Bronchospasms  
  
 triamcinolone 0.5 % topical cream  
Commonly known as:  ARISTOCORT  
 Apply  to affected area two (2) times daily as needed for Skin Irritation. use thin layer TYLENOL 325 mg tablet Generic drug:  acetaminophen Take 650 mg by mouth every four (4) hours as needed for Pain. We Performed the Following AMB POC COMPLETE CBC,AUTOMATED ENTER U819456 CPT(R)] AMB POC COMPREHENSIVE METABOLIC PANEL [69128 CPT(R)] COLLECTION VENOUS BLOOD,VENIPUNCTURE [id number] CPT(R)] REFERRAL TO CARDIOLOGY [PVT46 Custom] Comments:  
 FU afib and NSTEMI Follow-up Instructions Return for As previously scheduled. Referral Information Referral ID Referred By Referred To  
  
 [id number] [facility] [address] [place], [address] Visits Status Start Date End Date 1 New Request 5/16/18 5/16/19 If your referral has a status of pending review or denied, additional information will be sent to support the outcome of this decision. Patient Instructions Atrial Fibrillation: Care Instructions Your Care Instructions Atrial fibrillation is an irregular and often fast heartbeat. Treating this condition is important for several reasons. It can cause blood clots, which can travel from your heart to your brain and cause a stroke. If you have a fast heartbeat, you may feel lightheaded, dizzy, and weak. An irregular heartbeat can also increase your risk for heart failure. Atrial fibrillation is often the result of another heart condition, such as high blood pressure or coronary artery disease. Making changes to improve your heart condition will help you stay healthy and active. Follow-up care is a key part of your treatment and safety. Be sure to make and go to all appointments, and call your doctor if you are having problems. It's also a good idea to know your test results and keep a list of the medicines you take. How can you care for yourself at home? Medicines ? · Take your medicines exactly as prescribed. Call your doctor if you think you are having a problem with your medicine. You will get more details on the specific medicines your doctor prescribes. ? · If your doctor has given you a blood thinner to prevent a stroke, be sure you get instructions about how to take your medicine safely. Blood thinners can cause serious bleeding problems. ? · Do not take any vitamins, over-the-counter drugs, or herbal products without talking to your doctor first. ? Lifestyle changes ? · Do not smoke. Smoking can increase your chance of a stroke and heart attack. If you need help quitting, talk to your doctor about stop-smoking programs and medicines. These can increase your chances of quitting for good. ? · Eat a heart-healthy diet. ? · Stay at a healthy weight. Lose weight if you need to.  
? · Limit alcohol to 2 drinks a day for men and 1 drink a day for women. Too much alcohol can cause health problems. ? · Avoid colds and flu. Get a pneumococcal vaccine shot. If you have had one before, ask your doctor whether you need another dose. Get a flu shot every year. If you must be around people with colds or flu, wash your hands often. Activity ? · If your doctor recommends it, get more exercise. Walking is a good choice. Bit by bit, increase the amount you walk every day. Try for at least 30 minutes on most days of the week. You also may want to swim, bike, or do other activities. Your doctor may suggest that you join a cardiac rehabilitation program so that you can have help increasing your physical activity safely. ? · Start light exercise if your doctor says it is okay. Even a small amount will help you get stronger, have more energy, and manage stress. Walking is an easy way to get exercise. Start out by walking a little more than you did in the hospital. Gradually increase the amount you walk. ? · When you exercise, watch for signs that your heart is working too hard. You are pushing too hard if you cannot talk while you are exercising. If you become short of breath or dizzy or have chest pain, sit down and rest immediately. ? · Check your pulse regularly. Place two fingers on the artery at the palm side of your wrist, in line with your thumb. If your heartbeat seems uneven or fast, talk to your doctor. When should you call for help? Call 911 anytime you think you may need emergency care. For example, call if: 
? · You have symptoms of a heart attack. These may include: ¨ Chest pain or pressure, or a strange feeling in the chest. 
¨ Sweating. ¨ Shortness of breath. ¨ Nausea or vomiting. ¨ Pain, pressure, or a strange feeling in the back, neck, jaw, or upper belly or in one or both shoulders or arms. ¨ Lightheadedness or sudden weakness. ¨ A fast or irregular heartbeat. After you call 911, the  may tell you to chew 1 adult-strength or 2 to 4 low-dose aspirin. Wait for an ambulance. Do not try to drive yourself. ? · You have symptoms of a stroke. These may include: 
¨ Sudden numbness, tingling, weakness, or loss of movement in your face, arm, or leg, especially on only one side of your body. ¨ Sudden vision changes. ¨ Sudden trouble speaking. ¨ Sudden confusion or trouble understanding simple statements. ¨ Sudden problems with walking or balance. ¨ A sudden, severe headache that is different from past headaches. ? · You passed out (lost consciousness). ?Call your doctor now or seek immediate medical care if: 
? · You have new or increased shortness of breath. ? · You feel dizzy or lightheaded, or you feel like you may faint. ? · Your heart rate becomes irregular. ? · You can feel your heart flutter in your chest or skip heartbeats. Tell your doctor if these symptoms are new or worse. ? Watch closely for changes in your health, and be sure to contact your doctor if you have any problems. Where can you learn more? Go to http://yani-jori.info/. Enter U020 in the search box to learn more about \"Atrial Fibrillation: Care Instructions. \" Current as of: September 21, 2016 Content Version: 11.4 © 1080-4490 [employer]. Care instructions adapted under license by [employer] (which disclaims liability or warranty for this information). If you have questions about a medical condition or this instruction, always ask your healthcare professional. [address] any warranty or liability for your use of this information. Introducing [facility]! [facility] introduces [facility] patient portal. Now you can access parts of your medical record, email your doctor's office, and request medication refills online. 1. In your internet browser, go to [email or web address] Cloud 2. Click on the First Time User? Click Here link in the Sign In box. You will see the New Member Sign Up page. 3. Enter your [facility] Access Code exactly as it appears below. You will not need to use this code after youve completed the sign-up process. If you do not sign up before the expiration date, you must request a new code. · [facility] Access Code: [email or web address] Expires: 7/29/2018 10:37 AM 
 
4. Enter the last four digits of your Social Security Number (xxxx) and Date of Birth (mm/dd/yyyy) as indicated and click Submit. You will be taken to the next sign-up page. 5. Create a Downtown ID. This will be your [facility] login ID and cannot be changed, so think of one that is secure and easy to remember. 6. Create a [facility] password. You can change your password at any time. 7. Enter your Password Reset Question and Answer. This can be used at a later time if you forget your password. 8. Enter your e-mail address. You will receive e-mail notification when new information is available in [address] 9. Click Sign Up. You can now view and download portions of your medical record. 10. Click the Download Summary menu link to download a portable copy of your medical information. If you have questions, please visit the Frequently Asked Questions section of the [employer] website. Remember, [employer] is NOT to be used for urgent needs. For medical emergencies, dial 911. Now available from your iPhone and Android! Please provide this summary of care documentation to your next provider. Your primary care clinician is listed as ARTURO Luna. If you have any questions after today's visit, please call [phone].

## 2018-05-16 NOTE — PROGRESS NOTES
This note will not be viewable in [address] Rosette Diego is a 80 y.o. female and presents with Transitions Of Care      Subjective:  Mrs. Kath Teixeira returns to the office today and transition of care subsequent to a hospitalization at [facility] from 4/30 until 5/11 when she was admitted with rapid atrial fibrillation, acute diastolic congestive heart failure, non-ST elevated MI, acute respiratory failure with hypoxemia, hypokalemia, decompensated COPD and a urinary tract infection. She was admitted to a telemetry unit and started on IV twice a day diuresis. Diltiazem drip was started in the emergency room but she was seen by cardiology and changed to amiodarone and reverted into a normal sinus rhythm. She had a non-ST elevated MI based on cardiac enzyme rise and was seen by cardiology for this however conservative management was recommended due to age and multiple medical problems. The patient's lower extremity edema resolved with diuresis but she remained hypoxic and required supplemental oxygen for all activities as her saturations would drop into the 80s with activity. There was some confusion that developed and and she was also found to have a urinary tract infection with E. coli and Proteus mirabilis isolated from her urine. Unfortunately there were no oral alternatives for treatment due to her allergies and sensitivities and she was given IV Rocephin for 5 days. The patient was started on Eliquis for stroke prevention and continued on her diuretic which was subsequently changed to p.o. She did have some hypokalemia for which potassium was supplemented. The patient had not been seen here in 3 years and was staying at [facility] and return there. She presents to the office today brought to us by her son Radha Weir, and is wearing 2 L of oxygen. She notes no shortness of breath no cough or wheezing.   Her lower extremity edema which was present on admission has resolved and not recurred. She told the nurses at [facility] to check her weight daily in order to monitor her fluid status but they have only checked her one time in the last 5 or 6 days. She offers no new complaints today. Past Medical History:   Diagnosis Date    Arthritis     generalized    COPD     GERD (gastroesophageal reflux disease)     Hypertension     Ill-defined condition     Vitamin deficiency    NSTEMI (non-ST elevated myocardial infarction) ([facility] [address] 5/1/2018    Other ill-defined conditions(799.89)     high cholesterol    Peripheral artery disease (HCC)     Psychiatric disorder     Anxiety disorder    Sleep disorder     Insomnia    TIA (transient ischemic attack)      Past Surgical History:   Procedure Laterality Date    HX CAROTID ENDARTERECTOMY      left 2 or 3 years ago.  HX OTHER SURGICAL      colonoscopy    HX TONSILLECTOMY       Allergies   Allergen Reactions    Penicillins Swelling    Sulfa (Sulfonamide Antibiotics) Itching    Propoxyphene-Acetaminophen Unknown (comments)     Pt. Not sure, thinks it was hallucination. Current Outpatient Prescriptions   Medication Sig Dispense Refill    aspirin 81 mg chewable tablet Take 1 Tab by mouth daily.  atorvastatin (LIPITOR) 40 mg tablet Take 1 Tab by mouth nightly. 30 Tab 0    amiodarone (PACERONE) 100 mg tablet Take 1 Tab by mouth daily. 30 Tab 0    apixaban (ELIQUIS) 2.5 mg tablet Take 1 Tab by mouth two (2) times a day. 30 Tab 0    furosemide (LASIX) 40 mg tablet Take 1 Tab by mouth two (2) times a day. 60 Tab 0    metoprolol succinate (TOPROL-XL) 25 mg XL tablet Take 1 Tab by mouth daily. 30 Tab 0    potassium chloride SR (KLOR-CON 10) 10 mEq tablet Take 1 Tab by mouth two (2) times a day. 60 Tab 0    tiotropium (SPIRIVA WITH HANDIHALER) 18 mcg inhalation capsule Take 1 Cap by inhalation daily.  Indications: Chronic Obstructive Pulmonary Disease with Bronchospasms 30 Cap 0    amLODIPine (NORVASC) 5 mg tablet Take 5 mg by mouth daily.  oxybutynin chloride XL (DITROPAN XL) 5 mg CR tablet Take 5 mg by mouth daily.  pantoprazole (PROTONIX) 20 mg tablet Take 20 mg by mouth Before breakfast and dinner.  promethazine (PHENERGAN) 12.5 mg tablet Take 12.5 mg by mouth every six (6) hours as needed for Nausea.  alum-mag hydroxide-simeth (RULOX) 200-200-20 mg/5 mL susp Take 20 mL by mouth every four (4) hours as needed.  acetaminophen (TYLENOL) 325 mg tablet Take 650 mg by mouth every four (4) hours as needed for Pain.  magnesium hydroxide (MILK OF MAGNESIA) 400 mg/5 mL suspension Take 10 mL by mouth every seventy-two (72) hours as needed for Constipation (If no BM in 3 days).  albuterol-ipratropium (DUO-NEB) 2.5 mg-0.5 mg/3 ml nebu 3 mL by Nebulization route every four (4) hours as needed (Shortness of Breath).  emollient combination no. 108 (LUBRISKIN) lotn lotion Apply  to affected area every eight (8) hours as needed for Dry Skin (Apply to both hands).  triamcinolone (ARISTOCORT) 0.5 % topical cream Apply  to affected area two (2) times daily as needed for Skin Irritation. use thin layer      ammonium lactate (LAC-HYDRIN) 12 % lotion Apply  to affected area every twelve (12) hours as needed (Apply to chest, back, arms as needed for eczema). rub in to affected area well      diphenhydrAMINE-zinc acetate 1%-0.1% (BENADRYL) topical cream Apply  to affected area every six (6) hours as needed for Itching.  bisacodyl (DULCOLAX, BISACODYL,) 10 mg suppository Insert 10 mg into rectum as needed (Every 96 hours as needed for constipation).  sodium phosphate (FLEET ENEMA) 19-7 gram/118 mL enema Insert 1 Enema into rectum. Every 120 hours as needed for constipation if no results from suppository      guaiFENesin (ROBITUSSIN) 100 mg/5 mL liquid Take 200 mg by mouth every four (4) hours as needed for Cough.  cetirizine (ZYRTEC) 10 mg tablet Take 10 mg by mouth nightly.       clobetasol (TEMOVATE) 0.05 % topical cream Apply  to affected area every Monday, Wednesday, Friday. In the evening on Monday, Wednesday, and Friday      olopatadine (PATANOL) 0.1 % ophthalmic solution Administer 1 Drop to both eyes two (2) times a day.  B.infantis-B.ani-B.long-B.bifi (PROBIOTIC 4X) 10-15 mg TbEC Take 1 Tab by mouth two (2) times a day.  albuterol (PROVENTIL HFA, VENTOLIN HFA, PROAIR HFA) 90 mcg/actuation inhaler Take 2 Puffs by inhalation every six (6) hours as needed for Wheezing.  MULTIVITS W-FE,OTHER MIN/LUT (CENTRUM SILVER ULTRA WOMEN'S PO) Take 1 Tab by mouth daily.        Social History     Social History    Marital status:      Spouse name: N/A    Number of children: N/A    Years of education: N/A     Social History Main Topics    Smoking status: Former Smoker    Smokeless tobacco: Never Used      Comment: quit 3 years ago    Alcohol use Yes      Comment: glass of wine rarely    Drug use: Yes     Special: Prescription, OTC    Sexual activity: Not Asked     Other Topics Concern    None     Social History Narrative     Family History   Problem Relation Age of Onset    Other Mother      carotid endarterectomy/cardiomegaly    Other Father      carotid endarterectomy       Health Maintenance   Topic Date Due    DTaP/Tdap/Td series (1 - Tdap) 02/01/1949    ZOSTER VACCINE AGE 60>  12/01/1987    GLAUCOMA SCREENING Q2Y  02/01/1993    Bone Densitometry (Dexa) Screening  02/01/1993    Pneumococcal 65+ High/Highest Risk (2 of 2 - PPSV23) 10/01/2015    MEDICARE YEARLY EXAM  03/20/2018    Influenza Age 9 to Adult  08/01/2018        Review of Systems  Constitutional: negative for fevers, chills, anorexia and weight loss  Eyes:   negative for visual disturbance and irritation  ENT:   negative for tinnitus,sore throat,nasal congestion,ear pain,hoarseness  Respiratory:  negative for cough, hemoptysis, dyspnea,wheezing  CV:   negative for chest pain, palpitations, lower extremity edema  GI:   negative for nausea, vomiting, diarrhea, abdominal pain,melena  Endo:               negative for polyuria,polydipsia,polyphagia,heat intolerance  Genitourinary: negative for frequency, dysuria and hematuria  Integumentary: negative for rash and pruritus  Hematologic:  negative for easy bruising and gum/nose bleeding  Musculoskel: negative for myalgias, arthralgias, back pain, muscle weakness, joint pain  Neurological:  negative for headaches, dizziness, vertigo, memory problems and gait   Behavl/Psych: negative for feelings of anxiety, depression, mood changes  ROS otherwise negative      Objective:  Visit Vitals    /70    Pulse 74    Temp 98 °F (36.7 °C)    Resp 18    SpO2 94%  Comment: 2 lpm     There is no height or weight on file to calculate BMI. Physical Exam:   General appearance - alert, well appearing, and in no distress  Mental status - alert, oriented to person, place, and time  EYE-DONNA, EOMI,conjunctiva normal bilaterally, lids normal  ENT-ENT exam normal, no neck nodes or sinus tenderness  Nose - normal and patent, no erythema,  Or discharge   Mouth - mucous membranes moist, pharynx normal without lesions  Neck - supple, no significant adenopathy or bruit  Chest - clear to auscultation, no wheezes, rales or rhonchi. Heart - normal rate, regular rhythm, normal S1, S2, no murmurs, rubs, clicks or gallops   Abdomen - soft, nontender, nondistended, no masses or organomegaly  Lymph- no adenopathy palpable  Ext-peripheral pulses normal, no pedal edema, no clubbing or cyanosis  Skin-Warm and dry.  no hyperpigmentation, vitiligo, or suspicious lesions  Neuro -alert, oriented, normal speech, no focal findings or movement disorder noted      Assessment/Plan:  Diagnoses and all orders for this visit:    Acute respiratory failure with hypoxemia (HCC)    NSTEMI (non-ST elevated myocardial infarction) ([facility] [address]  -     APRIL Int Card Ref ED [facility]    Rapid atrial fibrillation ([facility] [address]  -     Ankita Leyva Int Card Ref [facility]    Lower urinary tract infectious disease    Acute diastolic CHF (congestive heart failure) (HCC)  -     AMB POC COMPLETE CBC,AUTOMATED ENTER  -     AMB POC COMPREHENSIVE METABOLIC PANEL  -     COLLECTION VENOUS BLOOD,VENIPUNCTURE    CKD (chronic kidney disease) stage 3, GFR 30-59 ml/min        Other instructions: The patient's medications are reviewed and reconciled. No change in her current medical regimen is made. She is to continue her oxygen at 2 L/min    Await results of follow-up labs    Amazingly she left the hospital without any cardiac follow-up arranged. We will arrange for her to have a follow-up appointment with Dr. Bernadette Abad who followed her in the hospital.    Follow-up here pending results and response to the above    Follow-up Disposition:  Return for As previously scheduled. I have reviewed with the patient details of the assessment and plan and all questions were answered. Relevent patient education was performed. The most recent lab findings were reviewed with the patient. An After Visit Summary was printed and given to the patient.     Ernesto Gutiérrez MD

## 2018-05-16 NOTE — PROGRESS NOTES
Douglas Landry is a 80 y.o. female presenting for No chief complaint on file. .     1. Have you been to the ER, urgent care clinic since your last visit? Hospitalized since your last visit? Yes When: 4/30/18-5/11/18 Where: [facility] Reason for visit: Atrial Fib. 2. Have you seen or consulted any other health care providers outside of the [address] [place] [address] since your last visit? Include any pap smears or colon screening. No    Fall Risk Assessment, last 12 mths 5/16/2018   Able to walk? Yes   Fall in past 12 months? No         Abuse Screening Questionnaire 5/16/2018   Do you ever feel afraid of your partner? N   Are you in a relationship with someone who physically or mentally threatens you? N   Is it safe for you to go home? Y       PHQ over the last two weeks 5/16/2018   Little interest or pleasure in doing things Not at all   Feeling down, depressed or hopeless Not at all   Total Score PHQ 2 0       There are no discontinued medications.

## 2018-06-02 ENCOUNTER — APPOINTMENT (OUTPATIENT)
Dept: GENERAL RADIOLOGY | Age: 83
DRG: 291 | End: 2018-06-02
Attending: EMERGENCY MEDICINE
Payer: MEDICARE

## 2018-06-02 ENCOUNTER — HOSPITAL ENCOUNTER (INPATIENT)
Age: 83
LOS: 4 days | Discharge: NURSING, REHAB OR LONG-TERM CARE | DRG: 291 | End: 2018-06-07
Attending: EMERGENCY MEDICINE | Admitting: INTERNAL MEDICINE
Payer: MEDICARE

## 2018-06-02 DIAGNOSIS — E87.6 HYPOKALEMIA: ICD-10-CM

## 2018-06-02 DIAGNOSIS — I50.33 ACUTE ON CHRONIC DIASTOLIC HEART FAILURE (HCC): ICD-10-CM

## 2018-06-02 DIAGNOSIS — J18.9 COMMUNITY ACQUIRED PNEUMONIA, UNSPECIFIED LATERALITY: ICD-10-CM

## 2018-06-02 DIAGNOSIS — I48.0 PAROXYSMAL A-FIB (HCC): ICD-10-CM

## 2018-06-02 DIAGNOSIS — N18.30 CKD (CHRONIC KIDNEY DISEASE) STAGE 3, GFR 30-59 ML/MIN (HCC): ICD-10-CM

## 2018-06-02 DIAGNOSIS — R77.8 ELEVATED TROPONIN: ICD-10-CM

## 2018-06-02 DIAGNOSIS — I34.0 MODERATE MITRAL REGURGITATION: ICD-10-CM

## 2018-06-02 DIAGNOSIS — J96.01 ACUTE RESPIRATORY FAILURE WITH HYPOXIA (HCC): ICD-10-CM

## 2018-06-02 DIAGNOSIS — J96.21 ACUTE ON CHRONIC RESPIRATORY FAILURE WITH HYPOXIA (HCC): Primary | ICD-10-CM

## 2018-06-02 DIAGNOSIS — J18.9 HCAP (HEALTHCARE-ASSOCIATED PNEUMONIA): ICD-10-CM

## 2018-06-02 DIAGNOSIS — I50.33 ACUTE ON CHRONIC DIASTOLIC CONGESTIVE HEART FAILURE (HCC): ICD-10-CM

## 2018-06-02 PROCEDURE — 71045 X-RAY EXAM CHEST 1 VIEW: CPT

## 2018-06-02 PROCEDURE — 83605 ASSAY OF LACTIC ACID: CPT | Performed by: EMERGENCY MEDICINE

## 2018-06-02 PROCEDURE — 82550 ASSAY OF CK (CPK): CPT | Performed by: EMERGENCY MEDICINE

## 2018-06-02 PROCEDURE — 84484 ASSAY OF TROPONIN QUANT: CPT | Performed by: EMERGENCY MEDICINE

## 2018-06-02 PROCEDURE — 36415 COLL VENOUS BLD VENIPUNCTURE: CPT | Performed by: EMERGENCY MEDICINE

## 2018-06-02 PROCEDURE — 83880 ASSAY OF NATRIURETIC PEPTIDE: CPT | Performed by: EMERGENCY MEDICINE

## 2018-06-02 PROCEDURE — 83735 ASSAY OF MAGNESIUM: CPT | Performed by: EMERGENCY MEDICINE

## 2018-06-02 PROCEDURE — 99285 EMERGENCY DEPT VISIT HI MDM: CPT

## 2018-06-02 PROCEDURE — 93005 ELECTROCARDIOGRAM TRACING: CPT

## 2018-06-02 PROCEDURE — 80053 COMPREHEN METABOLIC PANEL: CPT | Performed by: EMERGENCY MEDICINE

## 2018-06-02 PROCEDURE — 85025 COMPLETE CBC W/AUTO DIFF WBC: CPT | Performed by: EMERGENCY MEDICINE

## 2018-06-02 NOTE — IP AVS SNAPSHOT
[address] [facility] 
[phone] Patient: Allison Wyman MRN: [id number] About your hospitalization You were admitted on:  Mena 3, 2018 You last received care in the:  [facility] 2 PROGRESSIVE CARE You were discharged on:  June 7, 2018 Why you were hospitalized Your primary diagnosis was:  Acute Respiratory Failure With Hypoxia (Hcc) Your diagnoses also included:  Ckd (Chronic Kidney Disease) Stage 3, Gfr 30-59 Ml/Min, Acute On Chronic Diastolic Heart Failure (Hcc), Cad (Coronary Artery Disease), Moderate Mitral Regurgitation, Hypokalemia, Paroxysmal A-Fib (Hcc), Hyperlipidemia, Elevated Troponin, Cap (Community Acquired Pneumonia) Follow-up Information Follow up With Details Comments Contact Info [facility]  Patient will be going back to [address] [place] [address] when discharged and she will be getting therapy per her nurse. [phone] Padmini Dillard MD Schedule an appointment as soon as possible for a visit Call for appt in 5-6 days [address] 
[phone] Discharge Orders None A check juli indicates which time of day the medication should be taken. My Medications START taking these medications Instructions Each Dose to Equal  
 Morning Noon Evening Bedtime  
 levoFLOXacin 750 mg tablet Commonly known as:  Shane Backbone Your last dose was:  06/07 at 0600 Your next dose is:  06/09 in am  
   
 Take 1 Tab by mouth every fourty-eight (48) hours for 4 days. 750 mg CONTINUE taking these medications Instructions Each Dose to Equal  
 Morning Noon Evening Bedtime  
 albuterol 90 mcg/actuation inhaler Commonly known as:  PROVENTIL HFA, VENTOLIN HFA, PROAIR HFA  
   
 Take 2 Puffs by inhalation every six (6) hours as needed (for COPD). 2 Puff  
    
   
   
   
  
 albuterol-ipratropium 2.5 mg-0.5 mg/3 ml Nebu Commonly known as:  DUO-NEB  
   
 3 mL by Nebulization route every four (4) hours as needed (Shortness of Breath). 3 mL  
    
   
   
   
  
 amLODIPine 5 mg tablet Commonly known as:  Ayad Emerald Your last dose was:  06/07 am  
Your next dose is:  06/08 am  
   
 Take 5 mg by mouth daily. 5 mg  
    
  
   
   
   
  
 ammonium lactate 12 % lotion Commonly known as:  LAC-HYDRIN Apply  to affected area every twelve (12) hours as needed (Apply to chest, back, arms as needed for eczema). rub in to affected area well  
     
   
   
   
  
 apixaban 2.5 mg tablet Commonly known as:  Danny Loyd Your last dose was:  06/07 am  
Your next dose is:  06/08 am  
   
 Take 1 Tab by mouth two (2) times a day. 2.5 mg  
    
  
   
   
   
  
 aspirin 81 mg chewable tablet Your last dose was:  06/07 am  
Your next dose is:  06/08 am  
   
 Take 1 Tab by mouth daily. 81 mg  
    
  
   
   
   
  
 atorvastatin 40 mg tablet Commonly known as:  LIPITOR Your last dose was:  06/06 pm  
Your next dose is:  06/07 pm 
  
   
 Take 1 Tab by mouth nightly. 40 mg CENTRUM SILVER ULTRA WOMEN'S PO Your last dose was:  06/07 am  
Your next dose is:  06/08 am  
   
 Take 1 Tab by mouth daily. 1 Tab  
    
  
   
   
   
  
 cetirizine 10 mg tablet Commonly known as:  ZYRTEC Your last dose was:  06/06 pm  
Your next dose is:  06/07 Take 10 mg by mouth nightly. For itching 10 mg  
    
   
   
   
  
  
 clobetasol 0.05 % topical cream  
Commonly known as:  Roselia Keas Apply  to affected area every Monday, Wednesday, Friday. Apply to vulva at night for pain and itching  
     
   
   
   
  
 diphenhydrAMINE-zinc acetate 1%-0.1% topical cream  
Commonly known as:  BENADRYL Apply  to affected area every six (6) hours as needed for Itching. DULCOLAX (BISACODYL) 10 mg suppository Generic drug:  bisacodyl Insert 10 mg into rectum as needed (Every 96 hours as needed for constipation). 10 mg  
    
   
   
   
  
 FLEET ENEMA 19-7 gram/118 mL enema Generic drug:  sodium phosphate Insert 1 Enema into rectum. Every 120 hours as needed for constipation if no results from suppository 1 Enema  
    
   
   
   
  
 furosemide 40 mg tablet Commonly known as:  LASIX Your last dose was:  06/07 am  
Your next dose is:  06/08 am 
  
   
 Take 1 Tab by mouth two (2) times a day. 40 mg  
    
  
   
   
  
   
  
 guaiFENesin 100 mg/5 mL liquid Commonly known as:  ROBITUSSIN Take 200 mg by mouth every four (4) hours as needed for Cough. 200 mg LUBRISKIN Lotn lotion Generic drug:  emollient combination no. 1000 [facility] Grabbed Apply  to affected area every eight (8) hours as needed for Dry Skin (Apply to both hands). magnesium hydroxide 400 mg/5 mL suspension Commonly known as:  MILK OF MAGNESIA Take 10 mL by mouth every seventy-two (72) hours as needed for Constipation (If no BM in 3 days). 10 mL  
    
   
   
   
  
 metoprolol succinate 25 mg XL tablet Commonly known as:  TOPROL-XL Your last dose was:  06/07 am  
Your next dose is:  06/08 am  
   
 Take 1 Tab by mouth daily. 25 mg  
    
  
   
   
   
  
 olopatadine 0.1 % ophthalmic solution Commonly known as:  PATANOL Your last dose was:  06/07 am 
  
Your next dose is:  06/07 pm  
   
 Administer 1 Drop to both eyes two (2) times a day. 1 Drop  
    
  
   
   
  
   
  
 oxybutynin chloride XL 5 mg CR tablet Commonly known as:  DITROPAN XL Your last dose was:  06/07 am  
Your next dose is:  06/08 am  
   
 Take 5 mg by mouth daily. For overactive bladder 5 mg potassium chloride SR 10 mEq tablet Commonly known as:  KLOR-CON 10 Your last dose was:  06/07 am  
Your next dose is:  06/07 pm  
   
 Take 1 Tab by mouth two (2) times a day. 10 mEq PROBIOTIC 4X 10-15 mg Tbec Generic drug:  B.infantis-B.ani-B.long-B.bifi Your last dose was:  06/07 am  
Your next dose is:  06/07 pm 
  
   
 Take 1 Tab by mouth two (2) times a day. 1 Tab  
    
  
   
   
  
   
  
 promethazine 12.5 mg tablet Commonly known as:  PHENERGAN Take 12.5 mg by mouth every six (6) hours as needed for Nausea. 12.5 mg PROTONIX 20 mg tablet Generic drug:  pantoprazole Your last dose was:  06/07 am  
Your next dose is:  06/07 pm 
  
   
 Take 20 mg by mouth Before breakfast and dinner. For GERD 20 mg  
    
  
   
   
  
   
  
 RULOX 200-200-20 mg/5 mL Susp Generic drug:  alum-mag hydroxide-simeth Take 20 mL by mouth every four (4) hours as needed. 20 mL  
    
   
   
   
  
 tiotropium 18 mcg inhalation capsule Commonly known as:  [address] Your last dose was:  06/07 am  
Your next dose is:  06/08 am  
   
 Take 1 Cap by inhalation daily. Indications: Chronic Obstructive Pulmonary Disease with Bronchospasms 1 Cap  
    
  
   
   
   
  
 triamcinolone 0.5 % topical cream  
Commonly known as:  ARISTOCORT Apply  to affected area two (2) times daily as needed for Skin Irritation. use thin layer TYLENOL 325 mg tablet Generic drug:  acetaminophen Take 650 mg by mouth every four (4) hours as needed for Pain. 650 mg  
    
   
   
   
  
  
STOP taking these medications   
 amiodarone 100 mg tablet Commonly known as:  Mandy Mercado Where to Get Your Medications Information on where to get these meds will be given to you by the nurse or doctor. ! Ask your nurse or doctor about these medications  
  levoFLOXacin 750 mg tablet Discharge Instructions Daily Weights Weigh yourself Daily  Keep Log/Record Notify Dr. Denzel Jimenez office for a weight gain of 3 pounds or greater in one day or 5 pounds in 5 days. Low Sodium Diet as per [facility] Education [address] [place] [address] 
[phone] Patient Discharge Instructions Steve Lr / [id number] : 1928 Admitted 2018 Discharged: 2018 Take Home Medications · It is important that you take the medication exactly as they are prescribed. · Keep your medication in the bottles provided by the pharmacist and keep a list of the medication names, dosages, and times to be taken in your wallet. · Do not take other medications without consulting your doctor. What to do at [facility] Recommended diet: Cardiac Diet, Recommended activity: PT/OT Eval and Treat,  
 
Continuous oxygen Follow-up with Dr Bonilla Worley in 5-6 days Information obtained by : 
I understand that if any problems occur once I am at home I am to contact my physician. I understand and acknowledge receipt of the instructions indicated above. Physician's or R.N.'s Signature                                                                  Date/Time Patient or Representative Signature                                                          Date/Time [employer] Announcement We are excited to announce that we are making your provider's discharge notes available to you in [employer].   You will see these notes when they are completed and signed by the physician that discharged you from your recent hospital stay. If you have any questions or concerns about any information you see in [employer], please call the Health Information Department where you were seen or reach out to your Primary Care Provider for more information about your plan of care. Introducing [facility]! Bogdan Gallagher introduces [employer] patient portal. Now you can access parts of your medical record, email your doctor's office, and request medication refills online. 1. In your internet browser, go to [email or web address] 2. Click on the First Time User? Click Here link in the Sign In box. You will see the New Member Sign Up page. 3. Enter your [employer] Access Code exactly as it appears below. You will not need to use this code after youve completed the sign-up process. If you do not sign up before the expiration date, you must request a new code. · [employer] Access Code: [email or web address] Expires: 7/29/2018 10:37 AM 
 
4. Enter the last four digits of your Social Security Number (xxxx) and Date of Birth (mm/dd/yyyy) as indicated and click Submit. You will be taken to the next sign-up page. 5. Create a [employer] ID. This will be your [employer] login ID and cannot be changed, so think of one that is secure and easy to remember. 6. Create a [employer] password. You can change your password at any time. 7. Enter your Password Reset Question and Answer. This can be used at a later time if you forget your password. 8. Enter your e-mail address. You will receive e-mail notification when new information is available in [address] 9. Click Sign Up. You can now view and download portions of your medical record. 10. Click the Download Summary menu link to download a portable copy of your medical information. If you have questions, please visit the Frequently Asked Questions section of the [employer] website. Remember, [employer] is NOT to be used for urgent needs. For medical emergencies, dial 911. Now available from your iPhone and Android! Introducing [employer]ences As a Sunitha Williamson patient, I wanted to make you aware of our electronic visit tool called Nav [employer]ences. [facility] 24/7 allows you to connect within minutes with a medical provider 24 hours a day, seven days a week via a mobile device or tablet or logging into a secure website from your computer. You can access Nav Vences from anywhere in the [place]. A virtual visit might be right for you when you have a simple condition and feel like you just dont want to get out of bed, or cant get away from work for an appointment, when your regular Sunitha Williamson provider is not available (evenings, weekends or holidays), or when youre out of town and need minor care. Electronic visits cost only $49 and if the Sunitha Williamson 24/7 provider determines a prescription is needed to treat your condition, one can be electronically transmitted to a nearby pharmacy*. Please take a moment to enroll today if you have not already done so. The enrollment process is free and takes just a few minutes. To enroll, please download the Sunitha Eat 24/7 lionel to your tablet or phone, or visit [email or web address] to enroll on your computer. And, as an [address] [place] [address] patient with a [employer] account, the results of your visits will be scanned into your electronic medical record and your primary care provider will be able to view the scanned results. We urge you to continue to see your regular Sunitha Clay provider for your ongoing medical care. And while your primary care provider may not be the one available when you seek a [employer]ences virtual visit, the peace of mind you get from getting a real diagnosis real time can be priceless. For more information on [employer], view our Frequently Asked Questions (FAQs) at [email or web address]. Sincerely, 
 
Mitch Rg MD 
Chief Medical Officer [employer] *:  certain medications cannot be prescribed via [employer] Unresulted Labs-Please follow up with your PCP about these lab tests Order Current Status CULTURE, BLOOD Preliminary result Providers Seen During Your Hospitalization Provider Specialty Primary office phone Nany Knight MD Emergency Medicine [phone] Sapna Joshi MD Internal Medicine [phone] Your Primary Care Physician (PCP) Primary Care Physician Office Phone Office Fax Vista Homans [phone] You are allergic to the following Allergen Reactions Penicillins Swelling Sulfa (Sulfonamide Antibiotics) Itching Propoxyphene-Acetaminophen Unknown (comments) Pt. Not sure, thinks it was hallucination. Recent Documentation Height Weight BMI Smoking Status 1.676 m 66.6 kg 23.7 kg/m2 Former Smoker Emergency Contacts Name Discharge Info Relation Home Work Mobile [address] [facility] CAREGIVER [3] Child [2] [id number] Tunde Scales N/A  AT THIS TIME [6] Child [2] [phone] Patient Belongings The following personal items are in your possession at time of discharge: 
  Dental Appliances: None  Visual Aid: Glasses      Home Medications: None   Jewelry: Ring, Watch  Clothing: Pajamas    Other Valuables: None Discharge Instructions Attachments/References HEART FAILURE ZONES: GENERAL INFO (ENGLISH) HEART FAILURE: AVOIDING TRIGGERS (ENGLISH) HEART FAILURE: LIMITING SODIUM AND FLUIDS (ENGLISH) Patient Handouts Learning About Heart Failure Zones What are heart failure zones? Heart failure zones give you an easy way to see changes in your heart failure symptoms. They also tell you when you need to get help. Check every day to see which zone you are in. Green zone. You are doing well. This is where you want to be. · Your weight is stable. This means it is not going up or down. · You breathe easily. · You are sleeping well. You are able to lie flat without shortness of breath. · You can do your usual activities. Yellow zone. Be careful. Your symptoms are changing. Call your doctor. · You have new or increased shortness of breath. · You are dizzy or lightheaded, or you feel like you may faint. · You have sudden weight gain, such as more than 2 to 3 pounds in a day or 5 pounds in a week. (Your doctor may suggest a different range of weight gain.) · You have increased swelling in your legs, ankles, or feet. · You are so tired or weak that you cannot do your usual activities. · You are not sleeping well. Shortness of breath wakes you up at night. You need extra pillows. Your doctor's name: ____________________________________________________________ Your doctor's contact information: _________________________________________________ Red zone. This is an emergency. Call 911. You have symptoms of sudden heart failure, such as: 
· You have severe trouble breathing. · You cough up pink, foamy mucus. · You have a new irregular or fast heartbeat. You have symptoms of a heart attack. These may include: · Chest pain or pressure, or a strange feeling in the chest. 
· Sweating. · Shortness of breath. · Nausea or vomiting. · Pain, pressure, or a strange feeling in the back, neck, jaw, or upper belly or in one or both shoulders or arms. · Lightheadedness or sudden weakness. · A fast or irregular heartbeat. If you have symptoms of a heart attack: After you call 911, the  may tell you to chew 1 adult-strength or 2 to 4 low-dose aspirin. Wait for an ambulance. Do not try to drive yourself. Follow-up care is a key part of your treatment and safety.  Be sure to make and go to all appointments, and call your doctor if you are having problems. It's also a good idea to know your test results and keep a list of the medicines you take. Where can you learn more? Go to http://yani-jori.info/. Enter T174 in the search box to learn more about \"Learning About Heart Failure Zones. \" Current as of: September 21, 2016 Content Version: 11.4 © 6887-9451 [employer]. Care instructions adapted under license by [employer] (which disclaims liability or warranty for this information). If you have questions about a medical condition or this instruction, always ask your healthcare professional. [address] any warranty or liability for your use of this information. Avoiding Triggers With Heart Failure: Care Instructions Your Care Instructions Triggers are anything that make your heart failure flare up. A flare-up is also called \"sudden heart failure\" or \"acute heart failure. \" When you have a flare-up, fluid builds up in your lungs, and you have problems breathing. You might need to go to the hospital. By watching for changes in your condition and avoiding triggers, you can prevent heart failure flare-ups. Follow-up care is a key part of your treatment and safety. Be sure to make and go to all appointments, and call your doctor if you are having problems. It's also a good idea to know your test results and keep a list of the medicines you take. How can you care for yourself at home? Watch for changes in your weight and condition · Weigh yourself without clothing at the same time each day. Record your weight. Call your doctor if you have sudden weight gain, such as more than 2 to 3 pounds in a day or 5 pounds in a week. (Your doctor may suggest a different range of weight gain.) A sudden weight gain may mean that your heart failure is getting worse. · Keep a daily record of your symptoms.  Write down any changes in how you feel, such as new shortness of breath, cough, or problems eating. Also record if your ankles are more swollen than usual and if you feel more tired than usual. Note anything that you ate or did that could have triggered these changes. Limit sodium Sodium causes your body to hold on to extra water. This may cause your heart failure symptoms to get worse. People get most of their sodium from processed foods. Fast food and restaurant meals also tend to be very high in sodium. · Your doctor may suggest that you limit sodium to 2,000 milligrams (mg) a day or less. That is less than 1 teaspoon of salt a day, including all the salt you eat in cooking or in packaged foods. · Read food labels on cans and food packages. They tell you how much sodium you get in one serving. Check the serving size. If you eat more than one serving, you are getting more sodium. · Be aware that sodium can come in forms other than salt, including monosodium glutamate (MSG), sodium citrate, and sodium bicarbonate (baking soda). MSG is often added to Asian food. You can sometimes ask for food without MSG or salt. · Slowly reducing salt will help you adjust to the taste. Take the salt shaker off the table. · Flavor your food with garlic, lemon juice, onion, vinegar, herbs, and spices instead of salt. Do not use soy sauce, steak sauce, onion salt, garlic salt, mustard, or ketchup on your food, unless it is labeled \"low-sodium\" or \"low-salt. \" 
· Make your own salad dressings, sauces, and ketchup without adding salt. · Use fresh or frozen ingredients, instead of canned ones, whenever you can. Choose low-sodium canned goods. · Eat less processed food and food from restaurants, including fast food. Exercise as directed Moderate, regular exercise is very good for your heart. It improves your blood flow and helps control your weight. But too much exercise can stress your heart and cause a heart failure flare-up. · Check with your doctor before you start an exercise program. 
· Walking is an easy way to get exercise. Start out slowly. Gradually increase the length and pace of your walk. Swimming, riding a bike, and using a treadmill are also good forms of exercise. · When you exercise, watch for signs that your heart is working too hard. You are pushing yourself too hard if you cannot talk while you are exercising. If you become short of breath or dizzy or have chest pain, stop, sit down, and rest. 
· Do not exercise when you do not feel well. Take medicines correctly · Take your medicines exactly as prescribed. Call your doctor if you think you are having a problem with your medicine. · Make a list of all the medicines you take. Include those prescribed to you by other doctors and any over-the-counter medicines, vitamins, or supplements you take. Take this list with you when you go to any doctor. · Take your medicines at the same time every day. It may help you to post a list of all the medicines you take every day and what time of day you take them. · Make taking your medicine as simple as you can. Plan times to take your medicines when you are doing other things, such as eating a meal or getting ready for bed. This will make it easier to remember to take your medicines. · Get organized. Use helpful tools, such as daily or weekly pill containers. When should you call for help? Call 911 if you have symptoms of sudden heart failure such as: 
? · You have severe trouble breathing. ? · You cough up pink, foamy mucus. ? · You have a new irregular or rapid heartbeat. ?Call your doctor now or seek immediate medical care if: 
? · You have new or increased shortness of breath. ? · You are dizzy or lightheaded, or you feel like you may faint. ? · You have sudden weight gain, such as more than 2 to 3 pounds in a day or 5 pounds in a week. (Your doctor may suggest a different range of weight gain.) ? · You have increased swelling in your legs, ankles, or feet. ? · You are suddenly so tired or weak that you cannot do your usual activities. ? Watch closely for changes in your health, and be sure to contact your doctor if you develop new symptoms. Where can you learn more? Go to http://yani-jori.info/. Enter L266 in the search box to learn more about \"Avoiding Triggers With Heart Failure: Care Instructions. \" Current as of: September 21, 2016 Content Version: 11.4 © 8454-7768 [employer]. Care instructions adapted under license by [employer] (which disclaims liability or warranty for this information). If you have questions about a medical condition or this instruction, always ask your healthcare professional. [address] any warranty or liability for your use of this information. Limiting Sodium and Fluids With Heart Failure: Care Instructions Your Care Instructions Sodium causes your body to hold on to extra water. This may cause your heart failure symptoms to get worse. Limiting sodium may help you feel better and lower your risk of having to go to the hospital. 
People get most of their sodium from processed foods. Fast food and restaurant meals also tend to be very high in sodium. Your doctor may suggest that you limit sodium to 2,000 milligrams (mg) a day or less. That is less than 1 teaspoon of salt a day, including all the salt you eat in cooked or packaged foods. Usually, you have to limit the amount of liquids you drink only if your heart failure is severe. Limiting sodium alone often is enough to help your body get rid of extra fluids. However, your doctor may tell you to limit your fluid intake to a set amount each day. Follow-up care is a key part of your treatment and safety.  Be sure to make and go to all appointments, and call your doctor if you are having problems. It's also a good idea to know your test results and keep a list of the medicines you take. How can you care for yourself at home? Read food labels · Read food labels on cans and food packages. The labels tell you how much sodium is in each serving. Make sure that you look at the serving size. If you eat more than the serving size, you have eaten more sodium than is listed for one serving. · Food labels also tell you the Percent Daily Value. If the Percent Daily Value says 50%, it means that you will get at least 50% of all the sodium you need for the entire day in one serving. Choose products with low Percent Daily Values for sodium. · Be aware that sodium can come in forms other than salt, including monosodium glutamate (MSG), sodium citrate, and sodium bicarbonate (baking soda). MSG is often added to Asian food. You can sometimes ask for food without MSG or salt. Buy low-sodium foods · Buy foods that are labeled \"unsalted\" (no salt added), \"sodium-free\" (less than 5 mg of sodium per serving), or \"low-sodium\" (less than 140 mg of sodium per serving). A food labeled \"light sodium\" has less than half of the full-sodium version of that food. Foods labeled \"reduced-sodium\" may still have too much sodium. · Buy fresh vegetables or plain, frozen vegetables. Buy low-sodium versions of canned vegetables, soups, and other canned goods. Prepare low-sodium meals · Use less salt each day when cooking. Reducing salt in this way will help you adjust to the taste. Do not add salt after cooking. Take the salt shaker off the table. · Flavor your food with garlic, lemon juice, onion, vinegar, herbs, and spices instead of salt. Do not use soy sauce, steak sauce, onion salt, garlic salt, mustard, or ketchup on your food. · Make your own salad dressings, sauces, and ketchup without adding salt. · Use less salt (or none) when recipes call for it.  You can often use half the salt a recipe calls for without losing flavor. Other dishes like rice, pasta, and grains do not need added salt. · Rinse canned vegetables. This removes some-but not all-of the salt. · Avoid water that has a naturally high sodium content or that has been treated with water softeners, which add sodium. Call your local water company to find out the sodium content of your water supply. If you buy bottled water, read the label and choose a sodium-free brand. Avoid high-sodium foods, such as: 
· Smoked, cured, salted, and canned meat, fish, and poultry. · Ham, nesbitt, hot dogs, and luncheon meats. · Regular, hard, and processed cheese and regular peanut butter. · Crackers with salted tops. · Frozen prepared meals. · Canned and dried soups, broths, and bouillon, unless labeled sodium-free or low-sodium. · Canned vegetables, unless labeled sodium-free or low-sodium. · Salted snack foods such as chips and pretzels. · [place] fries, pizza, tacos, and other fast foods. · Pickles, olives, ketchup, and other condiments, especially soy sauce, unless labeled sodium-free or low-sodium. If you cannot cook for yourself · Have family members or friends help you, or have someone cook low-sodium meals. · Check with your local senior nutrition program to find out where meals are served and whether they offer a low-sodium option. You can often find these programs through your local health department or hospital. 
· Have meals delivered to your home. Most [facility] have a [employer]. These programs provide one hot meal a day for older adults, delivered to their homes. Ask whether these meals are low-sodium. Let them know that you are on a low-sodium diet. Limiting fluid intake · Find a method that works for you. You might simply write down how much you drink every time you do. Some people keep a container filled with the amount of fluid allowed for that day.  If they drink from a source other than the container, then they pour out that amount. · Measure your regular drinking glasses to find out how much fluid each one holds. Once you know this, you will not have to measure every time. · Besides water, milk, juices, and other drinks, some foods have a lot of fluid. Count any foods that will melt (such as ice cream or gelatin dessert) or liquid foods (such as soup) as part of your fluid intake for the day. Where can you learn more? Go to http://yani-jori.info/. Enter A166 in the search box to learn more about \"Limiting Sodium and Fluids With Heart Failure: Care Instructions. \" Current as of: September 21, 2016 Content Version: 11.4 © 3380-6334 [employer]. Care instructions adapted under license by [employer] (which disclaims liability or warranty for this information). If you have questions about a medical condition or this instruction, always ask your healthcare professional. [place] [address] any warranty or liability for your use of this information. Please provide this summary of care documentation to your next provider. Signatures-by signing, you are acknowledging that this After Visit Summary has been reviewed with you and you have received a copy. Patient Signature:  ____________________________________________________________ Date:  ____________________________________________________________  
  
Berlin Tucker Provider Signature:  ____________________________________________________________ Date:  ____________________________________________________________

## 2018-06-02 NOTE — IP AVS SNAPSHOT
[address] [place] [address] 
[phone] Patient: Harpal Baer MRN: [id number] KTA:[id number] A check juli indicates which time of day the medication should be taken. My Medications START taking these medications Instructions Each Dose to Equal  
 Morning Noon Evening Bedtime  
 levoFLOXacin 750 mg tablet Commonly known as:  Mira Collier Your last dose was:  06/07 at 0600 Your next dose is:  06/09 in am  
   
 Take 1 Tab by mouth every fourty-eight (48) hours for 4 days. 750 mg CONTINUE taking these medications Instructions Each Dose to Equal  
 Morning Noon Evening Bedtime  
 albuterol 90 mcg/actuation inhaler Commonly known as:  PROVENTIL HFA, VENTOLIN HFA, PROAIR HFA Take 2 Puffs by inhalation every six (6) hours as needed (for COPD). 2 Puff  
    
   
   
   
  
 albuterol-ipratropium 2.5 mg-0.5 mg/3 ml Nebu Commonly known as:  DUO-NEB  
   
 3 mL by Nebulization route every four (4) hours as needed (Shortness of Breath). 3 mL  
    
   
   
   
  
 amLODIPine 5 mg tablet Commonly known as:  Rod Dub Your last dose was:  06/07 am  
Your next dose is:  06/08 am  
   
 Take 5 mg by mouth daily. 5 mg  
    
  
   
   
   
  
 ammonium lactate 12 % lotion Commonly known as:  LAC-HYDRIN Apply  to affected area every twelve (12) hours as needed (Apply to chest, back, arms as needed for eczema). rub in to affected area well  
     
   
   
   
  
 apixaban 2.5 mg tablet Commonly known as:  Tanner Cook Your last dose was:  06/07 am  
Your next dose is:  06/08 am  
   
 Take 1 Tab by mouth two (2) times a day. 2.5 mg  
    
  
   
   
   
  
 aspirin 81 mg chewable tablet Your last dose was:  06/07 am  
Your next dose is:  06/08 am  
   
 Take 1 Tab by mouth daily. 81 mg  
    
  
   
   
   
  
 atorvastatin 40 mg tablet Commonly known as:  LIPITOR Your last dose was:  06/06 pm  
Your next dose is:  06/07 pm 
  
   
 Take 1 Tab by mouth nightly. 40 mg CENTRUM SILVER ULTRA WOMEN'S PO Your last dose was:  06/07 am  
Your next dose is:  06/08 am  
   
 Take 1 Tab by mouth daily. 1 Tab  
    
  
   
   
   
  
 cetirizine 10 mg tablet Commonly known as:  ZYRTEC Your last dose was:  06/06 pm  
Your next dose is:  06/07 Take 10 mg by mouth nightly. For itching 10 mg  
    
   
   
   
  
  
 clobetasol 0.05 % topical cream  
Commonly known as:  Michael Chance Apply  to affected area every Monday, Wednesday, Friday. Apply to vulva at night for pain and itching  
     
   
   
   
  
 diphenhydrAMINE-zinc acetate 1%-0.1% topical cream  
Commonly known as:  BENADRYL Apply  to affected area every six (6) hours as needed for Itching. DULCOLAX (BISACODYL) 10 mg suppository Generic drug:  bisacodyl Insert 10 mg into rectum as needed (Every 96 hours as needed for constipation). 10 mg  
    
   
   
   
  
 FLEET ENEMA 19-7 gram/118 mL enema Generic drug:  sodium phosphate Insert 1 Enema into rectum. Every 120 hours as needed for constipation if no results from suppository 1 Enema  
    
   
   
   
  
 furosemide 40 mg tablet Commonly known as:  LASIX Your last dose was:  06/07 am  
Your next dose is:  06/08 am 
  
   
 Take 1 Tab by mouth two (2) times a day. 40 mg  
    
  
   
   
  
   
  
 guaiFENesin 100 mg/5 mL liquid Commonly known as:  ROBITUSSIN Take 200 mg by mouth every four (4) hours as needed for Cough. 200 mg LUBRISKIN Lotn lotion Generic drug:  emollient combination no. 1000 [facility] Apply  to affected area every eight (8) hours as needed for Dry Skin (Apply to both hands). magnesium hydroxide 400 mg/5 mL suspension Commonly known as:  MILK OF MAGNESIA Take 10 mL by mouth every seventy-two (72) hours as needed for Constipation (If no BM in 3 days). 10 mL  
    
   
   
   
  
 metoprolol succinate 25 mg XL tablet Commonly known as:  TOPROL-XL Your last dose was:  06/07 am  
Your next dose is:  06/08 am  
   
 Take 1 Tab by mouth daily. 25 mg  
    
  
   
   
   
  
 olopatadine 0.1 % ophthalmic solution Commonly known as:  PATANOL Your last dose was:  06/07 am 
  
Your next dose is:  06/07 pm  
   
 Administer 1 Drop to both eyes two (2) times a day. 1 Drop  
    
  
   
   
  
   
  
 oxybutynin chloride XL 5 mg CR tablet Commonly known as:  DITROPAN XL Your last dose was:  06/07 am  
Your next dose is:  06/08 am  
   
 Take 5 mg by mouth daily. For overactive bladder 5 mg  
    
  
   
   
   
  
 potassium chloride SR 10 mEq tablet Commonly known as:  KLOR-CON 10 Your last dose was:  06/07 am  
Your next dose is:  06/07 pm  
   
 Take 1 Tab by mouth two (2) times a day. 10 mEq PROBIOTIC 4X 10-15 mg Tbec Generic drug:  B.infantis-B.ani-B.long-B.bifi Your last dose was:  06/07 am  
Your next dose is:  06/07 pm 
  
   
 Take 1 Tab by mouth two (2) times a day. 1 Tab  
    
  
   
   
  
   
  
 promethazine 12.5 mg tablet Commonly known as:  PHENERGAN Take 12.5 mg by mouth every six (6) hours as needed for Nausea. 12.5 mg PROTONIX 20 mg tablet Generic drug:  pantoprazole Your last dose was:  06/07 am  
Your next dose is:  06/07 pm 
  
   
 Take 20 mg by mouth Before breakfast and dinner. For GERD 20 mg  
    
  
   
   
  
   
  
 RULOX 200-200-20 mg/5 mL Susp Generic drug:  alum-mag hydroxide-simeth Take 20 mL by mouth every four (4) hours as needed. 20 mL  
    
   
   
   
  
 tiotropium 18 mcg inhalation capsule Commonly known as:  [address] Your last dose was:  06/07 am  
Your next dose is:  06/08 am  
   
 Take 1 Cap by inhalation daily. Indications: Chronic Obstructive Pulmonary Disease with Bronchospasms 1 Cap  
    
  
   
   
   
  
 triamcinolone 0.5 % topical cream  
Commonly known as:  ARISTOCORT Apply  to affected area two (2) times daily as needed for Skin Irritation. use thin layer TYLENOL 325 mg tablet Generic drug:  acetaminophen Take 650 mg by mouth every four (4) hours as needed for Pain. 650 mg  
    
   
   
   
  
  
STOP taking these medications   
 amiodarone 100 mg tablet Commonly known as:  Azul Loza Where to Get Your Medications Information on where to get these meds will be given to you by the nurse or doctor. ! Ask your nurse or doctor about these medications  
  levoFLOXacin 750 mg tablet

## 2018-06-03 ENCOUNTER — APPOINTMENT (OUTPATIENT)
Dept: CT IMAGING | Age: 83
DRG: 291 | End: 2018-06-03
Attending: INTERNAL MEDICINE
Payer: MEDICARE

## 2018-06-03 PROBLEM — I50.33 ACUTE ON CHRONIC DIASTOLIC HEART FAILURE (HCC): Status: ACTIVE | Noted: 2018-05-01

## 2018-06-03 PROBLEM — J96.01 ACUTE RESPIRATORY FAILURE WITH HYPOXIA (HCC): Status: ACTIVE | Noted: 2018-06-03

## 2018-06-03 PROBLEM — I34.0 MODERATE MITRAL REGURGITATION: Status: ACTIVE | Noted: 2018-06-03

## 2018-06-03 PROBLEM — I25.10 CAD (CORONARY ARTERY DISEASE): Status: ACTIVE | Noted: 2018-06-03

## 2018-06-03 PROBLEM — I48.0 PAROXYSMAL A-FIB (HCC): Status: ACTIVE | Noted: 2018-06-03

## 2018-06-03 PROBLEM — R77.8 ELEVATED TROPONIN: Status: ACTIVE | Noted: 2018-06-03

## 2018-06-03 PROBLEM — E78.5 HYPERLIPIDEMIA: Status: ACTIVE | Noted: 2018-06-03

## 2018-06-03 LAB
ALBUMIN SERPL-MCNC: 2.9 G/DL (ref 3.5–5)
ALBUMIN/GLOB SERPL: 0.7 {RATIO} (ref 1.1–2.2)
ALP SERPL-CCNC: 72 U/L (ref 45–117)
ALT SERPL-CCNC: 15 U/L (ref 12–78)
ANION GAP SERPL CALC-SCNC: 7 MMOL/L (ref 5–15)
AST SERPL-CCNC: 17 U/L (ref 15–37)
ATRIAL RATE: 87 BPM
BASOPHILS # BLD: 0 K/UL (ref 0–0.1)
BASOPHILS NFR BLD: 0 % (ref 0–1)
BILIRUB SERPL-MCNC: 1 MG/DL (ref 0.2–1)
BNP SERPL-MCNC: ABNORMAL PG/ML (ref 0–450)
BUN SERPL-MCNC: 23 MG/DL (ref 6–20)
BUN/CREAT SERPL: 18 (ref 12–20)
CALCIUM SERPL-MCNC: 9 MG/DL (ref 8.5–10.1)
CALCULATED R AXIS, ECG10: -35 DEGREES
CALCULATED T AXIS, ECG11: 31 DEGREES
CHLORIDE SERPL-SCNC: 94 MMOL/L (ref 97–108)
CK MB CFR SERPL CALC: 2.7 % (ref 0–2.5)
CK MB SERPL-MCNC: 1.7 NG/ML (ref 5–25)
CK SERPL-CCNC: 62 U/L (ref 26–192)
CO2 SERPL-SCNC: 34 MMOL/L (ref 21–32)
CREAT SERPL-MCNC: 1.27 MG/DL (ref 0.55–1.02)
DIAGNOSIS, 93000: NORMAL
DIFFERENTIAL METHOD BLD: ABNORMAL
EOSINOPHIL # BLD: 0 K/UL (ref 0–0.4)
EOSINOPHIL NFR BLD: 0 % (ref 0–7)
ERYTHROCYTE [DISTWIDTH] IN BLOOD BY AUTOMATED COUNT: 14.6 % (ref 11.5–14.5)
GLOBULIN SER CALC-MCNC: 4.3 G/DL (ref 2–4)
GLUCOSE SERPL-MCNC: 130 MG/DL (ref 65–100)
HCT VFR BLD AUTO: 36.5 % (ref 35–47)
HGB BLD-MCNC: 12.1 G/DL (ref 11.5–16)
IMM GRANULOCYTES # BLD: 0.3 K/UL (ref 0–0.04)
IMM GRANULOCYTES NFR BLD AUTO: 1 % (ref 0–0.5)
LACTATE SERPL-SCNC: 1.4 MMOL/L (ref 0.4–2)
LACTATE SERPL-SCNC: 2 MMOL/L (ref 0.4–2)
LYMPHOCYTES # BLD: 1.5 K/UL (ref 0.8–3.5)
LYMPHOCYTES NFR BLD: 6 % (ref 12–49)
MAGNESIUM SERPL-MCNC: 1.6 MG/DL (ref 1.6–2.4)
MCH RBC QN AUTO: 29.5 PG (ref 26–34)
MCHC RBC AUTO-ENTMCNC: 33.2 G/DL (ref 30–36.5)
MCV RBC AUTO: 89 FL (ref 80–99)
MONOCYTES # BLD: 0.5 K/UL (ref 0–1)
MONOCYTES NFR BLD: 2 % (ref 5–13)
NEUTS SEG # BLD: 22.7 K/UL (ref 1.8–8)
NEUTS SEG NFR BLD: 91 % (ref 32–75)
NRBC # BLD: 0 K/UL (ref 0–0.01)
NRBC BLD-RTO: 0 PER 100 WBC
P-R INTERVAL, ECG05: 98 MS
PLATELET # BLD AUTO: 224 K/UL (ref 150–400)
PMV BLD AUTO: 11.7 FL (ref 8.9–12.9)
POTASSIUM SERPL-SCNC: 3 MMOL/L (ref 3.5–5.1)
PROT SERPL-MCNC: 7.2 G/DL (ref 6.4–8.2)
Q-T INTERVAL, ECG07: 334 MS
QRS DURATION, ECG06: 88 MS
QTC CALCULATION (BEZET), ECG08: 401 MS
RBC # BLD AUTO: 4.1 M/UL (ref 3.8–5.2)
RBC MORPH BLD: ABNORMAL
SODIUM SERPL-SCNC: 135 MMOL/L (ref 136–145)
TROPONIN I SERPL-MCNC: 0.13 NG/ML
TROPONIN I SERPL-MCNC: 0.14 NG/ML
VENTRICULAR RATE, ECG03: 87 BPM
WBC # BLD AUTO: 25 K/UL (ref 3.6–11)

## 2018-06-03 PROCEDURE — 74011250637 HC RX REV CODE- 250/637: Performed by: EMERGENCY MEDICINE

## 2018-06-03 PROCEDURE — 77010033678 HC OXYGEN DAILY

## 2018-06-03 PROCEDURE — 74011250636 HC RX REV CODE- 250/636: Performed by: EMERGENCY MEDICINE

## 2018-06-03 PROCEDURE — 71250 CT THORAX DX C-: CPT

## 2018-06-03 PROCEDURE — 77030038269 HC DRN EXT URIN PURWCK BARD -A

## 2018-06-03 PROCEDURE — 65660000000 HC RM CCU STEPDOWN

## 2018-06-03 PROCEDURE — 96375 TX/PRO/DX INJ NEW DRUG ADDON: CPT

## 2018-06-03 PROCEDURE — 87040 BLOOD CULTURE FOR BACTERIA: CPT | Performed by: EMERGENCY MEDICINE

## 2018-06-03 PROCEDURE — 96367 TX/PROPH/DG ADDL SEQ IV INF: CPT

## 2018-06-03 PROCEDURE — 84484 ASSAY OF TROPONIN QUANT: CPT | Performed by: INTERNAL MEDICINE

## 2018-06-03 PROCEDURE — 74011250636 HC RX REV CODE- 250/636: Performed by: INTERNAL MEDICINE

## 2018-06-03 PROCEDURE — 96365 THER/PROPH/DIAG IV INF INIT: CPT

## 2018-06-03 PROCEDURE — 74011000250 HC RX REV CODE- 250: Performed by: INTERNAL MEDICINE

## 2018-06-03 PROCEDURE — 74011250637 HC RX REV CODE- 250/637: Performed by: INTERNAL MEDICINE

## 2018-06-03 PROCEDURE — 81001 URINALYSIS AUTO W/SCOPE: CPT | Performed by: EMERGENCY MEDICINE

## 2018-06-03 PROCEDURE — 36415 COLL VENOUS BLD VENIPUNCTURE: CPT | Performed by: INTERNAL MEDICINE

## 2018-06-03 PROCEDURE — 74011000258 HC RX REV CODE- 258: Performed by: EMERGENCY MEDICINE

## 2018-06-03 PROCEDURE — 87070 CULTURE OTHR SPECIMN AEROBIC: CPT | Performed by: INTERNAL MEDICINE

## 2018-06-03 PROCEDURE — 87186 SC STD MICRODIL/AGAR DIL: CPT | Performed by: EMERGENCY MEDICINE

## 2018-06-03 PROCEDURE — 83605 ASSAY OF LACTIC ACID: CPT | Performed by: INTERNAL MEDICINE

## 2018-06-03 RX ORDER — LEVOFLOXACIN 5 MG/ML
750 INJECTION, SOLUTION INTRAVENOUS
Status: COMPLETED | OUTPATIENT
Start: 2018-06-03 | End: 2018-06-04

## 2018-06-03 RX ORDER — POTASSIUM CHLORIDE 750 MG/1
40 TABLET, FILM COATED, EXTENDED RELEASE ORAL
Status: COMPLETED | OUTPATIENT
Start: 2018-06-03 | End: 2018-06-03

## 2018-06-03 RX ORDER — LEVOFLOXACIN 5 MG/ML
750 INJECTION, SOLUTION INTRAVENOUS EVERY 24 HOURS
Status: DISCONTINUED | OUTPATIENT
Start: 2018-06-04 | End: 2018-06-03

## 2018-06-03 RX ORDER — CETIRIZINE HCL 10 MG
10 TABLET ORAL
Status: DISCONTINUED | OUTPATIENT
Start: 2018-06-03 | End: 2018-06-07 | Stop reason: HOSPADM

## 2018-06-03 RX ORDER — GUAIFENESIN 100 MG/5ML
81 LIQUID (ML) ORAL DAILY
Status: DISCONTINUED | OUTPATIENT
Start: 2018-06-03 | End: 2018-06-07 | Stop reason: HOSPADM

## 2018-06-03 RX ORDER — OXYBUTYNIN CHLORIDE 5 MG/1
5 TABLET, EXTENDED RELEASE ORAL DAILY
Status: DISCONTINUED | OUTPATIENT
Start: 2018-06-03 | End: 2018-06-07 | Stop reason: HOSPADM

## 2018-06-03 RX ORDER — ATORVASTATIN CALCIUM 40 MG/1
40 TABLET, FILM COATED ORAL
Status: DISCONTINUED | OUTPATIENT
Start: 2018-06-03 | End: 2018-06-07 | Stop reason: HOSPADM

## 2018-06-03 RX ORDER — SODIUM CHLORIDE 0.9 % (FLUSH) 0.9 %
5-10 SYRINGE (ML) INJECTION EVERY 8 HOURS
Status: DISCONTINUED | OUTPATIENT
Start: 2018-06-03 | End: 2018-06-07 | Stop reason: HOSPADM

## 2018-06-03 RX ORDER — FUROSEMIDE 10 MG/ML
40 INJECTION INTRAMUSCULAR; INTRAVENOUS 2 TIMES DAILY
Status: DISCONTINUED | OUTPATIENT
Start: 2018-06-03 | End: 2018-06-06

## 2018-06-03 RX ORDER — LEVOFLOXACIN 5 MG/ML
750 INJECTION, SOLUTION INTRAVENOUS
Status: DISCONTINUED | OUTPATIENT
Start: 2018-06-05 | End: 2018-06-06

## 2018-06-03 RX ORDER — SODIUM CHLORIDE 0.9 % (FLUSH) 0.9 %
5-10 SYRINGE (ML) INJECTION AS NEEDED
Status: DISCONTINUED | OUTPATIENT
Start: 2018-06-03 | End: 2018-06-03 | Stop reason: SDUPTHER

## 2018-06-03 RX ORDER — POTASSIUM CHLORIDE 750 MG/1
20 TABLET, FILM COATED, EXTENDED RELEASE ORAL 2 TIMES DAILY
Status: DISCONTINUED | OUTPATIENT
Start: 2018-06-03 | End: 2018-06-03

## 2018-06-03 RX ORDER — PANTOPRAZOLE SODIUM 40 MG/1
40 TABLET, DELAYED RELEASE ORAL
Status: DISCONTINUED | OUTPATIENT
Start: 2018-06-03 | End: 2018-06-07 | Stop reason: HOSPADM

## 2018-06-03 RX ORDER — ONDANSETRON 2 MG/ML
4 INJECTION INTRAMUSCULAR; INTRAVENOUS
Status: DISCONTINUED | OUTPATIENT
Start: 2018-06-03 | End: 2018-06-07 | Stop reason: HOSPADM

## 2018-06-03 RX ORDER — KETOTIFEN FUMARATE 0.35 MG/ML
1 SOLUTION/ DROPS OPHTHALMIC 2 TIMES DAILY
Status: DISCONTINUED | OUTPATIENT
Start: 2018-06-03 | End: 2018-06-07 | Stop reason: HOSPADM

## 2018-06-03 RX ORDER — ACETAMINOPHEN 325 MG/1
650 TABLET ORAL
Status: DISCONTINUED | OUTPATIENT
Start: 2018-06-03 | End: 2018-06-07 | Stop reason: HOSPADM

## 2018-06-03 RX ORDER — POTASSIUM CHLORIDE 7.45 MG/ML
10 INJECTION INTRAVENOUS
Status: DISCONTINUED | OUTPATIENT
Start: 2018-06-03 | End: 2018-06-03

## 2018-06-03 RX ORDER — IPRATROPIUM BROMIDE AND ALBUTEROL SULFATE 2.5; .5 MG/3ML; MG/3ML
3 SOLUTION RESPIRATORY (INHALATION)
Status: DISCONTINUED | OUTPATIENT
Start: 2018-06-03 | End: 2018-06-07 | Stop reason: HOSPADM

## 2018-06-03 RX ORDER — METOPROLOL SUCCINATE 25 MG/1
25 TABLET, EXTENDED RELEASE ORAL DAILY
Status: DISCONTINUED | OUTPATIENT
Start: 2018-06-03 | End: 2018-06-07 | Stop reason: HOSPADM

## 2018-06-03 RX ORDER — VANCOMYCIN 1.75 GRAM/500 ML IN 0.9 % SODIUM CHLORIDE INTRAVENOUS
1750 ONCE
Status: DISCONTINUED | OUTPATIENT
Start: 2018-06-03 | End: 2018-06-03

## 2018-06-03 RX ORDER — VANCOMYCIN 1.75 GRAM/500 ML IN 0.9 % SODIUM CHLORIDE INTRAVENOUS
1750
Status: COMPLETED | OUTPATIENT
Start: 2018-06-03 | End: 2018-06-04

## 2018-06-03 RX ORDER — TRIAMCINOLONE ACETONIDE 1 MG/G
CREAM TOPICAL
Status: DISCONTINUED | OUTPATIENT
Start: 2018-06-03 | End: 2018-06-07 | Stop reason: HOSPADM

## 2018-06-03 RX ORDER — SODIUM CHLORIDE 0.9 % (FLUSH) 0.9 %
5-10 SYRINGE (ML) INJECTION AS NEEDED
Status: DISCONTINUED | OUTPATIENT
Start: 2018-06-03 | End: 2018-06-07 | Stop reason: HOSPADM

## 2018-06-03 RX ORDER — POTASSIUM CHLORIDE 750 MG/1
20 TABLET, FILM COATED, EXTENDED RELEASE ORAL 3 TIMES DAILY
Status: DISCONTINUED | OUTPATIENT
Start: 2018-06-03 | End: 2018-06-07 | Stop reason: HOSPADM

## 2018-06-03 RX ORDER — FUROSEMIDE 10 MG/ML
40 INJECTION INTRAMUSCULAR; INTRAVENOUS
Status: COMPLETED | OUTPATIENT
Start: 2018-06-03 | End: 2018-06-03

## 2018-06-03 RX ORDER — POTASSIUM CHLORIDE 20 MEQ/1
20 TABLET, EXTENDED RELEASE ORAL
Status: COMPLETED | OUTPATIENT
Start: 2018-06-03 | End: 2018-06-03

## 2018-06-03 RX ADMIN — POTASSIUM CHLORIDE 40 MEQ: 750 TABLET, EXTENDED RELEASE ORAL at 01:53

## 2018-06-03 RX ADMIN — PANTOPRAZOLE SODIUM 40 MG: 40 TABLET, DELAYED RELEASE ORAL at 09:10

## 2018-06-03 RX ADMIN — KETOTIFEN FUMARATE 1 DROP: 0.35 SOLUTION/ DROPS OPHTHALMIC at 19:17

## 2018-06-03 RX ADMIN — POTASSIUM CHLORIDE 20 MEQ: 750 TABLET, EXTENDED RELEASE ORAL at 09:23

## 2018-06-03 RX ADMIN — FUROSEMIDE 40 MG: 10 INJECTION, SOLUTION INTRAMUSCULAR; INTRAVENOUS at 01:55

## 2018-06-03 RX ADMIN — PANTOPRAZOLE SODIUM 40 MG: 40 TABLET, DELAYED RELEASE ORAL at 17:34

## 2018-06-03 RX ADMIN — VANCOMYCIN HYDROCHLORIDE 1750 MG: 10 INJECTION, POWDER, LYOPHILIZED, FOR SOLUTION INTRAVENOUS at 16:47

## 2018-06-03 RX ADMIN — Medication 10 ML: at 06:22

## 2018-06-03 RX ADMIN — POTASSIUM CHLORIDE 20 MEQ: 750 TABLET, EXTENDED RELEASE ORAL at 22:01

## 2018-06-03 RX ADMIN — Medication 1 CAPSULE: at 12:41

## 2018-06-03 RX ADMIN — ATORVASTATIN CALCIUM 40 MG: 40 TABLET, FILM COATED ORAL at 22:01

## 2018-06-03 RX ADMIN — POTASSIUM CHLORIDE 20 MEQ: 20 TABLET, EXTENDED RELEASE ORAL at 04:22

## 2018-06-03 RX ADMIN — OXYBUTYNIN CHLORIDE 5 MG: 5 TABLET, FILM COATED, EXTENDED RELEASE ORAL at 09:09

## 2018-06-03 RX ADMIN — APIXABAN 2.5 MG: 2.5 TABLET, FILM COATED ORAL at 17:34

## 2018-06-03 RX ADMIN — POTASSIUM CHLORIDE 20 MEQ: 750 TABLET, EXTENDED RELEASE ORAL at 17:34

## 2018-06-03 RX ADMIN — KETOTIFEN FUMARATE 1 DROP: 0.35 SOLUTION/ DROPS OPHTHALMIC at 12:41

## 2018-06-03 RX ADMIN — CEFEPIME HYDROCHLORIDE 2 G: 2 INJECTION, POWDER, FOR SOLUTION INTRAVENOUS at 01:17

## 2018-06-03 RX ADMIN — CETIRIZINE HYDROCHLORIDE 10 MG: 10 TABLET, FILM COATED ORAL at 06:22

## 2018-06-03 RX ADMIN — ASPIRIN 81 MG 81 MG: 81 TABLET ORAL at 09:10

## 2018-06-03 RX ADMIN — LEVOFLOXACIN 750 MG: 5 INJECTION, SOLUTION INTRAVENOUS at 03:16

## 2018-06-03 RX ADMIN — Medication 10 ML: at 22:02

## 2018-06-03 RX ADMIN — ATORVASTATIN CALCIUM 40 MG: 40 TABLET, FILM COATED ORAL at 06:21

## 2018-06-03 RX ADMIN — METOPROLOL SUCCINATE 25 MG: 25 TABLET, EXTENDED RELEASE ORAL at 09:10

## 2018-06-03 RX ADMIN — FUROSEMIDE 40 MG: 10 INJECTION, SOLUTION INTRAMUSCULAR; INTRAVENOUS at 09:09

## 2018-06-03 RX ADMIN — UMECLIDINIUM 1 PUFF: 62.5 AEROSOL, POWDER ORAL at 19:17

## 2018-06-03 RX ADMIN — CETIRIZINE HYDROCHLORIDE 10 MG: 10 TABLET, FILM COATED ORAL at 22:01

## 2018-06-03 RX ADMIN — Medication 10 ML: at 17:37

## 2018-06-03 RX ADMIN — APIXABAN 2.5 MG: 2.5 TABLET, FILM COATED ORAL at 09:10

## 2018-06-03 RX ADMIN — FUROSEMIDE 40 MG: 10 INJECTION, SOLUTION INTRAMUSCULAR; INTRAVENOUS at 17:35

## 2018-06-03 NOTE — PROGRESS NOTES
Pharmacy Automatic Renal Dosing Protocol - Antimicrobials    Indication for Antimicrobials: HAP     Current Regimen of Each Antimicrobial:  Levofloxacin 750mg IV q24h (Start Date 6/3; Day # 1)    Previous Antimicrobial Therapy:  Cefepime 2gm IV x1  6/3    Significant Cultures:   6/3: Blood = (pending)    Radiology / Imaging results: (X-ray, CT scan or MRI):   6/3: CXR: Pulmonary edema and small left pleural effusion. 6/3: CT Chest: to r/o  CHF vs PNA vs Amiodarone toxicity (pending)    Paralysis, amputations, malnutrition: n/a    Labs:  Recent Labs      18   2344   CREA  1.27*   BUN  23*   WBC  25.0*     Temp (24hrs), Av.6 °F (37 °C), Min:98.1 °F (36.7 °C), Max:99.2 °F (37.3 °C)    Creatinine Clearance (mL/min) or Dialysis: 28 ml/min    Impression/Plan:   · Vancomycin requested by ED physician; discontinued by admitting hospitalist  · Change Levofloxacin to 750mg IV q48h which is appropriate for indication and renal function  · Daily BMP  · Antimicrobial stop date TBD     Pharmacy will follow daily and adjust medications as appropriate for renal function and/or serum levels. Thank you,  Jamil Henderson [facility]    Recommended duration of therapy  [email or web address]/Pharmacy/Clinical%20Companion/[email or web address]. docx    Renal Dosing  [email or web address]/Pharmacy/[email or web address]/Renal%[email or web address] pdf

## 2018-06-03 NOTE — ED NOTES
TRANSFER - OUT REPORT:    Verbal report given to Shazia RN(name) on [employer]  being transferred to 2240(unit) for routine progression of care       Report consisted of patients Situation, Background, Assessment and   Recommendations(SBAR). Information from the following report(s) ED Summary was reviewed with the receiving nurse. Opportunity for questions and clarification was provided.       Patient transported with:   O2 @ 6 liters  Registered Nurse  [facility]

## 2018-06-03 NOTE — ED PROVIDER NOTES
EMERGENCY DEPARTMENT HISTORY AND PHYSICAL EXAM      Date: 6/2/2018  Patient Name: Raoul Murray    History of Presenting Illness     Chief Complaint   Patient presents with    Respiratory Distress       History Provided By: Patient and EMS and family    HPI: Raoul Murray, 80 y.o. female with PMHx significant for HTN, COPD, arthritis, GERD, TIA, PAD, and NSTEMI, presents via EMS to the ED for evaluation of worsening dyspnea on exertion with reported hypoxia at [facility] today. She notes associated mild nausea and fever last night. Family reports episode yesterday where pt spO2 dropped to mid 80% and today pt was 70%, per nursing home. EMS states pt was low 90s upon arrival so they placed pt on NRB with improvement. Family notes pt was started on O2 2L NC on 4/30 after admission for dystolic HF, afib with RVR and NSTEMI. Chart review shows pt had ECHO with EF of 55 - 60% on 5/1. She denies any SOB when laying flat. Pt specifically denies any CP, ABD pain, VD, or urinary complaints. There are no other complaints, changes, or physical findings at this time. PCP: Ryne Rebollar MD    Current Facility-Administered Medications   Medication Dose Route Frequency Provider Last Rate Last Dose    sodium chloride (NS) flush 5-10 mL  5-10 mL IntraVENous PRN Lynn Singer MD        levoFLOXacin (LEVAQUIN) 750 mg in D5W IVPB  750 mg IntraVENous Oliva Alejandro  mL/hr at 06/03/18 0316 750 mg at 06/03/18 0316    potassium chloride (K-DUR, KLOR-CON) SR tablet 20 mEq  20 mEq Oral ROSALINDA Miller MD         Current Outpatient Prescriptions   Medication Sig Dispense Refill    aspirin 81 mg chewable tablet Take 1 Tab by mouth daily.  atorvastatin (LIPITOR) 40 mg tablet Take 1 Tab by mouth nightly. 30 Tab 0    amiodarone (PACERONE) 100 mg tablet Take 1 Tab by mouth daily. 30 Tab 0    apixaban (ELIQUIS) 2.5 mg tablet Take 1 Tab by mouth two (2) times a day.  30 Tab 0    furosemide (LASIX) 40 mg tablet Take 1 Tab by mouth two (2) times a day. 60 Tab 0    metoprolol succinate (TOPROL-XL) 25 mg XL tablet Take 1 Tab by mouth daily. 30 Tab 0    potassium chloride SR (KLOR-CON 10) 10 mEq tablet Take 1 Tab by mouth two (2) times a day. 60 Tab 0    tiotropium (SPIRIVA WITH HANDIHALER) 18 mcg inhalation capsule Take 1 Cap by inhalation daily. Indications: Chronic Obstructive Pulmonary Disease with Bronchospasms 30 Cap 0    amLODIPine (NORVASC) 5 mg tablet Take 5 mg by mouth daily.  oxybutynin chloride XL (DITROPAN XL) 5 mg CR tablet Take 5 mg by mouth daily.  pantoprazole (PROTONIX) 20 mg tablet Take 20 mg by mouth Before breakfast and dinner.  promethazine (PHENERGAN) 12.5 mg tablet Take 12.5 mg by mouth every six (6) hours as needed for Nausea.  alum-mag hydroxide-simeth (RULOX) 200-200-20 mg/5 mL susp Take 20 mL by mouth every four (4) hours as needed.  acetaminophen (TYLENOL) 325 mg tablet Take 650 mg by mouth every four (4) hours as needed for Pain.  magnesium hydroxide (MILK OF MAGNESIA) 400 mg/5 mL suspension Take 10 mL by mouth every seventy-two (72) hours as needed for Constipation (If no BM in 3 days).  albuterol-ipratropium (DUO-NEB) 2.5 mg-0.5 mg/3 ml nebu 3 mL by Nebulization route every four (4) hours as needed (Shortness of Breath).  emollient combination no. 108 (LUBRISKIN) lotn lotion Apply  to affected area every eight (8) hours as needed for Dry Skin (Apply to both hands).  triamcinolone (ARISTOCORT) 0.5 % topical cream Apply  to affected area two (2) times daily as needed for Skin Irritation. use thin layer      ammonium lactate (LAC-HYDRIN) 12 % lotion Apply  to affected area every twelve (12) hours as needed (Apply to chest, back, arms as needed for eczema). rub in to affected area well      diphenhydrAMINE-zinc acetate 1%-0.1% (BENADRYL) topical cream Apply  to affected area every six (6) hours as needed for Itching.  bisacodyl (DULCOLAX, BISACODYL,) 10 mg suppository Insert 10 mg into rectum as needed (Every 96 hours as needed for constipation).  sodium phosphate (FLEET ENEMA) 19-7 gram/118 mL enema Insert 1 Enema into rectum. Every 120 hours as needed for constipation if no results from suppository      guaiFENesin (ROBITUSSIN) 100 mg/5 mL liquid Take 200 mg by mouth every four (4) hours as needed for Cough.  cetirizine (ZYRTEC) 10 mg tablet Take 10 mg by mouth nightly.  clobetasol (TEMOVATE) 0.05 % topical cream Apply  to affected area every Monday, Wednesday, Friday. In the evening on Monday, Wednesday, and Friday      olopatadine (PATANOL) 0.1 % ophthalmic solution Administer 1 Drop to both eyes two (2) times a day.  B.infantis-B.ani-B.long-B.bifi (PROBIOTIC 4X) 10-15 mg TbEC Take 1 Tab by mouth two (2) times a day.  albuterol (PROVENTIL HFA, VENTOLIN HFA, PROAIR HFA) 90 mcg/actuation inhaler Take 2 Puffs by inhalation every six (6) hours as needed for Wheezing.  MULTIVITS W-FE,OTHER MIN/LUT (CENTRUM SILVER ULTRA WOMEN'S PO) Take 1 Tab by mouth daily. Past History     Past Medical History:  Past Medical History:   Diagnosis Date    Arthritis     generalized    COPD     GERD (gastroesophageal reflux disease)     Hypertension     Ill-defined condition     Vitamin deficiency    NSTEMI (non-ST elevated myocardial infarction) ([facility] [address] 5/1/2018    Other ill-defined conditions(799.89)     high cholesterol    Peripheral artery disease (HCC)     Psychiatric disorder     Anxiety disorder    Sleep disorder     Insomnia    TIA (transient ischemic attack)        Past Surgical History:  Past Surgical History:   Procedure Laterality Date    HX CAROTID ENDARTERECTOMY      left 2 or 3 years ago.     HX OTHER SURGICAL      colonoscopy    HX TONSILLECTOMY         Family History:  Family History   Problem Relation Age of Onset    Other Mother      carotid endarterectomy/cardiomegaly    Other Father      carotid endarterectomy       Social History:  Social History   Substance Use Topics    Smoking status: Former Smoker    Smokeless tobacco: Never Used      Comment: quit 3 years ago    Alcohol use Yes      Comment: glass of wine rarely       Allergies: Allergies   Allergen Reactions    Penicillins Swelling    Sulfa (Sulfonamide Antibiotics) Itching    Propoxyphene-Acetaminophen Unknown (comments)     Pt. Not sure, thinks it was hallucination. Review of Systems   Review of Systems   Constitutional: Positive for fever (resolved). Negative for chills and fatigue. HENT: Negative for congestion, rhinorrhea and sore throat. Eyes: Negative for pain, discharge and visual disturbance. Respiratory: Positive for shortness of breath. Negative for cough, chest tightness and wheezing. Cardiovascular: Negative for chest pain, palpitations and leg swelling. Gastrointestinal: Positive for nausea. Negative for abdominal pain, constipation, diarrhea and vomiting. Genitourinary: Negative for dysuria, frequency and hematuria. Musculoskeletal: Negative for arthralgias, back pain and myalgias. Skin: Negative for rash. Neurological: Negative for dizziness, weakness, light-headedness and headaches. Psychiatric/Behavioral: Negative. Physical Exam   Physical Exam   Constitutional: She is oriented to person, place, and time. She appears well-developed and well-nourished. HENT:   Head: Normocephalic and atraumatic. Eyes: EOM are normal. Right eye exhibits no discharge. Left eye exhibits no discharge. No scleral icterus. Neck: Normal range of motion. Neck supple. No tracheal deviation present. Cardiovascular: Normal rate, regular rhythm, normal heart sounds and intact distal pulses. Exam reveals no gallop and no friction rub. No murmur heard. Trace bilateral LE edema   Pulmonary/Chest: Tachypnea noted. She has rales. NRB in place. Crackles L side. Abdominal: Soft.  She exhibits no distension. There is no tenderness. Musculoskeletal: Normal range of motion. She exhibits no edema. Lymphadenopathy:     She has no cervical adenopathy. Neurological: She is alert and oriented to person, place, and time. No focal neuro deficits   Skin: Skin is warm and dry. No rash noted. Psychiatric: She has a normal mood and affect. Nursing note and vitals reviewed. Diagnostic Study Results     Labs -     Recent Results (from the past 12 hour(s))   EKG, 12 LEAD, INITIAL    Collection Time: 06/02/18 11:35 PM   Result Value Ref Range    Ventricular Rate 87 BPM    Atrial Rate 87 BPM    P-R Interval 98 ms    QRS Duration 88 ms    Q-T Interval 334 ms    QTC Calculation (Bezet) 401 ms    Calculated R Axis -35 degrees    Calculated T Axis 31 degrees    Diagnosis       Sinus rhythm with short MN  Left axis deviation  Moderate voltage criteria for LVH, may be normal variant  Nonspecific ST and T wave abnormality  When compared with ECG of 30-APR-2018 16:01,  premature atrial complexes are no longer present  Criteria for Inferior infarct are no longer present  Nonspecific T wave abnormality, worse in Lateral leads  QT has shortened     CBC WITH AUTOMATED DIFF    Collection Time: 06/02/18 11:44 PM   Result Value Ref Range    WBC 25.0 (H) 3.6 - 11.0 K/uL    RBC 4.10 3.80 - 5.20 M/uL    HGB 12.1 11.5 - 16.0 g/dL    HCT 36.5 35.0 - 47.0 %    MCV 89.0 80.0 - 99.0 FL    MCH 29.5 26.0 - 34.0 PG    MCHC 33.2 30.0 - 36.5 g/dL    RDW 14.6 (H) 11.5 - 14.5 %    PLATELET 771 416 - 791 K/uL    MPV 11.7 8.9 - 12.9 FL    NRBC 0.0 0  WBC    ABSOLUTE NRBC 0.00 0.00 - 0.01 K/uL    NEUTROPHILS 91 (H) 32 - 75 %    LYMPHOCYTES 6 (L) 12 - 49 %    MONOCYTES 2 (L) 5 - 13 %    EOSINOPHILS 0 0 - 7 %    BASOPHILS 0 0 - 1 %    IMMATURE GRANULOCYTES 1 (H) 0.0 - 0.5 %    ABS. NEUTROPHILS 22.7 (H) 1.8 - 8.0 K/UL    ABS. LYMPHOCYTES 1.5 0.8 - 3.5 K/UL    ABS. MONOCYTES 0.5 0.0 - 1.0 K/UL    ABS.  EOSINOPHILS 0.0 0.0 - 0.4 K/UL    ABS. BASOPHILS 0.0 0.0 - 0.1 K/UL    ABS. IMM. GRANS. 0.3 (H) 0.00 - 0.04 K/UL    DF AUTOMATED      RBC COMMENTS NORMOCYTIC, NORMOCHROMIC     METABOLIC PANEL, COMPREHENSIVE    Collection Time: 06/02/18 11:44 PM   Result Value Ref Range    Sodium 135 (L) 136 - 145 mmol/L    Potassium 3.0 (L) 3.5 - 5.1 mmol/L    Chloride 94 (L) 97 - 108 mmol/L    CO2 34 (H) 21 - 32 mmol/L    Anion gap 7 5 - 15 mmol/L    Glucose 130 (H) 65 - 100 mg/dL    BUN 23 (H) 6 - 20 MG/DL    Creatinine 1.27 (H) 0.55 - 1.02 MG/DL    BUN/Creatinine ratio 18 12 - 20      GFR est AA 48 (L) >60 ml/min/1.73m2    GFR est non-AA 40 (L) >60 ml/min/1.73m2    Calcium 9.0 8.5 - 10.1 MG/DL    Bilirubin, total 1.0 0.2 - 1.0 MG/DL    ALT (SGPT) 15 12 - 78 U/L    AST (SGOT) 17 15 - 37 U/L    Alk. phosphatase 72 45 - 117 U/L    Protein, total 7.2 6.4 - 8.2 g/dL    Albumin 2.9 (L) 3.5 - 5.0 g/dL    Globulin 4.3 (H) 2.0 - 4.0 g/dL    A-G Ratio 0.7 (L) 1.1 - 2.2     CK W/ CKMB & INDEX    Collection Time: 06/02/18 11:44 PM   Result Value Ref Range    CK 62 26 - 192 U/L    CK - MB 1.7 <3.6 NG/ML    CK-MB Index 2.7 (H) 0 - 2.5     MAGNESIUM    Collection Time: 06/02/18 11:44 PM   Result Value Ref Range    Magnesium 1.6 1.6 - 2.4 mg/dL   TROPONIN I    Collection Time: 06/02/18 11:44 PM   Result Value Ref Range    Troponin-I, Qt. 0.14 (H) <0.05 ng/mL   LACTIC ACID    Collection Time: 06/02/18 11:44 PM   Result Value Ref Range    Lactic acid 2.0 0.4 - 2.0 MMOL/L   NT-PRO BNP    Collection Time: 06/02/18 11:44 PM   Result Value Ref Range    NT pro-BNP 68889 (H) 0 - 450 PG/ML       Radiologic Studies -   XR CHEST PORT   Final Result        CT Results  (Last 48 hours)    None        CXR Results  (Last 48 hours)               06/02/18 2352  XR CHEST PORT Final result    Impression:  IMPRESSION:    Pulmonary edema and small left pleural effusion. Narrative:  PORTABLE CHEST RADIOGRAPH/S: 6/2/2018 11:52 PM       INDICATION: Dyspnea.  history of heart failure, myocardial infarction, COPD,   hypertension, GERD. COMPARISON: 5/4/2018, 2/18/2015. TECHNIQUE: Portable frontal semiupright radiograph/s of the chest.       FINDINGS:    A small left pleural effusion associated with bibasilar atelectasis. There is   pulmonary vascular congestion. Septal thickening is consistent with interstitial   edema. Hazy airspace opacity over the left midlung may represent alveolar edema. The central airways are patent. No pneumothorax. Medical Decision Making   I am the first provider for this patient. I reviewed the vital signs, available nursing notes, past medical history, past surgical history, family history and social history. Vital Signs-Reviewed the patient's vital signs. Patient Vitals for the past 12 hrs:   Temp Pulse Resp BP SpO2   06/03/18 0230 - 84 26 180/73 92 %   06/03/18 0200 98.1 °F (36.7 °C) 85 24 157/67 91 %   06/03/18 0130 - 79 22 122/60 90 %   06/03/18 0100 - 84 23 136/72 (!) 89 %   06/03/18 0030 - 85 (!) 36 142/72 95 %   06/03/18 0000 - 85 (!) 31 127/60 96 %   06/02/18 2330 - 88 30 142/64 92 %   06/02/18 2329 99.2 °F (37.3 °C) 91 30 142/64 95 %       Pulse Oximetry Analysis - 96% on NRB    Cardiac Monitor:   Rate: 87 bpm  Rhythm: Normal Sinus Rhythm      EKG interpretation: (Preliminary) 2235  Rhythm: normal sinus rhythm; and regular . Rate (approx.): 87; Axis: normal; MA interval: normal; QRS interval: normal ; ST/T wave: Nonspecific ST abnormality. Written by LYNNETTE Hartibakira, as dictated by Lidya Moe MD.    Records Reviewed: Nursing Notes, Old Medical Records, Previous electrocardiograms, Previous Radiology Studies and Previous Laboratory Studies    Provider Notes (Medical Decision Making):   DDx: Pulmonary edema, heart failure, PNA, pleural effusion, bronchitis, ACS, PE    Disposition: Patient presents to ED with acute on chronic respiratory failure.   Suspect pneumonia given CXR, productive cough, leukocytosis. Given recent hospitalization will cover with broad spectrum antibiotics. There may also be a component of heart failure so IVF deferred, pt treated with lasix. Admit for further management. ED Course:   Initial assessment performed. The patients presenting problems have been discussed, and they are in agreement with the care plan formulated and outlined with them. I have encouraged them to ask questions as they arise throughout their visit. 12:02 AM - I suspect that this patient has an active infection. 12:54 AM  Pt does not meet severe sepsis criteria at this time. Will hold off on fluids given concern for heart failure as well. Written by Gary Hopson ED Scribe, as dictated by Milli Ho MD.    CONSULT NOTE:   12:54 AM  Milli Ho MD spoke with Dr. Jennifer Thomas,   Specialty: Hospitalist  Discussed pt's hx, disposition, and available diagnostic and imaging results. Reviewed care plans. Consultant will evaluate pt for admission. Written by Gary Hopson ED Scribe, as dictated by Milli Ho MD.    CRITICAL CARE NOTE :    12:30 AM    IMPENDING DETERIORATION - Cardiovascular, respiratory  ASSOCIATED RISK FACTORS - Hypoxic, pneumonia, heart failure  MANAGEMENT- Bedside assessment, supervision of care  INTERPRETATION -  EKG, CXR, vital signs  INTERVENTIONS - Abx, oxygen, lasix  CASE REVIEW - Hospitalist, nursing, family  TREATMENT RESPONSE - Stable  PERFORMED BY - Self    NOTES:  I have spent 40 minutes of critical care time involved in lab review, consultations with specialist, family decision- making, bedside attention and documentation. During this entire length of time I was immediately available to the patient . Disposition:  1:21 AM  Patient is being admitted to the hospital. The results of their tests and reasons for their admission have been discussed with them and/or available family.  They convey agreement and understanding for the need to be admitted and for their admission diagnosis. Consultation has been made with the inpatient physician specialist for hospitalization. PLAN:  1. Admit to hospitalist.     Return to ED if worse     Diagnosis     Clinical Impression:   Acute on chronic respiratory failure  HCAP  Acute on chronic diastolic heart failure    Attestations: This note is prepared by Gary Hopson, acting as Scribe for Milli Ho MD.    Milli Ho MD: The scribe's documentation has been prepared under my direction and personally reviewed by me in its entirety. I confirm that the note above accurately reflects all work, treatment, procedures, and medical decision making performed by me.

## 2018-06-03 NOTE — PROGRESS NOTES
Problem: Pressure Injury - Risk of  Goal: *Prevention of pressure injury  Document Rosas Scale and appropriate interventions in the flowsheet. Outcome: Progressing Towards Goal  Pressure Injury Interventions:       Moisture Interventions: Internal/External urinary devices, Maintain skin hydration (lotion/cream), Check for incontinence Q2 hours and as needed    Activity Interventions: Assess need for specialty bed, Pressure redistribution bed/mattress(bed type), PT/OT evaluation    Mobility Interventions: Assess need for specialty bed, Float heels, Pressure redistribution bed/mattress (bed type), Turn and reposition approx.  every two hours(pillow and wedges)    Nutrition Interventions: Document food/fluid/supplement intake    Friction and Shear Interventions: Apply protective barrier, creams and emollients, Transferring/repositioning devices

## 2018-06-03 NOTE — PROGRESS NOTES
[address] [place] [address]  [phone]      Medical Progress Note      NAME: June Kulkarni   :  1928  MRM:  [id number]    Date/Time: 6/3/2018  8:23 AM      Subjective:     Events leading to admission noted. Clearly has CHF. Also question re infection (pneumonia) with reported fever and leukocytosis. Currently a bit less SOB. No chest pain. Still with cough and purulent sputum. Past Medical History reviewed and unchanged from Admission History and Physical and/or prior progress note/electronic medical record    Medications reviewed. ROS:      General: postive for weakness and reported fever  Ear Nose and Throat: negative for rhinorrhea, pharyngitis, otalgia, tinnitus, speech or swallowing difficulties  Respiratory:  positive for SOB, cough and sputum production  Cardiology:  negative for chest pain, palpitations, orthopnea, PND, edema, syncope   Gastrointestinal: negative for abdominal pain, N/V, dysphagia, change in bowel habits, bleeding  Genitourinary: negative for frequency, urgency, dysuria, hematuria, incontinence  Muskuloskeletal : negative for arthralgia, myalgia  Neurological: negative for headache, dizziness, confusion, focal weakness, paresthesia, memory loss, gait disturbance    Objective:     Last 24hrs VS reviewed since prior progress note.  Most recent are:    Visit Vitals    /68 (BP 1 Location: Right arm, BP Patient Position: At rest)    Pulse 84    Temp 98.4 °F (36.9 °C)    Resp 24    Ht 5' 6\" (1.676 m)    Wt 155 lb (70.3 kg)    SpO2 93%    BMI 25.02 kg/m2     SpO2 Readings from Last 6 Encounters:   18 93%   18 94%   18 95%   02/23/15 95%   11 95%    O2 Flow Rate (L/min): 6 l/min     Intake/Output Summary (Last 24 hours) at 18 0823  Last data filed at 18 0630   Gross per 24 hour   Intake              120 ml   Output              350 ml   Net             -230 ml Physical Exam:    General appearance: alert, cooperative, no distress, appears stated age  Eyes: negative  Neck: supple, symmetrical, trachea midline, no adenopathy, thyroid: not enlarged, symmetric, no tenderness/mass/nodules, no carotid bruit and no JVD  Lungs: rhonchi bilaterally;  Rales and dulness at bases  Heart: regular rate and rhythm, S1, S2 normal, grade 1-1/3 systolic murmur  Abdomen: soft, non-tender. Bowel sounds normal. No masses,  no organomegaly  Extremities: extremities normal, atraumatic, no cyanosis or edema  Neurologic: Grossly normal    Data Review:    Lab:    BMP:   Lab Results   Component Value Date/Time     (L) 06/02/2018 11:44 PM    K 3.0 (L) 06/02/2018 11:44 PM    CL 94 (L) 06/02/2018 11:44 PM    CO2 34 (H) 06/02/2018 11:44 PM    AGAP 7 06/02/2018 11:44 PM     (H) 06/02/2018 11:44 PM    BUN 23 (H) 06/02/2018 11:44 PM    CREA 1.27 (H) 06/02/2018 11:44 PM    GFRAA 48 (L) 06/02/2018 11:44 PM    GFRNA 40 (L) 06/02/2018 11:44 PM     CBC:   Lab Results   Component Value Date/Time    WBC 25.0 (H) 06/02/2018 11:44 PM    HGB 12.1 06/02/2018 11:44 PM    HCT 36.5 06/02/2018 11:44 PM     06/02/2018 11:44 PM     All labs reviewed in past 24 hours    Other pertinent lab: Troponin 0.14; BNP > 13K    Imaging:    Reviewed. CXR      Assessment / Plan: Active Problems:    Acute on chronic diastolic heart failure ([address] (5/1/2018)      CKD (chronic kidney disease) stage 3, GFR 30-59 ml/min (5/1/2018)      Hypokalemia (5/2/2018)      Acute respiratory failure with hypoxia ([address] (6/3/2018)      CAD (coronary artery disease) (6/3/2018)      Moderate mitral regurgitation (6/3/2018)      Paroxysmal A-fib ([address] (6/3/2018)      Hyperlipidemia (6/3/2018)      Elevated troponin (6/3/2018)        1. CT chest  2. Replete KCl  3. Continue diuresis  4. Continue antibiotics  5.  Follow labs             Care Plan discussed with: Patient    Discussed:  Care Plan    Prophylaxis:  H2B/PPI and Eliquis    Disposition:  ???  ___________________________________________________    Attending Physician: Alicia Newton MD

## 2018-06-03 NOTE — PROGRESS NOTES
0450-TRANSFER - IN REPORT:    Verbal report received from YFN DAVIS [facility]) on [employer] Alejandro  being received from ED(unit) for routine progression of care      Report consisted of patients Situation, Background, Assessment and   Recommendations(SBAR). Information from the following report(s) SBAR, Kardex, ED Summary, Intake/Output, MAR, Accordion, Recent Results, Med Rec Status and Cardiac Rhythm SR was reviewed with the receiving nurse. Opportunity for questions and clarification was provided. Assessment completed upon patients arrival to unit and care assumed.      0500-pt arrived to room 2240 via stretcher with son at bedside- orders received and acted on

## 2018-06-03 NOTE — PROGRESS NOTES
0230: Paged Dr. Nai Louise for sputum culture results.  No change in current ABX regimen  0335: Paged Dr. Nai Louise for gram pos rods in blood culture

## 2018-06-03 NOTE — ED TRIAGE NOTES
Pt to ed via ems with c/o sob. Per ems, they were called for low pox and sob and pox on arrival was upper 80's to low 90's and up to 95 with NRB mask oxygen. Pt states she has had cough for a wk or so.

## 2018-06-03 NOTE — CONSULTS
Consult    NAME: Reginald Holbrook   :  1928   MRN:  [id number]     Date/Time:  6/3/2018 3:45 PM    Patient PCP: Armando Langley MD  ________________________________________________________________________                          [id number]:                  1. Hypoxia, CT suggestive of pneumonia, low grade fever, increased WBC, blood culture with bacteria. 2. No hx of CAD  3. Echo2018 EF 55%, mod MR, mild pHTN  4. Prox Afib currently in sinus with LVH  5. HTN, with hypertensive heart disease with heart failure, and CKD cr 1.4  6. Dyslipidemia  7. Hx of CVA s/p TPA in   8. PVD with hx of left CEA  9. COPD has had home O2 in past  10. GERD  11. DJD  12. , lives at [facility], poor functional capacity in a wheelchair, sleeps most of the day                                Plan:                   Does not appear ischemic. This is not clearly CHF, but pBNP is certianly elevated. No edema on exam.  CT results reviewed, not clearly amiodarone toxicity, she has also not been on that long, but ok with stopping for now. Low grade fever, increased wbc, flagged blood culture, I am concerned with pneumonia     1.   Cont added eliquis 2.5mg po bid, given CHADS2 of 5  2. Cont ASA likely stop soon  3. Agree with holding amiodarone  4. Cont toprol xl  5. Cont amlodipine  6. Currently on iv lasix, follow bmp  7. Cont atorvastatin     PT/OT  On O2        [x]        High complexity decision making was performed      Subjective:   CHIEF COMPLAINT:  Shortness of breath    HISTORY OF PRESENT ILLNESS:       Reginald Holbrook, 80 y.o. female with PMHx significant for HTN, COPD, arthritis, GERD, TIA, PAD, and NSTEMI, presents via EMS to the ED for evaluation of worsening dyspnea on exertion with reported hypoxia at [facility] today. She notes associated mild nausea and fever last night. Family reports episode yesterday where pt spO2 dropped to mid 80% and today pt was 70%, per nursing home.  EMS states pt was low 90s upon arrival so they placed pt on NRB with improvement. Family notes pt was started on O2 2L NC on 4/30 after admission for dystolic HF, afib with RVR and NSTEMI. Chart review shows pt had ECHO with EF of 55 - 60% on 5/1. She denies any SOB when laying flat. Pt specifically denies any CP, ABD pain, VD, or urinary complaints. Currently on floor, mild dyspnea, no chest pain, no edema, no palpitations, no syncope.       We were asked to consult for work up and evaluation of the above problems. Past Medical History:   Diagnosis Date    Arthritis     generalized    COPD     GERD (gastroesophageal reflux disease)     Hypertension     Ill-defined condition     Vitamin deficiency    NSTEMI (non-ST elevated myocardial infarction) ([facility] [address] 5/1/2018    Other ill-defined conditions(799.89)     high cholesterol    Peripheral artery disease (HCC)     Psychiatric disorder     Anxiety disorder    Sleep disorder     Insomnia    TIA (transient ischemic attack)       Past Surgical History:   Procedure Laterality Date    HX CAROTID ENDARTERECTOMY      left 2 or 3 years ago.  HX OTHER SURGICAL      colonoscopy    HX TONSILLECTOMY       Allergies   Allergen Reactions    Penicillins Swelling    Sulfa (Sulfonamide Antibiotics) Itching    Propoxyphene-Acetaminophen Unknown (comments)     Pt. Not sure, thinks it was hallucination. Meds:  See below  Social History   Substance Use Topics    Smoking status: Former Smoker    Smokeless tobacco: Never Used      Comment: quit 3 years ago    Alcohol use Yes      Comment: glass of wine rarely      Family History   Problem Relation Age of Onset    Other Mother      carotid endarterectomy/cardiomegaly    Other Father      carotid endarterectomy       REVIEW OF SYSTEMS:     []         Unable to obtain  ROS due to ---   [x]         Total of 12 systems reviewed as follows:     Total of 12 systems reviewed as follows:       POSITIVE= Bold text Negative = normal text  General:  fever, chills, sweats, generalized weakness, weight loss/gain,      loss of appetite   Eyes:    blurred vision, eye pain, loss of vision, double vision  ENT:    rhinorrhea, pharyngitis   Respiratory:   cough, sputum production, SOB, ALCARAZ, wheezing, pleuritic pain   Cardiology:   chest pain, palpitations, orthopnea, PND, edema, syncope   Gastrointestinal:  abdominal pain , N/V, diarrhea, dysphagia, constipation, bleeding   Genitourinary:  frequency, urgency, dysuria, hematuria, incontinence   Muskuloskeletal :  arthralgia, myalgia, back pain  Hematology:  easy bruising, nose or gum bleeding, lymphadenopathy   Dermatological: rash, ulceration, pruritis, color change / jaundice  Endocrine:   hot flashes or polydipsia   Neurological:  headache, dizziness, confusion, focal weakness, paresthesia,     Speech difficulties, memory loss, gait difficulty  Psychological: Feelings of anxiety, depression, agitation    Objective:      Physical Exam:    Last 24hrs VS reviewed since prior progress note. Most recent are:    Visit Vitals    /78 (BP 1 Location: Right arm, BP Patient Position: At rest)    Pulse 88    Temp 98.2 °F (36.8 °C)    Resp 22    Ht 5' 6\" (1.676 m)    Wt 70.3 kg (155 lb)    SpO2 93%    BMI 25.02 kg/m2       Intake/Output Summary (Last 24 hours) at 06/03/18 1545  Last data filed at 06/03/18 1242   Gross per 24 hour   Intake              120 ml   Output             1250 ml   Net            -1130 ml        General Appearance: Well developed, elderly, alert & oriented x 3,    no acute distress. Ears/Nose/Mouth/Throat: Pupils equal and round, Hearing grossly normal.  Neck: Supple. JVP within normal limits. Carotids good upstrokes, with no bruit. Chest: Lungs ronchi to auscultation bilaterally. Cardiovascular: Regular rate and rhythm, S1S2 normal, no murmur, rubs, gallops. Abdomen: Soft, non-tender, bowel sounds are active. No organomegaly.   Extremities: No edema bilaterally. Femoral pulses +2, Distal Pulses +1. Skin: Warm and dry. Neuro: CN II-XII grossly intact, Strength and sensation grossly intact. Data:      Prior to Admission medications    Medication Sig Start Date End Date Taking? Authorizing Provider   aspirin 81 mg chewable tablet Take 1 Tab by mouth daily. 5/11/18  Yes Webster Litten, MD   atorvastatin (LIPITOR) 40 mg tablet Take 1 Tab by mouth nightly. 5/11/18  Yes Webster Litten, MD   amiodarone (PACERONE) 100 mg tablet Take 1 Tab by mouth daily. 5/11/18  Yes Webster Litten, MD   apixaban (ELIQUIS) 2.5 mg tablet Take 1 Tab by mouth two (2) times a day. 5/11/18  Yes Webster Litten, MD   furosemide (LASIX) 40 mg tablet Take 1 Tab by mouth two (2) times a day. 5/11/18  Yes Webster Litten, MD   metoprolol succinate (TOPROL-XL) 25 mg XL tablet Take 1 Tab by mouth daily. 5/11/18  Yes Webster Litten, MD   potassium chloride SR (KLOR-CON 10) 10 mEq tablet Take 1 Tab by mouth two (2) times a day. 5/11/18  Yes Webster Litten, MD   tiotropium (SPIRIVA WITH HANDIHALER) 18 mcg inhalation capsule Take 1 Cap by inhalation daily. Indications: Chronic Obstructive Pulmonary Disease with Bronchospasms 5/11/18  Yes Webster Litten, MD   amLODIPine (NORVASC) 5 mg tablet Take 5 mg by mouth daily. Yes Historical Provider   oxybutynin chloride XL (DITROPAN XL) 5 mg CR tablet Take 5 mg by mouth daily. Yes Historical Provider   pantoprazole (PROTONIX) 20 mg tablet Take 20 mg by mouth Before breakfast and dinner. Yes Historical Provider   promethazine (PHENERGAN) 12.5 mg tablet Take 12.5 mg by mouth every six (6) hours as needed for Nausea. Yes Historical Provider   alum-mag hydroxide-simeth (RULOX) 200-200-20 mg/5 mL susp Take 20 mL by mouth every four (4) hours as needed. Yes Historical Provider   acetaminophen (TYLENOL) 325 mg tablet Take 650 mg by mouth every four (4) hours as needed for Pain.    Yes Historical Provider   magnesium hydroxide (MILK OF MAGNESIA) 400 mg/5 mL suspension Take 10 mL by mouth every seventy-two (72) hours as needed for Constipation (If no BM in 3 days). Yes Historical Provider   albuterol-ipratropium (DUO-NEB) 2.5 mg-0.5 mg/3 ml nebu 3 mL by Nebulization route every four (4) hours as needed (Shortness of Breath). Yes Historical Provider   emollient combination no. 108 (LUBRISKIN) lotn lotion Apply  to affected area every eight (8) hours as needed for Dry Skin (Apply to both hands). Yes Historical Provider   triamcinolone (ARISTOCORT) 0.5 % topical cream Apply  to affected area two (2) times daily as needed for Skin Irritation. use thin layer   Yes Historical Provider   ammonium lactate (LAC-HYDRIN) 12 % lotion Apply  to affected area every twelve (12) hours as needed (Apply to chest, back, arms as needed for eczema). rub in to affected area well   Yes Historical Provider   diphenhydrAMINE-zinc acetate 1%-0.1% (BENADRYL) topical cream Apply  to affected area every six (6) hours as needed for Itching. Yes Historical Provider   bisacodyl (DULCOLAX, BISACODYL,) 10 mg suppository Insert 10 mg into rectum as needed (Every 96 hours as needed for constipation). Yes Historical Provider   sodium phosphate (FLEET ENEMA) 19-7 gram/118 mL enema Insert 1 Enema into rectum. Every 120 hours as needed for constipation if no results from suppository   Yes Historical Provider   guaiFENesin (ROBITUSSIN) 100 mg/5 mL liquid Take 200 mg by mouth every four (4) hours as needed for Cough. Yes Historical Provider   cetirizine (ZYRTEC) 10 mg tablet Take 10 mg by mouth nightly. Yes Sunita Braswell MD   clobetasol (TEMOVATE) 0.05 % topical cream Apply  to affected area every Monday, Wednesday, Friday. In the evening on Monday, Wednesday, and Friday   Yes Sunita Braswell MD   olopatadine (PATANOL) 0.1 % ophthalmic solution Administer 1 Drop to both eyes two (2) times a day.    Yes MD HARSH Mcgowaninfantis-B.ani-B.long-B.bifi (PROBIOTIC 4X) 10-15 mg TbEC Take 1 Tab by mouth two (2) times a day. Yes Phys Other, MD   albuterol (PROVENTIL HFA, VENTOLIN HFA, PROAIR HFA) 90 mcg/actuation inhaler Take 2 Puffs by inhalation every six (6) hours as needed for Wheezing. Yes Historical Provider   MULTIVITS W-FE,OTHER MIN/LUT (CENTRUM SILVER ULTRA WOMEN'S PO) Take 1 Tab by mouth daily. 3/14/11  Yes Historical Provider       Recent Results (from the past 24 hour(s))   EKG, 12 LEAD, INITIAL    Collection Time: 06/02/18 11:35 PM   Result Value Ref Range    Ventricular Rate 87 BPM    Atrial Rate 87 BPM    P-R Interval 98 ms    QRS Duration 88 ms    Q-T Interval 334 ms    QTC Calculation (Bezet) 401 ms    Calculated R Axis -35 degrees    Calculated T Axis 31 degrees    Diagnosis       Sinus rhythm with short GA  Left axis deviation  Moderate voltage criteria for LVH, may be normal variant  Nonspecific ST and T wave abnormality    Confirmed by Vandana Domingo ([id number]) on 6/3/2018 11:45:05 AM     CBC WITH AUTOMATED DIFF    Collection Time: 06/02/18 11:44 PM   Result Value Ref Range    WBC 25.0 (H) 3.6 - 11.0 K/uL    RBC 4.10 3.80 - 5.20 M/uL    HGB 12.1 11.5 - 16.0 g/dL    HCT 36.5 35.0 - 47.0 %    MCV 89.0 80.0 - 99.0 FL    MCH 29.5 26.0 - 34.0 PG    MCHC 33.2 30.0 - 36.5 g/dL    RDW 14.6 (H) 11.5 - 14.5 %    PLATELET 037 293 - 842 K/uL    MPV 11.7 8.9 - 12.9 FL    NRBC 0.0 0  WBC    ABSOLUTE NRBC 0.00 0.00 - 0.01 K/uL    NEUTROPHILS 91 (H) 32 - 75 %    LYMPHOCYTES 6 (L) 12 - 49 %    MONOCYTES 2 (L) 5 - 13 %    EOSINOPHILS 0 0 - 7 %    BASOPHILS 0 0 - 1 %    IMMATURE GRANULOCYTES 1 (H) 0.0 - 0.5 %    ABS. NEUTROPHILS 22.7 (H) 1.8 - 8.0 K/UL    ABS. LYMPHOCYTES 1.5 0.8 - 3.5 K/UL    ABS. MONOCYTES 0.5 0.0 - 1.0 K/UL    ABS. EOSINOPHILS 0.0 0.0 - 0.4 K/UL    ABS. BASOPHILS 0.0 0.0 - 0.1 K/UL    ABS. IMM.  GRANS. 0.3 (H) 0.00 - 0.04 K/UL    DF AUTOMATED      RBC COMMENTS NORMOCYTIC, NORMOCHROMIC     METABOLIC PANEL, COMPREHENSIVE    Collection Time: 06/02/18 11:44 PM   Result Value Ref Range    Sodium 135 (L) 136 - 145 mmol/L    Potassium 3.0 (L) 3.5 - 5.1 mmol/L    Chloride 94 (L) 97 - 108 mmol/L    CO2 34 (H) 21 - 32 mmol/L    Anion gap 7 5 - 15 mmol/L    Glucose 130 (H) 65 - 100 mg/dL    BUN 23 (H) 6 - 20 MG/DL    Creatinine 1.27 (H) 0.55 - 1.02 MG/DL    BUN/Creatinine ratio 18 12 - 20      GFR est AA 48 (L) >60 ml/min/1.73m2    GFR est non-AA 40 (L) >60 ml/min/1.73m2    Calcium 9.0 8.5 - 10.1 MG/DL    Bilirubin, total 1.0 0.2 - 1.0 MG/DL    ALT (SGPT) 15 12 - 78 U/L    AST (SGOT) 17 15 - 37 U/L    Alk. phosphatase 72 45 - 117 U/L    Protein, total 7.2 6.4 - 8.2 g/dL    Albumin 2.9 (L) 3.5 - 5.0 g/dL    Globulin 4.3 (H) 2.0 - 4.0 g/dL    A-G Ratio 0.7 (L) 1.1 - 2.2     CK W/ CKMB & INDEX    Collection Time: 06/02/18 11:44 PM   Result Value Ref Range    CK 62 26 - 192 U/L    CK - MB 1.7 <3.6 NG/ML    CK-MB Index 2.7 (H) 0 - 2.5     MAGNESIUM    Collection Time: 06/02/18 11:44 PM   Result Value Ref Range    Magnesium 1.6 1.6 - 2.4 mg/dL   TROPONIN I    Collection Time: 06/02/18 11:44 PM   Result Value Ref Range    Troponin-I, Qt. 0.14 (H) <0.05 ng/mL   LACTIC ACID    Collection Time: 06/02/18 11:44 PM   Result Value Ref Range    Lactic acid 2.0 0.4 - 2.0 MMOL/L   NT-PRO BNP    Collection Time: 06/02/18 11:44 PM   Result Value Ref Range    NT pro-BNP 95900 (H) 0 - 450 PG/ML   CULTURE, BLOOD    Collection Time: 06/03/18  1:04 AM   Result Value Ref Range    Special Requests: NO SPECIAL REQUESTS      Culture result: (A)       GRAM POSITIVE COCCI IN CHAINS GROWING IN THIS SINGLE BOTTLE DRAWN SITE=LAC    Culture result: (A)       PRELIMINARY REPORT OF GRAM POSITIVE COCCI IN CHAINS GROWING IN THIS SINGLE BOTTLE DRAWN CALLED TO AND READ BACK BY YUMI MONROY ON 6/3/18 AT 1535. AOW   CULTURE, BLOOD    Collection Time: 06/03/18  1:04 AM   Result Value Ref Range    Special Requests: NO SPECIAL REQUESTS      Culture result: NO GROWTH AFTER 7 HOURS     TROPONIN I    Collection Time: 06/03/18 8: 57 AM   Result Value Ref Range    Troponin-I, Qt. 0.13 (H) <0.05 ng/mL   LACTIC ACID    Collection Time: 06/03/18  8:57 AM   Result Value Ref Range    Lactic acid 1.4 0.4 - 2.0 MMOL/L   CULTURE, RESPIRATORY/SPUTUM/BRONCH W GRAM STAIN    Collection Time: 06/03/18  9:13 AM   Result Value Ref Range    Special Requests: NO SPECIAL REQUESTS      GRAM STAIN 1+ WBCS SEEN      GRAM STAIN 1+ EPITHELIAL CELLS SEEN      GRAM STAIN 3+ GRAM POSITIVE COCCI      GRAM STAIN 2+ GRAM POSITIVE RODS      GRAM STAIN RARE BUDDING YEAST      Culture result: PENDING

## 2018-06-03 NOTE — PROGRESS NOTES
Pharmacy Automatic Renal Dosing Protocol - Antimicrobials    Indication for Antimicrobials: ?HAP, Bacteremia     Current Regimen of Each Antimicrobial:  Levofloxacin 750mg IV q24h -started 6/3 day 1  Vancomycin consult - started 6/3 day 1    Previous Antimicrobial Therapy:  Cefepime 2gm IV x1  6/3    Significant Cultures:   6/3 BCx - GPC in chains  6/3 RCx - pending, GPC, rare budding yeast   6/3 BCx - ng pending    Radiology / Imaging results: (X-ray, CT scan or MRI):   6/3: CXR: Pulmonary edema and small left pleural effusion. 6/3: CT Chest: to r/o  CHF vs PNA vs Amiodarone toxicity (pending)    Paralysis, amputations, malnutrition: n/a    Labs:  Recent Labs      18   2344   CREA  1.27*   BUN  23*   WBC  25.0*     Temp (24hrs), Av.4 °F (36.9 °C), Min:98.1 °F (36.7 °C), Max:99.2 °F (37.3 °C)    Creatinine Clearance (mL/min) or Dialysis:   Estimated Creatinine Clearance: 27.6 mL/min (based on Cr of 1.27). Estimated Creatinine Clearance (using IBW):27.6 mL/min    Impression/Plan:   · 6/3 am Vancomycin requested by ED physician; discontinued by admitting hospitalist.  Re-consulted to add Vancomycin 6/3 16:00. Vancomycin 1750mg IV loading dose followed by 1000mg (14.2mg/kg) IV q24h for a projected trough at Css fo 18. 2. · Continue Levofloxacin to 750mg IV q48h  · Daily BMP + CBC already ordered  · Antimicrobial duration - pending     Pharmacy will follow daily and adjust medications as appropriate for renal function and/or serum levels.     Thank you,  Latricia Camargo, [facility]

## 2018-06-03 NOTE — H&P
Hospitalist Admission Note    NAME: Carmen Pascal   :  1928   MRN:  [id number]     Date/Time:  6/3/2018 3:41 AM    Patient PCP: Miguel Guzman MD  ______________________________________________________________________  Given the patient's current clinical presentation, I have a high level of concern for decompensation if discharged from the emergency department. Complex decision making was performed, which includes reviewing the patient's available past medical records, laboratory results, and x-ray films. My assessment of this patient's clinical condition and my plan of care is as follows.     Assessment / Plan:  Acute Respiratory Failure with hypoxia POA  Suspect related to Acute on chronic diastolic heart failure POA in settings of moderate Mitral Regurgitation  R/o amiodarone toxicity  Unable to r/o Atypical PNA as family reported fever of a night before and pt reported productive cough with green sputum  Admit to PCU  Chest CT chest   IV lasix 40mg BID while monitoring I/Os, daily weight and lytes  Hold amiodarone  Cardiology consult  Px levaquin  check sputum cultures  F/u blood cultures  CXR with pulmonary edema and pleural effusion    Elevated troponin  Suspect from demand ischemia from hypoxia   Check Serial Troponin  Cardiology to see the patient    Hypokalemia  Replete and monitor  Increase scheduled K supplement to 20Meq BID    CKD (chronic kidney disease) stage 3, GFR 30-59 ml/min  Monitor lytes closely while aggressive diuresed    CAD (coronary artery disease)   Paroxysmal A-fib (HCC) (6/3/2018)  HTN  Continue ASA, Eliquis  Continue BB  Holding Norvasc to allow BP to diurese  PRN nitropaste    Hyperlipidemia  Continue statins    Code Status: DNR/DNI as per patient's own wishes  Surrogate Decision Maker: Iris Kapadia     DVT Prophylaxis: Eliquis    Baseline: came from NH      Subjective:   CHIEF COMPLAINT: sent from NH due to hypoxia    HISTORY OF PRESENT ILLNESS: Edyta Arroyo is a 80 y.o.  female who presents with hypoxia from NH. As per patient and family, pt had been feeling short winded since yesterday, progressive getting worse. Son reported she had fever of 101 a night before. Pt reports productive cough with greenish sputum for past couple of days. Pt denies any nausea, vomiting, diarrhea, chest pain, diarrhea, abdominal pain. In ED pt noted to have pulmonary edema and pleural effusion on CXR. We were asked to admit for work up and evaluation of the above problems. Past Medical History:   Diagnosis Date    Arthritis     generalized    COPD     GERD (gastroesophageal reflux disease)     Hypertension     Ill-defined condition     Vitamin deficiency    NSTEMI (non-ST elevated myocardial infarction) ([facility] [address] 5/1/2018    Other ill-defined conditions(799.89)     high cholesterol    Peripheral artery disease (HCC)     Psychiatric disorder     Anxiety disorder    Sleep disorder     Insomnia    TIA (transient ischemic attack)         Past Surgical History:   Procedure Laterality Date    HX CAROTID ENDARTERECTOMY      left 2 or 3 years ago.  HX OTHER SURGICAL      colonoscopy    HX TONSILLECTOMY         Social History   Substance Use Topics    Smoking status: Former Smoker    Smokeless tobacco: Never Used      Comment: quit 3 years ago    Alcohol use Yes      Comment: glass of wine rarely        Family History   Problem Relation Age of Onset    Other Mother      carotid endarterectomy/cardiomegaly    Other Father      carotid endarterectomy     Allergies   Allergen Reactions    Penicillins Swelling    Sulfa (Sulfonamide Antibiotics) Itching    Propoxyphene-Acetaminophen Unknown (comments)     Pt. Not sure, thinks it was hallucination. Prior to Admission medications    Medication Sig Start Date End Date Taking? Authorizing Provider   aspirin 81 mg chewable tablet Take 1 Tab by mouth daily.  5/11/18  Yes Jefry Gardiner MD atorvastatin (LIPITOR) 40 mg tablet Take 1 Tab by mouth nightly. 5/11/18  Yes Jose Ramon Parra MD   amiodarone (PACERONE) 100 mg tablet Take 1 Tab by mouth daily. 5/11/18  Yes Jose Ramon Parra MD   apixaban (ELIQUIS) 2.5 mg tablet Take 1 Tab by mouth two (2) times a day. 5/11/18  Yes Jose Ramon Parra MD   furosemide (LASIX) 40 mg tablet Take 1 Tab by mouth two (2) times a day. 5/11/18  Yes Jose Ramon Parra MD   metoprolol succinate (TOPROL-XL) 25 mg XL tablet Take 1 Tab by mouth daily. 5/11/18  Yes Jose Ramon Parra MD   potassium chloride SR (KLOR-CON 10) 10 mEq tablet Take 1 Tab by mouth two (2) times a day. 5/11/18  Yes Jose Ramon Parra MD   tiotropium (SPIRIVA WITH HANDIHALER) 18 mcg inhalation capsule Take 1 Cap by inhalation daily. Indications: Chronic Obstructive Pulmonary Disease with Bronchospasms 5/11/18  Yes Jose Ramon Parra MD   amLODIPine (NORVASC) 5 mg tablet Take 5 mg by mouth daily. Yes Historical Provider   oxybutynin chloride XL (DITROPAN XL) 5 mg CR tablet Take 5 mg by mouth daily. Yes Historical Provider   pantoprazole (PROTONIX) 20 mg tablet Take 20 mg by mouth Before breakfast and dinner. Yes Historical Provider   promethazine (PHENERGAN) 12.5 mg tablet Take 12.5 mg by mouth every six (6) hours as needed for Nausea. Yes Historical Provider   alum-mag hydroxide-simeth (RULOX) 200-200-20 mg/5 mL susp Take 20 mL by mouth every four (4) hours as needed. Yes Historical Provider   acetaminophen (TYLENOL) 325 mg tablet Take 650 mg by mouth every four (4) hours as needed for Pain. Yes Historical Provider   magnesium hydroxide (MILK OF MAGNESIA) 400 mg/5 mL suspension Take 10 mL by mouth every seventy-two (72) hours as needed for Constipation (If no BM in 3 days). Yes Historical Provider   albuterol-ipratropium (DUO-NEB) 2.5 mg-0.5 mg/3 ml nebu 3 mL by Nebulization route every four (4) hours as needed (Shortness of Breath).    Yes Historical Provider   emollient combination no. 108 (LUBRISKIN) lotn lotion Apply  to affected area every eight (8) hours as needed for Dry Skin (Apply to both hands). Yes Historical Provider   triamcinolone (ARISTOCORT) 0.5 % topical cream Apply  to affected area two (2) times daily as needed for Skin Irritation. use thin layer   Yes Historical Provider   ammonium lactate (LAC-HYDRIN) 12 % lotion Apply  to affected area every twelve (12) hours as needed (Apply to chest, back, arms as needed for eczema). rub in to affected area well   Yes Historical Provider   diphenhydrAMINE-zinc acetate 1%-0.1% (BENADRYL) topical cream Apply  to affected area every six (6) hours as needed for Itching. Yes Historical Provider   bisacodyl (DULCOLAX, BISACODYL,) 10 mg suppository Insert 10 mg into rectum as needed (Every 96 hours as needed for constipation). Yes Historical Provider   sodium phosphate (FLEET ENEMA) 19-7 gram/118 mL enema Insert 1 Enema into rectum. Every 120 hours as needed for constipation if no results from suppository   Yes Historical Provider   guaiFENesin (ROBITUSSIN) 100 mg/5 mL liquid Take 200 mg by mouth every four (4) hours as needed for Cough. Yes Historical Provider   cetirizine (ZYRTEC) 10 mg tablet Take 10 mg by mouth nightly. Yes Phys MD Michaelle   clobetasol (TEMOVATE) 0.05 % topical cream Apply  to affected area every Monday, Wednesday, Friday. In the evening on Monday, Wednesday, and Friday   Yes Phys MD Michaelle   olopatadine (PATANOL) 0.1 % ophthalmic solution Administer 1 Drop to both eyes two (2) times a day. Yes Phys MD Michaelle   BMikeinfantis-B.ani-B.long-B.bifi (PROBIOTIC 4X) 10-15 mg TbEC Take 1 Tab by mouth two (2) times a day. Yes Phys MD Michaelle   albuterol (PROVENTIL HFA, VENTOLIN HFA, PROAIR HFA) 90 mcg/actuation inhaler Take 2 Puffs by inhalation every six (6) hours as needed for Wheezing. Yes Historical Provider   MULTIVITS W-FE,OTHER MIN/LUT (CENTRUM SILVER ULTRA WOMEN'S PO) Take 1 Tab by mouth daily.  3/14/11 Yes Historical Provider       REVIEW OF SYSTEMS:     I am not able to complete the review of systems because: The patient is intubated and sedated    The patient has altered mental status due to his acute medical problems    The patient has baseline aphasia from prior stroke(s)    The patient has baseline dementia and is not reliable historian    The patient is in acute medical distress and unable to provide information           Total of 12 systems reviewed as follows:       POSITIVE= underlined text  Negative = text not underlined  General:  fever, chills, sweats, generalized weakness, weight loss/gain,      loss of appetite   Eyes:    blurred vision, eye pain, loss of vision, double vision  ENT:    rhinorrhea, pharyngitis   Respiratory:   cough, sputum production, SOB, ALCARAZ, wheezing, pleuritic pain   Cardiology:   chest pain, palpitations, orthopnea, PND, edema, syncope   Gastrointestinal:  abdominal pain , N/V, diarrhea, dysphagia, constipation, bleeding   Genitourinary:  frequency, urgency, dysuria, hematuria, incontinence   Muskuloskeletal :  arthralgia, myalgia, back pain  Hematology:  easy bruising, nose or gum bleeding, lymphadenopathy   Dermatological: rash, ulceration, pruritis, color change / jaundice  Endocrine:   hot flashes or polydipsia   Neurological:  headache, dizziness, confusion, focal weakness, paresthesia,     Speech difficulties, memory loss, gait difficulty  Psychological: Feelings of anxiety, depression, agitation    Objective:   VITALS:    Visit Vitals    /73    Pulse 84    Temp 98.1 °F (36.7 °C)    Resp 26    Ht 5' 6\" (1.676 m)    Wt 70.3 kg (155 lb)    SpO2 92%    BMI 25.02 kg/m2       PHYSICAL EXAM:    General:    Alert, cooperative, no distress, appears stated age.      HEENT: Atraumatic, anicteric sclerae, pink conjunctivae     No oral ulcers, mucosa moist, throat clear, dentition fair  Neck:  Supple, symmetrical,  thyroid: non tender  Lungs:   Crackles bilaterally  Chest wall:  No tenderness  No Accessory muscle use. Heart:   Regular  rhythm,  No  murmur   No edema  Abdomen:   Soft, non-tender. Not distended. Bowel sounds normal  Extremities: No cyanosis. No clubbing,      Skin turgor normal, Capillary refill normal, Radial dial pulse 2+  Skin:     Not pale. Not Jaundiced  No rashes   Psych:  Fair insight. Not depressed. Not anxious or agitated. Neurologic: EOMs intact. No facial asymmetry. No aphasia or slurred speech. Symmetrical strength, Sensation grossly intact. Alert and oriented X 4.     _______________________________________________________________________  Care Plan discussed with:    Comments   Patient y    Family  y Son at bedside   RN y    Care Manager                    Consultant:  [facility] ED physician   _______________________________________________________________________  Expected  Disposition:   Home with Family y   HH/PT/OT/RN    SNF/LTC    BRI    ________________________________________________________________________  TOTAL TIME: 61 Minutes    Critical Care Provided     Minutes non procedure based      Comments    y Reviewed previous records   >50% of visit spent in counseling and coordination of care y Discussion with patient and family and questions answered       ________________________________________________________________________  Signed: Alayna Raya MD    Procedures: see electronic medical records for all procedures/Xrays and details which were not copied into this note but were reviewed prior to creation of Plan.     LAB DATA REVIEWED:    Recent Results (from the past 24 hour(s))   EKG, 12 LEAD, INITIAL    Collection Time: 06/02/18 11:35 PM   Result Value Ref Range    Ventricular Rate 87 BPM    Atrial Rate 87 BPM    P-R Interval 98 ms    QRS Duration 88 ms    Q-T Interval 334 ms    QTC Calculation (Bezet) 401 ms    Calculated R Axis -35 degrees    Calculated T Axis 31 degrees    Diagnosis       Sinus rhythm with short NH  Left axis deviation  Moderate voltage criteria for LVH, may be normal variant  Nonspecific ST and T wave abnormality  When compared with ECG of 30-APR-2018 16:01,  premature atrial complexes are no longer present  Criteria for Inferior infarct are no longer present  Nonspecific T wave abnormality, worse in Lateral leads  QT has shortened     CBC WITH AUTOMATED DIFF    Collection Time: 06/02/18 11:44 PM   Result Value Ref Range    WBC 25.0 (H) 3.6 - 11.0 K/uL    RBC 4.10 3.80 - 5.20 M/uL    HGB 12.1 11.5 - 16.0 g/dL    HCT 36.5 35.0 - 47.0 %    MCV 89.0 80.0 - 99.0 FL    MCH 29.5 26.0 - 34.0 PG    MCHC 33.2 30.0 - 36.5 g/dL    RDW 14.6 (H) 11.5 - 14.5 %    PLATELET 542 200 - 944 K/uL    MPV 11.7 8.9 - 12.9 FL    NRBC 0.0 0  WBC    ABSOLUTE NRBC 0.00 0.00 - 0.01 K/uL    NEUTROPHILS 91 (H) 32 - 75 %    LYMPHOCYTES 6 (L) 12 - 49 %    MONOCYTES 2 (L) 5 - 13 %    EOSINOPHILS 0 0 - 7 %    BASOPHILS 0 0 - 1 %    IMMATURE GRANULOCYTES 1 (H) 0.0 - 0.5 %    ABS. NEUTROPHILS 22.7 (H) 1.8 - 8.0 K/UL    ABS. LYMPHOCYTES 1.5 0.8 - 3.5 K/UL    ABS. MONOCYTES 0.5 0.0 - 1.0 K/UL    ABS. EOSINOPHILS 0.0 0.0 - 0.4 K/UL    ABS. BASOPHILS 0.0 0.0 - 0.1 K/UL    ABS. IMM. GRANS. 0.3 (H) 0.00 - 0.04 K/UL    DF AUTOMATED      RBC COMMENTS NORMOCYTIC, NORMOCHROMIC     METABOLIC PANEL, COMPREHENSIVE    Collection Time: 06/02/18 11:44 PM   Result Value Ref Range    Sodium 135 (L) 136 - 145 mmol/L    Potassium 3.0 (L) 3.5 - 5.1 mmol/L    Chloride 94 (L) 97 - 108 mmol/L    CO2 34 (H) 21 - 32 mmol/L    Anion gap 7 5 - 15 mmol/L    Glucose 130 (H) 65 - 100 mg/dL    BUN 23 (H) 6 - 20 MG/DL    Creatinine 1.27 (H) 0.55 - 1.02 MG/DL    BUN/Creatinine ratio 18 12 - 20      GFR est AA 48 (L) >60 ml/min/1.73m2    GFR est non-AA 40 (L) >60 ml/min/1.73m2    Calcium 9.0 8.5 - 10.1 MG/DL    Bilirubin, total 1.0 0.2 - 1.0 MG/DL    ALT (SGPT) 15 12 - 78 U/L    AST (SGOT) 17 15 - 37 U/L    Alk.  phosphatase 72 45 - 117 U/L    Protein, total 7.2 6.4 - 8.2 g/dL Albumin 2.9 (L) 3.5 - 5.0 g/dL    Globulin 4.3 (H) 2.0 - 4.0 g/dL    A-G Ratio 0.7 (L) 1.1 - 2.2     CK W/ CKMB & INDEX    Collection Time: 06/02/18 11:44 PM   Result Value Ref Range    CK 62 26 - 192 U/L    CK - MB 1.7 <3.6 NG/ML    CK-MB Index 2.7 (H) 0 - 2.5     MAGNESIUM    Collection Time: 06/02/18 11:44 PM   Result Value Ref Range    Magnesium 1.6 1.6 - 2.4 mg/dL   TROPONIN I    Collection Time: 06/02/18 11:44 PM   Result Value Ref Range    Troponin-I, Qt. 0.14 (H) <0.05 ng/mL   LACTIC ACID    Collection Time: 06/02/18 11:44 PM   Result Value Ref Range    Lactic acid 2.0 0.4 - 2.0 MMOL/L   NT-PRO BNP    Collection Time: 06/02/18 11:44 PM   Result Value Ref Range    NT pro-BNP 56688 (H) 0 - 450 PG/ML

## 2018-06-04 LAB
ALBUMIN SERPL-MCNC: 2.6 G/DL (ref 3.5–5)
ALBUMIN/GLOB SERPL: 0.5 {RATIO} (ref 1.1–2.2)
ALP SERPL-CCNC: 92 U/L (ref 45–117)
ALT SERPL-CCNC: 19 U/L (ref 12–78)
ANION GAP SERPL CALC-SCNC: 8 MMOL/L (ref 5–15)
AST SERPL-CCNC: 26 U/L (ref 15–37)
BASOPHILS # BLD: 0 K/UL (ref 0–0.1)
BASOPHILS NFR BLD: 0 % (ref 0–1)
BILIRUB SERPL-MCNC: 0.7 MG/DL (ref 0.2–1)
BUN SERPL-MCNC: 20 MG/DL (ref 6–20)
BUN/CREAT SERPL: 18 (ref 12–20)
CALCIUM SERPL-MCNC: 9.3 MG/DL (ref 8.5–10.1)
CHLORIDE SERPL-SCNC: 98 MMOL/L (ref 97–108)
CO2 SERPL-SCNC: 31 MMOL/L (ref 21–32)
CREAT SERPL-MCNC: 1.1 MG/DL (ref 0.55–1.02)
DIFFERENTIAL METHOD BLD: ABNORMAL
EOSINOPHIL # BLD: 0.1 K/UL (ref 0–0.4)
EOSINOPHIL NFR BLD: 1 % (ref 0–7)
ERYTHROCYTE [DISTWIDTH] IN BLOOD BY AUTOMATED COUNT: 14.4 % (ref 11.5–14.5)
GLOBULIN SER CALC-MCNC: 5.1 G/DL (ref 2–4)
GLUCOSE SERPL-MCNC: 107 MG/DL (ref 65–100)
HCT VFR BLD AUTO: 38.4 % (ref 35–47)
HGB BLD-MCNC: 12.6 G/DL (ref 11.5–16)
IMM GRANULOCYTES # BLD: 0.1 K/UL (ref 0–0.04)
IMM GRANULOCYTES NFR BLD AUTO: 1 % (ref 0–0.5)
LYMPHOCYTES # BLD: 1.1 K/UL (ref 0.8–3.5)
LYMPHOCYTES NFR BLD: 7 % (ref 12–49)
MCH RBC QN AUTO: 28.9 PG (ref 26–34)
MCHC RBC AUTO-ENTMCNC: 32.8 G/DL (ref 30–36.5)
MCV RBC AUTO: 88.1 FL (ref 80–99)
MONOCYTES # BLD: 0.5 K/UL (ref 0–1)
MONOCYTES NFR BLD: 4 % (ref 5–13)
NEUTS SEG # BLD: 12.8 K/UL (ref 1.8–8)
NEUTS SEG NFR BLD: 88 % (ref 32–75)
NRBC # BLD: 0 K/UL (ref 0–0.01)
NRBC BLD-RTO: 0 PER 100 WBC
PLATELET # BLD AUTO: 216 K/UL (ref 150–400)
PMV BLD AUTO: 11.6 FL (ref 8.9–12.9)
POTASSIUM SERPL-SCNC: 3.6 MMOL/L (ref 3.5–5.1)
PROT SERPL-MCNC: 7.7 G/DL (ref 6.4–8.2)
RBC # BLD AUTO: 4.36 M/UL (ref 3.8–5.2)
SODIUM SERPL-SCNC: 137 MMOL/L (ref 136–145)
WBC # BLD AUTO: 14.6 K/UL (ref 3.6–11)

## 2018-06-04 PROCEDURE — G8979 MOBILITY GOAL STATUS: HCPCS

## 2018-06-04 PROCEDURE — 74011250637 HC RX REV CODE- 250/637: Performed by: INTERNAL MEDICINE

## 2018-06-04 PROCEDURE — 65660000000 HC RM CCU STEPDOWN

## 2018-06-04 PROCEDURE — 74011250636 HC RX REV CODE- 250/636: Performed by: INTERNAL MEDICINE

## 2018-06-04 PROCEDURE — 77030038269 HC DRN EXT URIN PURWCK BARD -A

## 2018-06-04 PROCEDURE — 80053 COMPREHEN METABOLIC PANEL: CPT | Performed by: INTERNAL MEDICINE

## 2018-06-04 PROCEDURE — G8987 SELF CARE CURRENT STATUS: HCPCS

## 2018-06-04 PROCEDURE — G8978 MOBILITY CURRENT STATUS: HCPCS

## 2018-06-04 PROCEDURE — G8988 SELF CARE GOAL STATUS: HCPCS

## 2018-06-04 PROCEDURE — 97162 PT EVAL MOD COMPLEX 30 MIN: CPT

## 2018-06-04 PROCEDURE — 36415 COLL VENOUS BLD VENIPUNCTURE: CPT | Performed by: INTERNAL MEDICINE

## 2018-06-04 PROCEDURE — 97535 SELF CARE MNGMENT TRAINING: CPT

## 2018-06-04 PROCEDURE — 77010033678 HC OXYGEN DAILY

## 2018-06-04 PROCEDURE — 97165 OT EVAL LOW COMPLEX 30 MIN: CPT

## 2018-06-04 PROCEDURE — 97530 THERAPEUTIC ACTIVITIES: CPT

## 2018-06-04 PROCEDURE — 85025 COMPLETE CBC W/AUTO DIFF WBC: CPT | Performed by: INTERNAL MEDICINE

## 2018-06-04 RX ADMIN — ATORVASTATIN CALCIUM 40 MG: 40 TABLET, FILM COATED ORAL at 22:33

## 2018-06-04 RX ADMIN — ACETAMINOPHEN 650 MG: 325 TABLET ORAL at 22:39

## 2018-06-04 RX ADMIN — APIXABAN 2.5 MG: 2.5 TABLET, FILM COATED ORAL at 17:10

## 2018-06-04 RX ADMIN — CETIRIZINE HYDROCHLORIDE 10 MG: 10 TABLET, FILM COATED ORAL at 22:33

## 2018-06-04 RX ADMIN — OXYBUTYNIN CHLORIDE 5 MG: 5 TABLET, FILM COATED, EXTENDED RELEASE ORAL at 08:33

## 2018-06-04 RX ADMIN — POTASSIUM CHLORIDE 20 MEQ: 750 TABLET, EXTENDED RELEASE ORAL at 08:34

## 2018-06-04 RX ADMIN — Medication 1 CAPSULE: at 08:34

## 2018-06-04 RX ADMIN — KETOTIFEN FUMARATE 1 DROP: 0.35 SOLUTION/ DROPS OPHTHALMIC at 17:10

## 2018-06-04 RX ADMIN — KETOTIFEN FUMARATE 1 DROP: 0.35 SOLUTION/ DROPS OPHTHALMIC at 08:37

## 2018-06-04 RX ADMIN — Medication 10 ML: at 05:18

## 2018-06-04 RX ADMIN — ASPIRIN 81 MG 81 MG: 81 TABLET ORAL at 08:34

## 2018-06-04 RX ADMIN — UMECLIDINIUM 1 PUFF: 62.5 AEROSOL, POWDER ORAL at 08:37

## 2018-06-04 RX ADMIN — POTASSIUM CHLORIDE 20 MEQ: 750 TABLET, EXTENDED RELEASE ORAL at 16:02

## 2018-06-04 RX ADMIN — PANTOPRAZOLE SODIUM 40 MG: 40 TABLET, DELAYED RELEASE ORAL at 16:02

## 2018-06-04 RX ADMIN — APIXABAN 2.5 MG: 2.5 TABLET, FILM COATED ORAL at 08:33

## 2018-06-04 RX ADMIN — FUROSEMIDE 40 MG: 10 INJECTION, SOLUTION INTRAMUSCULAR; INTRAVENOUS at 17:10

## 2018-06-04 RX ADMIN — Medication 10 ML: at 22:33

## 2018-06-04 RX ADMIN — METOPROLOL SUCCINATE 25 MG: 25 TABLET, EXTENDED RELEASE ORAL at 08:34

## 2018-06-04 RX ADMIN — VANCOMYCIN HYDROCHLORIDE 1000 MG: 1 INJECTION, POWDER, LYOPHILIZED, FOR SOLUTION INTRAVENOUS at 17:10

## 2018-06-04 RX ADMIN — Medication 10 ML: at 14:49

## 2018-06-04 RX ADMIN — FUROSEMIDE 40 MG: 10 INJECTION, SOLUTION INTRAMUSCULAR; INTRAVENOUS at 08:33

## 2018-06-04 RX ADMIN — POTASSIUM CHLORIDE 20 MEQ: 750 TABLET, EXTENDED RELEASE ORAL at 22:33

## 2018-06-04 RX ADMIN — PANTOPRAZOLE SODIUM 40 MG: 40 TABLET, DELAYED RELEASE ORAL at 08:34

## 2018-06-04 RX ADMIN — Medication 10 ML: at 17:10

## 2018-06-04 NOTE — PROGRESS NOTES
PCU SHIFT NURSING NOTE      Bedside and Verbal shift change report given to Jeremiah Wen (oncoming nurse) by Krysten White RN (offgoing nurse). Report included the following information SBAR, Kardex, MAR, Recent Results and Cardiac Rhythm NSR. Shift Summary:   0930:  PT into work with pt.    1000:  Decrease O2 to 5L NC and added humidification. 1100:  Pt up in chair. Tech assisted with weight for the am.  No signs of distress. Family at bedside. 1120:  Decrease O2 to 4L NC. Will continue to monitor. Admission Date 6/2/2018   Admission Diagnosis Acute respiratory failure with hypoxia ([facility] [address]   Consults IP CONSULT TO CARDIOLOGY  IP CONSULT TO PRIMARY CARE PROVIDER        Consults   [x]PT   [x]OT   []Speech   [x]Case Management      [] Palliative      Cardiac Monitoring Order   [x]Yes   []No     IV drips   []Yes    Drip:                            Dose:  Drip:                            Dose:  Drip:                            Dose:   [x]No     GI Prophylaxis   [x]Yes   []No         DVT Prophylaxis   SCDs:  Sequential Compression Device: Bilateral          Inderjit stockings:         [x] Medication   []Contraindicated   []None      Activity Level Activity Level: Bed Rest         Purposeful Rounding every 1-2 hour? [x]Yes   Ac Score  Total Score: 4   Bed Alarm (If score 3 or >)   []Yes   [] Refused (See signed refusal form in chart)   Rosas Score  Rosas Score: 15   Rosas Score (if score 14 or less)   []PMT consult   []Wound Care consult      []Specialty bed   [] Nutrition consult          Needs prior to discharge:   Home O2 required:    [x]Yes   []No    If yes, how much O2 required?  2L O@ NC    Other:    Last Bowel Movement: Last Bowel Movement Date: 06/02/18      Influenza Vaccine Received Flu Vaccine for Current Season (usually Sept-March): Not Flu Season        Pneumonia Vaccine           Diet Active Orders   Diet    DIET CARDIAC Regular      LDAs               Peripheral IV 06/02/18 Left Antecubital (Active) Site Assessment Clean, dry, & intact 6/4/2018  3:12 AM   Phlebitis Assessment 0 6/4/2018  3:12 AM   Infiltration Assessment 0 6/4/2018  3:12 AM   Dressing Status Clean, dry, & intact 6/4/2018  3:12 AM   Dressing Type Transparent;Tape 6/4/2018  3:12 AM   Hub Color/Line Status Pink;Clotted; Flushed 6/4/2018  3:12 AM   Alcohol Cap Used Yes 6/3/2018  7:20 PM       Peripheral IV 06/03/18 Left Wrist (Active)   Site Assessment Clean, dry, & intact 6/4/2018  3:12 AM   Phlebitis Assessment 0 6/4/2018  3:12 AM   Infiltration Assessment 0 6/4/2018  3:12 AM   Dressing Status Clean, dry, & intact 6/4/2018  3:12 AM   Dressing Type Transparent;Tape 6/4/2018  3:12 AM   Hub Color/Line Status Pink;Capped;Flushed 6/4/2018  3:12 AM          External Female Catheter 06/03/18 (Active)   Site Assessment Clean, dry, & intact 6/3/2018  7:20 PM   Repositioned No 6/3/2018  7:20 PM   Perineal Care No 6/3/2018  7:20 PM   Wick Changed No 6/3/2018  7:20 PM   Suction Canister/Tubing Changed No 6/3/2018  7:20 PM   Urine Output (mL) 1000 ml 6/4/2018  7:15 AM                Urinary Catheter      Intake & Output   Date 06/03/18 0700 - 06/04/18 0659 06/04/18 0700 - 06/05/18 0659   Shift 0700-1859 1900-0659 24 Hour Total 0700-1859 1900-0659 24 Hour Total   I  N  T  A  K  E   I.V.  (mL/kg/hr)  500  (0.6) 500  (0.3)         Volume (vancomycin (VANCOCIN) 1750 mg in  ml infusion)  500 500       Shift Total  (mL/kg)  500  (7.1) 500  (7.1)      O  U  T  P  U  T   Urine  (mL/kg/hr) 1200  (1.4) 1000  (1.2) 2200  (1.3) 1000  1000      Urine Output (mL) (External Female Catheter 06/03/18) 1200 1000 2200 1000  1000    Shift Total  (mL/kg) 1200  (17.1) 1000  (14.2) 2200  (31.3) 1000  (14.2)  1000  (14.2)   NET -1200 -500 -1700 -1000  -1000   Weight (kg) 70.3 70.3 70.3 70.3 70.3 70.3         Readmission Risk Assessment Tool Score High Risk            39       Total Score        3 Has Seen PCP in Last 6 Months (Yes=3, No=0)    2 .  Living with Significant Other. Assisted Living. LTAC. SNF. or   Rehab    4 IP Visits Last 12 Months (1-3=4, 4=9, >4=11)    5 Pt.  Coverage (Medicare=5 , Medicaid, or Self-Pay=4)    25 Charlson Comorbidity Score (Age + Comorbid Conditions)        Criteria that do not apply:    Patient Length of Stay (>5 days = 3)       Expected Length of Stay - - -   Actual Length of Stay 1

## 2018-06-04 NOTE — PROGRESS NOTES
Problem: Self Care Deficits Care Plan (Adult)  Goal: *Acute Goals and Plan of Care (Insert Text)  Occupational Therapy Goals  Initiated 6/4/2018  1. Patient will perform grooming with supervision/set-up within 7 day(s). 2.  Patient will perform bathing at bedside with moderate assistance  within 7 day(s). 3.  Patient will perform upper body dressing with minimal assistance/contact guard assist within 7 day(s). 4.  Patient will perform toilet transfers with moderate assistance  within 7 day(s). 5.  Patient will perform all aspects of toileting with supervision/set-up within 7 day(s). 6.  Patient will participate in upper extremity therapeutic exercise/activities with minimal assistance/contact guard assist for 5 minutes within 7 day(s). 7.  Patient will utilize energy conservation techniques during functional activities with verbal cues within 7 day(s). Occupational Therapy EVALUATION  Patient: Endy Cook (32 y.o. female)  Date: 6/4/2018  Primary Diagnosis: Acute respiratory failure with hypoxia [facility])        Precautions:   Fall    ASSESSMENT :  Based on the objective data described below, the patient presents with generalized weakness, decreased endurance, strength, functional mobility and ADLs and cognition. Pt was living at  [facility] and states that she was not bathing herself and the staff was helping her with transfers and toileting. Per family, a few months ago pt was able to bathe and dress her self with occasional assist.  Pt was also in a wheelchair more at [facility] according to her family. Pt was cleared to be seen for therapy and all VSS. She was on 4 liters of NC and her O2% was 94%. Pt has functional range in BUE and her strength is functional and  strength  in left hand is decreased. Pt was mod assist of 1 to stand and she was fearful of falling would recommend that pt have a RW. Pt was able to perform sit to stand with min to mod assist and left sitting in chair. Recommend that pt have further therapy at discharge at [facility], Kelly Murillo, OT and PT    Patient will benefit from skilled intervention to address the above impairments. Patients rehabilitation potential is considered to be Good  Factors which may influence rehabilitation potential include:   [x]             None noted  []             Mental ability/status  []             Medical condition  []             Home/family situation and support systems  []             Safety awareness  []             Pain tolerance/management  []             Other:      PLAN :  Recommendations and Planned Interventions:  [x]               Self Care Training                  []        Therapeutic Activities  [x]               Functional Mobility Training    []        Cognitive Retraining  [x]               Therapeutic Exercises           [x]        Endurance Activities  []               Balance Training                   []        Neuromuscular Re-Education  []               Visual/Perceptual Training     [x]   Home Safety Training  [x]               Patient Education                 [x]        Family Training/Education  []               Other (comment):    Frequency/Duration: Patient will be followed by occupational therapy 4 times a week to address goals. Discharge Recommendations: JACOBO Danielle  Further Equipment Recommendations for Discharge: tbd     SUBJECTIVE:   Patient stated I dont do much of that any more.     OBJECTIVE DATA SUMMARY:   HISTORY:   Past Medical History:   Diagnosis Date    Arthritis     generalized    COPD     GERD (gastroesophageal reflux disease)     Hypertension     Ill-defined condition     Vitamin deficiency    NSTEMI (non-ST elevated myocardial infarction) ([facility] [address] 5/1/2018    Other ill-defined conditions(799.89)     high cholesterol    Peripheral artery disease (HCC)     Psychiatric disorder     Anxiety disorder    Sleep disorder     Insomnia    TIA (transient ischemic attack)      Past Surgical History:   Procedure Laterality Date    HX CAROTID ENDARTERECTOMY      left 2 or 3 years ago.  HX OTHER SURGICAL      colonoscopy    HX TONSILLECTOMY         Prior Level of Function/Environment/Context: pt lives a [employer] and was not able to bathe and dress self at this facility but would like to do more if she can. Occupations in which the patient is/was successful, what are the barriers preventing that success:   Performance Patterns (routines, roles, habits, and rituals):   Personal Interests and/or values:   Expanded or extensive additional review of patient history:     Home Situation  Home Environment: Assisted living  One/Two Story Residence: Other (Comment)  Living Alone: No  Support Systems: Assisted living, Family member(s), Child(isma)  Patient Expects to be Discharged to[de-identified] Assisted living  Current DME Used/Available at Home: Shower chair, Grab bars  Tub or Shower Type: Shower    Hand dominance: Right    EXAMINATION OF PERFORMANCE DEFICITS:  Cognitive/Behavioral Status:  Neurologic State: Alert  Orientation Level: Appropriate for age  Cognition: Appropriate decision making  Perception: Appears intact  Perseveration: No perseveration noted  Safety/Judgement: Awareness of environment    Skin: in good health     Edema: none    Hearing: Auditory  Auditory Impairment: Hard of hearing, bilateral    Vision/Perceptual:                         intact            Range of Motion:    AROM: Generally decreased, functional  PROM: Generally decreased, functional                      Strength:    Strength: Generally decreased, functional                Coordination:  Coordination: Within functional limits  Fine Motor Skills-Upper: Left Intact; Right Intact    Gross Motor Skills-Upper: Left Intact; Right Intact    Tone & Sensation:    Tone: Normal  Sensation: Intact                      Balance:  Sitting: Impaired  Sitting - Static: Good (unsupported)  Sitting - Dynamic: Fair (occasional)  Standing: Impaired  Standing - Static: Fair  Standing - Dynamic : Poor    Functional Mobility and Transfers for ADLs:  Bed Mobility:  Rolling: Minimum assistance;Assist x1;Additional time  Supine to Sit: Moderate assistance;Assist x1;Additional time  Scooting: Contact guard assistance    Transfers:  Sit to Stand: Moderate assistance;Assist x1  Stand to Sit: Minimum assistance;Assist x1  Bed to Chair: Minimum assistance;Assist x1    ADL Assessment:  Feeding: Setup    Oral Facial Hygiene/Grooming: Setup    Bathing: Maximum assistance;Minimum assistance    Upper Body Dressing: Minimum assistance;Setup    Lower Body Dressing: Maximum assistance;Minimum assistance          Cognitive Retraining  Safety/Judgement: Awareness of environment         Functional Measure:  Barthel Index:    Bathin  Bladder: 5  Bowels: 5  Groomin  Dressin  Feedin  Mobility: 0  Stairs: 0  Toilet Use: 0  Transfer (Bed to Chair and Back): 5  Total: 25       Barthel and G-code impairment scale:  Percentage of impairment CH  0% CI  1-19% CJ  20-39% CK  40-59% CL  60-79% CM  80-99% CN  100%   Barthel Score 0-100 100 99-80 79-60 59-40 20-39 1-19   0   Barthel Score 0-20 20 17-19 13-16 9-12 5-8 1-4 0      The Barthel ADL Index: Guidelines  1. The index should be used as a record of what a patient does, not as a record of what a patient could do. 2. The main aim is to establish degree of independence from any help, physical or verbal, however minor and for whatever reason. 3. The need for supervision renders the patient not independent. 4. A patient's performance should be established using the best available evidence. Asking the patient, friends/relatives and nurses are the usual sources, but direct observation and common sense are also important. However direct testing is not needed. 5. Usually the patient's performance over the preceding 24-48 hours is important, but occasionally longer periods will be relevant.   6. Middle categories imply that the patient supplies over 50 per cent of the effort. 7. Use of aids to be independent is allowed. Rubie Osler., Barthel, D.W. (5122). Functional evaluation: the Barthel Index. [facility] (14)2. TAWNY Elliott, Rizwana Contreras., Leann Cervantes., Mathis, [facility] (1999). Measuring the change indisability after inpatient rehabilitation; comparison of the responsiveness of the Barthel Index and Functional [place] Measure. Journal of Neurology, Neurosurgery, and Psychiatry, 66(4), 914-049. MECHE Palomo, RAFAEL Benitez, & Crory Conte M.A. (2004.) Assessment of post-stroke quality of life in cost-effectiveness studies: The usefulness of the Barthel Index and the EuroQoL-5D. Quality of Life Research, 13, 025-29         G codes: In compliance with CMSs Claims Based Outcome Reporting, the following G-code set was chosen for this patient based on their primary functional limitation being treated: The outcome measure chosen to determine the severity of the functional limitation was the Barthel with a score of 25/100 which was correlated with the impairment scale. ?  Self Care:     - CURRENT STATUS: CL - 60%-79% impaired, limited or restricted    - GOAL STATUS: CK - 40%-59% impaired, limited or restricted    - D/C STATUS:  ---------------To be determined---------------     Occupational Therapy Evaluation Charge Determination   History Examination Decision-Making   MEDIUM Complexity : Expanded review of history including physical, cognitive and psychosocial  history  MEDIUM Complexity : 3-5 performance deficits relating to physical, cognitive , or psychosocial skils that result in activity limitations and / or participation restrictions LOW Complexity : No comorbidities that affect functional and no verbal or physical assistance needed to complete eval tasks       Based on the above components, the patient evaluation is determined to be of the following complexity level: LOW   Pain:  Pain Scale 1: Numeric (0 - 10)  Pain Intensity 1: 0              Activity Tolerance:   vss  Please refer to the flowsheet for vital signs taken during this treatment. After treatment:   [x] Patient left in no apparent distress sitting up in chair  [] Patient left in no apparent distress in bed  [x] Call bell left within reach  [x] Nursing notified  [x] Caregiver present  [x] Bed alarm activated    COMMUNICATION/EDUCATION:   The patients plan of care was discussed with: Physical Therapist, Registered Nurse and family. [x] Home safety education was provided and the patient/caregiver indicated understanding. [x] Patient/family have participated as able in goal setting and plan of care. [x] Patient/family agree to work toward stated goals and plan of care. [] Patient understands intent and goals of therapy, but is neutral about his/her participation. [] Patient is unable to participate in goal setting and plan of care. This patients plan of care is appropriate for delegation to [facility].     Thank you for this referral.  Ligia Garcia, OT  Time Calculation: 26 mins

## 2018-06-04 NOTE — CDMP QUERY
Dr. Flakito Braun MD :  Please clarify if this patient is (was) being treated/managed for:     => type 2 Myocardial infarction(demand) ischemia in setting of elevated trop in setting of AHRF/Acute on chronic diastolic CHF, PNA  => Other explanation of clinical findings  => Clinically Undetermined (no explanation for clinical findings)    The medical record reflects the following clinical findings, treatment, and risk factors. Risk Factors:  presents with AHRF/a/cD CHF, PNA  Clinical Indicators:  Elevated troponin; Suspect from demand ischemia from hypoxia, trop 0.14-0.13,   Treatment: serial troponin levels,     Please clarify and document your clinical opinion in the progress notes and discharge summary including the definitive and/or presumptive diagnosis, (suspected or probable), related to the above clinical findings. Please include clinical findings supporting your diagnosis. Thank Khadijah Farah RN [address] [place] [address]; [facility];[phone]

## 2018-06-04 NOTE — PROGRESS NOTES
Progress Note      6/4/2018 10:00 AM  NAME: Jacqueline Fowler   MRN:  [id number]   Admit Diagnosis: Acute respiratory failure with hypoxia ([facility] [address]                          [id number]:                  1. Hypoxia, CT suggestive of pneumonia, low grade fever, increased WBC, blood culture with bacteria. 2. No hx of CAD  3. Echo5/2018 EF 55%, mod MR, mild pHTN  4. Prox Afib currently in sinus with LVH  5. HTN, with hypertensive heart disease with heart failure, and CKD cr 1.4  6. Dyslipidemia  7. Hx of CVA s/p TPA in 2015  8. PVD with hx of left CEA  9. COPD has had home O2 in past  10. GERD  11. DJD  12. , lives at [facility], poor functional capacity in a wheelchair, sleeps most of the day                                 Plan:                   Does not appear ischemic. This is not clearly CHF, but pBNP is certianly elevated. No edema on exam.  CT results reviewed, not clearly amiodarone toxicity, she has also not been on that long, but ok with stopping for now. Low grade fever, increased wbc, flagged blood culture, I am concerned with pneumonia      1.  Cont eliquis 2.5mg po bid, given CHADS2 of 5  2. Cont ASA likely stop soon  3. Agree with holding amiodarone  4. Cont toprol xl  5. Cont amlodipine  6. Currently on iv lasix, follow bmp, currently stabl  7. Cont atorvastatin      PT/OT  Likely will need home O2        [x]        High complexity decision making was performed         Subjective:     Lesa Allen denies chest pain, +dyspnea improving. Discussed with RN events overnight.      Review of Systems:    Symptom Y/N Comments  Symptom Y/N Comments   Fever/Chills N   Chest Pain N    Poor Appetite N   Edema N    Cough Y   Abdominal Pain N    Sputum Y   Joint Pain N    SOB/ALCARAZ Y   Pruritis/Rash N    Nausea/vomit N   Tolerating PT/OT Y    Diarrhea N   Tolerating Diet Y    Constipation N   Other       Could NOT obtain due to:      Objective:      Physical Exam:    Last 24hrs VS reviewed since prior progress note. Most recent are:    Visit Vitals    /73 (BP 1 Location: Right arm, BP Patient Position: Sitting;Post activity)  Comment (BP Patient Position): transfer oob to chair    Pulse 91    Temp 98.3 °F (36.8 °C)    Resp 20    Ht 5' 6\" (1.676 m)    Wt 70.3 kg (155 lb)    SpO2 94%    BMI 25.02 kg/m2       Intake/Output Summary (Last 24 hours) at 06/04/18 1000  Last data filed at 06/04/18 0715   Gross per 24 hour   Intake              500 ml   Output             3200 ml   Net            -2700 ml        General Appearance: Well developed, elderly, alert & oriented x 3,    no acute distress. Ears/Nose/Mouth/Throat: Hearing grossly normal.  Neck: Supple. Chest: Lungs ronchi to auscultation bilaterally. Cardiovascular: Regular rate and rhythm, S1S2 normal, no murmur. Abdomen: Soft, non-tender, bowel sounds are active. Extremities: No edema bilaterally. Skin: Warm and dry. PMH/SH reviewed - no change compared to H&P    Data Review    Telemetry: normal sinus rhythm     Lab Data Personally Reviewed:    Recent Labs      06/04/18   0405  06/02/18   2344   WBC  14.6*  25.0*   HGB  12.6  12.1   HCT  38.4  36.5   PLT  216  224     No results for input(s): INR, PTP, APTT in the last 72 hours.     No lab exists for component: INREXT   Recent Labs      06/04/18   0405  06/02/18   2344   NA  137  135*   K  3.6  3.0*   CL  98  94*   CO2  31  34*   BUN  20  23*   CREA  1.10*  1.27*   GLU  107*  130*   CA  9.3  9.0   MG   --   1.6     Recent Labs      06/03/18   0857  06/02/18   2344   CPK   --   62   CKNDX   --   2.7*   TROIQ  0.13*  0.14*     Lab Results   Component Value Date/Time    Cholesterol, total 182 02/19/2015 04:30 AM    HDL Cholesterol 38 02/19/2015 04:30 AM    LDL, calculated 99.4 02/19/2015 04:30 AM    Triglyceride 223 (H) 02/19/2015 04:30 AM    CHOL/HDL Ratio 4.8 02/19/2015 04:30 AM       Recent Labs      06/04/18   0405  06/02/18   2344   SGOT  26  17   AP  92  72   TP  7.7  7.2   ALB 2. 6*  2.9*   GLOB  5.1*  4.3*     No results for input(s): PH, PCO2, PO2 in the last 72 hours.     Medications Personally Reviewed:    Current Facility-Administered Medications   Medication Dose Route Frequency    albuterol-ipratropium (DUO-NEB) 2.5 MG-0.5 MG/3 ML  3 mL Nebulization Q4H PRN    apixaban (ELIQUIS) tablet 2.5 mg  2.5 mg Oral BID    aspirin chewable tablet 81 mg  81 mg Oral DAILY    atorvastatin (LIPITOR) tablet 40 mg  40 mg Oral QHS    cetirizine (ZYRTEC) tablet 10 mg  10 mg Oral QHS    metoprolol succinate (TOPROL-XL) XL tablet 25 mg  25 mg Oral DAILY    oxybutynin chloride XL (DITROPAN XL) tablet 5 mg  5 mg Oral DAILY    ketotifen (ZADITOR) 0.025 % (0.035 %) ophthalmic solution 1 Drop  1 Drop Ophthalmic BID    pantoprazole (PROTONIX) tablet 40 mg  40 mg Oral ACB&D    umeclidinium (INCRUSE ELLIPTA) 62.5 mcg/actuation  1 Puff Inhalation DAILY    triamcinolone acetonide (KENALOG) 0.1 % cream   Topical BID PRN    nitroglycerin (NITROBID) 2 % ointment 0.5 Inch  0.5 Inch Topical Q6H PRN    furosemide (LASIX) injection 40 mg  40 mg IntraVENous BID    sodium chloride (NS) flush 5-10 mL  5-10 mL IntraVENous Q8H    sodium chloride (NS) flush 5-10 mL  5-10 mL IntraVENous PRN    ondansetron (ZOFRAN) injection 4 mg  4 mg IntraVENous Q4H PRN    acetaminophen (TYLENOL) tablet 650 mg  650 mg Oral Q6H PRN    potassium chloride SR (KLOR-CON 10) tablet 20 mEq  20 mEq Oral TID    [START ON 6/5/2018] levoFLOXacin (LEVAQUIN) 750 mg in D5W IVPB  750 mg IntraVENous Q48H    lactobac ac& pc-s.therm-b.anim (AYALA Q/RISAQUAD)  1 Cap Oral DAILY    vancomycin (VANCOCIN) 1,000 mg in 0.9% sodium chloride (MBP/ADV) 250 mL  1,000 mg IntraVENous Q24H         Yaa Guadarrama MD

## 2018-06-04 NOTE — PROGRESS NOTES
Reason for Readmission:     Patient came to ed for evaluation worsening dyspnea on exertion with reported hypoxia at [facility]. She also noted to have mild nausea and fever. Patient was admitted here 5/1 for respiratory problems. She lives at [facility] assisted living. RRAT Score and Risk Level:     39     Level of Readmission:    2      Care Conference scheduled:   Not at this time. Resources/supports as identified by patient/family:   Patient has supportive family         Top Challenges facing patient (as identified by patient/family and CM): Finances/Medication cost?   Patient and daughter denies having financial problems and she is able to get her medication. Transportation     Patient is at the [address]. Daughter will also come over to take her out. Support system or lack thereof? Supportive family. Living arrangements? She lives at the assisted [address] [facility] and has been there since first part of April. She had a stroke in the past per daughter and she is receiving therapy there to build her strength. She uses oxygen at [facility]. Patient states she cannot walk since her stroke and she is getting some therapy at the [facility]. She states she cannot hear or see very well. Patient is alert and oriented. Self-care/ADLs/Cognition? Patient needs assistance with bathing. States they wheel her to the dining area and she is able to feed herself. Current Advanced Directive/Advance Care Plan:   Not on file. Plan for utilizing home health:   She  Has been to [address] in the past and [address] [facility]. She will be returning to [facility] with home health services thru [facility] . Likelihood of additional readmission: Mod             Transition of Care Plan:    Based on readmission, the patient's previous Plan of Care   has been evaluated and/or modified.  The current Transition of Care Plan is:    Patient plans to follow up with her  pcp. She will continue with therapy when she gets back to [facility]. Care Management Interventions  PCP Verified by CM:  Yes  Transition of Care Consult (CM Consult): Discharge Planning  Discharge Durable Medical Equipment:  (Patient uses a wheelchair)  Physical Therapy Consult: Yes  Occupational Therapy Consult: Yes  Current Support Network: Assisted Living  Confirm Follow Up Transport: Family  Plan discussed with Pt/Family/Caregiver: Yes  Discharge Location  Discharge Placement: Assisted Living (Patient will return to assisted living when discharged. )

## 2018-06-04 NOTE — PROGRESS NOTES
Problem: Falls - Risk of  Goal: *Absence of Falls  Document Arjun Fall Risk and appropriate interventions in the flowsheet. Outcome: Progressing Towards Goal  Fall Risk Interventions:  Mobility Interventions: Communicate number of staff needed for ambulation/transfer, OT consult for ADLs, Patient to call before getting OOB, PT Consult for mobility concerns         Medication Interventions: Evaluate medications/consider consulting pharmacy, Patient to call before getting OOB, Teach patient to arise slowly    Elimination Interventions: Call light in reach, Toileting schedule/hourly rounds    History of Falls Interventions: Evaluate medications/consider consulting pharmacy, Investigate reason for fall, Room close to nurse's station        Problem: Pressure Injury - Risk of  Goal: *Prevention of pressure injury  Document Rosas Scale and appropriate interventions in the flowsheet.    Outcome: Progressing Towards Goal  Pressure Injury Interventions:  Sensory Interventions: Assess changes in LOC, Assess need for specialty bed, Discuss PT/OT consult with provider, Float heels, Keep linens dry and wrinkle-free, Minimize linen layers    Moisture Interventions: Apply protective barrier, creams and emollients, Assess need for specialty bed, Check for incontinence Q2 hours and as needed, Limit adult briefs, Maintain skin hydration (lotion/cream), Minimize layers    Activity Interventions: Chair cushion, Increase time out of bed, Pressure redistribution bed/mattress(bed type), PT/OT evaluation    Mobility Interventions: Float heels, HOB 30 degrees or less, Pressure redistribution bed/mattress (bed type), PT/OT evaluation    Nutrition Interventions: Document food/fluid/supplement intake    Friction and Shear Interventions: Foam dressings/transparent film/skin sealants, HOB 30 degrees or less, Lift sheet

## 2018-06-04 NOTE — PROGRESS NOTES
PCU SHIFT NURSING NOTE      Bedside and Verbal shift change report given to Yuliana Poole (oncoming nurse) by Sarkis Bernabe (offgoing nurse). Report included the following information SBAR, Kardex, Procedure Summary, Intake/Output, MAR and Recent Results. Shift Summary: 6979  1600: Patient sitting up in chair, no complaints at this time. Vital signs stable  1720: Assisted patient back to bed, purewick changed. Admission Date 6/2/2018   Admission Diagnosis Acute respiratory failure with hypoxia ([address]   Consults IP CONSULT TO CARDIOLOGY  IP CONSULT TO PRIMARY CARE PROVIDER        Consults   [x]PT   [x]OT   []Speech   []Case Management      [] Palliative      Cardiac Monitoring Order   [x]Yes   []No     IV drips   []Yes    Drip:                            Dose:  Drip:                            Dose:  Drip:                            Dose:   []No     GI Prophylaxis   []Yes   []No         DVT Prophylaxis   SCDs:  Sequential Compression Device: Bilateral          Inderjit stockings:         [] Medication   []Contraindicated   []None      Activity Level Activity Level: Up with Assistance     Activity Assistance: Partial (two people)   Purposeful Rounding every 1-2 hour? [x]Yes   Ac Score  Total Score: 3   Bed Alarm (If score 3 or >)   []Yes   [] Refused (See signed refusal form in chart)   Rosas Score  Rosas Score: 13   Rosas Score (if score 14 or less)   []PMT consult   []Wound Care consult      []Specialty bed   [] Nutrition consult          Needs prior to discharge:   Home O2 required:    []Yes   []No    If yes, how much O2 required?     Other:    Last Bowel Movement: Last Bowel Movement Date: 06/02/18      Influenza Vaccine Received Flu Vaccine for Current Season (usually Sept-March): Not Flu Season        Pneumonia Vaccine           Diet Active Orders   Diet    DIET CARDIAC Regular      LDAs               Peripheral IV 06/02/18 Left Antecubital (Active)   Site Assessment Clean, dry, & intact 6/4/2018  3:52 PM Phlebitis Assessment 0 6/4/2018  3:52 PM   Infiltration Assessment 0 6/4/2018  3:52 PM   Dressing Status Clean, dry, & intact 6/4/2018  3:52 PM   Dressing Type Transparent 6/4/2018  3:52 PM   Hub Color/Line Status Pink;Flushed 6/4/2018  3:52 PM   Alcohol Cap Used Yes 6/3/2018  7:20 PM       Peripheral IV 06/03/18 Left Wrist (Active)   Site Assessment Clean, dry, & intact 6/4/2018  3:52 PM   Phlebitis Assessment 0 6/4/2018  3:52 PM   Infiltration Assessment 0 6/4/2018  3:52 PM   Dressing Status Clean, dry, & intact 6/4/2018  3:52 PM   Dressing Type Transparent 6/4/2018  8:00 AM   Hub Color/Line Status Pink;Flushed 6/4/2018  3:52 PM          External Female Catheter 06/03/18 (Active)   Site Assessment Clean, dry, & intact 6/3/2018  7:20 PM   Repositioned No 6/3/2018  7:20 PM   Perineal Care Yes 6/4/2018  3:52 PM   Wick Changed Yes 6/4/2018  3:52 PM   Suction Canister/Tubing Changed No 6/4/2018  3:52 PM   Urine Output (mL) 250 ml 6/4/2018  3:52 PM                Urinary Catheter      Intake & Output   Date 06/03/18 0700 - 06/04/18 0659 06/04/18 0700 - 06/05/18 0659   Shift 1685-6405 7802-4817 24 Hour Total 4017-4309 8128-5405 24 Hour Total   I  N  T  A  K  E   P.O.    120  120      P. O.    120  120    I.V.  (mL/kg/hr)  500  (0.6) 500  (0.3)         Volume (vancomycin (VANCOCIN) 1750 mg in  ml infusion)  500 500       Shift Total  (mL/kg)  500  (7.1) 500  (7.1) 120  (1.8)  120  (1.8)   O  U  T  P  U  T   Urine  (mL/kg/hr) 1200  (1.4) 1000  (1.2) 2200  (1.3) 1350  1350      Urine Voided    100  100      Urine Output (mL) (External Female Catheter 06/03/18) 1200 1000 2200 1250  1250    Stool            Stool Occurrence(s)    1 x  1 x    Shift Total  (mL/kg) 1200  (17.1) 1000  (14.2) 2200  (31.3) 1350  (20.1)  1350  (20.1)   NET -1200 -500 -1700 -1230  -1230   Weight (kg) 70.3 70.3 70.3 67.1 67.1 67.1         Readmission Risk Assessment Tool Score High Risk            39       Total Score        3 Has Seen PCP in Last 6 Months (Yes=3, No=0)    2 . Living with Significant Other. Assisted Living. LTAC. SNF. or   Rehab    4 IP Visits Last 12 Months (1-3=4, 4=9, >4=11)    5 Pt.  Coverage (Medicare=5 , Medicaid, or Self-Pay=4)    25 Charlson Comorbidity Score (Age + Comorbid Conditions)        Criteria that do not apply:    Patient Length of Stay (>5 days = 3)       Expected Length of Stay 4d 12h   Actual Length of Stay 1

## 2018-06-04 NOTE — PROGRESS NOTES
[address] [place] [address]  [phone]      Medical Progress Note      NAME: Steve Lr   :  1928  MRM:  [id number]    Date/Time: 2018  5:24 AM      Subjective:     Admitted with CHF and probable pneumonia. Has diuresed and feels better although still has congested cough with purulent sputum. One blood culture positive for GPC. Afebrile with HR in 80's. O2 stat's 95% on 4 lpm.     Past Medical History reviewed and unchanged from Admission History and Physical and/or prior progress note/electronic medical record    Medications reviewed. ROS:      General: postive for weakness andafebrile  Ear Nose and Throat: negative for rhinorrhea, pharyngitis, otalgia, tinnitus, speech or swallowing difficulties  Respiratory:  positive for SOB, cough and sputum production  Cardiology:  negative for chest pain, palpitations, orthopnea, PND, edema, syncope   Gastrointestinal: negative for abdominal pain, N/V, dysphagia, change in bowel habits, bleeding  Genitourinary: negative for frequency, urgency, dysuria, hematuria, incontinence  Muskuloskeletal : negative for arthralgia, myalgia  Neurological: negative for headache, dizziness, confusion, focal weakness, paresthesia, memory loss, gait disturbance    Objective:     Last 24hrs VS reviewed since prior progress note.  Most recent are:    Visit Vitals    /50    Pulse 88    Temp 98.6 °F (37 °C)    Resp 23    Ht 5' 6\" (1.676 m)    Wt 155 lb (70.3 kg)    SpO2 91%    BMI 25.02 kg/m2     SpO2 Readings from Last 6 Encounters:   18 91%   18 94%   18 95%   02/23/15 95%   11 95%    O2 Flow Rate (L/min): 6 l/min       Intake/Output Summary (Last 24 hours) at 18 0524  Last data filed at 18 2203   Gross per 24 hour   Intake              620 ml   Output             2550 ml   Net            -1930 ml          Physical Exam:    General appearance: alert, cooperative, no distress, appears stated age  Eyes: negative  Neck: supple, symmetrical, trachea midline, no adenopathy, thyroid: not enlarged, symmetric, no tenderness/mass/nodules, no carotid bruit and no JVD  Lungs: rhonchi bilaterally;  Rales at bases  Heart: regular rate and rhythm, S1, S2 normal, grade 7-7/9 systolic murmur  Abdomen: soft, non-tender. Bowel sounds normal. No masses,  no organomegaly  Extremities: extremities normal, atraumatic, no cyanosis or edema  Neurologic: Grossly normal    Data Review:    Lab:    BMP:   No results found for: NA, K, CL, CO2, AGAP, GLU, BUN, CREA, GFRAA, GFRNA  CBC:   No results found for: WBC, HGB, HGBEXT, HCT, HCTEXT, PLT, PLTEXT, HGBEXT, HCTEXT, PLTEXT  All labs reviewed in past 24 hours    Other pertinent lab: Troponin 0.14; BNP > 13K    Imaging: CT chest - 1. Bilateral lower lobe and lingular peripheral consolidation with  bronchiectasis, suggesting cryptogenic organizing pneumonia, with areas of high  density within the consolidation which may be seen with amiodarone toxicity. 2. Emphysema. 4. No evidence of hepatic parenchymal density to suggest involvement by  amiodarone. Chest xray - Pulmonary edema and small left pleural effusion      Assessment / Plan:     Principal Problem:    Acute respiratory failure with hypoxia (HCC) (6/3/2018)    Active Problems:    Acute on chronic diastolic heart failure ([address] (5/1/2018)      CKD (chronic kidney disease) stage 3, GFR 30-59 ml/min (5/1/2018)      Hypokalemia (5/2/2018)      CAD (coronary artery disease) (6/3/2018)      Moderate mitral regurgitation (6/3/2018)      Paroxysmal A-fib ([address] (6/3/2018)      Hyperlipidemia (6/3/2018)      Elevated troponin (6/3/2018)        1. Await AM labs  2. Continue diuresis  3. Continue antibiotics  4. Await culture results  5. PT/OT eval  6. Off amiodarone  7. Continue Elquis  8.  SNF at discharge           Care Plan discussed with: Patient    Discussed:  Care Plan    Prophylaxis:  H2B/PPI and Eliquis    Disposition:  [facility]/LTC  ___________________________________________________    Attending Physician: Salvatore Farah MD

## 2018-06-04 NOTE — PROGRESS NOTES
Initial Nutrition Assessment:    INTERVENTIONS/RECOMMENDATIONS:   · Meals/Snacks: General/healthful diet: Continue Cardiac diet   · Supplements: Commercial supplement: Ensure enlive TID    ASSESSMENT:   Patient medically noted for acute respiratory failure, CHF, and pneumonia. PMH CKD, stroke, NSTEMI, COPD, and HTN. RN requested RD visit with patient d/t poor appetite/PO intake. Patient reports she does not feel hungry; no nausea. Dislikes chicken and turkey and meat in general does not sound appetizing. Reports 5-6# weight loss recently. She tried an ensure shake the day prior to admission and she reports liking these. Will add TID with meals for now. Menu at bedside; encouraged use of room service. Diet Order: Cardiac  % Eaten:  No data found. Pertinent Medications: [x]Reviewed []Other: Eliquis, Atorvastatin, Lasix, Jade Q, Protonix, KCl   Pertinent Labs: [x]Reviewed []Other:   Food Allergies: [x]None []Other   Last BM: 6/2   []Active     []Hyperactive  []Hypoactive       [] Absent BS  Skin:    [x] Intact   [] Incision  [] Breakdown  [] Other:    Anthropometrics:   Height: 5' 6\" (167.6 cm) Weight: 67.1 kg (147 lb 14.9 oz)   IBW (%IBW):   ( ) UBW (%UBW):   (  %)   Last Weight Metrics:  Weight Loss Metrics 6/4/2018 5/11/2018 2/24/2015 2/18/2015 3/14/2011   Today's Wt 147 lb 14.9 oz 140 lb 151 lb 166 lb 0.1 oz 163 lb   BMI 23.88 kg/m2 24.03 kg/m2 24.38 kg/m2 27.62 kg/m2 27.12 kg/m2       BMI: Body mass index is 23.88 kg/(m^2). This BMI is indicative of:   []Underweight    [x]Normal    []Overweight    [] Obesity   [] Extreme Obesity (BMI>40)     Estimated Nutrition Needs (Based on):   1439 Kcals/day (BMR (1107) x 1. 3AF) , 67 g (-80g (1.0-1.2 g/kg bw)) Protein  Carbohydrate:  At Least 130 g/day  Fluids: 1450 mL/day (1ml/kcal)    Pt expected to meet estimated nutrient needs: [x]Yes []No    NUTRITION DIAGNOSES:   Problem:  Inadequate oral intake      Etiology: related to decreased appetite      Signs/Symptoms: as evidenced by PO intake <50% of meals      NUTRITION INTERVENTIONS:  Meals/Snacks: General/healthful diet   Supplements: Commercial supplement              GOAL:   PO intake >50% of meals/supplements next 3-5 days    LEARNING NEEDS (Diet, Food/Nutrient-Drug Interaction):    [x] None Identified   [] Identified and Education Provided/Documented   [] Identified and Pt declined/was not appropriate     Cultureal, Oriental orthodox, OR Ethnic Dietary Needs:    [x] None Identified   [] Identified and Addressed     [x] Interdisciplinary Care Plan Reviewed/Documented    [x] Discharge Planning: Low sodium diet       MONITORING /EVALUATION:   Food/Nutrient Intake Outcomes:  Total energy intake  Physical Signs/Symptoms Outcomes: Weight/weight change, Electrolyte and renal profile    NUTRITION RISK:    [] High              [x] Moderate           []  Low  []  Minimal/Uncompromised    PT SEEN FOR:    []  MD Consult: []Calorie Count      []Diabetic Diet Education        []Diet Education     []Electrolyte Management     []General Nutrition Management and Supplements     []Management of Tube Feeding     []TPN Recommendations    []  RN Referral:  []MST score >=2     []Enteral/Parenteral Nutrition PTA     []Pregnant: Gestational DM or Multigestation     []Pressure Ulcer/Wound Care needs        []  Low BMI  [x]  RN verbal request     Alison Morrow  Pager [phone]                 Weekend Pager [phone]

## 2018-06-04 NOTE — PROGRESS NOTES
Problem: Mobility Impaired (Adult and Pediatric)  Goal: *Acute Goals and Plan of Care (Insert Text)  Physical Therapy Goals  Initiated 6/4/2018  1. Patient will move from supine to sit and sit to supine , scoot up and down and roll side to side in bed with modified independence within 7 day(s). 2.  Patient will transfer from bed to chair and chair to bed with minimal assistance/contact guard assist using the least restrictive device within 7 day(s). 3.  Patient will perform sit to stand with minimal assistance/contact guard assist within 7 day(s). 4.  Patient will ambulate with minimal assistance/contact guard assist for 15 feet with the least restrictive device within 7 day(s). physical Therapy EVALUATION  Patient: Donna Gonzalez (40 y.o. female)  Date: 6/4/2018  Primary Diagnosis: Acute respiratory failure with hypoxia [facility])        Precautions:   Fall    ASSESSMENT :  Based on the objective data described below, the patient presents with generalized weakness, decreased balance reactions and decreased mobility skills following admission 6/3/18 due to above diagnosis. She is lying in bed in NAD. Agreeable to PT and cleared by nursing. PTA she lived at an [facility] or nursing home and was min a for transfers oob to wheelchair. She reports being wheeled to the dining room rather than self propelling the wheelchair herself. Currently she needed min a x 1 for rolling and mod a for sidelying to sitting edge of bed. She was able to take a 4-5 steps to bedside chair with mod a x 1 bed to chair transfer. She will benefit from continued acute care PT to improve strength, balance and mobility skills. Recommend HH PT at her facility if they do not have onsite PT services upon discharge from hospital.    Patient will benefit from skilled intervention to address the above impairments.   Patients rehabilitation potential is considered to be Fair  Factors which may influence rehabilitation potential include:   [] None noted  []         Mental ability/status  [x]         Medical condition  []         Home/family situation and support systems  []         Safety awareness  []         Pain tolerance/management  []         Other:      PLAN :  Recommendations and Planned Interventions:  [x]           Bed Mobility Training             []    Neuromuscular Re-Education  [x]           Transfer Training                   []    Orthotic/Prosthetic Training  [x]           Gait Training                         []    Modalities  [x]           Therapeutic Exercises           []    Edema Management/Control  [x]           Therapeutic Activities            [x]    Patient and Family Training/Education  []           Other (comment):    Frequency/Duration: Patient will be followed by physical therapy  3 times a week to address goals. Discharge Recommendations: Skilled Nursing Facility  Further Equipment Recommendations for Discharge: TBD     SUBJECTIVE:   Patient stated I'd like to get out of bed.     OBJECTIVE DATA SUMMARY:   HISTORY:    Past Medical History:   Diagnosis Date    Arthritis     generalized    COPD     GERD (gastroesophageal reflux disease)     Hypertension     Ill-defined condition     Vitamin deficiency    NSTEMI (non-ST elevated myocardial infarction) ([facility] 5/1/2018    Other ill-defined conditions(799.89)     high cholesterol    Peripheral artery disease (HCC)     Psychiatric disorder     Anxiety disorder    Sleep disorder     Insomnia    TIA (transient ischemic attack)      Past Surgical History:   Procedure Laterality Date    HX CAROTID ENDARTERECTOMY      left 2 or 3 years ago.  HX OTHER SURGICAL      colonoscopy    HX TONSILLECTOMY       Prior Level of Function/Home Situation: she lived at an assisted or nursing home and was min a for transfers oob to wheelchair. She reports being wheeled to the dining room rather than self propelling the wheelchair herself.     Personal factors and/or comorbidities impacting plan of care: age, limited PLOF    Home Situation  Home Environment: Assisted living  One/Two Story Residence: Other (Comment)  Living Alone: No  Support Systems: Assisted living, Family member(s), Child(isma)  Patient Expects to be Discharged to[de-identified] Assisted living  Current DME Used/Available at Home: Shower chair, Grab bars  Tub or Shower Type: Shower    EXAMINATION/PRESENTATION/DECISION MAKING:   Critical Behavior:  Neurologic State: Alert  Orientation Level: Appropriate for age  Cognition: Appropriate decision making  Safety/Judgement: Awareness of environment  Hearing: Auditory  Auditory Impairment: Hard of hearing, bilateral  Skin:    Edema:   [place]  AROM: Generally decreased, functional           PROM: Generally decreased, functional           Strength:    Strength: Generally decreased, functional                    Tone & Sensation:   Tone: Normal              Sensation: Intact               Coordination:  Coordination: Within functional limits  Vision:      Functional Mobility:  Bed Mobility:  Rolling: Minimum assistance;Assist x1;Additional time  Supine to Sit: Moderate assistance;Assist x1;Additional time     Scooting: Contact guard assistance  Transfers:  Sit to Stand: Moderate assistance;Assist x1  Stand to Sit: Minimum assistance;Assist x1        Bed to Chair: Minimum assistance;Assist x1              Balance:   Sitting: Impaired  Sitting - Static: Good (unsupported)  Sitting - Dynamic: Fair (occasional)  Standing: Impaired  Standing - Static: Fair  Standing - Dynamic : Poor  Ambulation/Gait Training:  Distance (ft): 3 Feet (ft)  Assistive Device: Gait belt  Ambulation - Level of Assistance: Moderate assistance;Assist x1     Gait Description (WDL): Exceptions to WDL  Gait Abnormalities: Shuffling gait; Decreased step clearance        Base of Support: Narrowed     Speed/Vera: Slow;Shuffled  Step Length: Right shortened;Left shortened              Functional Measure:  Barthel Index:    Bathing: 0  Bladder: 5  Bowels: 5  Groomin  Dressin  Feedin  Mobility: 0  Stairs: 0  Toilet Use: 0  Transfer (Bed to Chair and Back): 5  Total: 25       Barthel and G-code impairment scale:  Percentage of impairment CH  0% CI  1-19% CJ  20-39% CK  40-59% CL  60-79% CM  80-99% CN  100%   Barthel Score 0-100 100 99-80 79-60 59-40 20-39 1-19   0   Barthel Score 0-20 20 17-19 13-16 9-12 5-8 1-4 0      The Barthel ADL Index: Guidelines  1. The index should be used as a record of what a patient does, not as a record of what a patient could do. 2. The main aim is to establish degree of independence from any help, physical or verbal, however minor and for whatever reason. 3. The need for supervision renders the patient not independent. 4. A patient's performance should be established using the best available evidence. Asking the patient, friends/relatives and nurses are the usual sources, but direct observation and common sense are also important. However direct testing is not needed. 5. Usually the patient's performance over the preceding 24-48 hours is important, but occasionally longer periods will be relevant. 6. Middle categories imply that the patient supplies over 50 per cent of the effort. 7. Use of aids to be independent is allowed. [employer] Barthel, D.W. (0811). Functional evaluation: the Barthel Index. [address] [facility] (14)2. Donovan Salazar, RAZ.J.[email or web address], Vitaly Wheeler., Dani Campbell.[facility], [address] [place] [address] (). Measuring the change indisability after inpatient rehabilitation; comparison of the responsiveness of the Barthel Index and Functional [place] Measure. Journal of Neurology, Neurosurgery, and Psychiatry, 66(4), 813-238. Christina Romero, N.J.A, Efren Wolf  [email or web address].[email or web address], & Erinn Boucher MMikeA. (2004.) Assessment of post-stroke quality of life in cost-effectiveness studies: The usefulness of the Barthel Index and the EuroQoL-5D. Quality of Life Research, 13, 548-23         G codes:   In compliance with CMSs Claims Based Outcome Reporting, the following G-code set was chosen for this patient based on their primary functional limitation being treated: The outcome measure chosen to determine the severity of the functional limitation was the Barthel with a score of 25/100 which was correlated with the impairment scale. ? Mobility - Walking and Moving Around:     - CURRENT STATUS: CL - 60%-79% impaired, limited or restricted    - GOAL STATUS: CK - 40%-59% impaired, limited or restricted    - D/C STATUS:  ---------------To be determined---------------      Physical Therapy Evaluation Charge Determination   History Examination Presentation Decision-Making   HIGH Complexity :3+ comorbidities / personal factors will impact the outcome/ POC  MEDIUM Complexity : 3 Standardized tests and measures addressing body structure, function, activity limitation and / or participation in recreation  MEDIUM Complexity : Evolving with changing characteristics  Other outcome measures Barthel  HIGH       Based on the above components, the patient evaluation is determined to be of the following complexity level: MEDIUM    Pain:  Pain Scale 1: Numeric (0 - 10)  Pain Intensity 1: 0              Activity Tolerance:   Fair. Please refer to the flowsheet for vital signs taken during this treatment. After treatment:   [x]         Patient left in no apparent distress sitting up in chair  []         Patient left in no apparent distress in bed  [x]         Call bell left within reach  [x]         Nursing notified   []         Caregiver present  [x]         Bed alarm activated    COMMUNICATION/EDUCATION:   The patients plan of care was discussed with: Occupational Therapist, Registered Nurse and . [x]         Fall prevention education was provided and the patient/caregiver indicated understanding. [x]         Patient/family have participated as able in goal setting and plan of care.   [x]         Patient/family agree to work toward stated goals and plan of care. []         Patient understands intent and goals of therapy, but is neutral about his/her participation. []         Patient is unable to participate in goal setting and plan of care.     Thank you for this referral.  Ramy Orozco, PT   Time Calculation: 30 mins

## 2018-06-05 ENCOUNTER — APPOINTMENT (OUTPATIENT)
Dept: GENERAL RADIOLOGY | Age: 83
DRG: 291 | End: 2018-06-05
Attending: INTERNAL MEDICINE
Payer: MEDICARE

## 2018-06-05 LAB
ALBUMIN SERPL-MCNC: 2.5 G/DL (ref 3.5–5)
ALBUMIN/GLOB SERPL: 0.6 {RATIO} (ref 1.1–2.2)
ALP SERPL-CCNC: 88 U/L (ref 45–117)
ALT SERPL-CCNC: 35 U/L (ref 12–78)
ANION GAP SERPL CALC-SCNC: 8 MMOL/L (ref 5–15)
AST SERPL-CCNC: 50 U/L (ref 15–37)
BACTERIA SPEC CULT: ABNORMAL
BACTERIA SPEC CULT: ABNORMAL
BACTERIA SPEC CULT: NORMAL
BASOPHILS # BLD: 0 K/UL (ref 0–0.1)
BASOPHILS NFR BLD: 1 % (ref 0–1)
BILIRUB SERPL-MCNC: 0.6 MG/DL (ref 0.2–1)
BUN SERPL-MCNC: 26 MG/DL (ref 6–20)
BUN/CREAT SERPL: 24 (ref 12–20)
CALCIUM SERPL-MCNC: 9 MG/DL (ref 8.5–10.1)
CHLORIDE SERPL-SCNC: 98 MMOL/L (ref 97–108)
CO2 SERPL-SCNC: 29 MMOL/L (ref 21–32)
CREAT SERPL-MCNC: 1.1 MG/DL (ref 0.55–1.02)
DATE LAST DOSE: ABNORMAL
DIFFERENTIAL METHOD BLD: ABNORMAL
EOSINOPHIL # BLD: 0.3 K/UL (ref 0–0.4)
EOSINOPHIL NFR BLD: 4 % (ref 0–7)
ERYTHROCYTE [DISTWIDTH] IN BLOOD BY AUTOMATED COUNT: 14.6 % (ref 11.5–14.5)
GLOBULIN SER CALC-MCNC: 4.5 G/DL (ref 2–4)
GLUCOSE SERPL-MCNC: 101 MG/DL (ref 65–100)
GRAM STN SPEC: NORMAL
HCT VFR BLD AUTO: 37.9 % (ref 35–47)
HGB BLD-MCNC: 12.5 G/DL (ref 11.5–16)
IMM GRANULOCYTES # BLD: 0.1 K/UL (ref 0–0.04)
IMM GRANULOCYTES NFR BLD AUTO: 1 % (ref 0–0.5)
LYMPHOCYTES # BLD: 1 K/UL (ref 0.8–3.5)
LYMPHOCYTES NFR BLD: 12 % (ref 12–49)
MCH RBC QN AUTO: 29.1 PG (ref 26–34)
MCHC RBC AUTO-ENTMCNC: 33 G/DL (ref 30–36.5)
MCV RBC AUTO: 88.1 FL (ref 80–99)
MONOCYTES # BLD: 0.6 K/UL (ref 0–1)
MONOCYTES NFR BLD: 7 % (ref 5–13)
NEUTS SEG # BLD: 6.7 K/UL (ref 1.8–8)
NEUTS SEG NFR BLD: 76 % (ref 32–75)
NRBC # BLD: 0 K/UL (ref 0–0.01)
NRBC BLD-RTO: 0 PER 100 WBC
PLATELET # BLD AUTO: 228 K/UL (ref 150–400)
PMV BLD AUTO: 11.2 FL (ref 8.9–12.9)
POTASSIUM SERPL-SCNC: 3.6 MMOL/L (ref 3.5–5.1)
PROT SERPL-MCNC: 7 G/DL (ref 6.4–8.2)
RBC # BLD AUTO: 4.3 M/UL (ref 3.8–5.2)
REPORTED DOSE,DOSE: ABNORMAL UNITS
REPORTED DOSE/TIME,TMG: ABNORMAL
SERVICE CMNT-IMP: ABNORMAL
SERVICE CMNT-IMP: NORMAL
SODIUM SERPL-SCNC: 135 MMOL/L (ref 136–145)
VANCOMYCIN TROUGH SERPL-MCNC: 11.5 UG/ML (ref 5–10)
WBC # BLD AUTO: 8.8 K/UL (ref 3.6–11)

## 2018-06-05 PROCEDURE — 80053 COMPREHEN METABOLIC PANEL: CPT | Performed by: INTERNAL MEDICINE

## 2018-06-05 PROCEDURE — 85025 COMPLETE CBC W/AUTO DIFF WBC: CPT | Performed by: INTERNAL MEDICINE

## 2018-06-05 PROCEDURE — 74011250637 HC RX REV CODE- 250/637: Performed by: INTERNAL MEDICINE

## 2018-06-05 PROCEDURE — 77030038269 HC DRN EXT URIN PURWCK BARD -A

## 2018-06-05 PROCEDURE — 74011250636 HC RX REV CODE- 250/636: Performed by: INTERNAL MEDICINE

## 2018-06-05 PROCEDURE — 36415 COLL VENOUS BLD VENIPUNCTURE: CPT | Performed by: INTERNAL MEDICINE

## 2018-06-05 PROCEDURE — 71045 X-RAY EXAM CHEST 1 VIEW: CPT

## 2018-06-05 PROCEDURE — 65660000000 HC RM CCU STEPDOWN

## 2018-06-05 PROCEDURE — 80202 ASSAY OF VANCOMYCIN: CPT | Performed by: INTERNAL MEDICINE

## 2018-06-05 PROCEDURE — 77010033678 HC OXYGEN DAILY

## 2018-06-05 RX ORDER — AMLODIPINE BESYLATE 2.5 MG/1
2.5 TABLET ORAL DAILY
Status: DISCONTINUED | OUTPATIENT
Start: 2018-06-05 | End: 2018-06-07 | Stop reason: HOSPADM

## 2018-06-05 RX ADMIN — FUROSEMIDE 40 MG: 10 INJECTION, SOLUTION INTRAMUSCULAR; INTRAVENOUS at 17:13

## 2018-06-05 RX ADMIN — POTASSIUM CHLORIDE 20 MEQ: 750 TABLET, EXTENDED RELEASE ORAL at 21:27

## 2018-06-05 RX ADMIN — AMLODIPINE BESYLATE 2.5 MG: 2.5 TABLET ORAL at 15:18

## 2018-06-05 RX ADMIN — Medication 10 ML: at 21:28

## 2018-06-05 RX ADMIN — KETOTIFEN FUMARATE 1 DROP: 0.35 SOLUTION/ DROPS OPHTHALMIC at 09:26

## 2018-06-05 RX ADMIN — PANTOPRAZOLE SODIUM 40 MG: 40 TABLET, DELAYED RELEASE ORAL at 17:13

## 2018-06-05 RX ADMIN — Medication 1 CAPSULE: at 09:26

## 2018-06-05 RX ADMIN — POTASSIUM CHLORIDE 20 MEQ: 750 TABLET, EXTENDED RELEASE ORAL at 09:26

## 2018-06-05 RX ADMIN — KETOTIFEN FUMARATE 1 DROP: 0.35 SOLUTION/ DROPS OPHTHALMIC at 17:14

## 2018-06-05 RX ADMIN — FUROSEMIDE 40 MG: 10 INJECTION, SOLUTION INTRAMUSCULAR; INTRAVENOUS at 09:26

## 2018-06-05 RX ADMIN — LEVOFLOXACIN 750 MG: 5 INJECTION, SOLUTION INTRAVENOUS at 07:21

## 2018-06-05 RX ADMIN — Medication 10 ML: at 15:18

## 2018-06-05 RX ADMIN — ATORVASTATIN CALCIUM 40 MG: 40 TABLET, FILM COATED ORAL at 21:27

## 2018-06-05 RX ADMIN — APIXABAN 2.5 MG: 2.5 TABLET, FILM COATED ORAL at 17:13

## 2018-06-05 RX ADMIN — PANTOPRAZOLE SODIUM 40 MG: 40 TABLET, DELAYED RELEASE ORAL at 09:26

## 2018-06-05 RX ADMIN — Medication 10 ML: at 07:20

## 2018-06-05 RX ADMIN — OXYBUTYNIN CHLORIDE 5 MG: 5 TABLET, FILM COATED, EXTENDED RELEASE ORAL at 09:26

## 2018-06-05 RX ADMIN — VANCOMYCIN HYDROCHLORIDE 1000 MG: 1 INJECTION, POWDER, LYOPHILIZED, FOR SOLUTION INTRAVENOUS at 18:26

## 2018-06-05 RX ADMIN — UMECLIDINIUM 1 PUFF: 62.5 AEROSOL, POWDER ORAL at 09:27

## 2018-06-05 RX ADMIN — CETIRIZINE HYDROCHLORIDE 10 MG: 10 TABLET, FILM COATED ORAL at 21:27

## 2018-06-05 RX ADMIN — APIXABAN 2.5 MG: 2.5 TABLET, FILM COATED ORAL at 09:26

## 2018-06-05 RX ADMIN — POTASSIUM CHLORIDE 20 MEQ: 750 TABLET, EXTENDED RELEASE ORAL at 15:18

## 2018-06-05 RX ADMIN — ASPIRIN 81 MG 81 MG: 81 TABLET ORAL at 09:26

## 2018-06-05 RX ADMIN — METOPROLOL SUCCINATE 25 MG: 25 TABLET, EXTENDED RELEASE ORAL at 09:26

## 2018-06-05 NOTE — PROGRESS NOTES
Problem: Falls - Risk of  Goal: *Absence of Falls  Document Arjun Fall Risk and appropriate interventions in the flowsheet. Outcome: Progressing Towards Goal  Fall Risk Interventions:  Mobility Interventions: Communicate number of staff needed for ambulation/transfer, Patient to call before getting OOB, PT Consult for mobility concerns         Medication Interventions: Bed/chair exit alarm, Patient to call before getting OOB, Teach patient to arise slowly    Elimination Interventions: Call light in reach, Toileting schedule/hourly rounds    History of Falls Interventions: Evaluate medications/consider consulting pharmacy, Investigate reason for fall        Problem: Pressure Injury - Risk of  Goal: *Prevention of pressure injury  Document Rosas Scale and appropriate interventions in the flowsheet.    Outcome: Progressing Towards Goal  Pressure Injury Interventions:  Sensory Interventions: Assess need for specialty bed, Float heels, Keep linens dry and wrinkle-free, Minimize linen layers    Moisture Interventions: Assess need for specialty bed, Check for incontinence Q2 hours and as needed, Limit adult briefs    Activity Interventions: Increase time out of bed, Pressure redistribution bed/mattress(bed type), PT/OT evaluation    Mobility Interventions: HOB 30 degrees or less, Pressure redistribution bed/mattress (bed type), PT/OT evaluation    Nutrition Interventions: Document food/fluid/supplement intake, Discuss nutritional consult with provider    Friction and Shear Interventions: Lift sheet

## 2018-06-05 NOTE — PROGRESS NOTES
OT attempted to see pt and per nursing pt had just gotten back to bed. Pt was tired at this time and will continue to follow .

## 2018-06-05 NOTE — PROGRESS NOTES
Progress Note      6/5/2018 10:00 AM  NAME: Andie Carr   MRN:  [id number]   Admit Diagnosis: Acute respiratory failure with hypoxia ([facility] [address]                          [id number]:                  1. Hypoxia, CT suggestive of pneumonia, low grade fever, increased WBC, blood culture with bacteria. 2. No hx of CAD  3. Echo5/2018 EF 55%, mod MR, mild pHTN  4. Prox Afib currently in sinus with LVH  5. HTN, with hypertensive heart disease with heart failure, and CKD cr 1.4  6. Dyslipidemia  7. Hx of CVA s/p TPA in 2015  8. PVD with hx of left CEA  9. COPD has had home O2 in past  10. GERD  11. DJD  12. , lives at [facility], poor functional capacity in a wheelchair, sleeps most of the day                                 Plan:                   Does not appear ischemic. This is not clearly CHF, but pBNP is certianly elevated. No edema on exam.  CT results reviewed, not clearly amiodarone toxicity, she has also not been on that long, but ok with stopping for now. Low grade fever, increased wbc, flagged blood culture, I am concerned with pneumonia  PAT/NSVT noted will keep electrolytes corrected, low dose beta blocker.      1.  Cont eliquis 2.5mg po bid, given CHADS2 of 5  2. Cont ASA likely stop soon  3. Agree with holding amiodarone  4. Cont toprol xl  5. Resume amlodipine  6. Currently on iv lasix, follow bmp, currently stable  7. Cont atorvastatin      PT/OT  Likely will need home O2        [x]        High complexity decision making was performed         Subjective:     Jenifer Allen denies chest pain, +dyspnea improving. Discussed with RN events overnight.      Review of Systems:    Symptom Y/N Comments  Symptom Y/N Comments   Fever/Chills N   Chest Pain N    Poor Appetite N   Edema N    Cough Y   Abdominal Pain N    Sputum Y   Joint Pain N    SOB/ALCARAZ Y   Pruritis/Rash N    Nausea/vomit N   Tolerating PT/OT Y    Diarrhea N   Tolerating Diet Y    Constipation N   Other       Could NOT obtain due to:      Objective:      Physical Exam:    Last 24hrs VS reviewed since prior progress note. Most recent are:    Visit Vitals    BP (!) 156/99 (BP 1 Location: Left arm, BP Patient Position: Sitting)    Pulse 98    Temp 97.4 °F (36.3 °C)    Resp 18    Ht 5' 6\" (1.676 m)    Wt 67 kg (147 lb 11.3 oz)    SpO2 96%    BMI 23.84 kg/m2       Intake/Output Summary (Last 24 hours) at 06/05/18 1350  Last data filed at 06/05/18 1112   Gross per 24 hour   Intake              120 ml   Output             1600 ml   Net            -1480 ml        General Appearance: Well developed, elderly, alert & oriented x 3,    no acute distress. Ears/Nose/Mouth/Throat: Hearing grossly normal.  Neck: Supple. Chest: Lungs ronchi to auscultation bilaterally. Cardiovascular: Regular rate and rhythm, S1S2 normal, no murmur. Abdomen: Soft, non-tender, bowel sounds are active. Extremities: No edema bilaterally. Skin: Warm and dry. PMH/SH reviewed - no change compared to H&P    Data Review    Telemetry: normal sinus rhythm     Lab Data Personally Reviewed:    Recent Labs      06/05/18   0428  06/04/18   0405   WBC  8.8  14.6*   HGB  12.5  12.6   HCT  37.9  38.4   PLT  228  216     No results for input(s): INR, PTP, APTT in the last 72 hours.     No lab exists for component: Megan Gardner   Recent Labs      06/05/18   0428  06/04/18   0405  06/02/18   2344   NA  135*  137  135*   K  3.6  3.6  3.0*   CL  98  98  94*   CO2  29  31  34*   BUN  26*  20  23*   CREA  1.10*  1.10*  1.27*   GLU  101*  107*  130*   CA  9.0  9.3  9.0   MG   --    --   1.6     Recent Labs      06/03/18   0857  06/02/18   2344   CPK   --   62   CKNDX   --   2.7*   TROIQ  0.13*  0.14*     Lab Results   Component Value Date/Time    Cholesterol, total 182 02/19/2015 04:30 AM    HDL Cholesterol 38 02/19/2015 04:30 AM    LDL, calculated 99.4 02/19/2015 04:30 AM    Triglyceride 223 (H) 02/19/2015 04:30 AM    CHOL/HDL Ratio 4.8 02/19/2015 04:30 AM       Recent Labs 06/05/18   0428  06/04/18   0405  06/02/18   2344   SGOT  50*  26  17   AP  88  92  72   TP  7.0  7.7  7.2   ALB  2.5*  2.6*  2.9*   GLOB  4.5*  5.1*  4.3*     No results for input(s): PH, PCO2, PO2 in the last 72 hours.     Medications Personally Reviewed:    Current Facility-Administered Medications   Medication Dose Route Frequency    amLODIPine (NORVASC) tablet 2.5 mg  2.5 mg Oral DAILY    albuterol-ipratropium (DUO-NEB) 2.5 MG-0.5 MG/3 ML  3 mL Nebulization Q4H PRN    apixaban (ELIQUIS) tablet 2.5 mg  2.5 mg Oral BID    aspirin chewable tablet 81 mg  81 mg Oral DAILY    atorvastatin (LIPITOR) tablet 40 mg  40 mg Oral QHS    cetirizine (ZYRTEC) tablet 10 mg  10 mg Oral QHS    metoprolol succinate (TOPROL-XL) XL tablet 25 mg  25 mg Oral DAILY    oxybutynin chloride XL (DITROPAN XL) tablet 5 mg  5 mg Oral DAILY    ketotifen (ZADITOR) 0.025 % (0.035 %) ophthalmic solution 1 Drop  1 Drop Ophthalmic BID    pantoprazole (PROTONIX) tablet 40 mg  40 mg Oral ACB&D    umeclidinium (INCRUSE ELLIPTA) 62.5 mcg/actuation  1 Puff Inhalation DAILY    triamcinolone acetonide (KENALOG) 0.1 % cream   Topical BID PRN    nitroglycerin (NITROBID) 2 % ointment 0.5 Inch  0.5 Inch Topical Q6H PRN    furosemide (LASIX) injection 40 mg  40 mg IntraVENous BID    sodium chloride (NS) flush 5-10 mL  5-10 mL IntraVENous Q8H    sodium chloride (NS) flush 5-10 mL  5-10 mL IntraVENous PRN    ondansetron (ZOFRAN) injection 4 mg  4 mg IntraVENous Q4H PRN    acetaminophen (TYLENOL) tablet 650 mg  650 mg Oral Q6H PRN    potassium chloride SR (KLOR-CON 10) tablet 20 mEq  20 mEq Oral TID    levoFLOXacin (LEVAQUIN) 750 mg in D5W IVPB  750 mg IntraVENous Q48H    lactobac ac& pc-s.therm-b.anim (AYALA Q/RISAQUAD)  1 Cap Oral DAILY    vancomycin (VANCOCIN) 1,000 mg in 0.9% sodium chloride (MBP/ADV) 250 mL  1,000 mg IntraVENous Q24H         Elise Blanc MD

## 2018-06-05 NOTE — PROGRESS NOTES
Chart reviewed. Patient declined PT due to just getting back to bed after staying up in chair for 4-5 hours and wants to rest now. PT will continue to follow this patient.     Flora Fields, PT

## 2018-06-05 NOTE — PROGRESS NOTES
PCU SHIFT NURSING NOTE      Bedside and Verbal shift change report given to Cecilia Cardoza RN (oncoming nurse) by Lori Chao (offgoing nurse). Report included the following information SBAR, Kardex, MAR, Recent Results and Cardiac Rhythm NSR. Shift Summary:   0845:  Pt up to chair with 1 assist.  José Kramer placed back. No complaints of pain. No signs of distress. Call bell in reach. 1100:  Assisted pt with ordering lunch. Pt resting comfortably back in the chair after using BSC.  1437:  Pt had a 7 beat run of v-tach  1445:  Sent a message to Dr. Lucie Momin about v-tach  [phone]:  Return call from MD, no new orders. 1715:  Lab draw for vanc trough drawn and sent. Pt complains of pain with flushing L wrist IV. Removed IV from L wrist.      Admission Date 6/2/2018   Admission Diagnosis Acute respiratory failure with hypoxia ([address]   Consults IP CONSULT TO CARDIOLOGY  IP CONSULT TO PRIMARY CARE PROVIDER        Consults   [x]PT   [x]OT   []Speech   [x]Case Management      [] Palliative      Cardiac Monitoring Order   [x]Yes   []No     IV drips   []Yes    Drip:                            Dose:  Drip:                            Dose:  Drip:                            Dose:   [x]No     GI Prophylaxis   [x]Yes   []No         DVT Prophylaxis   SCDs:  Sequential Compression Device: Bilateral          Inderjit stockings:         [x] Medication   []Contraindicated   []None      Activity Level Activity Level: Up with Assistance     Activity Assistance: Partial (two people)   Purposeful Rounding every 1-2 hour? [x]Yes   Ac Score  Total Score: 3   Bed Alarm (If score 3 or >)   [x]Yes   [] Refused (See signed refusal form in chart)   Rosas Score  Rosas Score: 13   Rosas Score (if score 14 or less)   []PMT consult   []Wound Care consult      []Specialty bed   [x] Nutrition consult          Needs prior to discharge:   Home O2 required:    [x]Yes   []No    If yes, how much O2 required?     Other:    Last Bowel Movement: Last Bowel Movement Date: 06/02/18      Influenza Vaccine Received Flu Vaccine for Current Season (usually Sept-March): Not Flu Season        Pneumonia Vaccine           Diet Active Orders   Diet    DIET CARDIAC Regular      LDAs               Peripheral IV 06/02/18 Left Antecubital (Active)   Site Assessment Clean, dry, & intact 6/5/2018  4:28 AM   Phlebitis Assessment 0 6/5/2018  4:28 AM   Infiltration Assessment 0 6/5/2018  4:28 AM   Dressing Status Clean, dry, & intact 6/5/2018  4:28 AM   Dressing Type Transparent;Tape 6/5/2018  4:28 AM   Hub Color/Line Status Pink;Capped 6/5/2018  4:28 AM   Alcohol Cap Used Yes 6/4/2018  7:48 PM       Peripheral IV 06/03/18 Left Wrist (Active)   Site Assessment Clean, dry, & intact 6/5/2018  4:28 AM   Phlebitis Assessment 0 6/5/2018  4:28 AM   Infiltration Assessment 0 6/5/2018  4:28 AM   Dressing Status Clean, dry, & intact 6/5/2018  4:28 AM   Dressing Type Transparent;Tape 6/5/2018  4:28 AM   Hub Color/Line Status Pink;Capped 6/5/2018  4:28 AM   Alcohol Cap Used Yes 6/4/2018  7:48 PM          External Female Catheter 06/03/18 (Active)   Site Assessment Clean, dry, & intact 6/4/2018  7:48 PM   Repositioned No 6/4/2018  7:48 PM   Perineal Care Yes 6/4/2018  7:48 PM   Wick Changed Yes 6/4/2018  7:48 PM   Suction Canister/Tubing Changed No 6/4/2018  7:48 PM   Urine Output (mL) 750 ml 6/5/2018  2:14 AM                Urinary Catheter      Intake & Output   Date 06/04/18 0700 - 06/05/18 0659 06/05/18 0700 - 06/06/18 0659   Shift [id number] 1900-0659 24 Hour Total 8239-3542 9136-7514 24 Hour Total   I  N  T  A  K  E   P.O. 120  120         P. O. 120  120       Shift Total  (mL/kg) 120  (1.8)  120  (1.8)      O  U  T  P  U  T   Urine  (mL/kg/hr) 1350  (1.7) 750  (0.9) 2100  (1.3)         Urine Voided 100  100         Urine Output (mL) (External Female Catheter 06/03/18) [phone]       Stool            Stool Occurrence(s) 1 x  1 x       Shift Total  (mL/kg) 1350  (20.1) 750  (11.2) 2100  (31.3)      NET -1230 -750 -1980      Weight (kg) 67.1 67 67 67 67 67         Readmission Risk Assessment Tool Score High Risk            39       Total Score        3 Has Seen PCP in Last 6 Months (Yes=3, No=0)    2 . Living with Significant Other. Assisted Living. LTAC. SNF. or   Rehab    4 IP Visits Last 12 Months (1-3=4, 4=9, >4=11)    5 Pt.  Coverage (Medicare=5 , Medicaid, or Self-Pay=4)    25 Charlson Comorbidity Score (Age + Comorbid Conditions)        Criteria that do not apply:    Patient Length of Stay (>5 days = 3)       Expected Length of Stay 4d 12h   Actual Length of Stay 2

## 2018-06-05 NOTE — PROGRESS NOTES
Bedside shift change report given to ÓSCAR Owens RN (oncoming nurse) by Chemo RN (offgoing nurse). Report included the following information SBAR, Kardex, Intake/Output, MAR, Recent Results, Med Rec Status and Cardiac Rhythm NSR.

## 2018-06-05 NOTE — PROGRESS NOTES
[address] [place] [address]  [phone]      Medical Progress Note      NAME: Heidi Pulido   :  1928  MRM:  [id number]    Date/Time: 2018  5:42 AM      Subjective:     Admitted with CHF and probable pneumonia. Has diuresed and feels better although still has congested cough with purulent sputum. One blood culture positive for GPC. Afebrile with HR in 70's. O2 stat's 93% on 5 lpm.     Past Medical History reviewed and unchanged from Admission History and Physical and/or prior progress note/electronic medical record    Medications reviewed. ROS:      General: postive for weakness andafebrile  Ear Nose and Throat: negative for rhinorrhea, pharyngitis, otalgia, tinnitus, speech or swallowing difficulties  Respiratory:  positive for SOB, cough and sputum production  Cardiology:  negative for chest pain, palpitations, orthopnea, PND, edema, syncope   Gastrointestinal: negative for abdominal pain, N/V, dysphagia, change in bowel habits, bleeding  Genitourinary: negative for frequency, urgency, dysuria, hematuria, incontinence  Muskuloskeletal : negative for arthralgia, myalgia  Neurological: negative for headache, dizziness, confusion, focal weakness, paresthesia, memory loss, gait disturbance    Objective:     Last 24hrs VS reviewed since prior progress note.  Most recent are:    Visit Vitals    /55 (BP 1 Location: Right arm, BP Patient Position: At rest)    Pulse 69    Temp 97.8 °F (36.6 °C)    Resp 20    Ht 5' 6\" (1.676 m)    Wt 147 lb 14.9 oz (67.1 kg)    SpO2 93%    BMI 23.88 kg/m2     SpO2 Readings from Last 6 Encounters:   18 93%   18 94%   18 95%   02/23/15 95%   11 95%    O2 Flow Rate (L/min): 5 l/min       Intake/Output Summary (Last 24 hours) at 18 0547  Last data filed at 18 0214   Gross per 24 hour   Intake              120 ml   Output             2100 ml   Net            -1980 ml Physical Exam:    General appearance: alert, cooperative, no distress, appears stated age  Eyes: negative  Neck: supple, symmetrical, trachea midline, no adenopathy, thyroid: not enlarged, symmetric, no tenderness/mass/nodules, no carotid bruit and no JVD  Lungs: rhonchi bilaterally;  Rales at bases  Heart: regular rate and rhythm, S1, S2 normal, grade 7-3/7 systolic murmur  Abdomen: soft, non-tender. Bowel sounds normal. No masses,  no organomegaly  Extremities: extremities normal, atraumatic, no cyanosis or edema  Neurologic: Grossly normal    Data Review:    Lab:    BMP:   Lab Results   Component Value Date/Time     (L) 06/05/2018 04:28 AM    K 3.6 06/05/2018 04:28 AM    CL 98 06/05/2018 04:28 AM    CO2 29 06/05/2018 04:28 AM    AGAP 8 06/05/2018 04:28 AM     (H) 06/05/2018 04:28 AM    BUN 26 (H) 06/05/2018 04:28 AM    CREA 1.10 (H) 06/05/2018 04:28 AM    GFRAA 57 (L) 06/05/2018 04:28 AM    GFRNA 47 (L) 06/05/2018 04:28 AM     CBC:   Lab Results   Component Value Date/Time    WBC 8.8 06/05/2018 04:28 AM    HGB 12.5 06/05/2018 04:28 AM    HCT 37.9 06/05/2018 04:28 AM     06/05/2018 04:28 AM     All labs reviewed in past 24 hours    Other pertinent lab: Troponin 0.14; BNP > 13K    Imaging: CT chest - 1. Bilateral lower lobe and lingular peripheral consolidation with  bronchiectasis, suggesting cryptogenic organizing pneumonia, with areas of high  density within the consolidation which may be seen with amiodarone toxicity. 2. Emphysema. 4. No evidence of hepatic parenchymal density to suggest involvement by  amiodarone.     Chest xray - Pulmonary edema and small left pleural effusion      Assessment / Plan:     Principal Problem:    Acute respiratory failure with hypoxia (HCC) (6/3/2018)    Active Problems:    Acute on chronic diastolic heart failure ([address] (5/1/2018)      CKD (chronic kidney disease) stage 3, GFR 30-59 ml/min (5/1/2018)      Hypokalemia (5/2/2018)      CAD (coronary artery disease) (6/3/2018)      Moderate mitral regurgitation (6/3/2018)      Paroxysmal A-fib ([address] (6/3/2018)      Hyperlipidemia (6/3/2018)      Elevated troponin (6/3/2018)        1. Await AM labs  2. Continue diuresis  3. Continue antibiotics  4. Recheck chest xray  5. Await culture results  6. PT/OT   7. Off amiodarone  8. Continue Elquis  9.  SNF at discharge           Care Plan discussed with: Patient    Discussed:  Care Plan    Prophylaxis:  H2B/PPI and Eliquis    Disposition:  SNF/LTC  ___________________________________________________    Attending Physician: Xavier Griffith MD

## 2018-06-06 PROBLEM — J18.9 CAP (COMMUNITY ACQUIRED PNEUMONIA): Status: ACTIVE | Noted: 2018-06-06

## 2018-06-06 LAB
ALBUMIN SERPL-MCNC: 2.4 G/DL (ref 3.5–5)
ALBUMIN/GLOB SERPL: 0.6 {RATIO} (ref 1.1–2.2)
ALP SERPL-CCNC: 88 U/L (ref 45–117)
ALT SERPL-CCNC: 42 U/L (ref 12–78)
ANION GAP SERPL CALC-SCNC: 8 MMOL/L (ref 5–15)
APPEARANCE UR: CLEAR
AST SERPL-CCNC: 47 U/L (ref 15–37)
BACTERIA URNS QL MICRO: NEGATIVE /HPF
BASOPHILS # BLD: 0 K/UL (ref 0–0.1)
BASOPHILS NFR BLD: 0 % (ref 0–1)
BILIRUB SERPL-MCNC: 0.6 MG/DL (ref 0.2–1)
BILIRUB UR QL: NEGATIVE
BUN SERPL-MCNC: 24 MG/DL (ref 6–20)
BUN/CREAT SERPL: 23 (ref 12–20)
CALCIUM SERPL-MCNC: 8.8 MG/DL (ref 8.5–10.1)
CHLORIDE SERPL-SCNC: 98 MMOL/L (ref 97–108)
CO2 SERPL-SCNC: 30 MMOL/L (ref 21–32)
COLOR UR: NORMAL
CREAT SERPL-MCNC: 1.06 MG/DL (ref 0.55–1.02)
DIFFERENTIAL METHOD BLD: ABNORMAL
EOSINOPHIL # BLD: 0.2 K/UL (ref 0–0.4)
EOSINOPHIL NFR BLD: 3 % (ref 0–7)
EPITH CASTS URNS QL MICRO: NORMAL /LPF
ERYTHROCYTE [DISTWIDTH] IN BLOOD BY AUTOMATED COUNT: 14.3 % (ref 11.5–14.5)
GLOBULIN SER CALC-MCNC: 4.3 G/DL (ref 2–4)
GLUCOSE SERPL-MCNC: 103 MG/DL (ref 65–100)
GLUCOSE UR STRIP.AUTO-MCNC: NEGATIVE MG/DL
HCT VFR BLD AUTO: 36.2 % (ref 35–47)
HGB BLD-MCNC: 11.9 G/DL (ref 11.5–16)
HGB UR QL STRIP: NEGATIVE
HYALINE CASTS URNS QL MICRO: NORMAL /LPF (ref 0–5)
IMM GRANULOCYTES # BLD: 0 K/UL (ref 0–0.04)
IMM GRANULOCYTES NFR BLD AUTO: 1 % (ref 0–0.5)
KETONES UR QL STRIP.AUTO: NEGATIVE MG/DL
LEUKOCYTE ESTERASE UR QL STRIP.AUTO: NEGATIVE
LYMPHOCYTES # BLD: 1.1 K/UL (ref 0.8–3.5)
LYMPHOCYTES NFR BLD: 13 % (ref 12–49)
MCH RBC QN AUTO: 28.7 PG (ref 26–34)
MCHC RBC AUTO-ENTMCNC: 32.9 G/DL (ref 30–36.5)
MCV RBC AUTO: 87.4 FL (ref 80–99)
MONOCYTES # BLD: 1 K/UL (ref 0–1)
MONOCYTES NFR BLD: 11 % (ref 5–13)
NEUTS SEG # BLD: 6.2 K/UL (ref 1.8–8)
NEUTS SEG NFR BLD: 72 % (ref 32–75)
NITRITE UR QL STRIP.AUTO: NEGATIVE
NRBC # BLD: 0 K/UL (ref 0–0.01)
NRBC BLD-RTO: 0 PER 100 WBC
PH UR STRIP: 7 [PH] (ref 5–8)
PLATELET # BLD AUTO: 242 K/UL (ref 150–400)
PMV BLD AUTO: 11.3 FL (ref 8.9–12.9)
POTASSIUM SERPL-SCNC: 3.7 MMOL/L (ref 3.5–5.1)
PROT SERPL-MCNC: 6.7 G/DL (ref 6.4–8.2)
PROT UR STRIP-MCNC: NEGATIVE MG/DL
RBC # BLD AUTO: 4.14 M/UL (ref 3.8–5.2)
RBC #/AREA URNS HPF: NORMAL /HPF (ref 0–5)
SODIUM SERPL-SCNC: 136 MMOL/L (ref 136–145)
SP GR UR REFRACTOMETRY: 1.01 (ref 1–1.03)
UA: UC IF INDICATED,UAUC: NORMAL
UROBILINOGEN UR QL STRIP.AUTO: 0.2 EU/DL (ref 0.2–1)
WBC # BLD AUTO: 8.5 K/UL (ref 3.6–11)
WBC URNS QL MICRO: NORMAL /HPF (ref 0–4)

## 2018-06-06 PROCEDURE — 36415 COLL VENOUS BLD VENIPUNCTURE: CPT | Performed by: INTERNAL MEDICINE

## 2018-06-06 PROCEDURE — 80053 COMPREHEN METABOLIC PANEL: CPT | Performed by: INTERNAL MEDICINE

## 2018-06-06 PROCEDURE — 65660000000 HC RM CCU STEPDOWN

## 2018-06-06 PROCEDURE — 74011250637 HC RX REV CODE- 250/637: Performed by: INTERNAL MEDICINE

## 2018-06-06 PROCEDURE — 97535 SELF CARE MNGMENT TRAINING: CPT

## 2018-06-06 PROCEDURE — 97530 THERAPEUTIC ACTIVITIES: CPT

## 2018-06-06 PROCEDURE — 97116 GAIT TRAINING THERAPY: CPT

## 2018-06-06 PROCEDURE — 77010033678 HC OXYGEN DAILY

## 2018-06-06 PROCEDURE — 85025 COMPLETE CBC W/AUTO DIFF WBC: CPT | Performed by: INTERNAL MEDICINE

## 2018-06-06 PROCEDURE — 77030038269 HC DRN EXT URIN PURWCK BARD -A

## 2018-06-06 RX ORDER — LEVOFLOXACIN 750 MG/1
750 TABLET ORAL
Status: DISCONTINUED | OUTPATIENT
Start: 2018-06-07 | End: 2018-06-07 | Stop reason: HOSPADM

## 2018-06-06 RX ORDER — FUROSEMIDE 40 MG/1
40 TABLET ORAL
Status: DISCONTINUED | OUTPATIENT
Start: 2018-06-06 | End: 2018-06-07 | Stop reason: HOSPADM

## 2018-06-06 RX ADMIN — FUROSEMIDE 40 MG: 40 TABLET ORAL at 09:49

## 2018-06-06 RX ADMIN — FUROSEMIDE 40 MG: 40 TABLET ORAL at 17:07

## 2018-06-06 RX ADMIN — CETIRIZINE HYDROCHLORIDE 10 MG: 10 TABLET, FILM COATED ORAL at 21:38

## 2018-06-06 RX ADMIN — Medication 10 ML: at 21:37

## 2018-06-06 RX ADMIN — APIXABAN 2.5 MG: 2.5 TABLET, FILM COATED ORAL at 09:49

## 2018-06-06 RX ADMIN — METOPROLOL SUCCINATE 25 MG: 25 TABLET, EXTENDED RELEASE ORAL at 09:49

## 2018-06-06 RX ADMIN — ASPIRIN 81 MG 81 MG: 81 TABLET ORAL at 09:49

## 2018-06-06 RX ADMIN — POTASSIUM CHLORIDE 20 MEQ: 750 TABLET, EXTENDED RELEASE ORAL at 17:07

## 2018-06-06 RX ADMIN — AMLODIPINE BESYLATE 2.5 MG: 2.5 TABLET ORAL at 09:49

## 2018-06-06 RX ADMIN — KETOTIFEN FUMARATE 1 DROP: 0.35 SOLUTION/ DROPS OPHTHALMIC at 09:51

## 2018-06-06 RX ADMIN — ATORVASTATIN CALCIUM 40 MG: 40 TABLET, FILM COATED ORAL at 21:37

## 2018-06-06 RX ADMIN — KETOTIFEN FUMARATE 1 DROP: 0.35 SOLUTION/ DROPS OPHTHALMIC at 19:24

## 2018-06-06 RX ADMIN — APIXABAN 2.5 MG: 2.5 TABLET, FILM COATED ORAL at 17:07

## 2018-06-06 RX ADMIN — PANTOPRAZOLE SODIUM 40 MG: 40 TABLET, DELAYED RELEASE ORAL at 09:49

## 2018-06-06 RX ADMIN — UMECLIDINIUM 1 PUFF: 62.5 AEROSOL, POWDER ORAL at 09:51

## 2018-06-06 RX ADMIN — POTASSIUM CHLORIDE 20 MEQ: 750 TABLET, EXTENDED RELEASE ORAL at 21:37

## 2018-06-06 RX ADMIN — PANTOPRAZOLE SODIUM 40 MG: 40 TABLET, DELAYED RELEASE ORAL at 17:07

## 2018-06-06 RX ADMIN — POTASSIUM CHLORIDE 20 MEQ: 750 TABLET, EXTENDED RELEASE ORAL at 09:49

## 2018-06-06 RX ADMIN — Medication 10 ML: at 09:49

## 2018-06-06 RX ADMIN — OXYBUTYNIN CHLORIDE 5 MG: 5 TABLET, FILM COATED, EXTENDED RELEASE ORAL at 09:49

## 2018-06-06 RX ADMIN — Medication 1 CAPSULE: at 09:49

## 2018-06-06 NOTE — PROGRESS NOTES
Progress Note      6/6/2018 10:00 AM  NAME: Nohemy Maldonado   MRN:  [id number]   Admit Diagnosis: Acute respiratory failure with hypoxia ([facility] [address]                          MUTMDKYAWN:                  1. Hypoxia, CT suggestive of pneumonia, low grade fever, increased WBC, blood culture with bacteria. 2. No hx of CAD  3. Echo5/2018 EF 55%, mod MR, mild pHTN  4. Prox Afib currently in sinus with LVH  5. HTN, with hypertensive heart disease with heart failure, and CKD cr 1.4  6. Dyslipidemia  7. Hx of CVA s/p TPA in 2015  8. PVD with hx of left CEA  9. COPD has had home O2 in past  10. GERD  11. DJD  12. , lives at [facility], poor functional capacity in a wheelchair, sleeps most of the day                                 Plan:                   Does not appear ischemic. This is not clearly CHF, but pBNP is certianly elevated. No edema on exam.  CT results reviewed, not clearly amiodarone toxicity, she has also not been on that long, but ok with stopping for now. Low grade fever, increased wbc, flagged blood culture, I am concerned with pneumonia  PAT/NSVT noted will keep electrolytes corrected, low dose beta blocker.      1.  Cont eliquis 2.5mg po bid, given CHADS2 of 5  2. Cont ASA likely stop soon  3. Agree with holding amiodarone  4. Cont toprol xl  5. Resume amlodipine  6. Currently on iv lasix, follow bmp, discharge on higher dose of lasix 60mg po bid  7. Cont atorvastatin      PT/OT  Likely will need home O2          [x]        High complexity decision making was performed         Subjective:     Bhavna  Alejandro denies chest pain, +dyspnea improving. Discussed with RN events overnight.      Review of Systems:    Symptom Y/N Comments  Symptom Y/N Comments   Fever/Chills N   Chest Pain N    Poor Appetite N   Edema N    Cough Y   Abdominal Pain N    Sputum Y   Joint Pain N    SOB/ALCARAZ Y   Pruritis/Rash N    Nausea/vomit N   Tolerating PT/OT Y    Diarrhea N   Tolerating Diet Y    Constipation N Other       Could NOT obtain due to:      Objective:      Physical Exam:    Last 24hrs VS reviewed since prior progress note. Most recent are:    Visit Vitals    /45 (BP 1 Location: Right arm, BP Patient Position: At rest)    Pulse 82    Temp 98.7 °F (37.1 °C)    Resp 18    Ht 5' 6\" (1.676 m)    Wt 67 kg (147 lb 11.3 oz)    SpO2 95%    BMI 23.84 kg/m2       Intake/Output Summary (Last 24 hours) at 06/06/18 1248  Last data filed at 06/06/18 0958   Gross per 24 hour   Intake              500 ml   Output             1300 ml   Net             -800 ml        General Appearance: Well developed, elderly, alert & oriented x 3,    no acute distress. Ears/Nose/Mouth/Throat: Hearing grossly normal.  Neck: Supple. Chest: Lungs ronchi to auscultation bilaterally. Cardiovascular: Regular rate and rhythm, S1S2 normal, no murmur. Abdomen: Soft, non-tender, bowel sounds are active. Extremities: No edema bilaterally. Skin: Warm and dry. PMH/SH reviewed - no change compared to H&P    Data Review    Telemetry: normal sinus rhythm     Lab Data Personally Reviewed:    Recent Labs      06/06/18   0407  06/05/18   0428   WBC  8.5  8.8   HGB  11.9  12.5   HCT  36.2  37.9   PLT  242  228     No results for input(s): INR, PTP, APTT in the last 72 hours. No lab exists for component: INREXT, INREXT   Recent Labs      06/06/18   0407  06/05/18   0428  06/04/18   0405   NA  136  135*  137   K  3.7  3.6  3.6   CL  98  98  98   CO2  30  29  31   BUN  24*  26*  20   CREA  1.06*  1.10*  1.10*   GLU  103*  101*  107*   CA  8.8  9.0  9.3     No results for input(s): CPK, CKNDX, TROIQ in the last 72 hours.     No lab exists for component: CPKMB  Lab Results   Component Value Date/Time    Cholesterol, total 182 02/19/2015 04:30 AM    HDL Cholesterol 38 02/19/2015 04:30 AM    LDL, calculated 99.4 02/19/2015 04:30 AM    Triglyceride 223 (H) 02/19/2015 04:30 AM    CHOL/HDL Ratio 4.8 02/19/2015 04:30 AM       Recent Labs 06/06/18   0407  06/05/18   0428  06/04/18   0405   SGOT  47*  50*  26   AP  88  88  92   TP  6.7  7.0  7.7   ALB  2.4*  2.5*  2.6*   GLOB  4.3*  4.5*  5.1*     No results for input(s): PH, PCO2, PO2 in the last 72 hours.     Medications Personally Reviewed:    Current Facility-Administered Medications   Medication Dose Route Frequency    furosemide (LASIX) tablet 40 mg  40 mg Oral ACB&D    [START ON 6/7/2018] levoFLOXacin (LEVAQUIN) tablet 750 mg  750 mg Oral Q48H    amLODIPine (NORVASC) tablet 2.5 mg  2.5 mg Oral DAILY    albuterol-ipratropium (DUO-NEB) 2.5 MG-0.5 MG/3 ML  3 mL Nebulization Q4H PRN    apixaban (ELIQUIS) tablet 2.5 mg  2.5 mg Oral BID    aspirin chewable tablet 81 mg  81 mg Oral DAILY    atorvastatin (LIPITOR) tablet 40 mg  40 mg Oral QHS    cetirizine (ZYRTEC) tablet 10 mg  10 mg Oral QHS    metoprolol succinate (TOPROL-XL) XL tablet 25 mg  25 mg Oral DAILY    oxybutynin chloride XL (DITROPAN XL) tablet 5 mg  5 mg Oral DAILY    ketotifen (ZADITOR) 0.025 % (0.035 %) ophthalmic solution 1 Drop  1 Drop Ophthalmic BID    pantoprazole (PROTONIX) tablet 40 mg  40 mg Oral ACB&D    umeclidinium (INCRUSE ELLIPTA) 62.5 mcg/actuation  1 Puff Inhalation DAILY    triamcinolone acetonide (KENALOG) 0.1 % cream   Topical BID PRN    nitroglycerin (NITROBID) 2 % ointment 0.5 Inch  0.5 Inch Topical Q6H PRN    sodium chloride (NS) flush 5-10 mL  5-10 mL IntraVENous Q8H    sodium chloride (NS) flush 5-10 mL  5-10 mL IntraVENous PRN    ondansetron (ZOFRAN) injection 4 mg  4 mg IntraVENous Q4H PRN    acetaminophen (TYLENOL) tablet 650 mg  650 mg Oral Q6H PRN    potassium chloride SR (KLOR-CON 10) tablet 20 mEq  20 mEq Oral TID    lactobac ac& pc-s.therm-b.anim (AYALA Q/RISAQUAD)  1 Cap Oral DAILY         Kamaljit Mansfield MD

## 2018-06-06 NOTE — PROGRESS NOTES
Pressure Ulcer Prevention Alert Received for Rosas < 14 (moderate risk).        Care Plan/Interventions for Nursin. Complete Rosas Pressure Ulcer Risk Scale and use sub scores to identify appropriate interventions. 2. Perform Assessment: skin, changes in LOC, visual cues for pain, monitor skin under medical devices  3. Respond to Reduced Sensory Perception: changes in LOC, check visual cues for pain, float heels, suspension boots, pressure redistribution bed/mattress/chair cushion, turning and reposition approximately every 2 hours (pillows & wedges), pad between skin to skin, turn & reposition  4. Manage Moisture: absorbent under pads, internal / external urinary device, internal /  external fecal device, minimize layers, contain wound drainage, access need for specialty bed, limit adult briefs, maintain skin hydration (lotion/cream), moisture barrier, offer toileting every hour  5. Promote Activity: increase time out of bed, chair cushion, PT/OT evaluation, trapeze to reposition, pressure redistribution bed/mattress/chair  6. Address Reduced Mobility: float heels / suspension boot, HOB 30 degrees or less, pressure redistribution bed/mattress/cushion, PT / OT evaluation, turn and reposition approximately every 2 hours (pillows & wedges)  7. Promote Nutrition: document food / fluid / supplement intake, encourage/assist with meals as needed  8. Reduce Friction and Shear: transferring/repositioning devices (lift/draw sheet), lift team/ patient mobility team, feet elevated on foot rest, minimize layers, foam dressing / transparent film / skin sealants, protective barrier creams and emollients, transfer aides (board, Dinah lift, ceiling lift, stand assist), HOB 30 degrees or less, trapeze to reposition.   Wound Care Team

## 2018-06-06 NOTE — PROGRESS NOTES
[address] [place] [address]  [phone]      Medical Progress Note      NAME: Kristen Jean   :  1928  MRM:  [id number]    Date/Time: 2018  5:16 AM      Subjective:     Admitted with CHF and probable pneumonia. Has diuresed and feels better although still has congested cough with purulent sputum. One blood culture positive for Strep pneumoniae. Afebrile with HR in 70's. O2 stat's 94% on 2.5 lpm.     Past Medical History reviewed and unchanged from Admission History and Physical and/or prior progress note/electronic medical record    Medications reviewed. ROS:      General: postive for weakness andafebrile  Ear Nose and Throat: negative for rhinorrhea, pharyngitis, otalgia, tinnitus, speech or swallowing difficulties  Respiratory:  positive for SOB, cough and sputum production  Cardiology:  negative for chest pain, palpitations, orthopnea, PND, edema, syncope   Gastrointestinal: negative for abdominal pain, N/V, dysphagia, change in bowel habits, bleeding  Genitourinary: negative for frequency, urgency, dysuria, hematuria, incontinence  Muskuloskeletal : negative for arthralgia, myalgia  Neurological: negative for headache, dizziness, confusion, focal weakness, paresthesia, memory loss, gait disturbance    Objective:     Last 24hrs VS reviewed since prior progress note.  Most recent are:    Visit Vitals    /66 (BP 1 Location: Right arm, BP Patient Position: At rest)    Pulse 88    Temp 98 °F (36.7 °C)    Resp 18    Ht 5' 6\" (1.676 m)    Wt 147 lb 11.3 oz (67 kg)    SpO2 94%    BMI 23.84 kg/m2     SpO2 Readings from Last 6 Encounters:   18 94%   18 94%   18 95%   02/23/15 95%   11 95%    O2 Flow Rate (L/min): 2.5 l/min       Intake/Output Summary (Last 24 hours) at 18  Last data filed at 18   Gross per 24 hour   Intake              500 ml   Output             1300 ml   Net             -800 ml Physical Exam:    General appearance: alert, cooperative, no distress, appears stated age  Eyes: negative  Neck: supple, symmetrical, trachea midline, no adenopathy, thyroid: not enlarged, symmetric, no tenderness/mass/nodules, no carotid bruit and no JVD  Lungs: rhonchi bilaterally;  Rales at bases  Heart: regular rate and rhythm, S1, S2 normal, grade 6-6/7 systolic murmur  Abdomen: soft, non-tender. Bowel sounds normal. No masses,  no organomegaly  Extremities: extremities normal, atraumatic, no cyanosis or edema  Neurologic: Grossly normal    Data Review:    Lab:    BMP:   Lab Results   Component Value Date/Time     06/06/2018 04:07 AM    K 3.7 06/06/2018 04:07 AM    CL 98 06/06/2018 04:07 AM    CO2 30 06/06/2018 04:07 AM    AGAP 8 06/06/2018 04:07 AM     (H) 06/06/2018 04:07 AM    BUN 24 (H) 06/06/2018 04:07 AM    CREA 1.06 (H) 06/06/2018 04:07 AM    GFRAA 59 (L) 06/06/2018 04:07 AM    GFRNA 49 (L) 06/06/2018 04:07 AM     CBC:   Lab Results   Component Value Date/Time    WBC 8.5 06/06/2018 04:07 AM    HGB 11.9 06/06/2018 04:07 AM    HCT 36.2 06/06/2018 04:07 AM     06/06/2018 04:07 AM     All labs reviewed in past 24 hours    Other pertinent lab: Troponin 0.14; BNP > 13K    Imaging: CT chest - 1. Bilateral lower lobe and lingular peripheral consolidation with  bronchiectasis, suggesting cryptogenic organizing pneumonia, with areas of high  density within the consolidation which may be seen with amiodarone toxicity. 2. Emphysema. 4. No evidence of hepatic parenchymal density to suggest involvement by  amiodarone. Chest xray - Pulmonary edema and small left pleural effusion    Chest xray (6/05) - Persistent left lower lobe infiltrate. No new finding.     Blood culture:  STREPTOCOCCUS PNEUMONIAE      Antibiotic Sensitivity MARLEY Unit Status     Ceftriaxone Meningitis Susceptible 0.064 ug/mL Final     Method: MARLEY     Ceftriaxone Non-meningitis Susceptible 0.064 ug/mL Final     Method: MARLEY     Erythromycin ($$$$) Susceptible 0.125 ug/mL Final     Method: MARLEY     Levofloxacin ($) Susceptible 0.75 ug/mL Final     Method: MARLEY     Penicillin (Oral) Susceptible 0.064 ug/mL Final     Method: MARLEY     Penicillin meningitis Susceptible 0.064 ug/mL Final     Method: MARLEY     Penicillin non-meningitis Susceptible 0.064 ug/mL Final     Method: MARLEY     Trimeth/Sulfa Susceptible 0.047 ug/mL Final     Method: MARLEY     Vancomycin ($) Susceptible 0.38 ug/mL Final     Method: MARLEY                Assessment / Plan:     Principal Problem:    Acute respiratory failure with hypoxia (HCC) (6/3/2018)    Active Problems:    Acute on chronic diastolic heart failure (HCC) (5/1/2018)      CKD (chronic kidney disease) stage 3, GFR 30-59 ml/min (5/1/2018)      Hypokalemia (5/2/2018)      CAD (coronary artery disease) (6/3/2018)      Moderate mitral regurgitation (6/3/2018)      Paroxysmal A-fib ([address] (6/3/2018)      Hyperlipidemia (6/3/2018)      Elevated troponin (6/3/2018)      CAP (community acquired pneumonia) (6/6/2018)        1. Labs stable  2. Continue diuresis  3. Discontinue vancomycin; continue on po levaquin  4. Chest xray improved  5. PT/OT   6. Off amiodarone  7. Continue Elquis  8.  Potentially discharge to [facility] Thursday           Care Plan discussed with: Patient    Discussed:  Care Plan    Prophylaxis:  H2B/PPI and Eliquis    Disposition:  SNF/LTC  ___________________________________________________    Attending Physician: Marie Pemberton MD

## 2018-06-06 NOTE — PROGRESS NOTES
Spoke with José Luis at [facility] and informed her patient is for possible discharge tomorrow and will becoming back to [facility]. She has accepted patient back and states patient will be continuing with her therapy and they will have her oxygen there for her. Nursing to call report to [phone] and ask for the supervisor on duty to given report per Zaina Castelan.

## 2018-06-06 NOTE — PROGRESS NOTES
Problem: Self Care Deficits Care Plan (Adult)  Goal: *Acute Goals and Plan of Care (Insert Text)  Occupational Therapy Goals  Initiated 6/4/2018  1. Patient will perform grooming with supervision/set-up within 7 day(s). 2.  Patient will perform bathing at bedside with moderate assistance  within 7 day(s). 3.  Patient will perform upper body dressing with minimal assistance/contact guard assist within 7 day(s). 4.  Patient will perform toilet transfers with moderate assistance  within 7 day(s). 5.  Patient will perform all aspects of toileting with supervision/set-up within 7 day(s). 6.  Patient will participate in upper extremity therapeutic exercise/activities with minimal assistance/contact guard assist for 5 minutes within 7 day(s). 7.  Patient will utilize energy conservation techniques during functional activities with verbal cues within 7 day(s). Occupational Therapy TREATMENT  Patient: Reginald Holbrook (20 y.o. female)  Date: 6/6/2018  Diagnosis: Acute respiratory failure with hypoxia ([facility] [address] Acute respiratory failure with hypoxia [facility])       Precautions: Fall  Chart, occupational therapy assessment, plan of care, and goals were reviewed. ASSESSMENT:  Pt was cleared to be seen and was laying across her bed with her head on the bed rail on 2 liters of NC. Pt was mod assist for come to sitting and all VSS. She was able to scoot forward to the EOB and able to stand with min to CGA. Assisted pt with weighing and gave info to nursing. With use of RW pt was able to transfer to toilet and able to toilet self after Bm with mod assist of 1. She was able to come to standing with min assist of 1 and wash her hands and face at the sink with min to CGA. Pt then was able to walk in room  Once and then she had to sit in recliner and rest.  Pt was left sitting up in recliner and call bell with in reach and bed alarm activated and nursing in room with  pt.   Progression toward goals:  [x]       Improving appropriately and progressing toward goals  []       Improving slowly and progressing toward goals  []       Not making progress toward goals and plan of care will be adjusted     PLAN:  Patient continues to benefit from skilled intervention to address the above impairments. Continue treatment per established plan of care. Discharge Recommendations:  [facility]  Further Equipment Recommendations for Discharge:  tbd     SUBJECTIVE:   Patient stated I am not comfortable like this.     OBJECTIVE DATA SUMMARY:   Cognitive/Behavioral Status:     Orientation Level: Oriented X4                Functional Mobility and Transfers for ADLs:  Bed Mobility:  Supine to Sit: Minimum assistance;Assist x1  Scooting: Minimum assistance;Assist x1    Transfers:  Sit to Stand: Minimum assistance;Assist x1     Bed to Chair: Minimum assistance;Assist x1;Additional time    Balance:  Sitting: Impaired  Sitting - Static: Fair (occasional)  Sitting - Dynamic: Fair (occasional)  Standing: Impaired  Standing - Static: Fair;Constant support  Standing - Dynamic : Poor    ADL Intervention:     Pt is max for LB ADLs and min to mod for UB       Pain:  Pain Scale 1: Numeric (0 - 10)  Pain Intensity 1: 0              Activity Tolerance:   vss  Please refer to the flowsheet for vital signs taken during this treatment.   After treatment:   [x] Patient left in no apparent distress sitting up in chair  [] Patient left in no apparent distress in bed  [x] Call bell left within reach  [x] Nursing notified  [] Caregiver present  [x] Bed alarm activated    COMMUNICATION/COLLABORATION:   The patients plan of care was discussed with: Physical Therapist and Registered Nurse    Vandana Alexander OT  Time Calculation: 32 mins

## 2018-06-06 NOTE — CARDIO/PULMONARY
Cardiopulmonary Rehab Nursing Entry:    Pt is on CHF Bundle List    Chart reviewed. Pt is a 80 y.o. female admitted with Acute respiratory failure with hypoxia. PMHx of HTN, PAD, COPD, GERD, TIA, anxiety. Former smoker. LVEF 55-60%. Pt resides at [facility]. Pt visited for follow-up to answer questions and reinforce prior teaching re: CHF, S&Ss, medication management, Low NA diet, daily weights and when to call the doctor. Pt reported that she has minimal control over her food choices at facility, she does not use added table salt. She does get weighed at the facility but, not on a regular basis. Pt takes medication as provided by staff and works with PT. Pt reminded of s/sx to contact staff. Pt verbalized understanding.

## 2018-06-06 NOTE — PROGRESS NOTES
Bedside shift change report given to ÓSCAR Owens RN   (oncoming nurse) by Alex Branch RN (offgoing nurse). Report included the following information SBAR, Kardex, Intake/Output, MAR, Accordion, Recent Results, Med Rec Status and Cardiac Rhythm NSR.      Pt assessed and cares assumed, will continue to monitor

## 2018-06-06 NOTE — PROGRESS NOTES
PCU SHIFT NURSING NOTE      Bedside shift change report given to Kashif Mcdonough (oncoming nurse) by Alyx Davenport (offgoing nurse). Report included the following information SBAR, Kardex, Intake/Output, MAR and Recent Results. Shift Summary: 0716  Pt in bed resting  On 2.5 L O2 NC   NSR  SCDs  purewick in place  ELINA [facility]  ALCARAZ    1528  Pt up in recliner   Visitor at bedside   No complaints at this time   Per [place] OT, pt scratched area on L buttock - causing small amount of bleeding  Area was cleaned with soap and water   Pt now up in chair, RN to assess when pt gets back in bed        Admission Date 6/2/2018   Admission Diagnosis Acute respiratory failure with hypoxia ([address]   Consults IP CONSULT TO CARDIOLOGY  IP CONSULT TO PRIMARY CARE PROVIDER        Consults   []PT   []OT   []Speech   []Case Management      [] Palliative      Cardiac Monitoring Order   []Yes   []No     IV drips   []Yes    Drip:                            Dose:  Drip:                            Dose:  Drip:                            Dose:   []No     GI Prophylaxis   []Yes   []No         DVT Prophylaxis   SCDs:  Sequential Compression Device: Bilateral          Inderjit stockings:         [] Medication   []Contraindicated   []None      Activity Level Activity Level: Bed Rest     Activity Assistance: Partial (two people)   Purposeful Rounding every 1-2 hour? []Yes   Ac Score  Total Score: 3   Bed Alarm (If score 3 or >)   []Yes   [] Refused (See signed refusal form in chart)   Rosas Score  Rosas Score: 15   Rosas Score (if score 14 or less)   []PMT consult   []Wound Care consult      []Specialty bed   [] Nutrition consult          Needs prior to discharge:   Home O2 required:    []Yes   []No    If yes, how much O2 required?     Other:    Last Bowel Movement: Last Bowel Movement Date: 06/02/18      Influenza Vaccine Received Flu Vaccine for Current Season (usually Sept-March): Not Flu Season        Pneumonia Vaccine           Diet Active Orders   Diet    DIET CARDIAC Regular      LDAs               Peripheral IV 06/02/18 Left Antecubital (Active)   Site Assessment Clean, dry, & intact 6/6/2018  7:16 AM   Phlebitis Assessment 0 6/6/2018  7:16 AM   Infiltration Assessment 0 6/6/2018  7:16 AM   Dressing Status Clean, dry, & intact 6/6/2018  7:16 AM   Dressing Type Transparent 6/6/2018  7:16 AM   Hub Color/Line Status Pink 6/6/2018  7:16 AM   Alcohol Cap Used Yes 6/5/2018  8:21 PM          External Female Catheter 06/03/18 (Active)   Site Assessment Clean, dry, & intact 6/6/2018  7:16 AM   Repositioned No 6/6/2018  7:16 AM   Perineal Care No 6/6/2018  7:16 AM   Wick Changed No 6/6/2018  7:16 AM   Suction Canister/Tubing Changed No 6/6/2018  7:16 AM   Urine Output (mL) 400 ml 6/5/2018  6:58 PM                Urinary Catheter      Intake & Output   Date 06/05/18 0700 - 06/06/18 0659 06/06/18 0700 - 06/07/18 0659   Shift 6323-5412 0074-9054 24 Hour Total 9662-0134 8897-1094 24 Hour Total   I  N  T  A  K  E   I.V.  (mL/kg/hr)  500  (0.6) 500  (0.3)         Volume (vancomycin (VANCOCIN) 1,000 mg in 0.9% sodium chloride (MBP/ADV) 250 mL)  500 500       Shift Total  (mL/kg)  500  (7.5) 500  (7.5)      O  U  T  P  U  T   Urine  (mL/kg/hr) 1300  (1.6)  1300  (0.8)         Urine Voided 600  600         Urine Occurrence(s)  2 x 2 x         Urine Output (mL) (External Female Catheter 06/03/18) 700  700       Stool            Stool Occurrence(s) 1 x  1 x       Shift Total  (mL/kg) 1300  (19.4)  1300  (19.4)      NET -1300 500 -800      Weight (kg) 67 67 67 67 67 67         Readmission Risk Assessment Tool Score High Risk            39       Total Score        3 Has Seen PCP in Last 6 Months (Yes=3, No=0)    2 . Living with Significant Other. Assisted Living. LTAC. SNF. or   Rehab    4 IP Visits Last 12 Months (1-3=4, 4=9, >4=11)    5 Pt.  Coverage (Medicare=5 , Medicaid, or Self-Pay=4)    25 Charlson Comorbidity Score (Age + Comorbid Conditions)        Criteria that do not apply: Patient Length of Stay (>5 days = 3)       Expected Length of Stay 4d 12h   Actual Length of Stay 3

## 2018-06-06 NOTE — PROGRESS NOTES
Heart Failure Care Coordinator visited the patient in room. Provided introduction to self, and explanation of the Care Coordinator role. Patient was provided a copy of the [facility] letter. Contact information provided should any questions arise before or after discharge. Patient stated they do not have functioning scales at [facility], per pt she is not weighed daily. Reinforced the importance of checking weight every AM and calling MD if weight is up 3 lbs in a day or 5 lbs in a week (or per MD guidelines). Patient given a set of scales, as well CHF education listing CHF Zones,CHF Calender and sodium reduction materials. Discussed with patient the importance of developing a plan in regards to CHF signs/symptoms. \"who would you call when you notice obvious signs and symptoms of heart failure such as weight gain, lower ext. Edema and SOB\"? Per patient she will make sure heritage green contacts Héctor Jones or Logan Diop as needed.

## 2018-06-07 VITALS
BODY MASS INDEX: 23.6 KG/M2 | RESPIRATION RATE: 16 BRPM | OXYGEN SATURATION: 94 % | HEIGHT: 66 IN | HEART RATE: 84 BPM | DIASTOLIC BLOOD PRESSURE: 82 MMHG | WEIGHT: 146.83 LBS | TEMPERATURE: 97.9 F | SYSTOLIC BLOOD PRESSURE: 140 MMHG

## 2018-06-07 LAB
ALBUMIN SERPL-MCNC: 2.3 G/DL (ref 3.5–5)
ALBUMIN/GLOB SERPL: 0.5 {RATIO} (ref 1.1–2.2)
ALP SERPL-CCNC: 81 U/L (ref 45–117)
ALT SERPL-CCNC: 34 U/L (ref 12–78)
ANION GAP SERPL CALC-SCNC: 7 MMOL/L (ref 5–15)
AST SERPL-CCNC: 26 U/L (ref 15–37)
BASOPHILS # BLD: 0 K/UL (ref 0–0.1)
BASOPHILS NFR BLD: 1 % (ref 0–1)
BILIRUB SERPL-MCNC: 0.8 MG/DL (ref 0.2–1)
BUN SERPL-MCNC: 20 MG/DL (ref 6–20)
BUN/CREAT SERPL: 20 (ref 12–20)
CALCIUM SERPL-MCNC: 8.7 MG/DL (ref 8.5–10.1)
CHLORIDE SERPL-SCNC: 98 MMOL/L (ref 97–108)
CO2 SERPL-SCNC: 30 MMOL/L (ref 21–32)
CREAT SERPL-MCNC: 1.02 MG/DL (ref 0.55–1.02)
DIFFERENTIAL METHOD BLD: ABNORMAL
EOSINOPHIL # BLD: 0.2 K/UL (ref 0–0.4)
EOSINOPHIL NFR BLD: 3 % (ref 0–7)
ERYTHROCYTE [DISTWIDTH] IN BLOOD BY AUTOMATED COUNT: 14.5 % (ref 11.5–14.5)
GLOBULIN SER CALC-MCNC: 4.3 G/DL (ref 2–4)
GLUCOSE SERPL-MCNC: 93 MG/DL (ref 65–100)
HCT VFR BLD AUTO: 36.6 % (ref 35–47)
HGB BLD-MCNC: 12.1 G/DL (ref 11.5–16)
IMM GRANULOCYTES # BLD: 0.1 K/UL (ref 0–0.04)
IMM GRANULOCYTES NFR BLD AUTO: 1 % (ref 0–0.5)
LYMPHOCYTES # BLD: 1.5 K/UL (ref 0.8–3.5)
LYMPHOCYTES NFR BLD: 18 % (ref 12–49)
MCH RBC QN AUTO: 28.9 PG (ref 26–34)
MCHC RBC AUTO-ENTMCNC: 33.1 G/DL (ref 30–36.5)
MCV RBC AUTO: 87.6 FL (ref 80–99)
MONOCYTES # BLD: 0.9 K/UL (ref 0–1)
MONOCYTES NFR BLD: 11 % (ref 5–13)
NEUTS SEG # BLD: 5.6 K/UL (ref 1.8–8)
NEUTS SEG NFR BLD: 67 % (ref 32–75)
NRBC # BLD: 0 K/UL (ref 0–0.01)
NRBC BLD-RTO: 0 PER 100 WBC
PLATELET # BLD AUTO: 243 K/UL (ref 150–400)
PMV BLD AUTO: 11.2 FL (ref 8.9–12.9)
POTASSIUM SERPL-SCNC: 3.9 MMOL/L (ref 3.5–5.1)
PROT SERPL-MCNC: 6.6 G/DL (ref 6.4–8.2)
RBC # BLD AUTO: 4.18 M/UL (ref 3.8–5.2)
SODIUM SERPL-SCNC: 135 MMOL/L (ref 136–145)
WBC # BLD AUTO: 8.3 K/UL (ref 3.6–11)

## 2018-06-07 PROCEDURE — 74011250637 HC RX REV CODE- 250/637: Performed by: INTERNAL MEDICINE

## 2018-06-07 PROCEDURE — 85025 COMPLETE CBC W/AUTO DIFF WBC: CPT | Performed by: INTERNAL MEDICINE

## 2018-06-07 PROCEDURE — 36415 COLL VENOUS BLD VENIPUNCTURE: CPT | Performed by: INTERNAL MEDICINE

## 2018-06-07 PROCEDURE — 80053 COMPREHEN METABOLIC PANEL: CPT | Performed by: INTERNAL MEDICINE

## 2018-06-07 PROCEDURE — 77010033678 HC OXYGEN DAILY

## 2018-06-07 RX ORDER — LEVOFLOXACIN 750 MG/1
750 TABLET ORAL
Qty: 2 TAB | Refills: 0 | Status: SHIPPED
Start: 2018-06-07 | End: 2018-06-11

## 2018-06-07 RX ADMIN — POTASSIUM CHLORIDE 20 MEQ: 750 TABLET, EXTENDED RELEASE ORAL at 09:12

## 2018-06-07 RX ADMIN — APIXABAN 2.5 MG: 2.5 TABLET, FILM COATED ORAL at 09:12

## 2018-06-07 RX ADMIN — AMLODIPINE BESYLATE 2.5 MG: 2.5 TABLET ORAL at 09:12

## 2018-06-07 RX ADMIN — OXYBUTYNIN CHLORIDE 5 MG: 5 TABLET, FILM COATED, EXTENDED RELEASE ORAL at 09:12

## 2018-06-07 RX ADMIN — KETOTIFEN FUMARATE 1 DROP: 0.35 SOLUTION/ DROPS OPHTHALMIC at 09:09

## 2018-06-07 RX ADMIN — UMECLIDINIUM 1 PUFF: 62.5 AEROSOL, POWDER ORAL at 09:09

## 2018-06-07 RX ADMIN — ASPIRIN 81 MG 81 MG: 81 TABLET ORAL at 09:12

## 2018-06-07 RX ADMIN — LEVOFLOXACIN 750 MG: 750 TABLET, FILM COATED ORAL at 06:00

## 2018-06-07 RX ADMIN — Medication 1 CAPSULE: at 09:12

## 2018-06-07 RX ADMIN — FUROSEMIDE 40 MG: 40 TABLET ORAL at 09:12

## 2018-06-07 RX ADMIN — Medication 10 ML: at 06:01

## 2018-06-07 RX ADMIN — METOPROLOL SUCCINATE 25 MG: 25 TABLET, EXTENDED RELEASE ORAL at 09:12

## 2018-06-07 RX ADMIN — PANTOPRAZOLE SODIUM 40 MG: 40 TABLET, DELAYED RELEASE ORAL at 09:11

## 2018-06-07 NOTE — PROGRESS NOTES
Heart Failure Care Coordinator visited the patient in room to review the transitional care plan with the patient. We discussed the importance of transitional care as it relates to reducing the likelihood of readmission. We reviewed the goals for the first 30 days following hospital discharge. The patient and I discussed wrap around services including nurse navigation, care coordination, home health, transitional care appointments with their primary care provider and specialist team and the role of dispatch health. Appointment Dates and Times were not documented on [facility] Calendar. Per patient her daughter, Claudia Yan makes all apt's. Pt would prefer daughter to make apt. Coordinator discussed with patient the importance of follow up appointments. The patient verbalized understanding.

## 2018-06-07 NOTE — PROGRESS NOTES
[address] [place] [address]  [phone]      Medical Progress Note      NAME: Douglas Landry   :  1928  MRM:  [id number]    Date/Time: 2018  5:56 AM      Subjective:     Admitted with CHF and probable pneumonia. Has diuresed and feels better although still has congested cough with purulent sputum. One blood culture positive for Strep pneumoniae. Afebrile with HR in 70's. O2 stat's 94% on 2.5 lpm.     Past Medical History reviewed and unchanged from Admission History and Physical and/or prior progress note/electronic medical record    Medications reviewed. ROS:      General: postive for weakness andafebrile  Ear Nose and Throat: negative for rhinorrhea, pharyngitis, otalgia, tinnitus, speech or swallowing difficulties  Respiratory:  positive for SOB, cough and sputum production  Cardiology:  negative for chest pain, palpitations, orthopnea, PND, edema, syncope   Gastrointestinal: negative for abdominal pain, N/V, dysphagia, change in bowel habits, bleeding  Genitourinary: negative for frequency, urgency, dysuria, hematuria, incontinence  Muskuloskeletal : negative for arthralgia, myalgia  Neurological: negative for headache, dizziness, confusion, focal weakness, paresthesia, memory loss, gait disturbance    Objective:     Last 24hrs VS reviewed since prior progress note.  Most recent are:    Visit Vitals    BP (!) 158/91 (BP 1 Location: Right arm, BP Patient Position: At rest)    Pulse 71    Temp 98.2 °F (36.8 °C)    Resp 16    Ht 5' 6\" (1.676 m)    Wt 150 lb 9.2 oz (68.3 kg)    SpO2 94%    BMI 24.3 kg/m2     SpO2 Readings from Last 6 Encounters:   18 94%   18 94%   18 95%   02/23/15 95%   11 95%    O2 Flow Rate (L/min): 2.5 l/min       Intake/Output Summary (Last 24 hours) at 18 0537  Last data filed at 18 7403   Gross per 24 hour   Intake                0 ml   Output             1000 ml   Net -1000 ml          Physical Exam:    General appearance: alert, cooperative, no distress, appears stated age  Eyes: negative  Neck: supple, symmetrical, trachea midline, no adenopathy, thyroid: not enlarged, symmetric, no tenderness/mass/nodules, no carotid bruit and no JVD  Lungs: rhonchi bilaterally;  Rales at bases  Heart: regular rate and rhythm, S1, S2 normal, grade 1-2/0 systolic murmur  Abdomen: soft, non-tender. Bowel sounds normal. No masses,  no organomegaly  Extremities: extremities normal, atraumatic, no cyanosis or edema  Neurologic: Grossly normal    Data Review:    Lab:    BMP:   Lab Results   Component Value Date/Time     (L) 06/07/2018 03:47 AM    K 3.9 06/07/2018 03:47 AM    CL 98 06/07/2018 03:47 AM    CO2 30 06/07/2018 03:47 AM    AGAP 7 06/07/2018 03:47 AM    GLU 93 06/07/2018 03:47 AM    BUN 20 06/07/2018 03:47 AM    CREA 1.02 06/07/2018 03:47 AM    GFRAA >60 06/07/2018 03:47 AM    GFRNA 51 (L) 06/07/2018 03:47 AM     CBC:   Lab Results   Component Value Date/Time    WBC 8.3 06/07/2018 03:47 AM    HGB 12.1 06/07/2018 03:47 AM    HCT 36.6 06/07/2018 03:47 AM     06/07/2018 03:47 AM     All labs reviewed in past 24 hours    Other pertinent lab: Troponin 0.14; BNP > 13K    Imaging: CT chest - 1. Bilateral lower lobe and lingular peripheral consolidation with  bronchiectasis, suggesting cryptogenic organizing pneumonia, with areas of high  density within the consolidation which may be seen with amiodarone toxicity. 2. Emphysema. 4. No evidence of hepatic parenchymal density to suggest involvement by  amiodarone. Chest xray - Pulmonary edema and small left pleural effusion    Chest xray (6/05) - Persistent left lower lobe infiltrate. No new finding.     Blood culture:  STREPTOCOCCUS PNEUMONIAE      Antibiotic Sensitivity MARLEY Unit Status     Ceftriaxone Meningitis Susceptible 0.064 ug/mL Final     Method: MARLEY     Ceftriaxone Non-meningitis Susceptible 0.064 ug/mL Final     Method: MARLEY   Erythromycin ($$$$) Susceptible 0.125 ug/mL Final     Method: MARLEY     Levofloxacin ($) Susceptible 0.75 ug/mL Final     Method: MARLEY     Penicillin (Oral) Susceptible 0.064 ug/mL Final     Method: MARLEY     Penicillin meningitis Susceptible 0.064 ug/mL Final     Method: MARLEY     Penicillin non-meningitis Susceptible 0.064 ug/mL Final     Method: MARLEY     Trimeth/Sulfa Susceptible 0.047 ug/mL Final     Method: MARLEY     Vancomycin ($) Susceptible 0.38 ug/mL Final     Method: MARLEY                Assessment / Plan:     Principal Problem:    Acute respiratory failure with hypoxia ([facility] [address] (6/3/2018)    Active Problems:    Acute on chronic diastolic heart failure (HCC) (5/1/2018)      CKD (chronic kidney disease) stage 3, GFR 30-59 ml/min (5/1/2018)      Hypokalemia (5/2/2018)      CAD (coronary artery disease) (6/3/2018)      Moderate mitral regurgitation (6/3/2018)      Paroxysmal A-fib ([facility] [address] (6/3/2018)      Hyperlipidemia (6/3/2018)      Elevated troponin (6/3/2018)      CAP (community acquired pneumonia) (6/6/2018)        1. Labs stable  2. Continue diuresis  3. Continue on po levaquin  4. Chest xray improved  5. Off amiodarone  6. Continue Elquis  7.  Discharge to [facility] [address] discussed with: Patient    Discussed:  Care Plan    Prophylaxis:  H2B/PPI and Eliquis    Disposition:  SNF/LTC  ___________________________________________________    Attending Physician: Darlin Smith MD

## 2018-06-07 NOTE — PROGRESS NOTES
Progress Note      6/7/2018 10:00 AM  NAME: Tavo Bush   MRN:  [id number]   Admit Diagnosis: Acute respiratory failure with hypoxia ([facility] [address]                          [id number]:                  1. Hypoxia, CT suggestive of pneumonia, low grade fever, increased WBC, blood culture with bacteria. 2. No hx of CAD  3. Echo5/2018 EF 55%, mod MR, mild pHTN  4. Prox Afib currently in sinus with LVH  5. HTN, with hypertensive heart disease with heart failure, and CKD cr 1.4  6. Dyslipidemia  7. Hx of CVA s/p TPA in 2015  8. PVD with hx of left CEA  9. COPD has had home O2 in past  10. GERD  11. DJD  12. , lives at [facility], poor functional capacity in a wheelchair, sleeps most of the day                                 Plan:                   Does not appear ischemic. This is not clearly CHF, but pBNP is certianly elevated. No edema on exam.  CT results reviewed, not clearly amiodarone toxicity, she has also not been on that long, but ok with stopping for now. Low grade fever, increased wbc, flagged blood culture, I am concerned with pneumonia  PAT/NSVT noted will keep electrolytes corrected, low dose beta blocker.      1.  Cont eliquis 2.5mg po bid, given CHADS2 of 5  2. Cont ASA likely stop soon  3. Agree with holding amiodarone  4. Cont toprol xl  5. Resume amlodipine  6. Currently on iv lasix, follow bmp, discharge on higher dose of lasix 60mg po bid  7. Cont atorvastatin      PT/OT  Likely will need home O2          [x]        High complexity decision making was performed         Subjective:     Clifton Barretoumer denies chest pain, +dyspnea improving. Discussed with RN events overnight.      Review of Systems:    Symptom Y/N Comments  Symptom Y/N Comments   Fever/Chills N   Chest Pain N    Poor Appetite N   Edema N    Cough Y   Abdominal Pain N    Sputum Y   Joint Pain N    SOB/ALCARAZ Y   Pruritis/Rash N    Nausea/vomit N   Tolerating PT/OT Y    Diarrhea N   Tolerating Diet Y    Constipation N Other       Could NOT obtain due to:      Objective:      Physical Exam:    Last 24hrs VS reviewed since prior progress note. Most recent are:    Visit Vitals    BP (!) 158/91 (BP 1 Location: Right arm, BP Patient Position: At rest)    Pulse 71    Temp 98.2 °F (36.8 °C)    Resp 16    Ht 5' 6\" (1.676 m)    Wt 68.3 kg (150 lb 9.2 oz)    SpO2 94%    BMI 24.3 kg/m2       Intake/Output Summary (Last 24 hours) at 06/07/18 0557  Last data filed at 06/06/18 1925   Gross per 24 hour   Intake                0 ml   Output             1000 ml   Net            -1000 ml        General Appearance: Well developed, elderly, alert & oriented x 3,    no acute distress. Ears/Nose/Mouth/Throat: Hearing grossly normal.  Neck: Supple. Chest: Lungs ronchi to auscultation bilaterally. Cardiovascular: Regular rate and rhythm, S1S2 normal, no murmur. Abdomen: Soft, non-tender, bowel sounds are active. Extremities: No edema bilaterally. Skin: Warm and dry. PMH/SH reviewed - no change compared to H&P    Data Review    Telemetry: normal sinus rhythm     Lab Data Personally Reviewed:    Recent Labs      06/07/18   0347  06/06/18   0407   WBC  8.3  8.5   HGB  12.1  11.9   HCT  36.6  36.2   PLT  243  242     No results for input(s): INR, PTP, APTT in the last 72 hours. No lab exists for component: Kesha Been   Recent Labs      06/07/18   0347  06/06/18   0407  06/05/18   0428   NA  135*  136  135*   K  3.9  3.7  3.6   CL  98  98  98   CO2  30  30  29   BUN  20  24*  26*   CREA  1.02  1.06*  1.10*   GLU  93  103*  101*   CA  8.7  8.8  9.0     No results for input(s): CPK, CKNDX, TROIQ in the last 72 hours.     No lab exists for component: CPKMB  Lab Results   Component Value Date/Time    Cholesterol, total 182 02/19/2015 04:30 AM    HDL Cholesterol 38 02/19/2015 04:30 AM    LDL, calculated 99.4 02/19/2015 04:30 AM    Triglyceride 223 (H) 02/19/2015 04:30 AM    CHOL/HDL Ratio 4.8 02/19/2015 04:30 AM       Recent Labs 06/07/18   0347  06/06/18   0407  06/05/18   0428   SGOT  26  47*  50*   AP  81  88  88   TP  6.6  6.7  7.0   ALB  2.3*  2.4*  2.5*   GLOB  4.3*  4.3*  4.5*     No results for input(s): PH, PCO2, PO2 in the last 72 hours.     Medications Personally Reviewed:    Current Facility-Administered Medications   Medication Dose Route Frequency    furosemide (LASIX) tablet 40 mg  40 mg Oral ACB&D    levoFLOXacin (LEVAQUIN) tablet 750 mg  750 mg Oral Q48H    amLODIPine (NORVASC) tablet 2.5 mg  2.5 mg Oral DAILY    albuterol-ipratropium (DUO-NEB) 2.5 MG-0.5 MG/3 ML  3 mL Nebulization Q4H PRN    apixaban (ELIQUIS) tablet 2.5 mg  2.5 mg Oral BID    aspirin chewable tablet 81 mg  81 mg Oral DAILY    atorvastatin (LIPITOR) tablet 40 mg  40 mg Oral QHS    cetirizine (ZYRTEC) tablet 10 mg  10 mg Oral QHS    metoprolol succinate (TOPROL-XL) XL tablet 25 mg  25 mg Oral DAILY    oxybutynin chloride XL (DITROPAN XL) tablet 5 mg  5 mg Oral DAILY    ketotifen (ZADITOR) 0.025 % (0.035 %) ophthalmic solution 1 Drop  1 Drop Ophthalmic BID    pantoprazole (PROTONIX) tablet 40 mg  40 mg Oral ACB&D    umeclidinium (INCRUSE ELLIPTA) 62.5 mcg/actuation  1 Puff Inhalation DAILY    triamcinolone acetonide (KENALOG) 0.1 % cream   Topical BID PRN    nitroglycerin (NITROBID) 2 % ointment 0.5 Inch  0.5 Inch Topical Q6H PRN    sodium chloride (NS) flush 5-10 mL  5-10 mL IntraVENous Q8H    sodium chloride (NS) flush 5-10 mL  5-10 mL IntraVENous PRN    ondansetron (ZOFRAN) injection 4 mg  4 mg IntraVENous Q4H PRN    acetaminophen (TYLENOL) tablet 650 mg  650 mg Oral Q6H PRN    potassium chloride SR (KLOR-CON 10) tablet 20 mEq  20 mEq Oral TID    lactobac ac& pc-s.therm-b.anim (AYALA Q/RISAQUAD)  1 Cap Oral DAILY         Dre Smith MD

## 2018-06-07 NOTE — DISCHARGE INSTRUCTIONS
Daily Weights     Weigh yourself Daily  Keep Log/Record    Notify Dr. Domingo Lyman office for a weight gain of 3 pounds or greater in one day or 5 pounds in 5 days. Low Sodium Diet as per CHF Education    [address]  [phone]      Patient Discharge Instructions    Mira Lindsay / [id number] : 1928    Admitted 2018 Discharged: 2018     Take Home Medications        · It is important that you take the medication exactly as they are prescribed. · Keep your medication in the bottles provided by the pharmacist and keep a list of the medication names, dosages, and times to be taken in your wallet. · Do not take other medications without consulting your doctor. What to do at Home    Recommended diet: Cardiac Diet,     Recommended activity: PT/OT Eval and Treat,     Continuous oxygen     Follow-up with Dr Mirna Castillo in 5-6 days        Information obtained by :  I understand that if any problems occur once I am at home I am to contact my physician. I understand and acknowledge receipt of the instructions indicated above.                                                                                                                                            Physician's or R.N.'s Signature                                                                  Date/Time                                                                                                                                              Patient or Representative Signature                                                          Date/Time

## 2018-06-07 NOTE — PROGRESS NOTES
Pharmacy Medication Reconciliation     The patient was not interviewed regarding current PTA medication list, use and drug allergies. The patient's nursing home facility faxed an updated medication list with scheduled and prn medications. All otc meds are also listed. Allergy Update: Sulfadiazine    Recommendations/Findings: The following amendments were made to the patient's active medication list on file at [address]:   1) Additions: None    2) Deletions: Levofloxacin 750mg q 48 h    3) Changes: Albuterol 90mcg inhaler updated indication for COPD  Cetirizine 10mg qhs updated indication for itching  Clobetasol 0.05% topical cream updated directions: Apply to vuvla q Monday, Wednesday, and Friday night for pain and itching      -Clarified PTA med list with [address]. PTA medication list was corrected to the following:     Prior to Admission Medications   Prescriptions Last Dose Informant Patient Reported? Taking? B.infantis-B.ani-B.long-B.bifi (PROBIOTIC 4X) 10-15 mg TbEC   Yes Yes   Sig: Take 1 Tab by mouth two (2) times a day. MULTIVITS W-FE,OTHER MIN/LUT (CENTRUM SILVER ULTRA WOMEN'S PO)   Yes Yes   Sig: Take 1 Tab by mouth daily. acetaminophen (TYLENOL) 325 mg tablet   Yes Yes   Sig: Take 650 mg by mouth every four (4) hours as needed for Pain. albuterol (PROVENTIL HFA, VENTOLIN HFA, PROAIR HFA) 90 mcg/actuation inhaler   Yes Yes   Sig: Take 2 Puffs by inhalation every six (6) hours as needed (for COPD). albuterol-ipratropium (DUO-NEB) 2.5 mg-0.5 mg/3 ml nebu   Yes Yes   Sig: 3 mL by Nebulization route every four (4) hours as needed (Shortness of Breath). alum-mag hydroxide-simeth (RULOX) 200-200-20 mg/5 mL susp   Yes Yes   Sig: Take 20 mL by mouth every four (4) hours as needed. amLODIPine (NORVASC) 5 mg tablet   Yes Yes   Sig: Take 5 mg by mouth daily. amiodarone (PACERONE) 100 mg tablet   No Yes   Sig: Take 1 Tab by mouth daily.    ammonium lactate (LAC-HYDRIN) 12 % lotion Yes Yes   Sig: Apply  to affected area every twelve (12) hours as needed (Apply to chest, back, arms as needed for eczema). rub in to affected area well   apixaban (ELIQUIS) 2.5 mg tablet   No Yes   Sig: Take 1 Tab by mouth two (2) times a day. aspirin 81 mg chewable tablet   Yes Yes   Sig: Take 1 Tab by mouth daily. atorvastatin (LIPITOR) 40 mg tablet   No Yes   Sig: Take 1 Tab by mouth nightly. bisacodyl (DULCOLAX, BISACODYL,) 10 mg suppository   Yes Yes   Sig: Insert 10 mg into rectum as needed (Every 96 hours as needed for constipation). cetirizine (ZYRTEC) 10 mg tablet   Yes Yes   Sig: Take 10 mg by mouth nightly. For itching   clobetasol (TEMOVATE) 0.05 % topical cream   Yes Yes   Sig: Apply  to affected area every Monday, Wednesday, Friday. Apply to vulva at night for pain and itching   diphenhydrAMINE-zinc acetate 1%-0.1% (BENADRYL) topical cream   Yes Yes   Sig: Apply  to affected area every six (6) hours as needed for Itching.   emollient combination no. 108 (LUBRISKIN) lotn lotion   Yes Yes   Sig: Apply  to affected area every eight (8) hours as needed for Dry Skin (Apply to both hands). furosemide (LASIX) 40 mg tablet   No Yes   Sig: Take 1 Tab by mouth two (2) times a day. guaiFENesin (ROBITUSSIN) 100 mg/5 mL liquid   Yes Yes   Sig: Take 200 mg by mouth every four (4) hours as needed for Cough.   magnesium hydroxide (MILK OF MAGNESIA) 400 mg/5 mL suspension   Yes Yes   Sig: Take 10 mL by mouth every seventy-two (72) hours as needed for Constipation (If no BM in 3 days). metoprolol succinate (TOPROL-XL) 25 mg XL tablet   No Yes   Sig: Take 1 Tab by mouth daily. olopatadine (PATANOL) 0.1 % ophthalmic solution   Yes Yes   Sig: Administer 1 Drop to both eyes two (2) times a day. oxybutynin chloride XL (DITROPAN XL) 5 mg CR tablet   Yes Yes   Sig: Take 5 mg by mouth daily.  For overactive bladder   pantoprazole (PROTONIX) 20 mg tablet   Yes Yes   Sig: Take 20 mg by mouth Before breakfast and dinner. For GERD   potassium chloride SR (KLOR-CON 10) 10 mEq tablet   No Yes   Sig: Take 1 Tab by mouth two (2) times a day. promethazine (PHENERGAN) 12.5 mg tablet   Yes Yes   Sig: Take 12.5 mg by mouth every six (6) hours as needed for Nausea. sodium phosphate (FLEET ENEMA) 19-7 gram/118 mL enema   Yes Yes   Sig: Insert 1 Enema into rectum. Every 120 hours as needed for constipation if no results from suppository   tiotropium (SPIRIVA WITH HANDIHALER) 18 mcg inhalation capsule   No Yes   Sig: Take 1 Cap by inhalation daily. Indications: Chronic Obstructive Pulmonary Disease with Bronchospasms   triamcinolone (ARISTOCORT) 0.5 % topical cream   Yes Yes   Sig: Apply  to affected area two (2) times daily as needed for Skin Irritation.  use thin layer      Facility-Administered Medications: None          Thank you,  Raffaele Sharp, Pharm D Candidate 2019

## 2018-06-07 NOTE — CARDIO/PULMONARY
Cardiopulmonary Rehab:      Chart reviewed. Pt is on CHF Bundle List      Pt is a 80 y.o. female admitted with Acute respiratory failure with hypoxia. PMHx of HTN, PAD, COPD, GERD, TIA, anxiety. Former smoker. LVEF 55-60%.      CHF teaching by cardiac rehab nurse at recent hospitalization at 5/8/18. Resides at [facility] weighing occasionally per pt. DIET CARDIAC Regular     Met with pt sitting up in chair-reminded in sitting to elevate legs. Excited she is discharged today. This was a follow-up visit to answer questions and reinforce prior teaching re: CHF, S&Ss, medication management, Low NA diet, daily weights, when to call the doctor and balancing rest/activity. Teachback-able to verbalize correct wt parameters and when to notify staff of any unusual s/s. Reminded to try to make low sodium choices for meals and not to add salt to meals. All questions answered. Understanding verbalized.

## 2018-06-07 NOTE — PROGRESS NOTES
Bedside shift change report given to Jada Matson (oncoming nurse) by Jena Hernandez (offgoing nurse). Report included the following information SBAR, Kardex, ED Summary, Intake/Output, MAR, Accordion and Recent Results.

## 2018-06-07 NOTE — PROGRESS NOTES
PCU SHIFT NURSING NOTE      Bedside and Verbal shift change report given to Neha Cannon RN (oncoming nurse) by Ivanna Guerin RN (offgoing nurse). Report included the following information SBAR, Kardex, MAR, Recent Results and Cardiac Rhythm NSR. Shift Summary:   1000:  Daughter to transport pt. She is going to  O2 prior to coming. 1130:  Called report to [facility], pt being transported by daughter. 1204:  Pt out in wheelchair to car with tech. DAughter transporting pt. Per daughter the facility stated the pt would be ok from hospital to facility without O2. Pt has tolerated 2L and has had it off on occasion. Daughter is transporting pt without O2. Admission Date 6/2/2018   Admission Diagnosis Acute respiratory failure with hypoxia ([facility] [address]   Consults IP CONSULT TO CARDIOLOGY  IP CONSULT TO PRIMARY CARE PROVIDER        Consults   [x]PT   [x]OT   []Speech   [x]Case Management      [] Palliative      Cardiac Monitoring Order   [x]Yes   []No     IV drips   []Yes    Drip:                            Dose:  Drip:                            Dose:  Drip:                            Dose:   [x]No     GI Prophylaxis   [x]Yes   []No         DVT Prophylaxis   SCDs:  Sequential Compression Device: Bilateral          Inderjit stockings:         [x] Medication   []Contraindicated   []None      Activity Level Activity Level: Up with Assistance     Activity Assistance: Partial (one person)   Purposeful Rounding every 1-2 hour? [x]Yes   Ac Score  Total Score: 3   Bed Alarm (If score 3 or >)   []Yes   [] Refused (See signed refusal form in chart)   Rosas Score  Rosas Score: 16   Rosas Score (if score 14 or less)   []PMT consult   []Wound Care consult      []Specialty bed   [x] Nutrition consult          Needs prior to discharge:   Home O2 required:    [x]Yes   []No    If yes, how much O2 required?  2L NC    Other:    Last Bowel Movement: Last Bowel Movement Date: 06/06/18      Influenza Vaccine Received Flu Vaccine for Current Season (usually Sept-March): Not Flu Season        Pneumonia Vaccine           Diet Active Orders   Diet    DIET CARDIAC Regular      LDAs                     External Female Catheter 06/03/18 (Active)   Site Assessment Clean, dry, & intact 6/7/2018  8:15 AM   Repositioned Yes 6/7/2018  8:15 AM   Perineal Care Yes 6/7/2018  8:15 AM   Wick Changed Yes 6/7/2018  6:00 AM   Suction Canister/Tubing Changed No 6/7/2018  6:00 AM   Urine Output (mL) 750 ml 6/7/2018  6:00 AM                Urinary Catheter      Intake & Output   Date 06/06/18 0700 - 06/07/18 0659 06/07/18 0700 - 06/08/18 0659   Shift 0910-6606 0191-4691 24 Hour Total 9370-3443 9188-4010 24 Hour Total   I  N  T  A  K  E   P.O.  300 300         P. O.  300 300       Shift Total  (mL/kg)  300  (4.5) 300  (4.5)      O  U  T  P  U  T   Urine  (mL/kg/hr) 600  (0.7) 1150  (1.4) 1750  (1.1)         Urine Output (mL) (External Female Catheter 06/03/18) 600 1150 1750       Shift Total  (mL/kg) 600  (8.8) 1150  (17.3) 1750  (26.3)      NET -600 -850 -1450      Weight (kg) 68.3 66.6 66.6 66.6 66.6 66.6         Readmission Risk Assessment Tool Score High Risk            39       Total Score        3 Has Seen PCP in Last 6 Months (Yes=3, No=0)    2 . Living with Significant Other. Assisted Living. LTAC. SNF. or   Rehab    4 IP Visits Last 12 Months (1-3=4, 4=9, >4=11)    5 Pt.  Coverage (Medicare=5 , Medicaid, or Self-Pay=4)    25 Charlson Comorbidity Score (Age + Comorbid Conditions)        Criteria that do not apply:    Patient Length of Stay (>5 days = 3)       Expected Length of Stay 4d 12h   Actual Length of Stay 4

## 2018-06-07 NOTE — PROGRESS NOTES
Patient is being discharged back to [facility] today. Her daughter was called and was aware patient is discharging today. Her daughter states she will be taking her to [facility] today. Reji Ordonez at [facility] states they have her oxygen there and she will be receiving physical therapy. She will stop by [facility] to  an oxygen tank to transport her. Called [facility] and spoke with  Gopal Conte and informed her patient was coming back to them today. She was fine with this. Nursing to call report to [phone] and ask for the nursing supervisor.

## 2018-06-08 ENCOUNTER — HOSPITAL ENCOUNTER (EMERGENCY)
Age: 83
Discharge: HOME | End: 2018-06-08
Attending: EMERGENCY MEDICINE
Payer: MEDICARE

## 2018-06-08 ENCOUNTER — APPOINTMENT (OUTPATIENT)
Dept: GENERAL RADIOLOGY | Age: 83
End: 2018-06-08
Attending: EMERGENCY MEDICINE
Payer: MEDICARE

## 2018-06-08 VITALS
HEART RATE: 75 BPM | BODY MASS INDEX: 24.21 KG/M2 | SYSTOLIC BLOOD PRESSURE: 169 MMHG | OXYGEN SATURATION: 93 % | TEMPERATURE: 98.3 F | DIASTOLIC BLOOD PRESSURE: 77 MMHG | RESPIRATION RATE: 24 BRPM | WEIGHT: 150 LBS

## 2018-06-08 DIAGNOSIS — R07.9 CHEST PAIN, UNSPECIFIED TYPE: Primary | ICD-10-CM

## 2018-06-08 LAB
ANION GAP SERPL CALC-SCNC: 9 MMOL/L (ref 5–15)
ATRIAL RATE: 79 BPM
BASOPHILS # BLD: 0.1 K/UL (ref 0–0.1)
BASOPHILS NFR BLD: 1 % (ref 0–1)
BNP SERPL-MCNC: 2160 PG/ML (ref 0–450)
BUN SERPL-MCNC: 24 MG/DL (ref 6–20)
BUN/CREAT SERPL: 19 (ref 12–20)
CALCIUM SERPL-MCNC: 9.2 MG/DL (ref 8.5–10.1)
CALCULATED P AXIS, ECG09: 88 DEGREES
CALCULATED R AXIS, ECG10: -30 DEGREES
CALCULATED T AXIS, ECG11: 2 DEGREES
CHLORIDE SERPL-SCNC: 95 MMOL/L (ref 97–108)
CK SERPL-CCNC: 27 U/L (ref 26–192)
CO2 SERPL-SCNC: 30 MMOL/L (ref 21–32)
CREAT SERPL-MCNC: 1.25 MG/DL (ref 0.55–1.02)
DIAGNOSIS, 93000: NORMAL
DIFFERENTIAL METHOD BLD: ABNORMAL
EOSINOPHIL # BLD: 0.1 K/UL (ref 0–0.4)
EOSINOPHIL NFR BLD: 1 % (ref 0–7)
ERYTHROCYTE [DISTWIDTH] IN BLOOD BY AUTOMATED COUNT: 14.5 % (ref 11.5–14.5)
GLUCOSE SERPL-MCNC: 102 MG/DL (ref 65–100)
HCT VFR BLD AUTO: 38.6 % (ref 35–47)
HGB BLD-MCNC: 12.7 G/DL (ref 11.5–16)
IMM GRANULOCYTES # BLD: 0.1 K/UL (ref 0–0.04)
IMM GRANULOCYTES NFR BLD AUTO: 1 % (ref 0–0.5)
INR PPP: 1.2 (ref 0.9–1.1)
LYMPHOCYTES # BLD: 1.2 K/UL (ref 0.8–3.5)
LYMPHOCYTES NFR BLD: 12 % (ref 12–49)
MCH RBC QN AUTO: 28.9 PG (ref 26–34)
MCHC RBC AUTO-ENTMCNC: 32.9 G/DL (ref 30–36.5)
MCV RBC AUTO: 87.9 FL (ref 80–99)
MONOCYTES # BLD: 1.1 K/UL (ref 0–1)
MONOCYTES NFR BLD: 11 % (ref 5–13)
NEUTS SEG # BLD: 7.3 K/UL (ref 1.8–8)
NEUTS SEG NFR BLD: 74 % (ref 32–75)
NRBC # BLD: 0 K/UL (ref 0–0.01)
NRBC BLD-RTO: 0 PER 100 WBC
P-R INTERVAL, ECG05: 154 MS
PLATELET # BLD AUTO: 298 K/UL (ref 150–400)
PMV BLD AUTO: 11.5 FL (ref 8.9–12.9)
POTASSIUM SERPL-SCNC: 4 MMOL/L (ref 3.5–5.1)
PROTHROMBIN TIME: 11.9 SEC (ref 9–11.1)
Q-T INTERVAL, ECG07: 372 MS
QRS DURATION, ECG06: 80 MS
QTC CALCULATION (BEZET), ECG08: 426 MS
RBC # BLD AUTO: 4.39 M/UL (ref 3.8–5.2)
SODIUM SERPL-SCNC: 134 MMOL/L (ref 136–145)
TROPONIN I SERPL-MCNC: 0.04 NG/ML
VENTRICULAR RATE, ECG03: 79 BPM
WBC # BLD AUTO: 9.9 K/UL (ref 3.6–11)

## 2018-06-08 PROCEDURE — 36415 COLL VENOUS BLD VENIPUNCTURE: CPT

## 2018-06-08 PROCEDURE — 84484 ASSAY OF TROPONIN QUANT: CPT

## 2018-06-08 PROCEDURE — 85025 COMPLETE CBC W/AUTO DIFF WBC: CPT

## 2018-06-08 PROCEDURE — 83880 ASSAY OF NATRIURETIC PEPTIDE: CPT

## 2018-06-08 PROCEDURE — 71046 X-RAY EXAM CHEST 2 VIEWS: CPT

## 2018-06-08 PROCEDURE — 82550 ASSAY OF CK (CPK): CPT

## 2018-06-08 PROCEDURE — 93005 ELECTROCARDIOGRAM TRACING: CPT

## 2018-06-08 PROCEDURE — 74011250637 HC RX REV CODE- 250/637: Performed by: EMERGENCY MEDICINE

## 2018-06-08 PROCEDURE — 99285 EMERGENCY DEPT VISIT HI MDM: CPT

## 2018-06-08 PROCEDURE — 85610 PROTHROMBIN TIME: CPT

## 2018-06-08 PROCEDURE — 80048 BASIC METABOLIC PNL TOTAL CA: CPT

## 2018-06-08 RX ORDER — ASPIRIN 325 MG
325 TABLET ORAL
Status: COMPLETED | OUTPATIENT
Start: 2018-06-08 | End: 2018-06-08

## 2018-06-08 RX ORDER — LIDOCAINE HYDROCHLORIDE 20 MG/ML
10 SOLUTION OROPHARYNGEAL ONCE
Status: DISCONTINUED | OUTPATIENT
Start: 2018-06-08 | End: 2018-06-08

## 2018-06-08 RX ADMIN — ASPIRIN 325 MG: 325 TABLET ORAL at 12:03

## 2018-06-08 NOTE — ED PROVIDER NOTES
Patient is a 80 y.o. female presenting with chest pain. The history is provided by the patient. Chest Pain (Angina)    Associated symptoms include cough (productive for yellow sputum). Pertinent negatives include no abdominal pain, no dizziness, no fever, no nausea, no palpitations, no shortness of breath, no vomiting and no weakness. Sanjiv Jones is a 79yo F with PMH of CAD, HFpEF, COPD (on home O2 - 2L NC), HTN, TIA with recent admission for hypoxic respiratory failure 2/2 PNA as well as heart failure exacerbation who returns to care today for chest pain. Patient states she was feeling at her baseline when discharged yesterday. This morning she noted acute onset chest pressure and fullness. Symptoms started at rest, not associated with eating or activity. Denies fever, chills, nausea, vomiting, heart palpitations, shortness of breath. Does note persistent cough that has been present since prior to admission. Also notes mild lower extremity edema that is stable. Endorses compliance with her home medications including antihypertensives, diuretics, antibiotics (Levaquin, day 2). Past Medical History:   Diagnosis Date    Arthritis     generalized    COPD     GERD (gastroesophageal reflux disease)     Hypertension     Ill-defined condition     Vitamin deficiency    NSTEMI (non-ST elevated myocardial infarction) ([facility] [address] 5/1/2018    Other ill-defined conditions(799.89)     high cholesterol    Peripheral artery disease (HCC)     Psychiatric disorder     Anxiety disorder    Sleep disorder     Insomnia    TIA (transient ischemic attack)        Past Surgical History:   Procedure Laterality Date    HX CAROTID ENDARTERECTOMY      left 2 or 3 years ago.     HX OTHER SURGICAL      colonoscopy    HX TONSILLECTOMY           Family History:   Problem Relation Age of Onset    Other Mother      carotid endarterectomy/cardiomegaly    Other Father      carotid endarterectomy       Social History Social History    Marital status:      Spouse name: N/A    Number of children: N/A    Years of education: N/A     Occupational History    Not on file. Social History Main Topics    Smoking status: Former Smoker    Smokeless tobacco: Never Used      Comment: quit 3 years ago    Alcohol use Yes      Comment: glass of wine rarely    Drug use: Yes     Special: Prescription, OTC    Sexual activity: Not on file     Other Topics Concern    Not on file     Social History Narrative         ALLERGIES: Penicillins; Sulfa (sulfonamide antibiotics); Propoxyphene-acetaminophen; and Sulfadiazine    Review of Systems   Constitutional: Negative for activity change, appetite change, chills and fever. HENT: Negative for congestion and sore throat. Eyes: Negative for pain and visual disturbance. Respiratory: Positive for cough (productive for yellow sputum). Negative for shortness of breath. Cardiovascular: Positive for chest pain and leg swelling. Negative for palpitations. Gastrointestinal: Negative for abdominal distention, abdominal pain, nausea and vomiting. Genitourinary: Negative for dysuria and frequency. Musculoskeletal: Negative for arthralgias and myalgias. Skin: Negative for rash and wound. Neurological: Negative for dizziness and weakness. Vitals:    06/08/18 1154   BP: 158/87   Pulse: 82   Resp: 26   Temp: 98.3 °F (36.8 °C)   SpO2: 94%   Weight: 68 kg (150 lb)            Physical Exam   Constitutional: She is oriented to person, place, and time. Generally well appearing elderly woman who appears stated age, in no acute distress   HENT:   Head: Normocephalic and atraumatic. Eyes: Conjunctivae and EOM are normal.   Neck: No JVD present. Cardiovascular: Normal rate, regular rhythm and normal heart sounds. Pulmonary/Chest: Effort normal. She has wheezes (mild expiratory, only at bases). She has rales (at bilateral bases). Abdominal: Soft.  She exhibits no distension. There is no tenderness. Musculoskeletal: She exhibits no edema or deformity. Neurological: She is alert and oriented to person, place, and time. Skin: Skin is warm and dry. Psychiatric: She has a normal mood and affect. MDM  Number of Diagnoses or Management Options  Diagnosis management comments: The patient presents with chest pain with a differential diagnosis of  ACS, pulmonary edema/CHF, angina, bronchitis, pnuemonia and COPD exacerbation. She is on her baseline oxygen without any significant desaturation so is not in acute respiratory failure. She is generally well-appearing and comfortable. Given significant comorbidities will work-up with EKG, cardiac enzymes, BNP, basic labs. Will also get CXR to evaluate for worsening infection or pulmonary edema. EKG interpretation: (Preliminary)  Rhythm: normal sinus rhythm; and regular . Rate (approx.): 79;  Axis: left axis deviation; ND interval: normal; QRS interval: normal ; ST/T wave: normal      Recent Results (from the past 12 hour(s))   EKG, 12 LEAD, INITIAL    Collection Time: 06/08/18 11:36 AM   Result Value Ref Range    Ventricular Rate 79 BPM    Atrial Rate 79 BPM    P-R Interval 154 ms    QRS Duration 80 ms    Q-T Interval 372 ms    QTC Calculation (Bezet) 426 ms    Calculated P Axis 88 degrees    Calculated R Axis -30 degrees    Calculated T Axis 2 degrees    Diagnosis       Normal sinus rhythm  Left axis deviation  Pulmonary disease pattern  Voltage criteria for left ventricular hypertrophy  Abnormal ECG  When compared with ECG of 02-JUN-2018 23:35,  ST no longer depressed in Lateral leads  Nonspecific T wave abnormality no longer evident in Lateral leads     CK W/ REFLX CKMB    Collection Time: 06/08/18 11:58 AM   Result Value Ref Range    CK 27 26 - 192 U/L   TROPONIN I    Collection Time: 06/08/18 11:58 AM   Result Value Ref Range    Troponin-I, Qt. 0.04 <0.05 ng/mL   NT-PRO BNP    Collection Time: 06/08/18 11:58 AM Result Value Ref Range    NT pro-BNP 2160 (H) 0 - 909 PG/ML   METABOLIC PANEL, BASIC    Collection Time: 06/08/18 11:58 AM   Result Value Ref Range    Sodium 134 (L) 136 - 145 mmol/L    Potassium 4.0 3.5 - 5.1 mmol/L    Chloride 95 (L) 97 - 108 mmol/L    CO2 30 21 - 32 mmol/L    Anion gap 9 5 - 15 mmol/L    Glucose 102 (H) 65 - 100 mg/dL    BUN 24 (H) 6 - 20 MG/DL    Creatinine 1.25 (H) 0.55 - 1.02 MG/DL    BUN/Creatinine ratio 19 12 - 20      GFR est AA 49 (L) >60 ml/min/1.73m2    GFR est non-AA 40 (L) >60 ml/min/1.73m2    Calcium 9.2 8.5 - 10.1 MG/DL   CBC WITH AUTOMATED DIFF    Collection Time: 06/08/18 11:58 AM   Result Value Ref Range    WBC 9.9 3.6 - 11.0 K/uL    RBC 4.39 3.80 - 5.20 M/uL    HGB 12.7 11.5 - 16.0 g/dL    HCT 38.6 35.0 - 47.0 %    MCV 87.9 80.0 - 99.0 FL    MCH 28.9 26.0 - 34.0 PG    MCHC 32.9 30.0 - 36.5 g/dL    RDW 14.5 11.5 - 14.5 %    PLATELET 293 670 - 459 K/uL    MPV 11.5 8.9 - 12.9 FL    NRBC 0.0 0  WBC    ABSOLUTE NRBC 0.00 0.00 - 0.01 K/uL    NEUTROPHILS 74 32 - 75 %    LYMPHOCYTES 12 12 - 49 %    MONOCYTES 11 5 - 13 %    EOSINOPHILS 1 0 - 7 %    BASOPHILS 1 0 - 1 %    IMMATURE GRANULOCYTES 1 (H) 0.0 - 0.5 %    ABS. NEUTROPHILS 7.3 1.8 - 8.0 K/UL    ABS. LYMPHOCYTES 1.2 0.8 - 3.5 K/UL    ABS. MONOCYTES 1.1 (H) 0.0 - 1.0 K/UL    ABS. EOSINOPHILS 0.1 0.0 - 0.4 K/UL    ABS. BASOPHILS 0.1 0.0 - 0.1 K/UL    ABS. IMM. GRANS. 0.1 (H) 0.00 - 0.04 K/UL    DF AUTOMATED     PROTHROMBIN TIME + INR    Collection Time: 06/08/18 11:58 AM   Result Value Ref Range    INR 1.2 (H) 0.9 - 1.1      Prothrombin time 11.9 (H) 9.0 - 11.1 sec       Xr Chest Pa Lat    Result Date: 6/8/2018  Impression: 1. Enlarged cardiac silhouette, improving left pleural effusion         ED Course       Procedures    1:36 PM - Patient endorses improvement in her symptoms. She remains on her baseline oxygen requirement. BNP is elevated however improved from previous (>10,000 previously).   CXR is still pending at this time to evaluate for pulmonary edema or worsening pneumonia. I reviewed results thus far with patient and her family at bedside. Patient feels strongly that she would like to return to her nursing home if reasonable. Will follow-up on CXR and if no significant concern will discharge to nursing facility. 2:43 PM - CXR shows improvement compared to previous study. Patient continues to feel well. Will discharge back to nursing facility with recommendations to follow-up with PCP, continue lasix and levofloxacin. Return to care if worsening symptoms, increased O2 requirement, chest pain, shortness of breath, worsening LE edema.     Signed:  Rosa Modi MD  [employer] EM PGY2

## 2018-06-08 NOTE — PROGRESS NOTES
Date of previous inpatient admission/ ED visit? Pt was admitted from 6/2/18-6/7/18 with a diagnosis of acute respiratory failure with hypoxia. What brought the patient back to ED? Pt presented to the ED via EMS with cc of chest pain    Did patient decline recommended services during last admission/ ED visit (if yes, what)? No    Has patient seen a provider since their last inpatient admission/ED visit (if yes, when)? No    CM Interventions:  From previous inpatient admission/ED visit: Pt was discharged to [place] [id number] and was discharged with her daughter transporting. Chyna Swift was to start PT with the patient. From current inpatient admission/ED visit: CM awaiting results at this time. Per resident note, pt would like to return to [facility]. CM will continue to remain available for support, discharge planning as needed.      Evelyn Client, MSW  [facility]  [phone]

## 2018-06-08 NOTE — ED NOTES
Discharge instructions reviewed w/ pt and copy given by Dr. Khushi Murillo. Pt discharged via wheelchair to care of family.

## 2018-06-08 NOTE — ED NOTES
Received pt to exam room via EMS for reports of chest discomfort that began before breakfast. Pt states feels like heartburn. [facility] was [id number] for [facility]. Son at bedside.

## 2018-06-09 LAB
BACTERIA SPEC CULT: NORMAL
SERVICE CMNT-IMP: NORMAL

## 2018-06-29 ENCOUNTER — PATIENT OUTREACH (OUTPATIENT)
Dept: CARDIOLOGY CLINIC | Age: 83
End: 2018-06-29

## 2018-06-29 NOTE — PROGRESS NOTES
Hospital Discharge Follow-Up      Date/Time:  6/29/2018 10:18 AM    Patient was admitted to [facility] on 6/2/18 and discharged on 6/7/18 for Discharge Diagnoses:  Principal Problem:  Acute respiratory failure with hypoxia ([facility] [address] (6/3/2018) Active Problems:   Acute on chronic diastolic heart failure ([facility] [address] (5/1/2018)  CKD (chronic kidney disease) stage 3, GFR 30-59 ml/min (5/1/2018) Hypokalemia (5/2/2018)  CAD (coronary artery disease) (6/3/2018)  Moderate mitral regurgitation (6/3/2018) Paroxysmal A-fib ([facility] [address] (6/3/2018)  Hyperlipidemia  (6/3/2018)  Elevated troponin (6/3/2018)  CAP (community acquired pneumonia) (6/6/2018). The physician discharge summary was available at the time of outreach. Spoke with pt's daughter who transports pt to PCP appts. Pt's daughter states that she is not aware of PCP appt. Called [facility] and was unable to speak with anyone. Top Challenges reviewed with the provider   Pt lives in [facility], unable to reach. Daughter does take pt to appts. Method of communication with provider :chart routing    Inpatient RRAT score: 44  Was this a readmission? yes     Disease Specific:   CHF     EF: 55 to 60% on 5/1/2018   Type of HF:   HFpEF     Cardiac Device present: none      Heart Failure Medications: Betablocker, Diuretic, Potassium, Anticoagulant     Barriers to care? no follow up appt, stages of grief, support system, transportation, utilization of services    Advance Care Planning:   Does patient have an Advance Directive:  not on file       Current Outpatient Prescriptions   Medication Sig    aspirin 81 mg chewable tablet Take 1 Tab by mouth daily.  atorvastatin (LIPITOR) 40 mg tablet Take 1 Tab by mouth nightly.  apixaban (ELIQUIS) 2.5 mg tablet Take 1 Tab by mouth two (2) times a day.  furosemide (LASIX) 40 mg tablet Take 1 Tab by mouth two (2) times a day.     metoprolol succinate (TOPROL-XL) 25 mg XL tablet Take 1 Tab by mouth daily.    potassium chloride SR (KLOR-CON 10) 10 mEq tablet Take 1 Tab by mouth two (2) times a day.  tiotropium (SPIRIVA WITH HANDIHALER) 18 mcg inhalation capsule Take 1 Cap by inhalation daily. Indications: Chronic Obstructive Pulmonary Disease with Bronchospasms    amLODIPine (NORVASC) 5 mg tablet Take 5 mg by mouth daily.  oxybutynin chloride XL (DITROPAN XL) 5 mg CR tablet Take 5 mg by mouth daily. For overactive bladder    pantoprazole (PROTONIX) 20 mg tablet Take 20 mg by mouth Before breakfast and dinner. For GERD    promethazine (PHENERGAN) 12.5 mg tablet Take 12.5 mg by mouth every six (6) hours as needed for Nausea.  alum-mag hydroxide-simeth (RULOX) 200-200-20 mg/5 mL susp Take 20 mL by mouth every four (4) hours as needed.  acetaminophen (TYLENOL) 325 mg tablet Take 650 mg by mouth every four (4) hours as needed for Pain.  magnesium hydroxide (MILK OF MAGNESIA) 400 mg/5 mL suspension Take 10 mL by mouth every seventy-two (72) hours as needed for Constipation (If no BM in 3 days).  albuterol-ipratropium (DUO-NEB) 2.5 mg-0.5 mg/3 ml nebu 3 mL by Nebulization route every four (4) hours as needed (Shortness of Breath).  emollient combination no. 108 (LUBRISKIN) lotn lotion Apply  to affected area every eight (8) hours as needed for Dry Skin (Apply to both hands).  triamcinolone (ARISTOCORT) 0.5 % topical cream Apply  to affected area two (2) times daily as needed for Skin Irritation. use thin layer    ammonium lactate (LAC-HYDRIN) 12 % lotion Apply  to affected area every twelve (12) hours as needed (Apply to chest, back, arms as needed for eczema). rub in to affected area well    diphenhydrAMINE-zinc acetate 1%-0.1% (BENADRYL) topical cream Apply  to affected area every six (6) hours as needed for Itching.  bisacodyl (DULCOLAX, BISACODYL,) 10 mg suppository Insert 10 mg into rectum as needed (Every 96 hours as needed for constipation).     sodium phosphate (FLEET ENEMA) 19-7 gram/118 mL enema Insert 1 Enema into rectum. Every 120 hours as needed for constipation if no results from suppository    guaiFENesin (ROBITUSSIN) 100 mg/5 mL liquid Take 200 mg by mouth every four (4) hours as needed for Cough.  cetirizine (ZYRTEC) 10 mg tablet Take 10 mg by mouth nightly. For itching    clobetasol (TEMOVATE) 0.05 % topical cream Apply  to affected area every Monday, Wednesday, Friday. Apply to vulva at night for pain and itching    olopatadine (PATANOL) 0.1 % ophthalmic solution Administer 1 Drop to both eyes two (2) times a day.  B.infantis-B.ani-B.long-B.bifi (PROBIOTIC 4X) 10-15 mg TbEC Take 1 Tab by mouth two (2) times a day.  albuterol (PROVENTIL HFA, VENTOLIN HFA, PROAIR HFA) 90 mcg/actuation inhaler Take 2 Puffs by inhalation every six (6) hours as needed (for COPD).  MULTIVITS W-FE,OTHER MIN/LUT (CENTRUM SILVER ULTRA WOMEN'S PO) Take 1 Tab by mouth daily. No current facility-administered medications for this visit. PCP/Specialist follow up: No future appointments.

## 2018-07-12 ENCOUNTER — PATIENT OUTREACH (OUTPATIENT)
Dept: CARDIOLOGY CLINIC | Age: 83
End: 2018-07-12

## 2018-07-12 ENCOUNTER — HOSPITAL ENCOUNTER (EMERGENCY)
Age: 83
Discharge: HOME | End: 2018-07-12
Attending: EMERGENCY MEDICINE
Payer: MEDICARE

## 2018-07-12 ENCOUNTER — APPOINTMENT (OUTPATIENT)
Dept: CT IMAGING | Age: 83
End: 2018-07-12
Attending: EMERGENCY MEDICINE
Payer: MEDICARE

## 2018-07-12 VITALS
WEIGHT: 145 LBS | HEART RATE: 93 BPM | BODY MASS INDEX: 23.3 KG/M2 | TEMPERATURE: 98.5 F | SYSTOLIC BLOOD PRESSURE: 172 MMHG | OXYGEN SATURATION: 95 % | RESPIRATION RATE: 25 BRPM | HEIGHT: 66 IN | DIASTOLIC BLOOD PRESSURE: 75 MMHG

## 2018-07-12 DIAGNOSIS — J96.11 CHRONIC RESPIRATORY FAILURE WITH HYPOXIA, ON HOME OXYGEN THERAPY (HCC): ICD-10-CM

## 2018-07-12 DIAGNOSIS — Z99.81 CHRONIC RESPIRATORY FAILURE WITH HYPOXIA, ON HOME OXYGEN THERAPY (HCC): ICD-10-CM

## 2018-07-12 DIAGNOSIS — N39.0 URINARY TRACT INFECTION WITHOUT HEMATURIA, SITE UNSPECIFIED: Primary | ICD-10-CM

## 2018-07-12 DIAGNOSIS — R93.89 ABNORMAL FINDING ON CT SCAN: ICD-10-CM

## 2018-07-12 DIAGNOSIS — K52.9 GASTROENTERITIS: ICD-10-CM

## 2018-07-12 DIAGNOSIS — E87.6 HYPOKALEMIA: ICD-10-CM

## 2018-07-12 LAB
ALBUMIN SERPL-MCNC: 3.7 G/DL (ref 3.5–5)
ALBUMIN/GLOB SERPL: 0.8 {RATIO} (ref 1.1–2.2)
ALP SERPL-CCNC: 106 U/L (ref 45–117)
ALT SERPL-CCNC: 24 U/L (ref 12–78)
ANION GAP SERPL CALC-SCNC: 8 MMOL/L (ref 5–15)
APPEARANCE UR: ABNORMAL
AST SERPL-CCNC: 24 U/L (ref 15–37)
BACTERIA URNS QL MICRO: ABNORMAL /HPF
BASOPHILS # BLD: 0.1 K/UL (ref 0–0.1)
BASOPHILS NFR BLD: 0 % (ref 0–1)
BILIRUB SERPL-MCNC: 0.7 MG/DL (ref 0.2–1)
BILIRUB UR QL: NEGATIVE
BUN SERPL-MCNC: 22 MG/DL (ref 6–20)
BUN/CREAT SERPL: 18 (ref 12–20)
CALCIUM SERPL-MCNC: 9.3 MG/DL (ref 8.5–10.1)
CHLORIDE SERPL-SCNC: 99 MMOL/L (ref 97–108)
CO2 SERPL-SCNC: 31 MMOL/L (ref 21–32)
COLOR UR: ABNORMAL
CREAT SERPL-MCNC: 1.19 MG/DL (ref 0.55–1.02)
DIFFERENTIAL METHOD BLD: ABNORMAL
EOSINOPHIL # BLD: 0 K/UL (ref 0–0.4)
EOSINOPHIL NFR BLD: 0 % (ref 0–7)
EPITH CASTS URNS QL MICRO: ABNORMAL /LPF
ERYTHROCYTE [DISTWIDTH] IN BLOOD BY AUTOMATED COUNT: 15.5 % (ref 11.5–14.5)
GLOBULIN SER CALC-MCNC: 4.6 G/DL (ref 2–4)
GLUCOSE SERPL-MCNC: 136 MG/DL (ref 65–100)
GLUCOSE UR STRIP.AUTO-MCNC: NEGATIVE MG/DL
HCT VFR BLD AUTO: 40.1 % (ref 35–47)
HGB BLD-MCNC: 13.2 G/DL (ref 11.5–16)
HGB UR QL STRIP: NEGATIVE
HYALINE CASTS URNS QL MICRO: ABNORMAL /LPF (ref 0–5)
IMM GRANULOCYTES # BLD: 0.1 K/UL (ref 0–0.04)
IMM GRANULOCYTES NFR BLD AUTO: 0 % (ref 0–0.5)
KETONES UR QL STRIP.AUTO: NEGATIVE MG/DL
LACTATE SERPL-SCNC: 1.2 MMOL/L (ref 0.4–2)
LEUKOCYTE ESTERASE UR QL STRIP.AUTO: ABNORMAL
LYMPHOCYTES # BLD: 1.2 K/UL (ref 0.8–3.5)
LYMPHOCYTES NFR BLD: 7 % (ref 12–49)
MCH RBC QN AUTO: 29.2 PG (ref 26–34)
MCHC RBC AUTO-ENTMCNC: 32.9 G/DL (ref 30–36.5)
MCV RBC AUTO: 88.7 FL (ref 80–99)
MONOCYTES # BLD: 1.1 K/UL (ref 0–1)
MONOCYTES NFR BLD: 6 % (ref 5–13)
NEUTS SEG # BLD: 14.3 K/UL (ref 1.8–8)
NEUTS SEG NFR BLD: 86 % (ref 32–75)
NITRITE UR QL STRIP.AUTO: NEGATIVE
NRBC # BLD: 0 K/UL (ref 0–0.01)
NRBC BLD-RTO: 0 PER 100 WBC
PH UR STRIP: 7.5 [PH] (ref 5–8)
PLATELET # BLD AUTO: 316 K/UL (ref 150–400)
PMV BLD AUTO: 11.5 FL (ref 8.9–12.9)
POTASSIUM SERPL-SCNC: 3.3 MMOL/L (ref 3.5–5.1)
PROT SERPL-MCNC: 8.3 G/DL (ref 6.4–8.2)
PROT UR STRIP-MCNC: NEGATIVE MG/DL
RBC # BLD AUTO: 4.52 M/UL (ref 3.8–5.2)
RBC #/AREA URNS HPF: ABNORMAL /HPF (ref 0–5)
SODIUM SERPL-SCNC: 138 MMOL/L (ref 136–145)
SP GR UR REFRACTOMETRY: 1.01 (ref 1–1.03)
UA: UC IF INDICATED,UAUC: ABNORMAL
UROBILINOGEN UR QL STRIP.AUTO: 0.2 EU/DL (ref 0.2–1)
WBC # BLD AUTO: 16.7 K/UL (ref 3.6–11)
WBC URNS QL MICRO: ABNORMAL /HPF (ref 0–4)

## 2018-07-12 PROCEDURE — 74011636320 HC RX REV CODE- 636/320: Performed by: EMERGENCY MEDICINE

## 2018-07-12 PROCEDURE — 87077 CULTURE AEROBIC IDENTIFY: CPT | Performed by: EMERGENCY MEDICINE

## 2018-07-12 PROCEDURE — 81001 URINALYSIS AUTO W/SCOPE: CPT | Performed by: EMERGENCY MEDICINE

## 2018-07-12 PROCEDURE — 87086 URINE CULTURE/COLONY COUNT: CPT | Performed by: EMERGENCY MEDICINE

## 2018-07-12 PROCEDURE — 87186 SC STD MICRODIL/AGAR DIL: CPT | Performed by: EMERGENCY MEDICINE

## 2018-07-12 PROCEDURE — 74011250637 HC RX REV CODE- 250/637: Performed by: EMERGENCY MEDICINE

## 2018-07-12 PROCEDURE — 80053 COMPREHEN METABOLIC PANEL: CPT | Performed by: EMERGENCY MEDICINE

## 2018-07-12 PROCEDURE — 85025 COMPLETE CBC W/AUTO DIFF WBC: CPT | Performed by: EMERGENCY MEDICINE

## 2018-07-12 PROCEDURE — 99285 EMERGENCY DEPT VISIT HI MDM: CPT

## 2018-07-12 PROCEDURE — 74011250636 HC RX REV CODE- 250/636: Performed by: EMERGENCY MEDICINE

## 2018-07-12 PROCEDURE — 83605 ASSAY OF LACTIC ACID: CPT | Performed by: EMERGENCY MEDICINE

## 2018-07-12 PROCEDURE — 96374 THER/PROPH/DIAG INJ IV PUSH: CPT

## 2018-07-12 PROCEDURE — 74176 CT ABD & PELVIS W/O CONTRAST: CPT

## 2018-07-12 PROCEDURE — 96361 HYDRATE IV INFUSION ADD-ON: CPT

## 2018-07-12 PROCEDURE — 36415 COLL VENOUS BLD VENIPUNCTURE: CPT | Performed by: EMERGENCY MEDICINE

## 2018-07-12 RX ORDER — NITROFURANTOIN 25; 75 MG/1; MG/1
100 CAPSULE ORAL 2 TIMES DAILY
Qty: 14 CAP | Refills: 0 | Status: SHIPPED | OUTPATIENT
Start: 2018-07-12 | End: 2019-01-21

## 2018-07-12 RX ORDER — NITROFURANTOIN 25; 75 MG/1; MG/1
100 CAPSULE ORAL
Status: COMPLETED | OUTPATIENT
Start: 2018-07-12 | End: 2018-07-12

## 2018-07-12 RX ORDER — ONDANSETRON 2 MG/ML
4 INJECTION INTRAMUSCULAR; INTRAVENOUS
Status: COMPLETED | OUTPATIENT
Start: 2018-07-12 | End: 2018-07-12

## 2018-07-12 RX ORDER — ONDANSETRON 4 MG/1
4 TABLET, ORALLY DISINTEGRATING ORAL
Qty: 10 TAB | Refills: 0 | Status: SHIPPED | OUTPATIENT
Start: 2018-07-12 | End: 2019-08-19

## 2018-07-12 RX ORDER — POTASSIUM CHLORIDE 750 MG/1
40 TABLET, FILM COATED, EXTENDED RELEASE ORAL
Status: COMPLETED | OUTPATIENT
Start: 2018-07-12 | End: 2018-07-12

## 2018-07-12 RX ADMIN — NITROFURANTOIN (MONOHYDRATE/MACROCRYSTALS) 100 MG: 75; 25 CAPSULE ORAL at 06:40

## 2018-07-12 RX ADMIN — SODIUM CHLORIDE 1000 ML: 900 INJECTION, SOLUTION INTRAVENOUS at 03:21

## 2018-07-12 RX ADMIN — POTASSIUM CHLORIDE 40 MEQ: 750 TABLET, EXTENDED RELEASE ORAL at 06:58

## 2018-07-12 RX ADMIN — ONDANSETRON 4 MG: 2 INJECTION, SOLUTION INTRAMUSCULAR; INTRAVENOUS at 03:22

## 2018-07-12 RX ADMIN — DIATRIZOATE MEGLUMINE AND DIATRIZOATE SODIUM 30 ML: 660; 100 LIQUID ORAL; RECTAL at 04:52

## 2018-07-12 NOTE — DISCHARGE INSTRUCTIONS
Gastroenteritis: Care Instructions  Your Care Instructions    Gastroenteritis is an illness that may cause nausea, vomiting, and diarrhea. It is sometimes called \"stomach flu. \" It can be caused by bacteria or a virus. You will probably begin to feel better in 1 to 2 days. In the meantime, get plenty of rest and make sure you do not become dehydrated. Dehydration occurs when your body loses too much fluid. Follow-up care is a key part of your treatment and safety. Be sure to make and go to all appointments, and call your doctor if you are having problems. It's also a good idea to know your test results and keep a list of the medicines you take. How can you care for yourself at home? · If your doctor prescribed antibiotics, take them as directed. Do not stop taking them just because you feel better. You need to take the full course of antibiotics. · Drink plenty of fluids to prevent dehydration, enough so that your urine is light yellow or clear like water. Choose water and other caffeine-free clear liquids until you feel better. If you have kidney, heart, or liver disease and have to limit fluids, talk with your doctor before you increase your fluid intake. · Drink fluids slowly, in frequent, small amounts, because drinking too much too fast can cause vomiting. · Begin eating mild foods, such as dry toast, yogurt, applesauce, bananas, and rice. Avoid spicy, hot, or high-fat foods, and do not drink alcohol or caffeine for a day or two. Do not drink milk or eat ice cream until you are feeling better. How to prevent gastroenteritis  · Keep hot foods hot and cold foods cold. · Do not eat meats, dressings, salads, or other foods that have been kept at room temperature for more than 2 hours. · Use a thermometer to check your refrigerator. It should be between 34°F and 40°F.  · Defrost meats in the refrigerator or microwave, not on the kitchen counter. · Keep your hands and your kitchen clean.  Wash your hands, cutting boards, and countertops with hot soapy water frequently. · Cook meat until it is well done. · Do not eat raw eggs or uncooked sauces made with raw eggs. · Do not take chances. If food looks or tastes spoiled, throw it out. When should you call for help? Call 911 anytime you think you may need emergency care. For example, call if:    · You vomit blood or what looks like coffee grounds.     · You passed out (lost consciousness).     · You pass maroon or very bloody stools.    Call your doctor now or seek immediate medical care if:    · You have severe belly pain.     · You have signs of needing more fluids. You have sunken eyes, a dry mouth, and pass only a little dark urine.     · You feel like you are going to faint.     · You have increased belly pain that does not go away in 1 to 2 days.     · You have new or increased nausea, or you are vomiting.     · You have a new or higher fever.     · Your stools are black and tarlike or have streaks of blood.    Watch closely for changes in your health, and be sure to contact your doctor if:    · You are dizzy or lightheaded.     · You urinate less than usual, or your urine is dark yellow or brown.     · You do not feel better with each day that goes by. Where can you learn more? Go to http://yani-jori.info/. Enter N142 in the search box to learn more about \"Gastroenteritis: Care Instructions. \"  Current as of: November 18, 2017  Content Version: 11.7  © 1801-9017 [employer]. Care instructions adapted under license by [employer] (which disclaims liability or warranty for this information). If you have questions about a medical condition or this instruction, always ask your healthcare professional. [place] [address] any warranty or liability for your use of this information.

## 2018-07-12 NOTE — PROGRESS NOTES
Called pt and LVM stating my name, NN on behalf of [address], date and time of call, and direct office telephone number. Pt resides at [facility]. Chart forwarded to NN at [facility].

## 2018-07-12 NOTE — ED PROVIDER NOTES
EMERGENCY DEPARTMENT HISTORY AND PHYSICAL EXAM 
 
 
Date: 7/12/2018 Patient Name: Celina Draper History of Presenting Illness Chief Complaint Patient presents with  Diarrhea  Vomiting History Provided By: Patient HPI: Celina Draper, 80 y.o. female with PMHx significant for HTN, COPD, GERD, TIA, NSTEMI, presents via EMS to the ED with cc of gradual onset feeling of being ill, along with diarrhea and subjective HTN. Pt reports that she has not felt normal since eating pork BBQ on the night of 07/10/2018. Pt reports that her stomach is upset and has had diarrhea which she describes as \"loose stools. \"  Pt reports taking her normal HTN medication and abx. Pt reports that she normally wears 2-3 liters of oxygen at all times. She specifically denies any fevers, chills, chest pain, shortness of breath, headache, rash, sweating or weight loss. There are no other complaints, changes, or physical findings at this time. PCP: Guicho Sapp MD 
 
Current Facility-Administered Medications Medication Dose Route Frequency Provider Last Rate Last Dose  potassium chloride SR (KLOR-CON 10) tablet 40 mEq  40 mEq Oral NOW Alonso Buchanan MD      
 
Current Outpatient Prescriptions Medication Sig Dispense Refill  nitrofurantoin, macrocrystal-monohydrate, (MACROBID) 100 mg capsule Take 1 Cap by mouth two (2) times a day. 14 Cap 0  
 ondansetron (ZOFRAN ODT) 4 mg disintegrating tablet Take 1 Tab by mouth every eight (8) hours as needed for Nausea. 10 Tab 0  
 aspirin 81 mg chewable tablet Take 1 Tab by mouth daily.  atorvastatin (LIPITOR) 40 mg tablet Take 1 Tab by mouth nightly. 30 Tab 0  
 apixaban (ELIQUIS) 2.5 mg tablet Take 1 Tab by mouth two (2) times a day. 30 Tab 0  
 furosemide (LASIX) 40 mg tablet Take 1 Tab by mouth two (2) times a day. 60 Tab 0  
 metoprolol succinate (TOPROL-XL) 25 mg XL tablet Take 1 Tab by mouth daily.  30 Tab 0  
 potassium chloride SR (KLOR-CON 10) 10 mEq tablet Take 1 Tab by mouth two (2) times a day. 60 Tab 0  
 tiotropium (SPIRIVA WITH HANDIHALER) 18 mcg inhalation capsule Take 1 Cap by inhalation daily. Indications: Chronic Obstructive Pulmonary Disease with Bronchospasms 30 Cap 0  
 amLODIPine (NORVASC) 5 mg tablet Take 5 mg by mouth daily.  oxybutynin chloride XL (DITROPAN XL) 5 mg CR tablet Take 5 mg by mouth daily. For overactive bladder  pantoprazole (PROTONIX) 20 mg tablet Take 20 mg by mouth Before breakfast and dinner. For GERD  promethazine (PHENERGAN) 12.5 mg tablet Take 12.5 mg by mouth every six (6) hours as needed for Nausea.  alum-mag hydroxide-simeth (RULOX) 200-200-20 mg/5 mL susp Take 20 mL by mouth every four (4) hours as needed.  acetaminophen (TYLENOL) 325 mg tablet Take 650 mg by mouth every four (4) hours as needed for Pain.  magnesium hydroxide (MILK OF MAGNESIA) 400 mg/5 mL suspension Take 10 mL by mouth every seventy-two (72) hours as needed for Constipation (If no BM in 3 days).  albuterol-ipratropium (DUO-NEB) 2.5 mg-0.5 mg/3 ml nebu 3 mL by Nebulization route every four (4) hours as needed (Shortness of Breath).  emollient combination no. 108 (LUBRISKIN) lotn lotion Apply  to affected area every eight (8) hours as needed for Dry Skin (Apply to both hands).  triamcinolone (ARISTOCORT) 0.5 % topical cream Apply  to affected area two (2) times daily as needed for Skin Irritation. use thin layer  ammonium lactate (LAC-HYDRIN) 12 % lotion Apply  to affected area every twelve (12) hours as needed (Apply to chest, back, arms as needed for eczema). rub in to affected area well  diphenhydrAMINE-zinc acetate 1%-0.1% (BENADRYL) topical cream Apply  to affected area every six (6) hours as needed for Itching.  bisacodyl (DULCOLAX, BISACODYL,) 10 mg suppository Insert 10 mg into rectum as needed (Every 96 hours as needed for constipation).     
 sodium phosphate (FLEET ENEMA) 19-7 gram/118 mL enema Insert 1 Enema into rectum. Every 120 hours as needed for constipation if no results from suppository  guaiFENesin (ROBITUSSIN) 100 mg/5 mL liquid Take 200 mg by mouth every four (4) hours as needed for Cough.  cetirizine (ZYRTEC) 10 mg tablet Take 10 mg by mouth nightly. For itching  clobetasol (TEMOVATE) 0.05 % topical cream Apply  to affected area every Monday, Wednesday, Friday. Apply to vulva at night for pain and itching  olopatadine (PATANOL) 0.1 % ophthalmic solution Administer 1 Drop to both eyes two (2) times a day.  B.infantis-B.ani-B.long-B.bifi (PROBIOTIC 4X) 10-15 mg TbEC Take 1 Tab by mouth two (2) times a day.  albuterol (PROVENTIL HFA, VENTOLIN HFA, PROAIR HFA) 90 mcg/actuation inhaler Take 2 Puffs by inhalation every six (6) hours as needed (for COPD).  MULTIVITS W-FE,OTHER MIN/LUT (CENTRUM SILVER ULTRA WOMEN'S PO) Take 1 Tab by mouth daily. Past History Past Medical History: 
Past Medical History:  
Diagnosis Date  Arthritis   
 generalized  COPD  GERD (gastroesophageal reflux disease)  Hypertension  Ill-defined condition Vitamin deficiency  NSTEMI (non-ST elevated myocardial infarction) ([facility] [address] 5/1/2018  Other ill-defined conditions(799.89)   
 high cholesterol  Peripheral artery disease ([facility] [address]  Psychiatric disorder Anxiety disorder  Sleep disorder Insomnia  TIA (transient ischemic attack) Past Surgical History: 
Past Surgical History:  
Procedure Laterality Date  HX CAROTID ENDARTERECTOMY    
 left 2 or 3 years ago.  HX OTHER SURGICAL    
 colonoscopy  HX TONSILLECTOMY Family History: 
Family History Problem Relation Age of Onset [facility] Other Mother   
  carotid endarterectomy/cardiomegaly  Other Father   
  carotid endarterectomy Social History: 
Social History Substance Use Topics  Smoking status: Former Smoker  Smokeless tobacco: Never Used Comment: quit 3 years ago  Alcohol use Yes Comment: glass of wine rarely Allergies: Allergies Allergen Reactions  Penicillins Swelling  Sulfa (Sulfonamide Antibiotics) Itching  Propoxyphene-Acetaminophen Unknown (comments) Pt. Not sure, thinks it was hallucination.  Sulfadiazine Unknown (comments) Review of Systems Review of Systems Constitutional: Negative for activity change, appetite change, fatigue and fever. HENT: Negative. Negative for congestion, rhinorrhea and sore throat. Respiratory: Negative. Negative for cough, shortness of breath and wheezing. Cardiovascular: Negative. Negative for chest pain and leg swelling. Gastrointestinal: Positive for diarrhea and nausea. Negative for abdominal distention, abdominal pain, constipation and vomiting. Endocrine: Negative. Genitourinary: Negative for difficulty urinating, dysuria, menstrual problem, vaginal bleeding and vaginal discharge. Musculoskeletal: Negative. Negative for arthralgias, joint swelling and myalgias. Skin: Negative. Negative for rash. Neurological: Negative. Negative for dizziness, weakness, light-headedness and headaches. Psychiatric/Behavioral: Negative. Physical Exam  
Physical Exam  
Constitutional: She is oriented to person, place, and time. She appears well-developed and well-nourished. stable vital signs, non-toxic, on home 2 liters nasal cannula (baseline) HENT:  
Head: Atraumatic. Eyes: EOM are normal.  
Cardiovascular: Normal rate, regular rhythm, normal heart sounds and intact distal pulses. Exam reveals no gallop and no friction rub. No murmur heard. Pulmonary/Chest: Effort normal and breath sounds normal. No respiratory distress. She has no wheezes. She has no rales. She exhibits no tenderness. on home 2 liters nasal cannula (baseline) Abdominal: Soft. Bowel sounds are normal. She exhibits no distension and no mass.  There is no tenderness. There is no rebound and no guarding. mild diffuse abdominal tenderness without focality Musculoskeletal: Normal range of motion. She exhibits no edema or tenderness. Neurological: She is oriented to person, place, and time. Skin: Skin is warm. Psychiatric: She has a normal mood and affect. Nursing note and vitals reviewed. Diagnostic Study Results Labs - Recent Results (from the past 12 hour(s)) LACTIC ACID Collection Time: 07/12/18  3:25 AM  
Result Value Ref Range Lactic acid 1.2 0.4 - 2.0 MMOL/L  
CBC WITH AUTOMATED DIFF Collection Time: 07/12/18  3:28 AM  
Result Value Ref Range WBC 16.7 (H) 3.6 - 11.0 K/uL  
 RBC 4.52 3.80 - 5.20 M/uL  
 HGB 13.2 11.5 - 16.0 g/dL HCT 40.1 35.0 - 47.0 % MCV 88.7 80.0 - 99.0 FL  
 MCH 29.2 26.0 - 34.0 PG  
 MCHC 32.9 30.0 - 36.5 g/dL  
 RDW 15.5 (H) 11.5 - 14.5 % PLATELET 173 186 - 120 K/uL MPV 11.5 8.9 - 12.9 FL  
 NRBC 0.0 0  WBC ABSOLUTE NRBC 0.00 0.00 - 0.01 K/uL NEUTROPHILS 86 (H) 32 - 75 % LYMPHOCYTES 7 (L) 12 - 49 % MONOCYTES 6 5 - 13 % EOSINOPHILS 0 0 - 7 % BASOPHILS 0 0 - 1 % IMMATURE GRANULOCYTES 0 0.0 - 0.5 % ABS. NEUTROPHILS 14.3 (H) 1.8 - 8.0 K/UL  
 ABS. LYMPHOCYTES 1.2 0.8 - 3.5 K/UL  
 ABS. MONOCYTES 1.1 (H) 0.0 - 1.0 K/UL  
 ABS. EOSINOPHILS 0.0 0.0 - 0.4 K/UL  
 ABS. BASOPHILS 0.1 0.0 - 0.1 K/UL  
 ABS. IMM. GRANS. 0.1 (H) 0.00 - 0.04 K/UL  
 DF AUTOMATED METABOLIC PANEL, COMPREHENSIVE Collection Time: 07/12/18  3:28 AM  
Result Value Ref Range Sodium 138 136 - 145 mmol/L Potassium 3.3 (L) 3.5 - 5.1 mmol/L Chloride 99 97 - 108 mmol/L  
 CO2 31 21 - 32 mmol/L Anion gap 8 5 - 15 mmol/L Glucose 136 (H) 65 - 100 mg/dL BUN 22 (H) 6 - 20 MG/DL Creatinine 1.19 (H) 0.55 - 1.02 MG/DL  
 BUN/Creatinine ratio 18 12 - 20 GFR est AA 52 (L) >60 ml/min/1.73m2 GFR est non-AA 43 (L) >60 ml/min/1.73m2 Calcium 9.3 8.5 - 10.1 MG/DL  Bilirubin, total 0.7 0.2 - 1.0 MG/DL  
 ALT (SGPT) 24 12 - 78 U/L  
 AST (SGOT) 24 15 - 37 U/L Alk. phosphatase 106 45 - 117 U/L Protein, total 8.3 (H) 6.4 - 8.2 g/dL Albumin 3.7 3.5 - 5.0 g/dL Globulin 4.6 (H) 2.0 - 4.0 g/dL A-G Ratio 0.8 (L) 1.1 - 2.2 URINALYSIS W/ REFLEX CULTURE Collection Time: 07/12/18  4:29 AM  
Result Value Ref Range Color YELLOW/STRAW Appearance CLOUDY (A) CLEAR Specific gravity 1.010 1.003 - 1.030    
 pH (UA) 7.5 5.0 - 8.0 Protein NEGATIVE  NEG mg/dL Glucose NEGATIVE  NEG mg/dL Ketone NEGATIVE  NEG mg/dL Bilirubin NEGATIVE  NEG Blood NEGATIVE  NEG Urobilinogen 0.2 0.2 - 1.0 EU/dL Nitrites NEGATIVE  NEG Leukocyte Esterase TRACE (A) NEG    
 WBC 5-10 0 - 4 /hpf  
 RBC 0-5 0 - 5 /hpf Epithelial cells FEW FEW /lpf Bacteria 4+ (A) NEG /hpf  
 UA:UC IF INDICATED URINE CULTURE ORDERED (A) CNI Hyaline cast 0-2 0 - 5 /lpf Radiologic Studies -  
CT ABD PELV WO CONT Final Result CT Results  (Last 48 hours) 07/12/18 0531  CT ABD PELV WO CONT Final result Impression:  IMPRESSION:   
1. Vague high attenuation in the left renal pelvis is incompletely characterized  
but differential considerations include infection or neoplasm. There is mild  
left hydronephrosis. Right kidney atrophy is noted. 2. Patchy bibasilar lung opacities are unchanged compared to CT chest of  
6/3/2018. 3. Dilatation of the abdominal aorta to 3.2 cm in diameter. Narrative:  EXAM:  CT abdomen pelvis without contrast  
   
INDICATION: Abdominal pain with vomiting and diarrhea. COMPARISON: CT chest 6/3/2018. TECHNIQUE: Helical CT of the abdomen  and pelvis  without contrast. Oral  
contrast is administered. Coronal and sagittal reformats are performed.  CT dose  
reduction was achieved through use of a standardized protocol tailored for this  
examination and automatic exposure control for dose modulation. FINDINGS:   
Solid organ evaluation is limited without contrast.   
   
The visualized lung bases demonstrate stable mild patchy bibasilar opacity. There is moderate cardiomegaly. There is no pericardial or pleural effusion. There is a small hiatal hernia. The right kidney is atrophic. There is vague high attenuation in the left renal  
pelvis measuring 1.8 x 2.1 cm (series 2, image 35) resulting in mild left  
hydronephrosis. There is no renal, ureteral, or urinary bladder calculus. There is a small liver calcification. The spleen, pancreas, and adrenal glands  
are normal.  The gall bladder is present  without intra- or extra-hepatic  
biliary dilatation. There are no dilated bowel loops. The appendix is normal.  There is scattered  
colonic diverticulosis without focal adjacent inflammation. There are no enlarged lymph nodes. There is no free fluid or free air. There is dilatation of the infrarenal aorta measuring up to 3.2 cm in diameter. There is marked aortoiliac atherosclerosis. The urinary bladder is unremarkable. There is no pelvic mass. The bony structures are age-appropriate. There is diffuse osteopenia. CXR Results  (Last 48 hours) None Medical Decision Making I am the first provider for this patient. I reviewed the vital signs, available nursing notes, past medical history, past surgical history, family history and social history. Vital Signs-Reviewed the patient's vital signs. Patient Vitals for the past 12 hrs: 
 Temp Pulse Resp BP SpO2  
07/12/18 0515 - 93 25 172/75 95 % 07/12/18 0445 - 90 26 168/75 95 % 07/12/18 0430 - 89 27 179/76 94 % 07/12/18 0415 - 88 25 175/78 93 % 07/12/18 0400 - 93 23 (!) 151/98 93 % 07/12/18 0345 - 92 24 159/71 95 % 07/12/18 0330 - 90 25 134/67 94 % 07/12/18 0315 - 93 30 156/90 96 % 07/12/18 0300 - 94 (!) 31 151/75 96 % 07/12/18 0237 98.5 °F (36.9 °C) 90 17 174/83 96 % 07/12/18 0237 - - - - 96 % Pulse Oximetry Analysis - 95% on 3 liters Cardiac Monitor:  
Rate: 92 bpm 
Rhythm: Normal Sinus Rhythm Records Reviewed: Nursing Notes, Old Medical Records and Ambulance Run Sheet Provider Notes (Medical Decision Making):  
Gastritis, dehydration, electrolyte abnormality C. Diff. HTN possibly due to not able to take medication due to GI illness. Diverticulitis cannot be excluded. ED Course:  
Initial assessment performed. The patients presenting problems have been discussed, and they are in agreement with the care plan formulated and outlined with them. I have encouraged them to ask questions as they arise throughout their visit. Progress Note: 
6:27 AM 
No further emesis or diarrhea after arrival to ED. Progress Note: 
6:30 AM 
All incidental findings were discussed with family. Critical Care Time:  
0 minutes Disposition: 
Discharge Note: 
6:37 AM 
The pt is ready for discharge. The pt's signs, symptoms, diagnosis, and discharge instructions have been discussed and pt has conveyed their understanding. The pt is to follow up as recommended or return to ER should their symptoms worsen. Plan has been discussed and pt is in agreement. PLAN: 
1. Current Discharge Medication List  
  
START taking these medications Details  
nitrofurantoin, macrocrystal-monohydrate, (MACROBID) 100 mg capsule Take 1 Cap by mouth two (2) times a day. Qty: 14 Cap, Refills: 0  
  
ondansetron (ZOFRAN ODT) 4 mg disintegrating tablet Take 1 Tab by mouth every eight (8) hours as needed for Nausea. Qty: 10 Tab, Refills: 0  
  
  
 
2. Follow-up Information Follow up With Details Comments Contact Info Terence Colon MD In 1 day As needed [address] [facility] 
[phone] As needed, If symptoms worsen Return to ED if worse Diagnosis Clinical Impression: 1. Urinary tract infection without hematuria, site unspecified 2. Gastroenteritis 3. Chronic respiratory failure with hypoxia, on home oxygen therapy (HCC) 4. Abnormal finding on CT scan 5. Hypokalemia Attestations: This note is prepared by Sander Srinivasan, acting as Scribe for Madhu Moss MD. 
 
Madhu Moss MD: The scribe's documentation has been prepared under my direction and personally reviewed by me in its entirety. I confirm that the note above accurately reflects all work, treatment, procedures, and medical decision making performed by me.

## 2018-07-12 NOTE — ED TRIAGE NOTES
Patient arrives via EMS with complaints of N/V/D that has been going on for the last 24 hours per EMS. Patient is a patient from [facility]. Per staff there, patient was not feeling well and was having some loose stools. Patient states that she feels like she is not having any fevers. Patient states that she thinks that she ate some bad pulled pork and that is what made her feel so lousy. Patient did arrive on 2 L NC, which is her baseline. Patient does not ambulate do to previous stroke Hx. Patient is a&ox4 at this time.

## 2018-07-15 LAB
BACTERIA SPEC CULT: ABNORMAL
CC UR VC: ABNORMAL
SERVICE CMNT-IMP: ABNORMAL

## 2018-07-15 NOTE — PROGRESS NOTES
Spoke with ELICIA San at [facility]. Faxed results so pt's PCP, Dr Hilaria Gaucher at the facility can review.

## 2018-07-15 NOTE — PROGRESS NOTES
Called daughter to discuss results. Also called [facility], where patient resides. Nurse is not currently available to speak with. Left my name and number for return call. Of note, spoke extensively with Nestor Rodriguez regarding urine results. Ceftin 250mg PO BID x 7 days would be an appropriate option if patient remains symptomatic. Pt does not have anaphylactic reaction to penicillins documented.

## 2018-07-26 ENCOUNTER — PATIENT OUTREACH (OUTPATIENT)
Dept: CARDIOLOGY CLINIC | Age: 83
End: 2018-07-26

## 2018-07-26 NOTE — PROGRESS NOTES
Called pt and LVM stating my name, NN calling on behalf of [facility], date and time of call, and direct office telephone number. Chart sent to NN at [facility].

## 2018-08-01 ENCOUNTER — PATIENT OUTREACH (OUTPATIENT)
Dept: INTERNAL MEDICINE CLINIC | Age: 83
End: 2018-08-01

## 2018-08-01 ENCOUNTER — TELEPHONE (OUTPATIENT)
Dept: INTERNAL MEDICINE CLINIC | Age: 83
End: 2018-08-01

## 2018-08-01 DIAGNOSIS — J96.01 ACUTE RESPIRATORY FAILURE WITH HYPOXEMIA (HCC): Primary | ICD-10-CM

## 2018-08-01 NOTE — TELEPHONE ENCOUNTER
Clinton Miguel, patients daughter is calling, she is requesting a call back. She would like to know if Dr. Ralf Uriarte would be willing to write her mother a prescription for a enogen portable concentrator (pulse/on demand type). She currently has a concentrator in her room at the nursing home and she is carrying around a tank as she has to go out of the room.     Best call back # for angel: [phone]

## 2018-08-01 NOTE — PROGRESS NOTES
8/1/18- Spoke to Parminder Martinez, patient's daughter to see how her mom is doing. She is currently at [facility] with her mom and is taking her to dinner.   She asks that I call her back tomorrow around 11 am which I will do to get an update on her progress as related to CHF/respiratory status. / vs

## 2018-08-02 ENCOUNTER — PATIENT OUTREACH (OUTPATIENT)
Dept: INTERNAL MEDICINE CLINIC | Age: 83
End: 2018-08-02

## 2018-08-02 NOTE — PROGRESS NOTES
8/2/18-NN spoke to patient's daughter, Maren Marin. Daughter states her mom is doing well. No issues currently of exacerbation of CHF, or COPD. She saw PCP back in May of this year after a hospital stay. I suggested she call me and make a f/u appointment for her mom to see PCP late August or early September to check on her. She was also told to call sooner if her mom seemed to have any problems. Ela Arrieta

## 2018-08-09 ENCOUNTER — HOSPITAL ENCOUNTER (EMERGENCY)
Age: 83
Discharge: HOME | End: 2018-08-10
Attending: EMERGENCY MEDICINE
Payer: MEDICARE

## 2018-08-09 ENCOUNTER — APPOINTMENT (OUTPATIENT)
Dept: CT IMAGING | Age: 83
End: 2018-08-09
Attending: EMERGENCY MEDICINE
Payer: MEDICARE

## 2018-08-09 DIAGNOSIS — I71.9 AORTIC ANEURYSM WITHOUT RUPTURE, UNSPECIFIED PORTION OF AORTA (HCC): ICD-10-CM

## 2018-08-09 DIAGNOSIS — K59.00 CONSTIPATION, UNSPECIFIED CONSTIPATION TYPE: Primary | ICD-10-CM

## 2018-08-09 DIAGNOSIS — I72.3 ILIAC ANEURYSM (HCC): ICD-10-CM

## 2018-08-09 DIAGNOSIS — R10.84 ABDOMINAL PAIN, GENERALIZED: ICD-10-CM

## 2018-08-09 DIAGNOSIS — D72.828 OTHER ELEVATED WHITE BLOOD CELL (WBC) COUNT: ICD-10-CM

## 2018-08-09 LAB
ALBUMIN SERPL-MCNC: 3.5 G/DL (ref 3.5–5)
ALBUMIN/GLOB SERPL: 0.7 {RATIO} (ref 1.1–2.2)
ALP SERPL-CCNC: 142 U/L (ref 45–117)
ALT SERPL-CCNC: 20 U/L (ref 12–78)
ANION GAP SERPL CALC-SCNC: 10 MMOL/L (ref 5–15)
AST SERPL-CCNC: 23 U/L (ref 15–37)
BASOPHILS # BLD: 0.1 K/UL (ref 0–0.1)
BASOPHILS NFR BLD: 0 % (ref 0–1)
BILIRUB SERPL-MCNC: 1 MG/DL (ref 0.2–1)
BUN SERPL-MCNC: 25 MG/DL (ref 6–20)
BUN/CREAT SERPL: 18 (ref 12–20)
CALCIUM SERPL-MCNC: 9.5 MG/DL (ref 8.5–10.1)
CHLORIDE SERPL-SCNC: 98 MMOL/L (ref 97–108)
CO2 SERPL-SCNC: 28 MMOL/L (ref 21–32)
CREAT SERPL-MCNC: 1.39 MG/DL (ref 0.55–1.02)
DIFFERENTIAL METHOD BLD: ABNORMAL
EOSINOPHIL # BLD: 0.1 K/UL (ref 0–0.4)
EOSINOPHIL NFR BLD: 1 % (ref 0–7)
ERYTHROCYTE [DISTWIDTH] IN BLOOD BY AUTOMATED COUNT: 14.7 % (ref 11.5–14.5)
GLOBULIN SER CALC-MCNC: 4.9 G/DL (ref 2–4)
GLUCOSE SERPL-MCNC: 177 MG/DL (ref 65–100)
HCT VFR BLD AUTO: 41.5 % (ref 35–47)
HGB BLD-MCNC: 13.6 G/DL (ref 11.5–16)
IMM GRANULOCYTES # BLD: 0.1 K/UL (ref 0–0.04)
IMM GRANULOCYTES NFR BLD AUTO: 0 % (ref 0–0.5)
LACTATE SERPL-SCNC: 1.9 MMOL/L (ref 0.4–2)
LYMPHOCYTES # BLD: 1 K/UL (ref 0.8–3.5)
LYMPHOCYTES NFR BLD: 5 % (ref 12–49)
MCH RBC QN AUTO: 29.9 PG (ref 26–34)
MCHC RBC AUTO-ENTMCNC: 32.8 G/DL (ref 30–36.5)
MCV RBC AUTO: 91.2 FL (ref 80–99)
MONOCYTES # BLD: 0.7 K/UL (ref 0–1)
MONOCYTES NFR BLD: 4 % (ref 5–13)
NEUTS SEG # BLD: 17.3 K/UL (ref 1.8–8)
NEUTS SEG NFR BLD: 90 % (ref 32–75)
NRBC # BLD: 0 K/UL (ref 0–0.01)
NRBC BLD-RTO: 0 PER 100 WBC
PLATELET # BLD AUTO: 332 K/UL (ref 150–400)
PMV BLD AUTO: 10.9 FL (ref 8.9–12.9)
POTASSIUM SERPL-SCNC: 3.3 MMOL/L (ref 3.5–5.1)
PROT SERPL-MCNC: 8.4 G/DL (ref 6.4–8.2)
RBC # BLD AUTO: 4.55 M/UL (ref 3.8–5.2)
SODIUM SERPL-SCNC: 136 MMOL/L (ref 136–145)
WBC # BLD AUTO: 19.2 K/UL (ref 3.6–11)

## 2018-08-09 PROCEDURE — 99285 EMERGENCY DEPT VISIT HI MDM: CPT

## 2018-08-09 PROCEDURE — 74176 CT ABD & PELVIS W/O CONTRAST: CPT

## 2018-08-09 PROCEDURE — 83605 ASSAY OF LACTIC ACID: CPT | Performed by: EMERGENCY MEDICINE

## 2018-08-09 PROCEDURE — 80053 COMPREHEN METABOLIC PANEL: CPT | Performed by: EMERGENCY MEDICINE

## 2018-08-09 PROCEDURE — 36415 COLL VENOUS BLD VENIPUNCTURE: CPT | Performed by: EMERGENCY MEDICINE

## 2018-08-09 PROCEDURE — 85025 COMPLETE CBC W/AUTO DIFF WBC: CPT | Performed by: EMERGENCY MEDICINE

## 2018-08-09 RX ORDER — ONDANSETRON 2 MG/ML
4 INJECTION INTRAMUSCULAR; INTRAVENOUS
Status: DISCONTINUED | OUTPATIENT
Start: 2018-08-09 | End: 2018-08-10 | Stop reason: HOSPADM

## 2018-08-10 VITALS
HEIGHT: 66 IN | WEIGHT: 150 LBS | BODY MASS INDEX: 24.11 KG/M2 | OXYGEN SATURATION: 95 % | DIASTOLIC BLOOD PRESSURE: 83 MMHG | TEMPERATURE: 98.2 F | RESPIRATION RATE: 27 BRPM | SYSTOLIC BLOOD PRESSURE: 147 MMHG | HEART RATE: 83 BPM

## 2018-08-10 NOTE — ED TRIAGE NOTES
Pt presents to ED via EMS with abdominal pain and vomitting. Pt from [facility]. Pt has pain in anterior lower abd; Pt states she had BM irregularly. Pt denies chest pain; hx of afib. Hx of COPD. Pt given 4mg of zofran and 30mg of toradol en route by EMS. Pt received 500cc NaCl en route. Pt on monitors x 3. Pt AOX3. Bed locked and in low position. Side rails up x 2. Call bell within reach.

## 2018-08-10 NOTE — ED NOTES
Pt was placed on bed pan and 2 very small stool \"pellets\" were seen in bedpan. Will report to Dr Radha Barrios of stool. Pt. Updated to plan of care' denies needs at this time. Bed locked and low, call bell in reach.

## 2018-08-10 NOTE — ED PROVIDER NOTES
EMERGENCY DEPARTMENT HISTORY AND PHYSICAL EXAM      Date: 8/9/2018  Patient Name: Abdifatah Morgan    History of Presenting Illness     Chief Complaint   Patient presents with    Abdominal Pain    Vomiting       History Provided By: Patient and Patient's Family    HPI: Abdifatah Morgan, 80 y.o. female with PMHx significant for TIA, NSTEMI, GERD, COPD, HTN, presents via EMS to the ED with cc of a cramping lower abdominal pain that began 3-4 days ago and frequent episodes of yellow emesis. The pt was given 4 mg of zofran, 30 mg of toradol and 500 cc NaCl in the EMS en route to [facility] ED. Per family, pt has a chest x-ray performed yesterday for abdominal pain and back pain. However, the family relays that the the x-ray was clear even though there have have \"heard something\" in her lungs. The patient reports she does not remember her last BM and couldn't pass a BM even after given a stool softener around 4-4:30 PM today. Per family, the patient was in pain and discomfort all week with a decreased appetite. The patient's family states that patient relates pain symptoms to previous colonoscopy. Family reports that her pain is exacerbated when she leans forward. The patient states she is on O2 at home. Patient denies that she is passing any gas. She also denies a hx of abdominal surgeries. She specifically denies any fevers, chills, N/D, chest pain and SOB. Chief Complaint: Abdominal Pain and Vomiting  Duration: 3-4 Days  Timing:  Gradual  Location: Lower abdomen  Quality: Cramping  Severity: Moderate  Modifying Factors: Pain exacerbated when pt attempts to lean forward  Associated Symptoms: Constipation, Generalized weakness, decreased appetite    There are no other complaints, changes, or physical findings at this time.     PCP: Jami Maurer MD    Current Facility-Administered Medications   Medication Dose Route Frequency Provider Last Rate Last Dose    ondansetron (ZOFRAN) injection 4 mg  4 mg IntraVENous Randi Saenz MD   Stopped at 08/09/18 7595     Current Outpatient Prescriptions   Medication Sig Dispense Refill    nitrofurantoin, macrocrystal-monohydrate, (MACROBID) 100 mg capsule Take 1 Cap by mouth two (2) times a day. 14 Cap 0    ondansetron (ZOFRAN ODT) 4 mg disintegrating tablet Take 1 Tab by mouth every eight (8) hours as needed for Nausea. 10 Tab 0    aspirin 81 mg chewable tablet Take 1 Tab by mouth daily.  atorvastatin (LIPITOR) 40 mg tablet Take 1 Tab by mouth nightly. 30 Tab 0    apixaban (ELIQUIS) 2.5 mg tablet Take 1 Tab by mouth two (2) times a day. 30 Tab 0    furosemide (LASIX) 40 mg tablet Take 1 Tab by mouth two (2) times a day. 60 Tab 0    metoprolol succinate (TOPROL-XL) 25 mg XL tablet Take 1 Tab by mouth daily. 30 Tab 0    potassium chloride SR (KLOR-CON 10) 10 mEq tablet Take 1 Tab by mouth two (2) times a day. 60 Tab 0    tiotropium (SPIRIVA WITH HANDIHALER) 18 mcg inhalation capsule Take 1 Cap by inhalation daily. Indications: Chronic Obstructive Pulmonary Disease with Bronchospasms 30 Cap 0    amLODIPine (NORVASC) 5 mg tablet Take 5 mg by mouth daily.  oxybutynin chloride XL (DITROPAN XL) 5 mg CR tablet Take 5 mg by mouth daily. For overactive bladder      pantoprazole (PROTONIX) 20 mg tablet Take 20 mg by mouth Before breakfast and dinner. For GERD      promethazine (PHENERGAN) 12.5 mg tablet Take 12.5 mg by mouth every six (6) hours as needed for Nausea.  alum-mag hydroxide-simeth (RULOX) 200-200-20 mg/5 mL susp Take 20 mL by mouth every four (4) hours as needed.  acetaminophen (TYLENOL) 325 mg tablet Take 650 mg by mouth every four (4) hours as needed for Pain.  magnesium hydroxide (MILK OF MAGNESIA) 400 mg/5 mL suspension Take 10 mL by mouth every seventy-two (72) hours as needed for Constipation (If no BM in 3 days).       albuterol-ipratropium (DUO-NEB) 2.5 mg-0.5 mg/3 ml nebu 3 mL by Nebulization route every four (4) hours as needed (Shortness of Breath).  emollient combination no. 108 (LUBRISKIN) lotn lotion Apply  to affected area every eight (8) hours as needed for Dry Skin (Apply to both hands).  triamcinolone (ARISTOCORT) 0.5 % topical cream Apply  to affected area two (2) times daily as needed for Skin Irritation. use thin layer      ammonium lactate (LAC-HYDRIN) 12 % lotion Apply  to affected area every twelve (12) hours as needed (Apply to chest, back, arms as needed for eczema). rub in to affected area well      diphenhydrAMINE-zinc acetate 1%-0.1% (BENADRYL) topical cream Apply  to affected area every six (6) hours as needed for Itching.  bisacodyl (DULCOLAX, BISACODYL,) 10 mg suppository Insert 10 mg into rectum as needed (Every 96 hours as needed for constipation).  sodium phosphate (FLEET ENEMA) 19-7 gram/118 mL enema Insert 1 Enema into rectum. Every 120 hours as needed for constipation if no results from suppository      guaiFENesin (ROBITUSSIN) 100 mg/5 mL liquid Take 200 mg by mouth every four (4) hours as needed for Cough.  cetirizine (ZYRTEC) 10 mg tablet Take 10 mg by mouth nightly. For itching      clobetasol (TEMOVATE) 0.05 % topical cream Apply  to affected area every Monday, Wednesday, Friday. Apply to vulva at night for pain and itching      olopatadine (PATANOL) 0.1 % ophthalmic solution Administer 1 Drop to both eyes two (2) times a day.  B.infantis-B.ani-B.long-B.bifi (PROBIOTIC 4X) 10-15 mg TbEC Take 1 Tab by mouth two (2) times a day.  albuterol (PROVENTIL HFA, VENTOLIN HFA, PROAIR HFA) 90 mcg/actuation inhaler Take 2 Puffs by inhalation every six (6) hours as needed (for COPD).  MULTIVITS W-FE,OTHER MIN/LUT (CENTRUM SILVER ULTRA WOMEN'S PO) Take 1 Tab by mouth daily.          Past History     Past Medical History:  Past Medical History:   Diagnosis Date    Arthritis     generalized    COPD     GERD (gastroesophageal reflux disease)     Hypertension     Ill-defined condition     Vitamin deficiency    NSTEMI (non-ST elevated myocardial infarction) ([facility] [address] 5/1/2018    Other ill-defined conditions(799.89)     high cholesterol    Peripheral artery disease (HCC)     Psychiatric disorder     Anxiety disorder    Sleep disorder     Insomnia    TIA (transient ischemic attack)        Past Surgical History:  Past Surgical History:   Procedure Laterality Date    HX CAROTID ENDARTERECTOMY      left 2 or 3 years ago.  HX OTHER SURGICAL      colonoscopy    HX TONSILLECTOMY         Family History:  Family History   Problem Relation Age of Onset    Other Mother      carotid endarterectomy/cardiomegaly    Other Father      carotid endarterectomy       Social History:  Social History   Substance Use Topics    Smoking status: Former Smoker    Smokeless tobacco: Never Used      Comment: quit 3 years ago    Alcohol use Yes      Comment: glass of wine rarely       Allergies: Allergies   Allergen Reactions    Penicillins Swelling    Sulfa (Sulfonamide Antibiotics) Itching    Propoxyphene-Acetaminophen Unknown (comments)     Pt. Not sure, thinks it was hallucination.  Sulfadiazine Unknown (comments)         Review of Systems   Review of Systems   Constitutional: Positive for appetite change (decreased). Negative for activity change, fatigue and fever. HENT: Negative. Negative for congestion, rhinorrhea and sore throat. Respiratory: Negative. Negative for cough, shortness of breath and wheezing. Cardiovascular: Negative. Negative for chest pain and leg swelling. Gastrointestinal: Positive for abdominal pain, constipation and vomiting. Negative for abdominal distention, diarrhea and nausea. Endocrine: Negative. Genitourinary: Negative for difficulty urinating, dysuria, menstrual problem, vaginal bleeding and vaginal discharge. Musculoskeletal: Negative for arthralgias, joint swelling and myalgias. Skin: Negative. Negative for rash.    Neurological: Negative for dizziness, weakness, light-headedness and headaches. Psychiatric/Behavioral: Negative. Physical Exam   Physical Exam   Constitutional: She is oriented to person, place, and time. She appears well-developed and well-nourished. HENT:   Head: Atraumatic. Eyes: EOM are normal.   Cardiovascular: Normal rate, regular rhythm, normal heart sounds and intact distal pulses. Exam reveals no gallop and no friction rub. No murmur heard. Pulmonary/Chest: Effort normal and breath sounds normal. No respiratory distress. She has no wheezes. She has no rales. She exhibits no tenderness. 2L O2 NC at baseline. Abdominal: Soft. Bowel sounds are normal. She exhibits no distension and no mass. There is no tenderness. There is no rebound and no guarding. Mild to moderate abdominal distension. Not peritoneal. Not tympanic. Musculoskeletal: Normal range of motion. She exhibits no edema or tenderness. Neurological: She is oriented to person, place, and time. Skin: Skin is warm. Psychiatric: She has a normal mood and affect. Nursing note and vitals reviewed. Diagnostic Study Results     Labs -     Recent Results (from the past 12 hour(s))   CBC WITH AUTOMATED DIFF    Collection Time: 08/09/18 10:28 PM   Result Value Ref Range    WBC 19.2 (H) 3.6 - 11.0 K/uL    RBC 4.55 3.80 - 5.20 M/uL    HGB 13.6 11.5 - 16.0 g/dL    HCT 41.5 35.0 - 47.0 %    MCV 91.2 80.0 - 99.0 FL    MCH 29.9 26.0 - 34.0 PG    MCHC 32.8 30.0 - 36.5 g/dL    RDW 14.7 (H) 11.5 - 14.5 %    PLATELET 936 839 - 835 K/uL    MPV 10.9 8.9 - 12.9 FL    NRBC 0.0 0  WBC    ABSOLUTE NRBC 0.00 0.00 - 0.01 K/uL    NEUTROPHILS 90 (H) 32 - 75 %    LYMPHOCYTES 5 (L) 12 - 49 %    MONOCYTES 4 (L) 5 - 13 %    EOSINOPHILS 1 0 - 7 %    BASOPHILS 0 0 - 1 %    IMMATURE GRANULOCYTES 0 0.0 - 0.5 %    ABS. NEUTROPHILS 17.3 (H) 1.8 - 8.0 K/UL    ABS. LYMPHOCYTES 1.0 0.8 - 3.5 K/UL    ABS. MONOCYTES 0.7 0.0 - 1.0 K/UL    ABS.  EOSINOPHILS 0.1 0.0 - 0.4 K/UL ABS. BASOPHILS 0.1 0.0 - 0.1 K/UL    ABS. IMM. GRANS. 0.1 (H) 0.00 - 0.04 K/UL    DF AUTOMATED     METABOLIC PANEL, COMPREHENSIVE    Collection Time: 08/09/18 10:28 PM   Result Value Ref Range    Sodium 136 136 - 145 mmol/L    Potassium 3.3 (L) 3.5 - 5.1 mmol/L    Chloride 98 97 - 108 mmol/L    CO2 28 21 - 32 mmol/L    Anion gap 10 5 - 15 mmol/L    Glucose 177 (H) 65 - 100 mg/dL    BUN 25 (H) 6 - 20 MG/DL    Creatinine 1.39 (H) 0.55 - 1.02 MG/DL    BUN/Creatinine ratio 18 12 - 20      GFR est AA 43 (L) >60 ml/min/1.73m2    GFR est non-AA 36 (L) >60 ml/min/1.73m2    Calcium 9.5 8.5 - 10.1 MG/DL    Bilirubin, total 1.0 0.2 - 1.0 MG/DL    ALT (SGPT) 20 12 - 78 U/L    AST (SGOT) 23 15 - 37 U/L    Alk. phosphatase 142 (H) 45 - 117 U/L    Protein, total 8.4 (H) 6.4 - 8.2 g/dL    Albumin 3.5 3.5 - 5.0 g/dL    Globulin 4.9 (H) 2.0 - 4.0 g/dL    A-G Ratio 0.7 (L) 1.1 - 2.2     LACTIC ACID    Collection Time: 08/09/18 10:28 PM   Result Value Ref Range    Lactic acid 1.9 0.4 - 2.0 MMOL/L       Radiologic Studies -   CT ABD PELV WO CONT   Final Result        CT Results  (Last 48 hours)               08/10/18 0031  CT ABD PELV WO CONT Final result    Impression:  IMPRESSION:       1. No acute findings. 2. 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 Narrative:  EXAM:  CT ABD PELV WO CONT       INDICATION: abd distension, abd pain, eval for SBO  abdominal pain and vomiting   today       COMPARISON: July 12       CONTRAST:  None. TECHNIQUE:    Thin axial images were obtained through the abdomen and pelvis. Coronal and   sagittal reconstructions were generated. Oral contrast was not administered. CT   dose reduction was achieved through use of a standardized protocol tailored for   this examination and automatic exposure control for dose modulation.         The absence of intravenous contrast material reduces the sensitivity for   evaluation of the solid parenchymal organs of the abdomen. FINDINGS:    LUNG BASES: Bilateral lower lobe scarring. INCIDENTALLY IMAGED HEART AND MEDIASTINUM: Unremarkable. LIVER: No mass or biliary dilatation. GALLBLADDER: Unremarkable. SPLEEN: No mass. PANCREAS: No mass or ductal dilatation. ADRENALS: Unremarkable. KIDNEYS/URETERS: Atrophic right kidney. STOMACH: Unremarkable. SMALL BOWEL: No dilatation or wall thickening. COLON: Diverticulosis. Moderate colonic stool. APPENDIX: Unremarkable. PERITONEUM: No ascites or pneumoperitoneum. RETROPERITONEUM: The proximal abdominal aorta measures 3.7 cm, stable. There are   significant calcifications at the origins of the superior mesenteric and renal   arteries bilaterally. 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 REPRODUCTIVE ORGANS: Normal.   URINARY BLADDER: No mass or calculus. BONES: No destructive bone lesion. ADDITIONAL COMMENTS: N/A               Medical Decision Making   I am the first provider for this patient. I reviewed the vital signs, available nursing notes, past medical history, past surgical history, family history and social history. Vital Signs-Reviewed the patient's vital signs.   Patient Vitals for the past 12 hrs:   Temp Pulse Resp BP SpO2   08/10/18 0145 - 87 26 144/75 94 %   08/10/18 0115 - 85 21 136/73 94 %   08/10/18 0100 - 88 24 138/73 92 %   08/10/18 0015 - 92 28 139/76 92 %   08/09/18 2345 - - - (!) 156/96 -   08/09/18 2315 - 98 25 137/78 93 %   08/09/18 2300 - 98 30 146/72 92 %   08/09/18 2245 - 99 (!) 32 136/78 91 %   08/09/18 2232 - 100 (!) 31 131/84 93 %   08/09/18 2220 - - - - 95 %   08/09/18 2215 - - - (!) 151/91 95 %   08/09/18 2208 98.2 °F (36.8 °C) (!) 102 18 (!) 158/93 95 %       Pulse Oximetry Analysis - 95% on NC    Records Reviewed: Nursing Notes, Old Medical Records and Ambulance Run Sheet    Provider Notes (Medical Decision Making):   DDx: SBO, colitis, enteritis, diverticulitis, bilious, constipation, pancreatitis    ED Course: Initial assessment performed. The patients presenting problems have been discussed, and they are in agreement with the care plan formulated and outlined with them. I have encouraged them to ask questions as they arise throughout their visit. 1:46 AM  Pt was updated on labs and imaging. She conveys she had 2 large BM's since arrival (without intervention). She reports significant improvement in abdominal discomfort. Disposition:  Discharge Note:  1:55 AM  The pt is ready for discharge. The pt's signs, symptoms, diagnosis, and discharge instructions have been discussed and pt has conveyed their understanding. The pt is to follow up as recommended or return to ER should their symptoms worsen. Plan has been discussed and pt is in agreement. PLAN:  1. Discharge  Current Discharge Medication List        2. Follow-up Information     Follow up With Details Comments [address] [place], [place] In 3 days  [address]  [phone]      [facility] EMERGENCY DEPT  As needed, If symptoms worsen [address] [place] [address]  [phone]        Return to ED if worse     Diagnosis     Clinical Impression:   1. Constipation, unspecified constipation type    2. Abdominal pain, generalized    3. Aortic aneurysm without rupture, unspecified portion of aorta ([address]    4. Iliac aneurysm ([address]    5. Other elevated white blood cell (WBC) count          Attestation: This note is prepared by Divya Wilcox, acting as scribe for MD Rosales Lamb MD: The scribe's documentation has been prepared under my direction and personally reviewed by me in its entirety. I confirm that the note above accurately reflects all work, treatment, procedures, and medical decision making performed by me.

## 2018-08-10 NOTE — ED NOTES
Pt provided pericare; clothes cleaned and paper scrub pants provided. Dr Johann Jorgensen has reviewed discharge instructions with the caregiver. The caregiver verbalized understanding. Pt. A&Ox3, respirations even and unlabored. VS stable as noted in flowsheet. Pt provided wheelchair assist from department; paperwork in hand.

## 2018-08-10 NOTE — DISCHARGE INSTRUCTIONS
Abdominal Aortic Aneurysm: Care Instructions    YOU HAVE A STABLE ANEURYSM AT 3.7 cm. 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 People pass stools from 3 times a day to once every 3 days. What is normal for you may be different. Constipation may occur with pain in the rectum and cramping. The pain may get worse when you try to pass stools. Sometimes there are small amounts of bright red blood on toilet paper or the surface of stools. This is because of enlarged veins near the rectum (hemorrhoids). A few changes in your diet and lifestyle may help you avoid ongoing constipation. Your doctor may also prescribe medicine to help loosen your stool. Some medicines can cause constipation. These include pain medicines and antidepressants. Tell your doctor about all the medicines you take. Your doctor may want to make a medicine change to ease your symptoms. Follow-up care is a key part of your treatment and safety. Be sure to make and go to all appointments, and call your doctor if you are having problems. It's also a good idea to know your test results and keep a list of the medicines you take. How can you care for yourself at home? · Drink plenty of fluids, enough so that your urine is light yellow or clear like water. If you have kidney, heart, or liver disease and have to limit fluids, talk with your doctor before you increase the amount of fluids you drink. · Include high-fiber foods in your diet each day. These include fruits, vegetables, beans, and whole grains. · Get at least 30 minutes of exercise on most days of the week. Walking is a good choice. You also may want to do other activities, such as running, swimming, cycling, or playing tennis or team sports. · Take a fiber supplement, such as Citrucel or Metamucil, every day.  Read and follow all instructions on the label. · Schedule time each day for a bowel movement. A daily routine may help. Take your time having your bowel movement. · Support your feet with a small step stool when you sit on the toilet. This helps flex your hips and places your pelvis in a squatting position. · Your doctor may recommend an over-the-counter laxative to relieve your constipation. Examples are Milk of Magnesia and MiraLax. Read and follow all instructions on the label. Do not use laxatives on a long-term basis. When should you call for help? Call your doctor now or seek immediate medical care if:    · You have new or worse belly pain.     · You have new or worse nausea or vomiting.     · You have blood in your stools.    Watch closely for changes in your health, and be sure to contact your doctor if:    · Your constipation is getting worse.     · You do not get better as expected. Where can you learn more? Go to http://yaniReclogjori.info/. Enter [id number] in the search box to learn more about \"Constipation: Care Instructions. \"  Current as of: November 20, 2017  Content Version: 11.7  © 7465-1755 [employer]. Care instructions adapted under license by [employer] (which disclaims liability or warranty for this information). If you have questions about a medical condition or this instruction, always ask your healthcare professional. [address] any warranty or liability for your use of this information. Abdominal Pain: Care Instructions  Your Care Instructions    Abdominal pain has many possible causes. Some aren't serious and get better on their own in a few days. Others need more testing and treatment. If your pain continues or gets worse, you need to be rechecked and may need more tests to find out what is wrong. You may need surgery to correct the problem.   Don't ignore new symptoms, such as fever, nausea and vomiting, urination problems, pain that gets worse, and dizziness. These may be signs of a more serious problem. Your doctor may have recommended a follow-up visit in the next 8 to 12 hours. If you are not getting better, you may need more tests or treatment. The doctor has checked you carefully, but problems can develop later. If you notice any problems or new symptoms, get medical treatment right away. Follow-up care is a key part of your treatment and safety. Be sure to make and go to all appointments, and call your doctor if you are having problems. It's also a good idea to know your test results and keep a list of the medicines you take. How can you care for yourself at home? · Rest until you feel better. · To prevent dehydration, drink plenty of fluids, enough so that your urine is light yellow or clear like water. Choose water and other caffeine-free clear liquids until you feel better. If you have kidney, heart, or liver disease and have to limit fluids, talk with your doctor before you increase the amount of fluids you drink. · If your stomach is upset, eat mild foods, such as rice, dry toast or crackers, bananas, and applesauce. Try eating several small meals instead of two or three large ones. · Wait until 48 hours after all symptoms have gone away before you have spicy foods, alcohol, and drinks that contain caffeine. · Do not eat foods that are high in fat. · Avoid anti-inflammatory medicines such as aspirin, ibuprofen (Advil, Motrin), and naproxen (Aleve). These can cause stomach upset. Talk to your doctor if you take daily aspirin for another health problem. When should you call for help? Call 911 anytime you think you may need emergency care.  For example, call if:    · You passed out (lost consciousness).     · You pass maroon or very bloody stools.     · You vomit blood or what looks like coffee grounds.     · You have new, severe belly pain.    Call your doctor now or seek immediate medical care if:    · Your pain gets worse, especially if it becomes focused in one area of your belly.     · You have a new or higher fever.     · Your stools are black and look like tar, or they have streaks of blood.     · You have unexpected vaginal bleeding.     · You have symptoms of a urinary tract infection. These may include:  ¨ Pain when you urinate. ¨ Urinating more often than usual.  ¨ Blood in your urine.     · You are dizzy or lightheaded, or you feel like you may faint.    Watch closely for changes in your health, and be sure to contact your doctor if:    · You are not getting better after 1 day (24 hours). Where can you learn more? Go to http://yani-jori.info/. Enter E284 in the search box to learn more about \"Abdominal Pain: Care Instructions. \"  Current as of: November 20, 2017  Content Version: 11.7  © 5296-2583 [employer]. Care instructions adapted under license by [employer] (which disclaims liability or warranty for this information). If you have questions about a medical condition or this instruction, always ask your healthcare professional. [address] any warranty or liability for your use of this information. Your Care Instructions  An abdominal aortic aneurysm is a stretched and bulging area of the aorta. The aorta is the large blood vessel that takes oxygen-rich blood from the heart to the rest of the body. This type of aneurysm is in the belly, where the aorta takes blood to the lower body. If an aneurysm gets too big, it can cause serious problems. A bulging aorta is weak and can burst, or rupture. This causes life-threatening bleeding. If your doctor has determined that your aneurysm is small and not growing fast, it is safe to watch the aneurysm carefully and wait on surgery. If the aneurysm is larger, surgery may be the safest choice.  In some cases, your doctor may be able to put in a type of graft, called a stent, to fix the aneurysm without doing major surgery. Follow-up care is a key part of your treatment and safety. Be sure to make and go to all appointments, and call your doctor if you are having problems. It's also a good idea to know your test results and keep a list of the medicines you take. How can you care for yourself at home? · Take your medicines exactly as prescribed. Call your doctor if you think you are having a problem with your medicine. You may take a medicine to lower your blood pressure. This lowers stress on your aorta. If you have high cholesterol, you also may take a statin medicine. · Follow a heart-healthy diet. ¨ Eat lots of fruits and vegetables, whole grains, fish, and low-fat or nonfat dairy foods. ¨ Eat lean meats. Limit saturated fat and avoid trans fat. ¨ Limit processed food, sodium, alcohol, and sweets. · If your doctor recommends it, get more exercise. Walking is a good choice. Bit by bit, increase the amount you walk every day. Try for at least 30 minutes on most days of the week. · Talk to your doctor about when you can do more active workouts. · Manage your weight. Being at a healthy weight will not likely change your aortic aneurysm, but it may lower the chance of complications if you ever need surgery. · Do not smoke or allow others to smoke around you. Smoking can make your condition worse. If you need help quitting, talk to your doctor about stop-smoking programs and medicines. These can increase your chances of quitting for good. When should you call for help? Call 911 anytime you think you may need emergency care.  For example, call if:    · You have severe pain in your belly, back, or chest.     · You passed out (lost consciousness).     · You have severe trouble breathing.    Call your doctor now or seek immediate medical care if:    · You are dizzy or lightheaded, or you feel like you may faint.     · One or both feet change color, are painful, feel cool, or burn or tingle.    Watch closely for changes in your health, and be sure to contact your doctor if you have any problems. Where can you learn more? Go to http://yani-jori.info/. Enter S947 in the search box to learn more about \"Abdominal Aortic Aneurysm: Care Instructions. \"  Current as of: November 21, 2017  Content Version: 11.7  © 2320-1076 [employer]. Care instructions adapted under license by [email or web address] (which disclaims liability or warranty for this information). If you have questions about a medical condition or this instruction, always ask your healthcare professional. [address] any warranty or liability for your use of this information.

## 2018-08-10 NOTE — ED NOTES
Pt provided pericare; pt had large BM on commode and in bed at this time. Pt on monitors x 3. Family bedside. Awaiting CT. Pt. Resting comfortably in bed, denies needs at this time. Bed locked and low, call bell in reach.

## 2018-08-15 ENCOUNTER — PATIENT OUTREACH (OUTPATIENT)
Dept: INTERNAL MEDICINE CLINIC | Age: 83
End: 2018-08-15

## 2018-08-15 NOTE — PROGRESS NOTES
8/15/18-NN received a call from Sherman Mendoza nurse at [facility] to say patient will have a repeat CBC per Dr. Mignon Orozco at facility tomorrow.   Asked Destiny to keep me informed re: patient's condition and if any needs arise. / vs

## 2018-08-15 NOTE — PROGRESS NOTES
8/15/18-MANJU spoke to patient's daughter. Patient  had an ER visit 8/9/18 for abdominal pain. WBC was elevated at that time. Patient is on CHF bundle. She resides at [facility] currently and per her daughter says she is followed there by Dr. Lisha Gomes who was aware of this. He saw her on 8/10/18. I left a message for Destiny, head nurse at facility to give me a call back to get an update on that visit. In addition I relayed all of this information to patient's PCP, Dr. Gino Griffin. Daughter states it is very difficult to get her in for f/u appointments in the office and that is why they chose to have Dr. Lisha Gomes follow her at [facility].   Per Dr. Gino Griffin that is fine and patient's daughter told to call this office for any needs that might arise. / vs

## 2018-08-22 ENCOUNTER — PATIENT OUTREACH (OUTPATIENT)
Dept: INTERNAL MEDICINE CLINIC | Age: 83
End: 2018-08-22

## 2018-08-22 NOTE — PROGRESS NOTES
8/22/18-NN spoke with head nurse, Destiny, at [facility] living to get an update on her condition. No issues currently, however CBC that was pending was not drawn. She will put an order in for that to be done as a stat draw. Discussed the fact that patient was being followed by our NN team due to her heart failure. When I asked her if patient was weighed on a regular basis, she states she has only been  being weighed monthly. Asked her if weight could be done at least weekly, and the importance of reporting a 5 lb weight gain to MD following her at the facility. She stated they would change her weight to weekly. Also educated her on importance of no added salt to food that is served to patient. She states the patient does not add any salt to her diet. We also discussed need for nurses to watch for any increased edema or shortness of breath to keep on top of CHF and symptoms of exacerbation. We agreed that I would also check with her periodically to check on patient's status and to help with any issues that might arise.   Will check back with Destiny in 2 weeks to get an update on patient. / vs

## 2018-09-06 ENCOUNTER — PATIENT OUTREACH (OUTPATIENT)
Dept: INTERNAL MEDICINE CLINIC | Age: 83
End: 2018-09-06

## 2018-09-11 ENCOUNTER — PATIENT OUTREACH (OUTPATIENT)
Dept: INTERNAL MEDICINE CLINIC | Age: 83
End: 2018-09-11

## 2018-10-16 ENCOUNTER — PATIENT OUTREACH (OUTPATIENT)
Dept: INTERNAL MEDICINE CLINIC | Age: 83
End: 2018-10-16

## 2019-01-21 ENCOUNTER — HOSPITAL ENCOUNTER (EMERGENCY)
Age: 84
Discharge: HOME | End: 2019-01-21
Attending: EMERGENCY MEDICINE
Payer: MEDICARE

## 2019-01-21 ENCOUNTER — APPOINTMENT (OUTPATIENT)
Dept: ULTRASOUND IMAGING | Age: 84
End: 2019-01-21
Attending: EMERGENCY MEDICINE
Payer: MEDICARE

## 2019-01-21 VITALS
OXYGEN SATURATION: 98 % | RESPIRATION RATE: 16 BRPM | HEIGHT: 65 IN | HEART RATE: 78 BPM | TEMPERATURE: 97.4 F | DIASTOLIC BLOOD PRESSURE: 99 MMHG | BODY MASS INDEX: 24.99 KG/M2 | WEIGHT: 150 LBS | SYSTOLIC BLOOD PRESSURE: 179 MMHG

## 2019-01-21 DIAGNOSIS — R31.9 URINARY TRACT INFECTION WITH HEMATURIA, SITE UNSPECIFIED: Primary | ICD-10-CM

## 2019-01-21 DIAGNOSIS — R93.89 ENDOMETRIAL THICKENING ON ULTRASOUND: ICD-10-CM

## 2019-01-21 DIAGNOSIS — N95.0 POST-MENOPAUSAL BLEEDING: ICD-10-CM

## 2019-01-21 DIAGNOSIS — N39.0 URINARY TRACT INFECTION WITH HEMATURIA, SITE UNSPECIFIED: Primary | ICD-10-CM

## 2019-01-21 LAB
ALBUMIN SERPL-MCNC: 3.4 G/DL (ref 3.5–5)
ALBUMIN/GLOB SERPL: 0.8 {RATIO} (ref 1.1–2.2)
ALP SERPL-CCNC: 100 U/L (ref 45–117)
ALT SERPL-CCNC: 17 U/L (ref 12–78)
ANION GAP SERPL CALC-SCNC: 6 MMOL/L (ref 5–15)
APPEARANCE UR: ABNORMAL
AST SERPL-CCNC: 22 U/L (ref 15–37)
BACTERIA URNS QL MICRO: ABNORMAL /HPF
BASOPHILS # BLD: 0.1 K/UL (ref 0–0.1)
BASOPHILS NFR BLD: 1 % (ref 0–1)
BILIRUB SERPL-MCNC: 0.4 MG/DL (ref 0.2–1)
BILIRUB UR QL: NEGATIVE
BUN SERPL-MCNC: 14 MG/DL (ref 6–20)
BUN/CREAT SERPL: 12 (ref 12–20)
CALCIUM SERPL-MCNC: 8.6 MG/DL (ref 8.5–10.1)
CHLORIDE SERPL-SCNC: 102 MMOL/L (ref 97–108)
CO2 SERPL-SCNC: 32 MMOL/L (ref 21–32)
COLOR UR: ABNORMAL
CREAT SERPL-MCNC: 1.15 MG/DL (ref 0.55–1.02)
DIFFERENTIAL METHOD BLD: NORMAL
EOSINOPHIL # BLD: 0.1 K/UL (ref 0–0.4)
EOSINOPHIL NFR BLD: 2 % (ref 0–7)
EPITH CASTS URNS QL MICRO: ABNORMAL /LPF
ERYTHROCYTE [DISTWIDTH] IN BLOOD BY AUTOMATED COUNT: 13.1 % (ref 11.5–14.5)
GLOBULIN SER CALC-MCNC: 4.5 G/DL (ref 2–4)
GLUCOSE SERPL-MCNC: 101 MG/DL (ref 65–100)
GLUCOSE UR STRIP.AUTO-MCNC: NEGATIVE MG/DL
HCT VFR BLD AUTO: 42.2 % (ref 35–47)
HGB BLD-MCNC: 13.5 G/DL (ref 11.5–16)
HGB UR QL STRIP: ABNORMAL
IMM GRANULOCYTES # BLD AUTO: 0 K/UL (ref 0–0.04)
IMM GRANULOCYTES NFR BLD AUTO: 0 % (ref 0–0.5)
KETONES UR QL STRIP.AUTO: NEGATIVE MG/DL
LEUKOCYTE ESTERASE UR QL STRIP.AUTO: ABNORMAL
LYMPHOCYTES # BLD: 1.9 K/UL (ref 0.8–3.5)
LYMPHOCYTES NFR BLD: 20 % (ref 12–49)
MCH RBC QN AUTO: 30.7 PG (ref 26–34)
MCHC RBC AUTO-ENTMCNC: 32 G/DL (ref 30–36.5)
MCV RBC AUTO: 95.9 FL (ref 80–99)
MONOCYTES # BLD: 1 K/UL (ref 0–1)
MONOCYTES NFR BLD: 10 % (ref 5–13)
NEUTS SEG # BLD: 6.5 K/UL (ref 1.8–8)
NEUTS SEG NFR BLD: 67 % (ref 32–75)
NITRITE UR QL STRIP.AUTO: POSITIVE
NRBC # BLD: 0 K/UL (ref 0–0.01)
NRBC BLD-RTO: 0 PER 100 WBC
PH UR STRIP: 6 [PH] (ref 5–8)
PLATELET # BLD AUTO: 270 K/UL (ref 150–400)
PMV BLD AUTO: 11.3 FL (ref 8.9–12.9)
POTASSIUM SERPL-SCNC: 3.3 MMOL/L (ref 3.5–5.1)
PROT SERPL-MCNC: 7.9 G/DL (ref 6.4–8.2)
PROT UR STRIP-MCNC: 30 MG/DL
RBC # BLD AUTO: 4.4 M/UL (ref 3.8–5.2)
RBC #/AREA URNS HPF: ABNORMAL /HPF (ref 0–5)
SODIUM SERPL-SCNC: 140 MMOL/L (ref 136–145)
SP GR UR REFRACTOMETRY: 1.02 (ref 1–1.03)
UA: UC IF INDICATED,UAUC: ABNORMAL
UROBILINOGEN UR QL STRIP.AUTO: 0.2 EU/DL (ref 0.2–1)
WBC # BLD AUTO: 9.7 K/UL (ref 3.6–11)
WBC URNS QL MICRO: ABNORMAL /HPF (ref 0–4)

## 2019-01-21 PROCEDURE — 80053 COMPREHEN METABOLIC PANEL: CPT

## 2019-01-21 PROCEDURE — 76830 TRANSVAGINAL US NON-OB: CPT

## 2019-01-21 PROCEDURE — 36415 COLL VENOUS BLD VENIPUNCTURE: CPT

## 2019-01-21 PROCEDURE — 85025 COMPLETE CBC W/AUTO DIFF WBC: CPT

## 2019-01-21 PROCEDURE — 87186 SC STD MICRODIL/AGAR DIL: CPT

## 2019-01-21 PROCEDURE — 87077 CULTURE AEROBIC IDENTIFY: CPT

## 2019-01-21 PROCEDURE — 81001 URINALYSIS AUTO W/SCOPE: CPT

## 2019-01-21 PROCEDURE — 99285 EMERGENCY DEPT VISIT HI MDM: CPT

## 2019-01-21 PROCEDURE — 87086 URINE CULTURE/COLONY COUNT: CPT

## 2019-01-21 RX ORDER — POLYETHYLENE GLYCOL 3350 17 G/17G
17 POWDER, FOR SOLUTION ORAL DAILY
COMMUNITY
End: 2019-08-19

## 2019-01-21 RX ORDER — SORBITOL SOLUTION 70 %
30 SOLUTION, ORAL MISCELLANEOUS DAILY PRN
COMMUNITY
End: 2019-08-19

## 2019-01-21 RX ORDER — DIPHENHYDRAMINE HCL 25 MG
25 CAPSULE ORAL
COMMUNITY
End: 2019-08-19

## 2019-01-21 RX ORDER — CEPHALEXIN 500 MG/1
500 CAPSULE ORAL 3 TIMES DAILY
Qty: 21 CAP | Refills: 0 | Status: SHIPPED | OUTPATIENT
Start: 2019-01-21 | End: 2019-01-28

## 2019-01-22 NOTE — DISCHARGE INSTRUCTIONS
Patient Education        Urinary Tract Infection in Women: Care Instructions  Your Care Instructions    A urinary tract infection, or UTI, is a general term for an infection anywhere between the kidneys and the urethra (where urine comes out). Most UTIs are bladder infections. They often cause pain or burning when you urinate. UTIs are caused by bacteria and can be cured with antibiotics. Be sure to complete your treatment so that the infection goes away. Follow-up care is a key part of your treatment and safety. Be sure to make and go to all appointments, and call your doctor if you are having problems. It's also a good idea to know your test results and keep a list of the medicines you take. How can you care for yourself at home? · Take your antibiotics as directed. Do not stop taking them just because you feel better. You need to take the full course of antibiotics. · Drink extra water and other fluids for the next day or two. This may help wash out the bacteria that are causing the infection. (If you have kidney, heart, or liver disease and have to limit fluids, talk with your doctor before you increase your fluid intake.)  · Avoid drinks that are carbonated or have caffeine. They can irritate the bladder. · Urinate often. Try to empty your bladder each time. · To relieve pain, take a hot bath or lay a heating pad set on low over your lower belly or genital area. Never go to sleep with a heating pad in place. To prevent UTIs  · Drink plenty of water each day. This helps you urinate often, which clears bacteria from your system. (If you have kidney, heart, or liver disease and have to limit fluids, talk with your doctor before you increase your fluid intake.)  · Urinate when you need to. · Urinate right after you have sex. · Change sanitary pads often. · Avoid douches, bubble baths, feminine hygiene sprays, and other feminine hygiene products that have deodorants.   · After going to the bathroom, wipe from front to back. When should you call for help? Call your doctor now or seek immediate medical care if:    · Symptoms such as fever, chills, nausea, or vomiting get worse or appear for the first time.     · You have new pain in your back just below your rib cage. This is called flank pain.     · There is new blood or pus in your urine.     · You have any problems with your antibiotic medicine.    Watch closely for changes in your health, and be sure to contact your doctor if:    · You are not getting better after taking an antibiotic for 2 days.     · Your symptoms go away but then come back. Where can you learn more? Go to http://yani-jori.info/. Enter F163 in the search box to learn more about \"Urinary Tract Infection in Women: Care Instructions. \"  Current as of: March 20, 2018  Content Version: 11.9  © 4851-2261 [employer] by [employer]. Care instructions adapted under license by [employer] (which disclaims liability or warranty for this information). If you have questions about a medical condition or this instruction, always ask your healthcare professional. [place] [address] any warranty or liability for your use of this information. Patient Education        Vaginal Bleeding After Menopause: Care Instructions  Your Care Instructions    Vaginal bleeding after menopause can have many causes. Causes may include infection, inflammation, prescription hormones, abnormal growths, and injury. Your doctor may want you to have more tests to find the cause of your vaginal bleeding. Follow-up care is a key part of your treatment and safety. Be sure to make and go to all appointments, and call your doctor if you are having problems. It's also a good idea to know your test results and keep a list of the medicines you take. How can you care for yourself at home? · If your doctor gave you medicine, take it exactly as prescribed.  Call your doctor if you think you are having a problem with your medicine. · Do not have sex or put anything inside your vagina until you talk with your doctor. · Do not douche. When should you call for help? Call 911 anytime you think you may need emergency care. For example, call if:    · You passed out (lost consciousness).    Call your doctor now or seek immediate medical care if:    · You have severe vaginal bleeding.     · You are dizzy or lightheaded, or you feel like you may faint.     · You have new or worse belly or pelvic pain.    Watch closely for changes in your health, and be sure to contact your doctor if:    · Your bleeding gets worse.     · You think you might be pregnant.     · You do not get better as expected. Where can you learn more? Go to http://yani-jori.info/. Enter N304 in the search box to learn more about \"Vaginal Bleeding After Menopause: Care Instructions. \"  Current as of: May 14, 2018  Content Version: 11.9  © 3192-7577 [employer]. Care instructions adapted under license by [employer] (which disclaims liability or warranty for this information). If you have questions about a medical condition or this instruction, always ask your healthcare professional. [address] any warranty or liability for your use of this information.

## 2019-01-22 NOTE — ED NOTES
Dr. Mortensen Re gave and reviewed discharge instructions with the patient. The patient verbalized understanding. The patient was given opportunity for questions. Patient discharged in stable condition to the waiting room via wheelchair with female and male visitors.

## 2019-01-22 NOTE — ED NOTES
Assumed care of pt. from triage. Pt. Presents to the ED with chief complaint of headache, and rectal bleeding that started this am. Pt was sent by [facility] because \"she had rectal bleeding start this morning and her blood pressure was high\" according to the facility. Pt reports taking Eliquis for history of strokes. Pt wears 2L O2 at baseline. Pt. Is A/O x 4. Pt. Denies any other symptoms at this time. Pt. Resting comfortably on the stretcher in a position of comfort. Pt. In no acute distress at this time. Call bell within reach. Side rails x 2. Cardiac monitor x 2. Stretcher locked in the lowest position. Pt. Aware of plan to await for MD/PA-C/NP assessment, and patient/family verbalizes understanding. Will continue to monitor.

## 2019-01-22 NOTE — ED NOTES
Upon examination with Dr. Shahida Hernández, pt has vaginal bleeding not rectal bleeding. Pt's pad covered with light pink blood.

## 2019-01-22 NOTE — ED PROVIDER NOTES
EMERGENCY DEPARTMENT HISTORY AND PHYSICAL EXAM 
 
 
Date: 1/21/2019 Patient Name: King Leyva History of Presenting Illness Chief Complaint Patient presents with  Abdominal Pain Presents to the ED via EMS, from [facility], with c/o abdominal pain and rectal bleeding that started this am. Is currently taking Eloquis  Rectal Bleeding History Provided By: Patient and Patient's Sister HPI: King Leyva, 80 y.o. female with PMHx significant for HTN, COPD, TIA, peripheral artery disease, anxiety, NSTEMI, presents via EMS to the ED with cc of new onset moderate vaginal bleeding, ongoing for several hours. Pt reports HA alongside cc. Pt states that she first noticed the bleeding after she was transferring into a chair from toilet and \"malodorous bloody urine gushed out\". Per sister, pt is incontinent as baseline. She endorses to being on Eliquis and 2 L of O2 at home. Pt denies any alleviating or exacerbating factors. She specifically denies any SOB, nausea, vomiting, fevers, chills, abdominal pain, cough, urinary sxs or diarrhea. There are no other complaints, changes, or physical findings at this time. PCP: Nataliya Michael MD 
SHx: (+)former smoker, (+)EtOH use:occasional, (-)illicit drug use No current facility-administered medications on file prior to encounter. Current Outpatient Medications on File Prior to Encounter Medication Sig Dispense Refill  senna/docusate sodium (SENNA-C PLUS PO) Take  by mouth two (2) times daily as needed.  diphenhydrAMINE (BENADRYL) 25 mg capsule Take 25 mg by mouth every six (6) hours as needed.  benzocaine-resorcinol (VAGISIL) 5-2 % topical cream Apply  to affected area two (2) times daily as needed for Itching.  sorbitol 70 % solution Take 30 mL by mouth daily as needed.  polyethylene glycol (MIRALAX) 17 gram/dose powder Take 17 g by mouth daily.  ondansetron (ZOFRAN ODT) 4 mg disintegrating tablet Take 1 Tab by mouth every eight (8) hours as needed for Nausea. 10 Tab 0  
 atorvastatin (LIPITOR) 40 mg tablet Take 1 Tab by mouth nightly. 30 Tab 0  
 apixaban (ELIQUIS) 2.5 mg tablet Take 1 Tab by mouth two (2) times a day. 30 Tab 0  
 furosemide (LASIX) 40 mg tablet Take 1 Tab by mouth two (2) times a day. 60 Tab 0  
 metoprolol succinate (TOPROL-XL) 25 mg XL tablet Take 1 Tab by mouth daily. 30 Tab 0  
 amLODIPine (NORVASC) 5 mg tablet Take 5 mg by mouth daily.  oxybutynin chloride XL (DITROPAN XL) 5 mg CR tablet Take 5 mg by mouth daily. For overactive bladder  pantoprazole (PROTONIX) 20 mg tablet Take 20 mg by mouth Before breakfast and dinner. For GERD  promethazine (PHENERGAN) 12.5 mg tablet Take 12.5 mg by mouth every six (6) hours as needed for Nausea.  acetaminophen (TYLENOL) 325 mg tablet Take 650 mg by mouth every four (4) hours as needed for Pain.  albuterol-ipratropium (DUO-NEB) 2.5 mg-0.5 mg/3 ml nebu 3 mL by Nebulization route every four (4) hours as needed (Shortness of Breath).  ammonium lactate (LAC-HYDRIN) 12 % lotion Apply  to affected area every twelve (12) hours as needed (Apply to chest, back, arms as needed for eczema). rub in to affected area well  diphenhydrAMINE-zinc acetate 1%-0.1% (BENADRYL) topical cream Apply  to affected area every six (6) hours as needed for Itching.  sodium phosphate (FLEET ENEMA) 19-7 gram/118 mL enema Insert 1 Enema into rectum. Every 120 hours as needed for constipation if no results from suppository  cetirizine (ZYRTEC) 10 mg tablet Take 10 mg by mouth nightly. For itching  clobetasol (TEMOVATE) 0.05 % topical cream Apply  to affected area every Monday, Wednesday, Friday. Apply to vulva at night for pain and itching  olopatadine (PATANOL) 0.1 % ophthalmic solution Administer 1 Drop to both eyes two (2) times a day.  B.infantis-B.ani-B.long-B.bifi (PROBIOTIC 4X) 10-15 mg TbEC Take 1 Tab by mouth two (2) times a day.  albuterol (PROVENTIL HFA, VENTOLIN HFA, PROAIR HFA) 90 mcg/actuation inhaler Take 2 Puffs by inhalation every six (6) hours as needed (for COPD).  MULTIVITS W-FE,OTHER MIN/LUT (CENTRUM SILVER ULTRA WOMEN'S PO) Take 1 Tab by mouth daily. Past History Past Medical History: 
Past Medical History:  
Diagnosis Date  Arthritis   
 generalized  COPD  GERD (gastroesophageal reflux disease)  Hypertension  Ill-defined condition Vitamin deficiency  NSTEMI (non-ST elevated myocardial infarction) ([facility] [address] 5/1/2018  Other ill-defined conditions(799.89)   
 high cholesterol  Peripheral artery disease ([facility] [address]  Psychiatric disorder Anxiety disorder  Sleep disorder Insomnia  TIA (transient ischemic attack) Past Surgical History: 
Past Surgical History:  
Procedure Laterality Date  HX CAROTID ENDARTERECTOMY    
 left 2 or 3 years ago.  HX OTHER SURGICAL    
 colonoscopy  HX TONSILLECTOMY Family History: 
Family History Problem Relation Age of Onset 24 [facility] Other Mother   
     carotid endarterectomy/cardiomegaly  Other Father   
     carotid endarterectomy Social History: 
Social History Tobacco Use  Smoking status: Former Smoker  Smokeless tobacco: Never Used  Tobacco comment: quit 3 years ago Substance Use Topics  Alcohol use: Yes Comment: glass of wine rarely  Drug use: Yes Types: Prescription, OTC Allergies: Allergies Allergen Reactions  Penicillins Swelling  Sulfa (Sulfonamide Antibiotics) Itching  Propoxyphene-Acetaminophen Unknown (comments) Pt. Not sure, thinks it was hallucination.  Sulfadiazine Unknown (comments) Review of Systems Review of Systems Constitutional: Negative. Negative for chills, fatigue and fever. HENT: Negative. Eyes: Negative. Respiratory: Negative for cough, shortness of breath and wheezing. Cardiovascular: Negative for chest pain and leg swelling. Gastrointestinal: Negative. Negative for abdominal pain, blood in stool, constipation, diarrhea, nausea and vomiting. Genitourinary: Positive for vaginal bleeding. Negative for difficulty urinating and dysuria. Musculoskeletal: Negative. Skin: Negative. Negative for rash. Neurological: Positive for headaches. Negative for dizziness, weakness, light-headedness and numbness. Psychiatric/Behavioral: Negative. All other systems reviewed and are negative. Physical Exam  
Physical Exam  
Constitutional: She is oriented to person, place, and time. She appears well-developed and well-nourished. HENT:  
Head: Normocephalic and atraumatic. Mouth/Throat: Mucous membranes are normal.  
Eyes: EOM are normal. Pupils are equal, round, and reactive to light. Neck: Normal range of motion. No JVD present. No tracheal deviation present. Cardiovascular: Normal rate, regular rhythm, normal heart sounds and intact distal pulses. Exam reveals no gallop and no friction rub. No murmur heard. Pulmonary/Chest: Effort normal and breath sounds normal. No stridor. She has no wheezes. She has no rales. Abdominal: Soft. Bowel sounds are normal. She exhibits no distension and no mass. There is no tenderness. There is no guarding. Genitourinary:  
Genitourinary Comments: Visible vaginal bleeding on external general exam  
Musculoskeletal: Normal range of motion. She exhibits no edema or tenderness. Neurological: She is alert and oriented to person, place, and time. Skin: Skin is warm and dry. No rash noted. Psychiatric: She has a normal mood and affect. Her behavior is normal. Judgment and thought content normal.  
 
Diagnostic Study Results Labs - Recent Results (from the past 12 hour(s)) CBC WITH AUTOMATED DIFF  Collection Time: 01/21/19  3:17 PM  
 Result Value Ref Range WBC 9.7 3.6 - 11.0 K/uL  
 RBC 4.40 3.80 - 5.20 M/uL  
 HGB 13.5 11.5 - 16.0 g/dL HCT 42.2 35.0 - 47.0 % MCV 95.9 80.0 - 99.0 FL  
 MCH 30.7 26.0 - 34.0 PG  
 MCHC 32.0 30.0 - 36.5 g/dL  
 RDW 13.1 11.5 - 14.5 % PLATELET 241 058 - 760 K/uL MPV 11.3 8.9 - 12.9 FL  
 NRBC 0.0 0  WBC ABSOLUTE NRBC 0.00 0.00 - 0.01 K/uL NEUTROPHILS 67 32 - 75 % LYMPHOCYTES 20 12 - 49 % MONOCYTES 10 5 - 13 % EOSINOPHILS 2 0 - 7 % BASOPHILS 1 0 - 1 % IMMATURE GRANULOCYTES 0 0.0 - 0.5 % ABS. NEUTROPHILS 6.5 1.8 - 8.0 K/UL  
 ABS. LYMPHOCYTES 1.9 0.8 - 3.5 K/UL  
 ABS. MONOCYTES 1.0 0.0 - 1.0 K/UL  
 ABS. EOSINOPHILS 0.1 0.0 - 0.4 K/UL  
 ABS. BASOPHILS 0.1 0.0 - 0.1 K/UL  
 ABS. IMM. GRANS. 0.0 0.00 - 0.04 K/UL  
 DF AUTOMATED METABOLIC PANEL, COMPREHENSIVE Collection Time: 01/21/19  3:17 PM  
Result Value Ref Range Sodium 140 136 - 145 mmol/L Potassium 3.3 (L) 3.5 - 5.1 mmol/L Chloride 102 97 - 108 mmol/L  
 CO2 32 21 - 32 mmol/L Anion gap 6 5 - 15 mmol/L Glucose 101 (H) 65 - 100 mg/dL BUN 14 6 - 20 MG/DL Creatinine 1.15 (H) 0.55 - 1.02 MG/DL  
 BUN/Creatinine ratio 12 12 - 20 GFR est AA 54 (L) >60 ml/min/1.73m2 GFR est non-AA 44 (L) >60 ml/min/1.73m2 Calcium 8.6 8.5 - 10.1 MG/DL Bilirubin, total 0.4 0.2 - 1.0 MG/DL  
 ALT (SGPT) 17 12 - 78 U/L  
 AST (SGOT) 22 15 - 37 U/L Alk. phosphatase 100 45 - 117 U/L Protein, total 7.9 6.4 - 8.2 g/dL Albumin 3.4 (L) 3.5 - 5.0 g/dL Globulin 4.5 (H) 2.0 - 4.0 g/dL A-G Ratio 0.8 (L) 1.1 - 2.2 URINALYSIS W/ REFLEX CULTURE Collection Time: 01/21/19  9:35 PM  
Result Value Ref Range Color YELLOW/STRAW Appearance CLOUDY (A) CLEAR Specific gravity 1.016 1.003 - 1.030    
 pH (UA) 6.0 5.0 - 8.0 Protein 30 (A) NEG mg/dL Glucose NEGATIVE  NEG mg/dL Ketone NEGATIVE  NEG mg/dL Bilirubin NEGATIVE  NEG  Blood SMALL (A) NEG    
 Urobilinogen 0.2 0.2 - 1.0 EU/dL Nitrites POSITIVE (A) NEG Leukocyte Esterase LARGE (A) NEG    
 WBC 20-50 0 - 4 /hpf  
 RBC 5-10 0 - 5 /hpf Epithelial cells FEW FEW /lpf Bacteria 4+ (A) NEG /hpf  
 UA:UC IF INDICATED URINE CULTURE ORDERED (A) CNI Radiologic Studies -  
US TRANSVAGINAL Final Result IMPRESSION:   
Diffuse endometrial thickening. Small amount of fluid in the endometrial cavity. Medical Decision Making I am the first provider for this patient. I reviewed the vital signs, available nursing notes, past medical history, past surgical history, family history and social history. Vital Signs-Reviewed the patient's vital signs. Patient Vitals for the past 12 hrs: 
 Temp Pulse Resp BP SpO2  
01/21/19 2242     98 % 01/21/19 2045    (!) 179/99 95 % 01/21/19 2030    (!) 174/95 97 % 01/21/19 2015    187/89 98 % 01/21/19 2008     96 % 01/21/19 2005    (!) 150/95   
01/21/19 1740  78 16 (!) 178/112 95 % 01/21/19 1502 97.4 °F (36.3 °C) 70 16 (!) 200/93 96 % Pulse Oximetry Analysis - 95% on RA Records Reviewed: Nursing Notes, Old Medical Records and Previous Laboratory Studies Provider Notes (Medical Decision Making): Pt presenting with post-menopausal bleeding, otherwise has no complaints. DDx includes uterine fibroids, malignancy, cervical polyps, anemia. Pt is on Eliquis. Will check labs, transvaginal US. ED Course:  
Initial assessment performed. The patients presenting problems have been discussed, and they are in agreement with the care plan formulated and outlined with them. I have encouraged them to ask questions as they arise throughout their visit. Consult Note: 
10:01 PM 
Edd San DO spoke with Malcolm Cardoza MD 
Specialty: [facility] Discussed pt's hx, disposition, and available diagnostic and imaging results. Reviewed care plans.  Consultant states that if bleeding is minimal she can continue on eliquis and call office for an appointment to f/u. 
 
10:06 PM 
Updated pt on labs and imaging results. Critical Care Time: 0 Disposition: 
Discharge Note: 
11:11 PM 
The pt is ready for discharge. The pt's signs, symptoms, diagnosis, and discharge instructions have been discussed and pt has conveyed their understanding. The pt is to follow up as recommended or return to ER should their symptoms worsen. Plan has been discussed and pt is in agreement. PLAN: 
1. Discharge home Discharge Medication List as of 1/21/2019 10:17 PM  
  
START taking these medications Details  
cephALEXin (KEFLEX) 500 mg capsule Take 1 Cap by mouth three (3) times daily for 7 days. , Normal, Disp-21 Cap, R-0  
  
  
CONTINUE these medications which have NOT CHANGED Details  
senna/docusate sodium (SENNA-C PLUS PO) Take  by mouth two (2) times daily as needed., Historical Med  
  
diphenhydrAMINE (BENADRYL) 25 mg capsule Take 25 mg by mouth every six (6) hours as needed., Historical Med  
  
benzocaine-resorcinol (VAGISIL) 5-2 % topical cream Apply  to affected area two (2) times daily as needed for Itching., Historical Med  
  
sorbitol 70 % solution Take 30 mL by mouth daily as needed., Historical Med  
  
polyethylene glycol (MIRALAX) 17 gram/dose powder Take 17 g by mouth daily. , Historical Med  
  
ondansetron (ZOFRAN ODT) 4 mg disintegrating tablet Take 1 Tab by mouth every eight (8) hours as needed for Nausea. , Print, Disp-10 Tab, R-0  
  
atorvastatin (LIPITOR) 40 mg tablet Take 1 Tab by mouth nightly., No Print, Disp-30 Tab, R-0  
  
apixaban (ELIQUIS) 2.5 mg tablet Take 1 Tab by mouth two (2) times a day., No Print, Disp-30 Tab, R-0  
  
furosemide (LASIX) 40 mg tablet Take 1 Tab by mouth two (2) times a day., No Print, Disp-60 Tab, R-0  
  
metoprolol succinate (TOPROL-XL) 25 mg XL tablet Take 1 Tab by mouth daily. , No Print, Disp-30 Tab, R-0  
  
 amLODIPine (NORVASC) 5 mg tablet Take 5 mg by mouth daily. , Historical Med  
  
oxybutynin chloride XL (DITROPAN XL) 5 mg CR tablet Take 5 mg by mouth daily. For overactive bladder, Historical Med  
  
pantoprazole (PROTONIX) 20 mg tablet Take 20 mg by mouth Before breakfast and dinner. For GERD, Historical Med  
  
promethazine (PHENERGAN) 12.5 mg tablet Take 12.5 mg by mouth every six (6) hours as needed for Nausea., Historical Med  
  
acetaminophen (TYLENOL) 325 mg tablet Take 650 mg by mouth every four (4) hours as needed for Pain., Historical Med  
  
albuterol-ipratropium (DUO-NEB) 2.5 mg-0.5 mg/3 ml nebu 3 mL by Nebulization route every four (4) hours as needed (Shortness of Breath). , Historical Med  
  
ammonium lactate (LAC-HYDRIN) 12 % lotion Apply  to affected area every twelve (12) hours as needed (Apply to chest, back, arms as needed for eczema). rub in to affected area well, Historical Med  
  
diphenhydrAMINE-zinc acetate 1%-0.1% (BENADRYL) topical cream Apply  to affected area every six (6) hours as needed for Itching., Historical Med  
  
sodium phosphate (FLEET ENEMA) 19-7 gram/118 mL enema Insert 1 Enema into rectum. Every 120 hours as needed for constipation if no results from suppository, Historical Med  
  
cetirizine (ZYRTEC) 10 mg tablet Take 10 mg by mouth nightly. For itching, Historical Med  
  
clobetasol (TEMOVATE) 0.05 % topical cream Apply  to affected area every Monday, Wednesday, Friday. Apply to vulva at night for pain and itching, Historical Med  
  
olopatadine (PATANOL) 0.1 % ophthalmic solution Administer 1 Drop to both eyes two (2) times a day., Historical Med  
  
B.infantis-B.ani-B.long-B.bifi (PROBIOTIC 4X) 10-15 mg TbEC Take 1 Tab by mouth two (2) times a day., Historical Med  
  
albuterol (PROVENTIL HFA, VENTOLIN HFA, PROAIR HFA) 90 mcg/actuation inhaler Take 2 Puffs by inhalation every six (6) hours as needed (for COPD). , Historical Med  
  
 MULTIVITS W-FE,OTHER MIN/LUT (CENTRUM SILVER ULTRA WOMEN'S PO) Take 1 Tab by mouth daily. , Historical Med 2. Follow-up Information Follow up With Specialties Details Why Contact Info Juan Diego Carlos MD Gynecologic Oncology Schedule an appointment as soon as possible for a visit in 1 day  [address] [facility] [address] [facility] 
[phone] Andrew Spencer MD Internal Medicine Schedule an appointment as soon as possible for a visit  [address] [facility] 
[phone] [facility] EMERGENCY DEPT Emergency Medicine  As needed, If symptoms worsen [address] [facility] [address] [facility] 
[phone] Return to ED if worse Diagnosis Clinical Impression: 1. Urinary tract infection with hematuria, site unspecified 2. Post-menopausal bleeding 3. Endometrial thickening on ultrasound Attestations: This note is prepared by Kan Barboza, acting as a Scribe for Ham Henriquez DO. Ham Henriquez DO: The scribe's documentation has been prepared under my direction and personally reviewed by me in its entirety. I confirm that the notes above accurately reflects all work, treatment, procedures, and medical decision making performed by me. This note will not be viewable in [address]

## 2019-01-24 LAB
BACTERIA SPEC CULT: ABNORMAL
CC UR VC: ABNORMAL
SERVICE CMNT-IMP: ABNORMAL

## 2019-08-19 ENCOUNTER — HOSPITAL ENCOUNTER (INPATIENT)
Age: 84
LOS: 3 days | Discharge: HOME | DRG: 291 | End: 2019-08-22
Attending: EMERGENCY MEDICINE | Admitting: GENERAL ACUTE CARE HOSPITAL
Payer: MEDICARE

## 2019-08-19 ENCOUNTER — APPOINTMENT (OUTPATIENT)
Dept: GENERAL RADIOLOGY | Age: 84
DRG: 291 | End: 2019-08-19
Payer: MEDICARE

## 2019-08-19 DIAGNOSIS — R53.83 MALAISE AND FATIGUE: ICD-10-CM

## 2019-08-19 DIAGNOSIS — I50.9 ACUTE ON CHRONIC CONGESTIVE HEART FAILURE, UNSPECIFIED HEART FAILURE TYPE (HCC): Primary | ICD-10-CM

## 2019-08-19 DIAGNOSIS — R53.81 MALAISE AND FATIGUE: ICD-10-CM

## 2019-08-19 DIAGNOSIS — N30.00 ACUTE CYSTITIS WITHOUT HEMATURIA: ICD-10-CM

## 2019-08-19 DIAGNOSIS — J44.9 CHRONIC OBSTRUCTIVE PULMONARY DISEASE, UNSPECIFIED COPD TYPE (HCC): ICD-10-CM

## 2019-08-19 LAB
ALBUMIN SERPL-MCNC: 3.5 G/DL (ref 3.5–5)
ALBUMIN/GLOB SERPL: 0.8 {RATIO} (ref 1.1–2.2)
ALP SERPL-CCNC: 77 U/L (ref 45–117)
ALT SERPL-CCNC: 19 U/L (ref 12–78)
ANION GAP SERPL CALC-SCNC: 5 MMOL/L (ref 5–15)
APPEARANCE UR: CLEAR
ARTERIAL PATENCY WRIST A: ABNORMAL
AST SERPL-CCNC: 19 U/L (ref 15–37)
ATRIAL RATE: 84 BPM
BACTERIA URNS QL MICRO: ABNORMAL /HPF
BASE EXCESS BLDV CALC-SCNC: 5 MMOL/L
BASOPHILS # BLD: 0.1 K/UL (ref 0–0.1)
BASOPHILS NFR BLD: 1 % (ref 0–1)
BDY SITE: ABNORMAL
BILIRUB SERPL-MCNC: 0.8 MG/DL (ref 0.2–1)
BILIRUB UR QL: NEGATIVE
BNP SERPL-MCNC: 3189 PG/ML
BUN SERPL-MCNC: 12 MG/DL (ref 6–20)
BUN/CREAT SERPL: 12 (ref 12–20)
CALCIUM SERPL-MCNC: 8.9 MG/DL (ref 8.5–10.1)
CALCULATED P AXIS, ECG09: 15 DEGREES
CALCULATED R AXIS, ECG10: -53 DEGREES
CALCULATED T AXIS, ECG11: 85 DEGREES
CHLORIDE SERPL-SCNC: 104 MMOL/L (ref 97–108)
CK SERPL-CCNC: 74 U/L (ref 26–192)
CO2 SERPL-SCNC: 31 MMOL/L (ref 21–32)
COLOR UR: ABNORMAL
CREAT SERPL-MCNC: 1 MG/DL (ref 0.55–1.02)
DIAGNOSIS, 93000: NORMAL
DIFFERENTIAL METHOD BLD: ABNORMAL
EOSINOPHIL # BLD: 0 K/UL (ref 0–0.4)
EOSINOPHIL NFR BLD: 0 % (ref 0–7)
EPITH CASTS URNS QL MICRO: ABNORMAL /LPF
ERYTHROCYTE [DISTWIDTH] IN BLOOD BY AUTOMATED COUNT: 13.2 % (ref 11.5–14.5)
GAS FLOW.O2 O2 DELIVERY SYS: ABNORMAL L/MIN
GLOBULIN SER CALC-MCNC: 4.2 G/DL (ref 2–4)
GLUCOSE SERPL-MCNC: 98 MG/DL (ref 65–100)
GLUCOSE UR STRIP.AUTO-MCNC: NEGATIVE MG/DL
HCO3 BLDV-SCNC: 30.4 MMOL/L (ref 23–28)
HCT VFR BLD AUTO: 41.7 % (ref 35–47)
HGB BLD-MCNC: 13.5 G/DL (ref 11.5–16)
HGB UR QL STRIP: ABNORMAL
IMM GRANULOCYTES # BLD AUTO: 0.1 K/UL (ref 0–0.04)
IMM GRANULOCYTES NFR BLD AUTO: 1 % (ref 0–0.5)
KETONES UR QL STRIP.AUTO: NEGATIVE MG/DL
LACTATE BLD-SCNC: 0.81 MMOL/L (ref 0.4–2)
LEUKOCYTE ESTERASE UR QL STRIP.AUTO: ABNORMAL
LYMPHOCYTES # BLD: 1.5 K/UL (ref 0.8–3.5)
LYMPHOCYTES NFR BLD: 12 % (ref 12–49)
MCH RBC QN AUTO: 32.3 PG (ref 26–34)
MCHC RBC AUTO-ENTMCNC: 32.4 G/DL (ref 30–36.5)
MCV RBC AUTO: 99.8 FL (ref 80–99)
MONOCYTES # BLD: 1.2 K/UL (ref 0–1)
MONOCYTES NFR BLD: 10 % (ref 5–13)
NEUTS SEG # BLD: 9.1 K/UL (ref 1.8–8)
NEUTS SEG NFR BLD: 76 % (ref 32–75)
NITRITE UR QL STRIP.AUTO: POSITIVE
NRBC # BLD: 0 K/UL (ref 0–0.01)
NRBC BLD-RTO: 0 PER 100 WBC
P-R INTERVAL, ECG05: 160 MS
PCO2 BLDV: 52.5 MMHG (ref 41–51)
PH BLDV: 7.37 [PH] (ref 7.32–7.42)
PH UR STRIP: 6.5 [PH] (ref 5–8)
PLATELET # BLD AUTO: 246 K/UL (ref 150–400)
PMV BLD AUTO: 10.7 FL (ref 8.9–12.9)
PO2 BLDV: 33 MMHG (ref 25–40)
POTASSIUM SERPL-SCNC: 3.6 MMOL/L (ref 3.5–5.1)
PROT SERPL-MCNC: 7.7 G/DL (ref 6.4–8.2)
PROT UR STRIP-MCNC: NEGATIVE MG/DL
Q-T INTERVAL, ECG07: 422 MS
QRS DURATION, ECG06: 146 MS
QTC CALCULATION (BEZET), ECG08: 498 MS
RBC # BLD AUTO: 4.18 M/UL (ref 3.8–5.2)
RBC #/AREA URNS HPF: >100 /HPF (ref 0–5)
SAO2 % BLDV: 60 % (ref 65–88)
SODIUM SERPL-SCNC: 140 MMOL/L (ref 136–145)
SP GR UR REFRACTOMETRY: 1.01 (ref 1–1.03)
SPECIMEN TYPE: ABNORMAL
TROPONIN I SERPL-MCNC: 0.14 NG/ML
TROPONIN I SERPL-MCNC: 0.14 NG/ML
UA: UC IF INDICATED,UAUC: ABNORMAL
UROBILINOGEN UR QL STRIP.AUTO: 0.2 EU/DL (ref 0.2–1)
VENTRICULAR RATE, ECG03: 84 BPM
WBC # BLD AUTO: 12 K/UL (ref 3.6–11)
WBC URNS QL MICRO: ABNORMAL /HPF (ref 0–4)

## 2019-08-19 PROCEDURE — 87186 SC STD MICRODIL/AGAR DIL: CPT

## 2019-08-19 PROCEDURE — 87077 CULTURE AEROBIC IDENTIFY: CPT

## 2019-08-19 PROCEDURE — 77030038269 HC DRN EXT URIN PURWCK BARD -A

## 2019-08-19 PROCEDURE — 74011250637 HC RX REV CODE- 250/637: Performed by: GENERAL ACUTE CARE HOSPITAL

## 2019-08-19 PROCEDURE — 80053 COMPREHEN METABOLIC PANEL: CPT

## 2019-08-19 PROCEDURE — 87086 URINE CULTURE/COLONY COUNT: CPT

## 2019-08-19 PROCEDURE — 96365 THER/PROPH/DIAG IV INF INIT: CPT

## 2019-08-19 PROCEDURE — 85025 COMPLETE CBC W/AUTO DIFF WBC: CPT

## 2019-08-19 PROCEDURE — 77010033678 HC OXYGEN DAILY

## 2019-08-19 PROCEDURE — 82550 ASSAY OF CK (CPK): CPT

## 2019-08-19 PROCEDURE — 96367 TX/PROPH/DG ADDL SEQ IV INF: CPT

## 2019-08-19 PROCEDURE — 74011250637 HC RX REV CODE- 250/637: Performed by: FAMILY MEDICINE

## 2019-08-19 PROCEDURE — 81001 URINALYSIS AUTO W/SCOPE: CPT

## 2019-08-19 PROCEDURE — 83880 ASSAY OF NATRIURETIC PEPTIDE: CPT

## 2019-08-19 PROCEDURE — 93005 ELECTROCARDIOGRAM TRACING: CPT

## 2019-08-19 PROCEDURE — 77030029684 HC NEB SM VOL KT MONA -A

## 2019-08-19 PROCEDURE — 96375 TX/PRO/DX INJ NEW DRUG ADDON: CPT

## 2019-08-19 PROCEDURE — 74011000250 HC RX REV CODE- 250: Performed by: PHYSICIAN ASSISTANT

## 2019-08-19 PROCEDURE — 82803 BLOOD GASES ANY COMBINATION: CPT

## 2019-08-19 PROCEDURE — 71046 X-RAY EXAM CHEST 2 VIEWS: CPT

## 2019-08-19 PROCEDURE — 74011000258 HC RX REV CODE- 258: Performed by: EMERGENCY MEDICINE

## 2019-08-19 PROCEDURE — 74011250636 HC RX REV CODE- 250/636: Performed by: EMERGENCY MEDICINE

## 2019-08-19 PROCEDURE — 84484 ASSAY OF TROPONIN QUANT: CPT

## 2019-08-19 PROCEDURE — 94640 AIRWAY INHALATION TREATMENT: CPT

## 2019-08-19 PROCEDURE — 83605 ASSAY OF LACTIC ACID: CPT

## 2019-08-19 PROCEDURE — 99285 EMERGENCY DEPT VISIT HI MDM: CPT

## 2019-08-19 PROCEDURE — 87040 BLOOD CULTURE FOR BACTERIA: CPT

## 2019-08-19 PROCEDURE — 36415 COLL VENOUS BLD VENIPUNCTURE: CPT

## 2019-08-19 PROCEDURE — 65660000000 HC RM CCU STEPDOWN

## 2019-08-19 RX ORDER — POTASSIUM CHLORIDE 750 MG/1
10 TABLET, FILM COATED, EXTENDED RELEASE ORAL 2 TIMES DAILY
COMMUNITY

## 2019-08-19 RX ORDER — ATORVASTATIN CALCIUM 40 MG/1
40 TABLET, FILM COATED ORAL
Status: DISCONTINUED | OUTPATIENT
Start: 2019-08-19 | End: 2019-08-22 | Stop reason: HOSPADM

## 2019-08-19 RX ORDER — AMLODIPINE BESYLATE 5 MG/1
5 TABLET ORAL DAILY
Status: DISCONTINUED | OUTPATIENT
Start: 2019-08-20 | End: 2019-08-22 | Stop reason: HOSPADM

## 2019-08-19 RX ORDER — FUROSEMIDE 10 MG/ML
40 INJECTION INTRAMUSCULAR; INTRAVENOUS
Status: COMPLETED | OUTPATIENT
Start: 2019-08-19 | End: 2019-08-19

## 2019-08-19 RX ORDER — IPRATROPIUM BROMIDE AND ALBUTEROL SULFATE 2.5; .5 MG/3ML; MG/3ML
3 SOLUTION RESPIRATORY (INHALATION)
Status: COMPLETED | OUTPATIENT
Start: 2019-08-19 | End: 2019-08-19

## 2019-08-19 RX ORDER — LANOLIN ALCOHOL/MO/W.PET/CERES
3 CREAM (GRAM) TOPICAL
Status: DISCONTINUED | OUTPATIENT
Start: 2019-08-19 | End: 2019-08-22 | Stop reason: HOSPADM

## 2019-08-19 RX ORDER — AMOXICILLIN 250 MG
2 CAPSULE ORAL 2 TIMES DAILY
COMMUNITY

## 2019-08-19 RX ORDER — ONDANSETRON 2 MG/ML
4 INJECTION INTRAMUSCULAR; INTRAVENOUS
Status: DISCONTINUED | OUTPATIENT
Start: 2019-08-19 | End: 2019-08-22 | Stop reason: HOSPADM

## 2019-08-19 RX ORDER — ONDANSETRON 2 MG/ML
4 INJECTION INTRAMUSCULAR; INTRAVENOUS
Status: DISCONTINUED | OUTPATIENT
Start: 2019-08-19 | End: 2019-08-19

## 2019-08-19 RX ORDER — OXYBUTYNIN CHLORIDE 5 MG/1
5 TABLET, EXTENDED RELEASE ORAL DAILY
Status: DISCONTINUED | OUTPATIENT
Start: 2019-08-20 | End: 2019-08-22 | Stop reason: HOSPADM

## 2019-08-19 RX ORDER — FUROSEMIDE 10 MG/ML
40 INJECTION INTRAMUSCULAR; INTRAVENOUS DAILY
Status: DISCONTINUED | OUTPATIENT
Start: 2019-08-20 | End: 2019-08-20

## 2019-08-19 RX ORDER — SODIUM CHLORIDE 0.9 % (FLUSH) 0.9 %
5-40 SYRINGE (ML) INJECTION EVERY 8 HOURS
Status: DISCONTINUED | OUTPATIENT
Start: 2019-08-19 | End: 2019-08-22 | Stop reason: HOSPADM

## 2019-08-19 RX ORDER — METOPROLOL SUCCINATE 25 MG/1
25 TABLET, EXTENDED RELEASE ORAL DAILY
Status: DISCONTINUED | OUTPATIENT
Start: 2019-08-20 | End: 2019-08-22 | Stop reason: HOSPADM

## 2019-08-19 RX ORDER — SODIUM CHLORIDE 0.9 % (FLUSH) 0.9 %
5-40 SYRINGE (ML) INJECTION AS NEEDED
Status: DISCONTINUED | OUTPATIENT
Start: 2019-08-19 | End: 2019-08-22 | Stop reason: HOSPADM

## 2019-08-19 RX ADMIN — FUROSEMIDE 40 MG: 10 INJECTION, SOLUTION INTRAMUSCULAR; INTRAVENOUS at 14:08

## 2019-08-19 RX ADMIN — IPRATROPIUM BROMIDE AND ALBUTEROL SULFATE 3 ML: .5; 3 SOLUTION RESPIRATORY (INHALATION) at 11:04

## 2019-08-19 RX ADMIN — Medication 10 ML: at 21:42

## 2019-08-19 RX ADMIN — Medication 10 ML: at 17:52

## 2019-08-19 RX ADMIN — AZITHROMYCIN MONOHYDRATE 500 MG: 500 INJECTION, POWDER, LYOPHILIZED, FOR SOLUTION INTRAVENOUS at 15:42

## 2019-08-19 RX ADMIN — ATORVASTATIN CALCIUM 40 MG: 40 TABLET, FILM COATED ORAL at 21:38

## 2019-08-19 RX ADMIN — CEFTRIAXONE 1 G: 1 INJECTION, POWDER, FOR SOLUTION INTRAMUSCULAR; INTRAVENOUS at 14:15

## 2019-08-19 RX ADMIN — MELATONIN TAB 3 MG 3 MG: 3 TAB at 21:38

## 2019-08-19 RX ADMIN — APIXABAN 2.5 MG: 2.5 TABLET, FILM COATED ORAL at 21:38

## 2019-08-19 NOTE — PROGRESS NOTES
Pharmacy Clarification of Prior to Admission Medication Regimen-Follow Up Needed    The patient was not interviewed regarding clarification of the prior to admission medication regimen. Patient was transferred from [facility], to [facility], with a current med list. Pharmacy called [facility], [phone], several times and was unable to speak with a nurse to verify the patient's last administered doses. Information Obtained From: transfer papers    Pertinent Pharmacy Findings:  Updated patients preferred outpatient pharmacy to: [facility] did not update the outpatient pharmacy due to the patient living at a SNF  Last does are unknown at this time  The medication history will need to be re-evaluated at a later time during admission when patient is willing/able to participate or if more information is provided. Identified High Alert Medication Information  Current Anticoagulants:  Name: apixaban (ELIQUIS) 2.5 mg tablet    PTA medication list was corrected to the following:     Prior to Admission Medications   Prescriptions Last Dose Informant Patient Reported? Taking? B.infantis-B.ani-B.long-B.bifi (PROBIOTIC 4X) 10-15 mg TbEC Unknown at Unknown time Transfer Papers Yes No   Sig: Take 1 Cap by mouth two (2) times a day. MULTIVITS W-FE,OTHER MIN/LUT (CENTRUM SILVER ULTRA WOMEN'S PO) Unknown at Unknown time Transfer Papers Yes No   Sig: Take 1 Tab by mouth daily. acetaminophen (TYLENOL) 325 mg tablet Unknown at Unknown time Transfer Papers Yes No   Sig: Take 650 mg by mouth every four to six (4-6) hours as needed for Pain. amLODIPine (NORVASC) 5 mg tablet Unknown at Unknown time Transfer Papers Yes No   Sig: Take 5 mg by mouth daily. ammonium lactate (LAC-HYDRIN) 12 % lotion Unknown at Unknown time Transfer Papers Yes No   Sig: Apply  to affected area every twelve (12) hours as needed (Apply to chest, back, arms as needed for eczema).  rub in to affected area well   apixaban (ELIQUIS) 2.5 mg tablet Unknown at Unknown time Transfer Papers No No   Sig: Take 1 Tab by mouth two (2) times a day. atorvastatin (LIPITOR) 40 mg tablet Unknown at Unknown time Transfer Papers No No   Sig: Take 1 Tab by mouth nightly. cetirizine (ZYRTEC) 10 mg tablet Unknown at Unknown time Transfer Papers Yes No   Sig: Take 10 mg by mouth nightly. For itching   clobetasol (TEMOVATE) 0.05 % topical cream Unknown at Unknown time Transfer Papers Yes No   Sig: Apply  to affected area daily. Apply to vulva/inner labia or whitish area   furosemide (LASIX) 40 mg tablet Unknown at Unknown time Transfer Papers No No   Sig: Take 1 Tab by mouth two (2) times a day. metoprolol succinate (TOPROL-XL) 25 mg XL tablet Unknown at Unknown time Transfer Papers No No   Sig: Take 1 Tab by mouth daily. olopatadine (PATANOL) 0.1 % ophthalmic solution Unknown at Unknown time Transfer Papers Yes No   Sig: Administer 1 Drop to both eyes two (2) times a day. oxybutynin chloride XL (DITROPAN XL) 5 mg CR tablet Unknown at Unknown time Transfer Papers Yes No   Sig: Take 5 mg by mouth daily. For overactive bladder   pantoprazole (PROTONIX) 20 mg tablet Unknown at Unknown time Transfer Papers Yes No   Sig: Take 20 mg by mouth Before breakfast and dinner. For GERD   peg 400-propylene glycol (SYSTANE, PROPYLENE GLYCOL,) 0.4-0.3 % drop Unknown at Unknown time Transfer Papers Yes No   Sig: Administer 1 Drop to both eyes two (2) times a day. potassium chloride SR (KLOR-CON 10) 10 mEq tablet Unknown at Unknown time Transfer Papers Yes No   Sig: Take 10 mEq by mouth two (2) times a day. senna-docusate (PERICOLACE) 8.6-50 mg per tablet Unknown at Unknown time Transfer Papers Yes No   Sig: Take 2 Tabs by mouth two (2) times a day.       Facility-Administered Medications: None          Thank you,  Sergio Mederos, [facility]  Medication History Pharmacy Technician

## 2019-08-19 NOTE — ED NOTES
Contacted lab to ensure add on orders are able to be ful-lfilled. Lab reports they have orders and are working on them now.

## 2019-08-19 NOTE — H&P
Hospitalist Admission Note    NAME: Cate Cervantes   :  1928   MRN:  [id number]     Date/Time:  2019 4:10 PM    Patient PCP: Saleem Archer MD  ______________________________________________________________________  Given the patient's current clinical presentation, I have a high level of concern for decompensation if discharged from the emergency department. Complex decision making was performed, which includes reviewing the patient's available past medical records, laboratory results, and x-ray films. My assessment of this patient's clinical condition and my plan of care is as follows. Assessment / Plan:  Acute on chronic systolic HF  Acute on chronic hypoxic respiratory failure  COPD on home O2 2L  HTN  CAD  PAD  Sleep disorder  -Admit to Telemetry  -IV diuresis  -Cardiology Consult  -Echo  -Monitor I/Os, daily weight  -Continue O2 supplementation - pt typically on 2L at home - wean back to baseline as tolerated  -CXR: Cardiomegaly, mild pulmonary edema and small bilateral pleural effusions.    -Given IV Ceftriaxone and Azithromycin by the ER - will hold for now as pt's presentation seems to be more likely due to HF, as opposed to CAP  -BNP elevated approx 3k - higher than baseline  -Troponin slightly elevated - nonspecific at this point, repeat pending - likely secondary to demand ---- repeat trop noted, 0.14 again, will not repeat  -Procalcitonin in AM  -Repeat CXR in AM    Paroxysmal Atrial Fibrillation, rate controlled, on chronic AC  -Pt's chart from [facility] does not show this diagnosis, however it is present in our EMR and pt is on Eliquis 2.5mg BID for some appreciable reason at [facility] - likely AFib    Abnormal UA  -5-10 WBC, no symptoms, follow up UCx - likely colonization    Code Status: Full  Surrogate Decision Maker: Daughter  DVT Prophylaxis: Eliquis  GI Prophylaxis: not indicated  Baseline: Wheelchair bound, lives at [facility] Subjective:   CHIEF COMPLAINT: SOB    HISTORY OF PRESENT ILLNESS:     Carrie Leone is a 80 y.o.  female who presents with CC listed above. Pt is a poor historian due to her advanced age. Her daughter attempts to fill in the void, however she is not up to date on pt's medical conditions. She does state that she went to visit her mother yesterday and did notice anything different. However this morning pt was complaining of SOB. Pt denies any CP, fever, chills or cough. She does not ambulate and denies any worsening leg swelling - she is already wearing knee high compression socks. We were asked to admit for work up and evaluation of the above problems. Past Medical History:   Diagnosis Date    Arthritis     generalized    COPD     GERD (gastroesophageal reflux disease)     Hypertension     Ill-defined condition     Vitamin deficiency    NSTEMI (non-ST elevated myocardial infarction) ([facility] [address] 5/1/2018    Other ill-defined conditions(799.89)     high cholesterol    Peripheral artery disease (HCC)     Psychiatric disorder     Anxiety disorder    Sleep disorder     Insomnia    TIA (transient ischemic attack)         Past Surgical History:   Procedure Laterality Date    HX CAROTID ENDARTERECTOMY      left 2 or 3 years ago.  HX OTHER SURGICAL      colonoscopy    HX TONSILLECTOMY         Social History     Tobacco Use    Smoking status: Former Smoker    Smokeless tobacco: Never Used    Tobacco comment: quit 3 years ago   Substance Use Topics    Alcohol use: Yes     Comment: glass of wine rarely        Family History   Problem Relation Age of Onset    Other Mother         carotid endarterectomy/cardiomegaly    Other Father         carotid endarterectomy     Allergies   Allergen Reactions    Penicillins Swelling    Sulfa (Sulfonamide Antibiotics) Itching    Propoxyphene-Acetaminophen Unknown (comments)     Pt. Not sure, thinks it was hallucination.     Sulfadiazine Unknown (comments)        Prior to Admission medications    Medication Sig Start Date End Date Taking? Authorizing Provider   peg 400-propylene glycol (SYSTANE, PROPYLENE GLYCOL,) 0.4-0.3 % drop Administer 1 Drop to both eyes two (2) times a day. Provider, Historical   senna-docusate (PERICOLACE) 8.6-50 mg per tablet Take 2 Tabs by mouth two (2) times a day. Provider, Historical   potassium chloride SR (KLOR-CON 10) 10 mEq tablet Take 10 mEq by mouth two (2) times a day. Provider, Historical   atorvastatin (LIPITOR) 40 mg tablet Take 1 Tab by mouth nightly. 5/11/18   Lorie Hebert MD   apixaban (ELIQUIS) 2.5 mg tablet Take 1 Tab by mouth two (2) times a day. 5/11/18   Lorie Hebert MD   furosemide (LASIX) 40 mg tablet Take 1 Tab by mouth two (2) times a day. 5/11/18   Lorie Hebert MD   metoprolol succinate (TOPROL-XL) 25 mg XL tablet Take 1 Tab by mouth daily. 5/11/18   Lorie Hebert MD   amLODIPine (NORVASC) 5 mg tablet Take 5 mg by mouth daily. Provider, Historical   oxybutynin chloride XL (DITROPAN XL) 5 mg CR tablet Take 5 mg by mouth daily. For overactive bladder    Provider, Historical   pantoprazole (PROTONIX) 20 mg tablet Take 20 mg by mouth Before breakfast and dinner. For GERD    Provider, Historical   acetaminophen (TYLENOL) 325 mg tablet Take 650 mg by mouth every four to six (4-6) hours as needed for Pain. Provider, Historical   ammonium lactate (LAC-HYDRIN) 12 % lotion Apply  to affected area every twelve (12) hours as needed (Apply to chest, back, arms as needed for eczema). rub in to affected area well    Provider, Historical   cetirizine (ZYRTEC) 10 mg tablet Take 10 mg by mouth nightly. For itching    Other, MD Sunita   clobetasol (TEMOVATE) 0.05 % topical cream Apply  to affected area daily. Apply to vulva/inner labia or whitish area    Other, MD Sunita   olopatadine (PATANOL) 0.1 % ophthalmic solution Administer 1 Drop to both eyes two (2) times a day. Sunita Braswell MD   B.infantis-B.ani-B.long-B.bifi (PROBIOTIC 4X) 10-15 mg TbEC Take 1 Cap by mouth two (2) times a day. Sunita Braswell MD   MULTIVITS W-FE,OTHER MIN/LUT (CENTRUM SILVER ULTRA WOMEN'S PO) Take 1 Tab by mouth daily. 3/14/11   Provider, Historical       REVIEW OF SYSTEMS:     I am not able to complete the review of systems because:    The patient is intubated and sedated    The patient has altered mental status due to his acute medical problems    The patient has baseline aphasia from prior stroke(s)    The patient has baseline dementia and is not reliable historian    The patient is in acute medical distress and unable to provide information           Total of 12 systems reviewed as follows:       POSITIVE= underlined text  Negative = text not underlined  General:  fever, chills, sweats, generalized weakness, weight loss/gain,      loss of appetite   Eyes:    blurred vision, eye pain, loss of vision, double vision  ENT:    rhinorrhea, pharyngitis   Respiratory:   cough, sputum production, SOB, ALCARAZ, wheezing, pleuritic pain   Cardiology:   chest pain, palpitations, orthopnea, PND, edema, syncope   Gastrointestinal:  abdominal pain , N/V, diarrhea, dysphagia, constipation, bleeding   Genitourinary:  frequency, urgency, dysuria, hematuria, incontinence   Muskuloskeletal :  arthralgia, myalgia, back pain  Hematology:  easy bruising, nose or gum bleeding, lymphadenopathy   Dermatological: rash, ulceration, pruritis, color change / jaundice  Endocrine:   hot flashes or polydipsia   Neurological:  headache, dizziness, confusion, focal weakness, paresthesia,     Speech difficulties, memory loss, gait difficulty  Psychological: Feelings of anxiety, depression, agitation    Objective:   VITALS:    Visit Vitals  /83 (BP 1 Location: Right arm, BP Patient Position: At rest;Supine)   Pulse 85   Temp 99.8 °F (37.7 °C)   Resp 30   Ht 5' 5\" (1.651 m)   Wt 68 kg (149 lb 14.6 oz)   SpO2 91%   BMI 24.95 kg/m² PHYSICAL EXAM:    General:    Alert, cooperative, elderly  HEENT: Atraumatic, anicteric sclerae, pink conjunctivae     No oral ulcers, mucosa moist, throat clear, dentition fair  Neck:  Supple, symmetrical,  thyroid: non tender  Lungs:   Crackles  Chest wall:  No tenderness  No Accessory muscle use. Heart:   Regular  rhythm,  No  murmur   No edema  Abdomen:   Soft, non-tender. Not distended. Bowel sounds normal  Extremities: No cyanosis. No clubbing,      Skin turgor normal, Capillary refill normal, Radial dial pulse 2+  Skin:     Not pale. Not Jaundiced  No rashes   Psych:  Fair insight. Not depressed. Not anxious or agitated. Neurologic: EOMs intact. No facial asymmetry. No aphasia or slurred speech. Symmetrical strength, Sensation grossly intact. Alert and oriented x person, place and month/season     _______________________________________________________________________  Care Plan discussed with:    Comments   Patient x    Family  x    RN x    Care Manager                    Consultant:      _______________________________________________________________________  Expected  Disposition:   Home with Family    HH/PT/OT/RN    SNF/LTC x   [facility]    ________________________________________________________________________  TOTAL TIME:  54 Minutes    Critical Care Provided     Minutes non procedure based      Comments     Reviewed previous records   >50% of visit spent in counseling and coordination of care  Discussion with patient and/or family and questions answered       ________________________________________________________________________  Signed: Bárbara Rehman MD    Procedures: see electronic medical records for all procedures/Xrays and details which were not copied into this note but were reviewed prior to creation of Plan.     LAB DATA REVIEWED:    Recent Results (from the past 24 hour(s))   EKG, 12 LEAD, INITIAL    Collection Time: 08/19/19 10:10 AM   Result Value Ref Range    Ventricular Rate 84 BPM    Atrial Rate 84 BPM    P-R Interval 160 ms    QRS Duration 146 ms    Q-T Interval 422 ms    QTC Calculation (Bezet) 498 ms    Calculated P Axis 15 degrees    Calculated R Axis -53 degrees    Calculated T Axis 85 degrees    Diagnosis       Sinus rhythm with premature supraventricular complexes and premature   ventricular complexes or fusion complexes  Left axis deviation  Left bundle branch block  When compared with ECG of 08-JUN-2018 11:36,  fusion complexes are now present  premature ventricular complexes are now present  premature supraventricular complexes are now present  Left bundle branch block is now present     CBC WITH AUTOMATED DIFF    Collection Time: 08/19/19 10:24 AM   Result Value Ref Range    WBC 12.0 (H) 3.6 - 11.0 K/uL    RBC 4.18 3.80 - 5.20 M/uL    HGB 13.5 11.5 - 16.0 g/dL    HCT 41.7 35.0 - 47.0 %    MCV 99.8 (H) 80.0 - 99.0 FL    MCH 32.3 26.0 - 34.0 PG    MCHC 32.4 30.0 - 36.5 g/dL    RDW 13.2 11.5 - 14.5 %    PLATELET 274 560 - 638 K/uL    MPV 10.7 8.9 - 12.9 FL    NRBC 0.0 0  WBC    ABSOLUTE NRBC 0.00 0.00 - 0.01 K/uL    NEUTROPHILS 76 (H) 32 - 75 %    LYMPHOCYTES 12 12 - 49 %    MONOCYTES 10 5 - 13 %    EOSINOPHILS 0 0 - 7 %    BASOPHILS 1 0 - 1 %    IMMATURE GRANULOCYTES 1 (H) 0.0 - 0.5 %    ABS. NEUTROPHILS 9.1 (H) 1.8 - 8.0 K/UL    ABS. LYMPHOCYTES 1.5 0.8 - 3.5 K/UL    ABS. MONOCYTES 1.2 (H) 0.0 - 1.0 K/UL    ABS. EOSINOPHILS 0.0 0.0 - 0.4 K/UL    ABS. BASOPHILS 0.1 0.0 - 0.1 K/UL    ABS. IMM.  GRANS. 0.1 (H) 0.00 - 0.04 K/UL    DF AUTOMATED     METABOLIC PANEL, COMPREHENSIVE    Collection Time: 08/19/19 10:24 AM   Result Value Ref Range    Sodium 140 136 - 145 mmol/L    Potassium 3.6 3.5 - 5.1 mmol/L    Chloride 104 97 - 108 mmol/L    CO2 31 21 - 32 mmol/L    Anion gap 5 5 - 15 mmol/L    Glucose 98 65 - 100 mg/dL    BUN 12 6 - 20 MG/DL    Creatinine 1.00 0.55 - 1.02 MG/DL    BUN/Creatinine ratio 12 12 - 20      GFR est AA >60 >60 ml/min/1.73m2    GFR est non-AA 52 (L) >60 ml/min/1.73m2    Calcium 8.9 8.5 - 10.1 MG/DL    Bilirubin, total 0.8 0.2 - 1.0 MG/DL    ALT (SGPT) 19 12 - 78 U/L    AST (SGOT) 19 15 - 37 U/L    Alk.  phosphatase 77 45 - 117 U/L    Protein, total 7.7 6.4 - 8.2 g/dL    Albumin 3.5 3.5 - 5.0 g/dL    Globulin 4.2 (H) 2.0 - 4.0 g/dL    A-G Ratio 0.8 (L) 1.1 - 2.2     CK W/ REFLX CKMB    Collection Time: 08/19/19 10:24 AM   Result Value Ref Range    CK 74 26 - 192 U/L   NT-PRO BNP    Collection Time: 08/19/19 10:24 AM   Result Value Ref Range    NT pro-BNP 3,189 (H) <450 PG/ML   TROPONIN I    Collection Time: 08/19/19 10:24 AM   Result Value Ref Range    Troponin-I, Qt. 0.14 (H) <0.05 ng/mL   POC LACTIC ACID    Collection Time: 08/19/19 10:26 AM   Result Value Ref Range    Lactic Acid (POC) 0.81 0.40 - 2.00 mmol/L   URINALYSIS W/ REFLEX CULTURE    Collection Time: 08/19/19 11:46 AM   Result Value Ref Range    Color YELLOW/STRAW      Appearance CLEAR CLEAR      Specific gravity 1.007 1.003 - 1.030      pH (UA) 6.5 5.0 - 8.0      Protein NEGATIVE  NEG mg/dL    Glucose NEGATIVE  NEG mg/dL    Ketone NEGATIVE  NEG mg/dL    Bilirubin NEGATIVE  NEG      Blood LARGE (A) NEG      Urobilinogen 0.2 0.2 - 1.0 EU/dL    Nitrites POSITIVE (A) NEG      Leukocyte Esterase TRACE (A) NEG      WBC 5-10 0 - 4 /hpf    RBC >100 (H) 0 - 5 /hpf    Epithelial cells FEW FEW /lpf    Bacteria 4+ (A) NEG /hpf    UA:UC IF INDICATED URINE CULTURE ORDERED (A) CNI     POC VENOUS BLOOD GAS    Collection Time: 08/19/19  2:06 PM   Result Value Ref Range    Device: SIMPLE MASK      pH, venous (POC) 7.371 7.32 - 7.42      pCO2, venous (POC) 52.5 (H) 41 - 51 MMHG    pO2, venous (POC) 33 25 - 40 mmHg    HCO3, venous (POC) 30.4 (H) 23.0 - 28.0 MMOL/L    sO2, venous (POC) 60 (L) 65 - 88 %    Base excess, venous (POC) 5 mmol/L    Allens test (POC) N/A      Site OTHER      Specimen type (POC) VENOUS BLOOD

## 2019-08-19 NOTE — PROGRESS NOTES
1630: TRANSFER - IN REPORT:    Verbal report received from Ema Hopson RN (name) on Raffaele Morgan  being received from ED (unit) for routine progression of care      Report consisted of patients Situation, Background, Assessment and   Recommendations(SBAR). Information from the following report(s) SBAR and Kardex was reviewed with the receiving nurse. Opportunity for questions and clarification was provided. Assessment completed upon patients arrival to unit and care assumed. 1900:   Bedside shift change report GIVEN TO Juan J Rocha RN. Report included the following information SBAR, Kardex and ED Summary. SIGNIFICANT CHANGES DURING SHIFT:  Pt admitted from ED      CONCERNS TO ADDRESS WITH MD:  D/c planning? [id number] NURSING NOTE   Admission Date 8/19/2019   Admission Diagnosis Acute on chronic heart failure ([facility] [address] [I50.9]   Consults IP CONSULT TO HOSPITALIST  IP CONSULT TO CARDIOLOGY      Cardiac Monitoring [x] Yes [] No      Purposeful Hourly Rounding [x] Yes    Arjun Score Total Score: 2   Arjun score 3 or > [x] Bed Alarm [] Avasys [] 1:1 sitter [] Patient refused (Signed refusal form in chart)   Rosas Score Rosas Score: 18   Rosas score 14 or < [] PMT consult [] Wound Care consult    []  Specialty bed  [] Nutrition consult      Influenza Vaccine Received Flu Vaccine for Current Season (usually Sept-March): Not Flu Season           Oxygen needs? [] Room air Oxygen @  []1L    [x]2L    []3L   []4L    []5L   []6L via  NC   Chronic home O2 use?  [x] Yes [] No  Perform O2 challenge test and document in progress note using smartphrase (.Homeoxygen)      Last bowel movement        Urinary Catheter             LDAs               Peripheral IV 08/19/19 Right Antecubital (Active)   Site Assessment Clean, dry, & intact 8/19/2019  5:34 PM   Phlebitis Assessment 0 8/19/2019  5:34 PM   Infiltration Assessment 0 8/19/2019  5:34 PM   Dressing Status Clean, dry, & intact 8/19/2019  5:34 PM   Dressing Type Transparent;Tape 8/19/2019  5:34 PM   Hub Color/Line Status Pink;Flushed 8/19/2019  5:34 PM   Alcohol Cap Used Yes 8/19/2019 10:27 AM       Peripheral IV 08/19/19 Left Antecubital (Active)   Site Assessment Clean, dry, & intact 8/19/2019  5:34 PM   Phlebitis Assessment 0 8/19/2019  5:34 PM   Infiltration Assessment 0 8/19/2019  5:34 PM   Dressing Status Clean, dry, & intact 8/19/2019  5:34 PM   Dressing Type Transparent;Tape 8/19/2019  5:34 PM   Hub Color/Line Status Blue;Flushed 8/19/2019  5:34 PM   Alcohol Cap Used Yes 8/19/2019  2:57 PM                         Readmission Risk Assessment Tool Score High Risk            32       Total Score        2 . Living with Significant Other. Assisted Living. LTAC. SNF. or   Rehab    5 Pt.  Coverage (Medicare=5 , Medicaid, or Self-Pay=4)    25 Charlson Comorbidity Score (Age + Comorbid Conditions)        Criteria that do not apply:    Has Seen PCP in Last 6 Months (Yes=3, No=0)    Patient Length of Stay (>5 days = 3)    IP Visits Last 12 Months (1-3=4, 4=9, >4=11)       Expected Length of Stay - - -   Actual Length of Stay 0

## 2019-08-19 NOTE — ED NOTES
TRANSFER - OUT REPORT:    Verbal report given to Kirill Zee on [employer] Alejandro  being transferred to [facility](unit) for routine progression of care       Report consisted of patients Situation, Background, Assessment and   Recommendations(SBAR). Information from the following report(s) SBAR, ED Summary, Intake/Output, MAR and Recent Results was reviewed with the receiving nurse. Lines:   Peripheral IV 08/19/19 Right Antecubital (Active)   Site Assessment Clean, dry, & intact 8/19/2019 10:27 AM   Phlebitis Assessment 0 8/19/2019 10:27 AM   Infiltration Assessment 0 8/19/2019 10:27 AM   Dressing Status Clean, dry, & intact 8/19/2019 10:27 AM   Dressing Type Transparent 8/19/2019 10:27 AM   Hub Color/Line Status Pink;Flushed 8/19/2019 10:27 AM   Alcohol Cap Used Yes 8/19/2019 10:27 AM       Peripheral IV 08/19/19 Left Antecubital (Active)   Site Assessment Clean, dry, & intact 8/19/2019  2:57 PM   Phlebitis Assessment 0 8/19/2019  2:57 PM   Infiltration Assessment 0 8/19/2019  2:57 PM   Dressing Status Clean, dry, & intact 8/19/2019  2:57 PM   Dressing Type Transparent 8/19/2019  2:57 PM   Hub Color/Line Status Blue;Flushed 8/19/2019  2:57 PM   Alcohol Cap Used Yes 8/19/2019  2:57 PM        Opportunity for questions and clarification was provided.       Patient transported with:   O2 @ 2 liters

## 2019-08-19 NOTE — ED TRIAGE NOTES
Assumed care of pt from EMS. EMS reports that the pt was SOB and generalized weakness. Pt has hx of COPD, HTN, and anxiety. Pt Placed on monitor x3. Pt at 85% on room air. Placed on NC @2L. Pt at 93% now. Pt A&Ox3. Call bell in reach. VSS.

## 2019-08-20 ENCOUNTER — APPOINTMENT (OUTPATIENT)
Dept: NON INVASIVE DIAGNOSTICS | Age: 84
DRG: 291 | End: 2019-08-20
Attending: GENERAL ACUTE CARE HOSPITAL
Payer: MEDICARE

## 2019-08-20 LAB
ALBUMIN SERPL-MCNC: 3.1 G/DL (ref 3.5–5)
ALBUMIN/GLOB SERPL: 0.8 {RATIO} (ref 1.1–2.2)
ALP SERPL-CCNC: 68 U/L (ref 45–117)
ALT SERPL-CCNC: 18 U/L (ref 12–78)
ANION GAP SERPL CALC-SCNC: 9 MMOL/L (ref 5–15)
AST SERPL-CCNC: 20 U/L (ref 15–37)
BASOPHILS # BLD: 0 K/UL (ref 0–0.1)
BASOPHILS NFR BLD: 0 % (ref 0–1)
BILIRUB SERPL-MCNC: 1 MG/DL (ref 0.2–1)
BUN SERPL-MCNC: 12 MG/DL (ref 6–20)
BUN/CREAT SERPL: 14 (ref 12–20)
CALCIUM SERPL-MCNC: 9 MG/DL (ref 8.5–10.1)
CHLORIDE SERPL-SCNC: 100 MMOL/L (ref 97–108)
CO2 SERPL-SCNC: 29 MMOL/L (ref 21–32)
CREAT SERPL-MCNC: 0.87 MG/DL (ref 0.55–1.02)
DIFFERENTIAL METHOD BLD: ABNORMAL
ECHO AV AREA PEAK VELOCITY: 2.1 CM2
ECHO AV AREA VTI: 2.2 CM2
ECHO AV AREA/BSA PEAK VELOCITY: 1.2 CM2/M2
ECHO AV AREA/BSA VTI: 1.3 CM2/M2
ECHO AV MEAN GRADIENT: 8.4 MMHG
ECHO AV PEAK GRADIENT: 20.6 MMHG
ECHO AV PEAK VELOCITY: 226.8 CM/S
ECHO AV REGURGITANT PHT: 1541 CM
ECHO AV VTI: 31.94 CM
ECHO LA MAJOR AXIS: 3.74 CM
ECHO LV E' LATERAL VELOCITY: 5.94 CM/S
ECHO LV E' SEPTAL VELOCITY: 3.26 CM/S
ECHO LV INTERNAL DIMENSION DIASTOLIC: 3.65 CM (ref 3.9–5.3)
ECHO LV INTERNAL DIMENSION SYSTOLIC: 3.08 CM
ECHO LV IVSD: 2.08 CM (ref 0.6–0.9)
ECHO LV MASS 2D: 351.2 G (ref 67–162)
ECHO LV MASS INDEX 2D: 201.2 G/M2 (ref 43–95)
ECHO LV POSTERIOR WALL DIASTOLIC: 1.63 CM (ref 0.6–0.9)
ECHO LVOT DIAM: 2.06 CM
ECHO LVOT PEAK GRADIENT: 7.9 MMHG
ECHO LVOT PEAK VELOCITY: 140.55 CM/S
ECHO LVOT SV: 71.7 ML
ECHO LVOT VTI: 21.46 CM
ECHO MV A VELOCITY: 135.81 CM/S
ECHO MV AREA PHT: 5.4 CM2
ECHO MV AREA VTI: 4 CM2
ECHO MV E DECELERATION TIME (DT): 95.6 MS
ECHO MV E VELOCITY: 70.44 CM/S
ECHO MV E/A RATIO: 0.52
ECHO MV E/E' LATERAL: 11.86
ECHO MV E/E' RATIO (AVERAGED): 16.73
ECHO MV E/E' SEPTAL: 21.61
ECHO MV MAX VELOCITY: 134.02 CM/S
ECHO MV MEAN GRADIENT: 3.1 MMHG
ECHO MV PEAK GRADIENT: 7.2 MMHG
ECHO MV PRESSURE HALF TIME (PHT): 41 MS
ECHO MV REGURGITANT PEAK GRADIENT: 86.8 MMHG
ECHO MV REGURGITANT PEAK VELOCITY: 465.84 CM/S
ECHO MV REGURGITANT VTIA: 86.5 CM
ECHO MV VTI: 17.83 CM
ECHO PV MAX VELOCITY: 81.8 CM/S
ECHO PV MEAN GRADIENT: 1.5 MMHG
ECHO PV PEAK GRADIENT: 2.7 MMHG
ECHO PV VTI: 12.54 CM
ECHO RV TAPSE: 1.33 CM (ref 1.5–2)
EOSINOPHIL # BLD: 0.1 K/UL (ref 0–0.4)
EOSINOPHIL NFR BLD: 1 % (ref 0–7)
ERYTHROCYTE [DISTWIDTH] IN BLOOD BY AUTOMATED COUNT: 12.9 % (ref 11.5–14.5)
GLOBULIN SER CALC-MCNC: 4.1 G/DL (ref 2–4)
GLUCOSE SERPL-MCNC: 96 MG/DL (ref 65–100)
HCT VFR BLD AUTO: 39.9 % (ref 35–47)
HGB BLD-MCNC: 13.4 G/DL (ref 11.5–16)
IMM GRANULOCYTES # BLD AUTO: 0 K/UL (ref 0–0.04)
IMM GRANULOCYTES NFR BLD AUTO: 0 % (ref 0–0.5)
LYMPHOCYTES # BLD: 1.8 K/UL (ref 0.8–3.5)
LYMPHOCYTES NFR BLD: 18 % (ref 12–49)
MAGNESIUM SERPL-MCNC: 2.1 MG/DL (ref 1.6–2.4)
MCH RBC QN AUTO: 33.3 PG (ref 26–34)
MCHC RBC AUTO-ENTMCNC: 33.6 G/DL (ref 30–36.5)
MCV RBC AUTO: 99.3 FL (ref 80–99)
MONOCYTES # BLD: 0.9 K/UL (ref 0–1)
MONOCYTES NFR BLD: 9 % (ref 5–13)
NEUTS SEG # BLD: 7.2 K/UL (ref 1.8–8)
NEUTS SEG NFR BLD: 72 % (ref 32–75)
NRBC # BLD: 0 K/UL (ref 0–0.01)
NRBC BLD-RTO: 0 PER 100 WBC
PHOSPHATE SERPL-MCNC: 3.7 MG/DL (ref 2.6–4.7)
PISA AR MAX VEL: 353.02 CM/S
PLATELET # BLD AUTO: 245 K/UL (ref 150–400)
PMV BLD AUTO: 11.2 FL (ref 8.9–12.9)
POTASSIUM SERPL-SCNC: 3.2 MMOL/L (ref 3.5–5.1)
PROCALCITONIN SERPL-MCNC: 0.1 NG/ML
PROT SERPL-MCNC: 7.2 G/DL (ref 6.4–8.2)
RBC # BLD AUTO: 4.02 M/UL (ref 3.8–5.2)
SODIUM SERPL-SCNC: 138 MMOL/L (ref 136–145)
WBC # BLD AUTO: 9.9 K/UL (ref 3.6–11)

## 2019-08-20 PROCEDURE — 94760 N-INVAS EAR/PLS OXIMETRY 1: CPT

## 2019-08-20 PROCEDURE — 97116 GAIT TRAINING THERAPY: CPT

## 2019-08-20 PROCEDURE — 97161 PT EVAL LOW COMPLEX 20 MIN: CPT

## 2019-08-20 PROCEDURE — 83735 ASSAY OF MAGNESIUM: CPT

## 2019-08-20 PROCEDURE — 74011250636 HC RX REV CODE- 250/636: Performed by: INTERNAL MEDICINE

## 2019-08-20 PROCEDURE — 74011250637 HC RX REV CODE- 250/637: Performed by: GENERAL ACUTE CARE HOSPITAL

## 2019-08-20 PROCEDURE — 74011250637 HC RX REV CODE- 250/637: Performed by: INTERNAL MEDICINE

## 2019-08-20 PROCEDURE — 74011250636 HC RX REV CODE- 250/636: Performed by: GENERAL ACUTE CARE HOSPITAL

## 2019-08-20 PROCEDURE — 74011250637 HC RX REV CODE- 250/637: Performed by: FAMILY MEDICINE

## 2019-08-20 PROCEDURE — 65660000000 HC RM CCU STEPDOWN

## 2019-08-20 PROCEDURE — 85025 COMPLETE CBC W/AUTO DIFF WBC: CPT

## 2019-08-20 PROCEDURE — 36415 COLL VENOUS BLD VENIPUNCTURE: CPT

## 2019-08-20 PROCEDURE — 80053 COMPREHEN METABOLIC PANEL: CPT

## 2019-08-20 PROCEDURE — 97165 OT EVAL LOW COMPLEX 30 MIN: CPT

## 2019-08-20 PROCEDURE — 84100 ASSAY OF PHOSPHORUS: CPT

## 2019-08-20 PROCEDURE — 97535 SELF CARE MNGMENT TRAINING: CPT

## 2019-08-20 PROCEDURE — 77010033678 HC OXYGEN DAILY

## 2019-08-20 PROCEDURE — 84145 PROCALCITONIN (PCT): CPT

## 2019-08-20 PROCEDURE — 74011250637 HC RX REV CODE- 250/637: Performed by: HOSPITALIST

## 2019-08-20 PROCEDURE — 93306 TTE W/DOPPLER COMPLETE: CPT

## 2019-08-20 RX ORDER — POTASSIUM CHLORIDE 750 MG/1
20 TABLET, FILM COATED, EXTENDED RELEASE ORAL
Status: COMPLETED | OUTPATIENT
Start: 2019-08-20 | End: 2019-08-20

## 2019-08-20 RX ORDER — POTASSIUM CHLORIDE 750 MG/1
10 TABLET, FILM COATED, EXTENDED RELEASE ORAL 2 TIMES DAILY
Status: DISCONTINUED | OUTPATIENT
Start: 2019-08-20 | End: 2019-08-22 | Stop reason: HOSPADM

## 2019-08-20 RX ORDER — FUROSEMIDE 10 MG/ML
40 INJECTION INTRAMUSCULAR; INTRAVENOUS 2 TIMES DAILY
Status: DISCONTINUED | OUTPATIENT
Start: 2019-08-20 | End: 2019-08-22 | Stop reason: HOSPADM

## 2019-08-20 RX ADMIN — METOPROLOL SUCCINATE 25 MG: 25 TABLET, EXTENDED RELEASE ORAL at 09:14

## 2019-08-20 RX ADMIN — FUROSEMIDE 40 MG: 10 INJECTION, SOLUTION INTRAMUSCULAR; INTRAVENOUS at 17:32

## 2019-08-20 RX ADMIN — Medication 10 ML: at 13:59

## 2019-08-20 RX ADMIN — MELATONIN TAB 3 MG 3 MG: 3 TAB at 21:25

## 2019-08-20 RX ADMIN — Medication 10 ML: at 21:25

## 2019-08-20 RX ADMIN — ATORVASTATIN CALCIUM 40 MG: 40 TABLET, FILM COATED ORAL at 21:25

## 2019-08-20 RX ADMIN — FUROSEMIDE 40 MG: 10 INJECTION, SOLUTION INTRAMUSCULAR; INTRAVENOUS at 09:14

## 2019-08-20 RX ADMIN — POTASSIUM CHLORIDE 10 MEQ: 750 TABLET, EXTENDED RELEASE ORAL at 17:12

## 2019-08-20 RX ADMIN — APIXABAN 2.5 MG: 2.5 TABLET, FILM COATED ORAL at 09:14

## 2019-08-20 RX ADMIN — Medication 10 ML: at 05:24

## 2019-08-20 RX ADMIN — AMLODIPINE BESYLATE 5 MG: 5 TABLET ORAL at 09:14

## 2019-08-20 RX ADMIN — OXYBUTYNIN CHLORIDE 5 MG: 5 TABLET, EXTENDED RELEASE ORAL at 09:14

## 2019-08-20 RX ADMIN — APIXABAN 2.5 MG: 2.5 TABLET, FILM COATED ORAL at 21:25

## 2019-08-20 RX ADMIN — POTASSIUM CHLORIDE 20 MEQ: 750 TABLET, EXTENDED RELEASE ORAL at 13:58

## 2019-08-20 RX ADMIN — Medication 10 ML: at 09:14

## 2019-08-20 RX ADMIN — Medication 10 ML: at 17:32

## 2019-08-20 NOTE — PROGRESS NOTES
Hospitalist Progress Note    NAME: Loretta Goodwin   :  1928   MRN:  [id number]       Assessment / Plan:    Acute on chronic systolic HF  Acute on chronic hypoxic respiratory failure due to above  COPD on home O2 2L  -probnp 3184  -troponin 0.14 x 2  -CXR Cardiomegaly, mild pulmonary edema and small bilateral pleural effusions.   -blood cx, procalcitonin ordered  -Cont suppl oxygen to keeps sats above 90  -IV diuresis, -Monitor I/Os, daily weight  -Cardiology Consult  -Echo  -on empiric ceftriaxone and azithromycin    Hypokalemia likely from aggressive diuresis  -replace    HTN, essential POA  -continue amlodipine    CAD  PAD  Paroxysmal Atrial Fibrillation, rate controlled, on chronic AC  --on BB, eliquis    Abnormal UA  -5-10 WBC, no symptoms, follow up UCx - likely colonization  -on empiric ceftriaxone     Code Status: Full  Surrogate Decision Maker: Daughter  DVT Prophylaxis: Eliquis  GI Prophylaxis: not indicated  Baseline: Wheelchair bound, lives at [facility]:     Chief Complaint / Reason for Physician Visit  \"I am doing better than yesterday\". Discussed with RN events overnight. Review of Systems:  Symptom Y/N Comments  Symptom Y/N Comments   Fever/Chills n   Chest Pain n    Poor Appetite y   Edema y    Cough n   Abdominal Pain     Sputum n   Joint Pain n    SOB/ALCARAZ y   Pruritis/Rash     Nausea/vomit n   Tolerating PT/OT y    Diarrhea    Tolerating Diet y    Constipation    Other       Could NOT obtain due to:      Objective:     VITALS:   Last 24hrs VS reviewed since prior progress note.  Most recent are:  Patient Vitals for the past 24 hrs:   Temp Pulse Resp BP SpO2   19 1044 98.5 °F (36.9 °C) 75 18 167/76 94 %   19 0734 97.7 °F (36.5 °C) 75 20 180/85 99 %   19 0248 97.8 °F (36.6 °C) 73 18 164/80 93 %   19 2242 98.2 °F (36.8 °C) 79 20 157/80 91 %   19 1917 97.7 °F (36.5 °C) 86 20 157/64 95 %   19 1718 98.1 °F (36.7 °C) 84 20 170/81 94 %   08/19/19 1630  87 26 146/86 91 %   08/19/19 1600  90 30 159/89 92 %   08/19/19 1415 99.8 °F (37.7 °C) 85 30 161/83 91 %   08/19/19 1413  82 (!) 33 158/82 91 %   08/19/19 1408  78  161/86    08/19/19 1400  87 29 161/86 91 %   08/19/19 1345  92 (!) 31 159/82 90 %   08/19/19 1315  90 20 (!) 157/91 93 %       Intake/Output Summary (Last 24 hours) at 8/20/2019 1232  Last data filed at 8/20/2019 1045  Gross per 24 hour   Intake 580 ml   Output 2000 ml   Net -1420 ml        PHYSICAL EXAM:  General: WD, WN. Alert, cooperative, no acute distress    EENT:  EOMI. Anicteric sclerae. MMM  Resp:  B/l fine crackles  CV:  Regular  rhythm,  No edema  GI:  Soft, Non distended, Non tender.  +Bowel sounds  Neurologic:  Alert and oriented X 3, normal speech,   Psych:   Good insight. Not anxious nor agitated  Skin:  No rashes. No jaundice    Reviewed most current lab test results and cultures  YES  Reviewed most current radiology test results   YES  Review and summation of old records today    NO  Reviewed patient's current orders and MAR    YES  PMH/SH reviewed - no change compared to H&P  ________________________________________________________________________  Care Plan discussed with:    Comments   Patient x    Family      RN x    Care Manager     Consultant                        Multidiciplinary team rounds were held today with , nursing, pharmacist and clinical coordinator. Patient's plan of care was discussed; medications were reviewed and discharge planning was addressed.      ________________________________________________________________________  Total NON critical care TIME:     Minutes    Total CRITICAL CARE TIME Spent:   Minutes non procedure based      Comments   >50% of visit spent in counseling and coordination of care     ________________________________________________________________________  Lorna Quijano MD     Procedures: see electronic medical records for all procedures/Xrays and details which were not copied into this note but were reviewed prior to creation of Plan. LABS:  I reviewed today's most current labs and imaging studies.   Pertinent labs include:  Recent Labs     08/20/19 0250 08/19/19  1024   WBC 9.9 12.0*   HGB 13.4 13.5   HCT 39.9 41.7    246     Recent Labs     08/20/19  0250 08/19/19  1024    140   K 3.2* 3.6    104   CO2 29 31   GLU 96 98   BUN 12 12   CREA 0.87 1.00   CA 9.0 8.9   MG 2.1  --    PHOS 3.7  --    ALB 3.1* 3.5   TBILI 1.0 0.8   SGOT 20 19   ALT 18 19       Signed: Karuna Gamboa MD

## 2019-08-20 NOTE — PROGRESS NOTES
Problem: Falls - Risk of  Goal: *Absence of Falls  Description  Document Dequan Fells Fall Risk and appropriate interventions in the flowsheet. Outcome: Progressing Towards Goal  Note:   Fall Risk Interventions:  Mobility Interventions: Bed/chair exit alarm    Mentation Interventions: Bed/chair exit alarm, Adequate sleep, hydration, pain control    Medication Interventions: Bed/chair exit alarm    Elimination Interventions: Bed/chair exit alarm, Call light in reach              Problem: Patient Education: Go to Patient Education Activity  Goal: Patient/Family Education  Outcome: Progressing Towards Goal     Problem: Pressure Injury - Risk of  Goal: *Prevention of pressure injury  Description  Document Rosas Scale and appropriate interventions in the flowsheet.   Outcome: Progressing Towards Goal  Note:   Pressure Injury Interventions:  Sensory Interventions: Assess changes in LOC    Moisture Interventions: Absorbent underpads    Activity Interventions: Increase time out of bed, PT/OT evaluation    Mobility Interventions: Assess need for specialty bed, Chair cushion    Nutrition Interventions: Document food/fluid/supplement intake    Friction and Shear Interventions: Minimize layers                Problem: Patient Education: Go to Patient Education Activity  Goal: Patient/Family Education  Outcome: Progressing Towards Goal     Problem: Breathing Pattern - Ineffective  Goal: *Absence of hypoxia  Outcome: Progressing Towards Goal  Goal: *Use of effective breathing techniques  Outcome: Progressing Towards Goal     Problem: Patient Education: Go to Patient Education Activity  Goal: Patient/Family Education  Outcome: Progressing Towards Goal     Problem: Patient Education: Go to Patient Education Activity  Goal: Patient/Family Education  Outcome: Progressing Towards Goal

## 2019-08-20 NOTE — PROGRESS NOTES
Problem: Falls - Risk of  Goal: *Absence of Falls  Description  Document Ruth Lu Fall Risk and appropriate interventions in the flowsheet. Outcome: Progressing Towards Goal  Note:   Fall Risk Interventions:  Mobility Interventions: Bed/chair exit alarm         Medication Interventions: Bed/chair exit alarm    Elimination Interventions: Bed/chair exit alarm, Call light in reach              Problem: Pressure Injury - Risk of  Goal: *Prevention of pressure injury  Description  Document Rosas Scale and appropriate interventions in the flowsheet.   Outcome: Progressing Towards Goal  Note:   Pressure Injury Interventions:  Sensory Interventions: Assess changes in LOC    Moisture Interventions: Absorbent underpads    Activity Interventions: Pressure redistribution bed/mattress(bed type)    Mobility Interventions: Pressure redistribution bed/mattress (bed type)    Nutrition Interventions: Document food/fluid/supplement intake    Friction and Shear Interventions: Minimize layers                Problem: Breathing Pattern - Ineffective  Goal: *Absence of hypoxia  Outcome: Progressing Towards Goal

## 2019-08-20 NOTE — PROGRESS NOTES
[address] INTERDISCIPLINARY ROUNDS    Cardiopulmonary Care Interdisciplinary Rounds were held today to discuss patient's plan of care and outcomes. The following members were present: Pharmacy, Nursing and Case Management.   Expected Length of Stay:  4d 2h    PLAN OF CARE:   Continue current treatment plan

## 2019-08-20 NOTE — PROGRESS NOTES
Problem: Self Care Deficits Care Plan (Adult)  Goal: *Acute Goals and Plan of Care (Insert Text)  Description    FUNCTIONAL STATUS PRIOR TO ADMISSION: Patient required moderate assistance for basic and instrumental ADLs. HOME SUPPORT: The patient lived at [facility] and per family, pt had assist with ADLs and her showers 2x a week, but pt was able to transfer to and from wheelchair<>toilet and bed but had been asked to call staff for assist. .    Occupational Therapy Goals  Initiated 8/20/2019  1. Patient will perform grooming at the sink with supervision/set-up within 7 day(s). 2.  Patient will perform bathing at the sink with supervision/set-up within 7 day(s). 3.  Patient will perform lower body dressing with supervision/set-up within 7 day(s). 4.  Patient will perform toilet transfers with modified independence within 7 day(s). 5.  Patient will perform all aspects of toileting with independence within 7 day(s). 6.  Patient will participate in upper extremity therapeutic exercise/activities with supervision/set-up for 7 minutes within 7 day(s). 7.  Patient will utilize energy conservation techniques during functional activities with verbal cues within 7 day(s). Outcome: Progressing Towards Goal   OCCUPATIONAL THERAPY EVALUATION  Patient: Cate Cervantes (20 y.o. female)  Date: 8/20/2019  Primary Diagnosis: Acute on chronic heart failure ([facility] [address] [I50.9]        Precautions: fall       ASSESSMENT  Based on the objective data described below, the patient presents with generalized weakness,decreased endurance, strength, functional mobility and ADLs. Pt was living at [facility] and stated that she had assist with her showers, and her ADLs at the sink. She was mainly in wheelchair most of the day.   She was able to transfer from bed<>wheelchair, wheelchair <>toilet with RW and no other assist.     Current Level of Function Impacting Discharge (ADLs/self-care): decreased endurance, strength, functional mobility and ADLs, and pt stated that she is incontinent     Functional Outcome Measure: The patient scored 30/100 on the Barthel outcome measure which is indicative of needs assist with ADLs and would benefit from OT at [facility] at discharge. Patient will benefit from skilled therapy intervention to address the above noted impairments. PLAN :  Recommendations and Planned Interventions: self care training, functional mobility training, therapeutic exercise, balance training, endurance activities, patient education, home safety training and family training/education    Frequency/Duration: Patient will be followed by occupational therapy 4 times a week to address goals. Recommendation for discharge: (in order for the patient to meet his/her long term goals)  Therapy up to 5 days/week in SNF setting    This discharge recommendation:  Has been made in collaboration with the attending provider and/or case management    Equipment recommendations for successful discharge (if) home: to be determined by rehab facility       SUBJECTIVE:   Patient stated I dont walk anymore. I can stand to get to the wheelchair and bathroom.     OBJECTIVE DATA SUMMARY:   HISTORY:   Past Medical History:   Diagnosis Date    Arthritis     generalized    COPD     GERD (gastroesophageal reflux disease)     Hypertension     Ill-defined condition     Vitamin deficiency    NSTEMI (non-ST elevated myocardial infarction) ([facility] [address] 5/1/2018    Other ill-defined conditions(799.89)     high cholesterol    Peripheral artery disease (HCC)     Psychiatric disorder     Anxiety disorder    Sleep disorder     Insomnia    TIA (transient ischemic attack)      Past Surgical History:   Procedure Laterality Date    HX CAROTID ENDARTERECTOMY      left 2 or 3 years ago.     HX OTHER SURGICAL      colonoscopy    HX TONSILLECTOMY         Expanded or extensive additional review of patient history:     [address] [place] [place] [address] Environment: [address] [place] [address] Road Name: Amelia Hastings  # Steps to Enter: 0  One/Two Story Residence: One story  Living Alone: No  Support Systems: Assisted living  Patient Expects to be Discharged to[de-identified] Assisted living  Current DME Used/Available at Home: Esperanza Reas, rolling, Wheelchair  Tub or Shower Type: Shower    Hand dominance: Right    EXAMINATION OF PERFORMANCE DEFICITS:  Cognitive/Behavioral Status:  Neurologic State: Alert  Orientation Level: Oriented X4  Cognition: Follows commands  Perception: Appears intact  Perseveration: No perseveration noted  Safety/Judgement: Fall prevention    Skin: in fair health     Edema: none    Hearing: Auditory  Auditory Impairment: Hard of hearing, bilateral    Vision/Perceptual:                 Glasses, intact                    Range of Motion:    AROM: Generally decreased, functional                         Strength:    Strength: Generally decreased, functional                Coordination:  Coordination: Within functional limits  Fine Motor Skills-Upper: Left Intact; Right Intact    Gross Motor Skills-Upper: Left Intact; Right Intact    Tone & Sensation:    Tone: Normal  Sensation: Intact                      Balance:  Sitting: Intact  Standing: Impaired; With support  Standing - Static: Fair;Constant support  Standing - Dynamic : Fair;Constant support    Functional Mobility and Transfers for ADLs:  Bed Mobility:  Rolling: Contact guard assistance  Supine to Sit: Contact guard assistance  Scooting: Minimum assistance    Transfers:  Sit to Stand:  Moderate assistance;Assist x2  Stand to Sit: Moderate assistance;Assist x2  Bed to Chair: Moderate assistance;Assist x2  Toilet Transfer : Minimum assistance;Contact guard assistance;Assist x1    ADL Assessment:  Feeding: Independent    Oral Facial Hygiene/Grooming: Setup    Bathing: Maximum assistance;Minimum assistance    Upper Body Dressing: Contact guard assistance;Setup    Lower Body Dressing: Maximum assistance;Minimum assistance    Toileting: Contact guard assistance              Cognitive Retraining  Safety/Judgement: Fall prevention     Functional Measure:  Barthel Index:    Bathin  Bladder: 5  Bowels: 5  Groomin  Dressin  Feedin  Mobility: 0  Stairs: 0  Toilet Use: 5  Transfer (Bed to Chair and Back): 5  Total: 30/100        Percentage of impairment   0%   1-19%   20-39%   40-59%   60-79%   80-99%   100%   Barthel Score 0-100 100 99-80 79-60 59-40 20-39 1-19   0     The Barthel ADL Index: Guidelines  1. The index should be used as a record of what a patient does, not as a record of what a patient could do. 2. The main aim is to establish degree of independence from any help, physical or verbal, however minor and for whatever reason. 3. The need for supervision renders the patient not independent. 4. A patient's performance should be established using the best available evidence. Asking the patient, friends/relatives and nurses are the usual sources, but direct observation and common sense are also important. However direct testing is not needed. 5. Usually the patient's performance over the preceding 24-48 hours is important, but occasionally longer periods will be relevant. 6. Middle categories imply that the patient supplies over 50 per cent of the effort. 7. Use of aids to be independent is allowed. Tono Lozada., Barthel, D.W. ([id number]). Functional evaluation: the Barthel Index. [address] [facility] (14)2. Amberly Salazar, TAWNY, Eldon Alexander., Vik Reddy.[address] (). Measuring the change indisability after inpatient rehabilitation; comparison of the responsiveness of the Barthel Index and Functional Oakes Measure. Journal of Neurology, Neurosurgery, and Psychiatry, 66(4), 821-681. Isa Gleason, N.J.A, Efren Wolf  WMikeJ.M, & Lobo Miguel M.A. (2004.) Assessment of post-stroke quality of life in cost-effectiveness studies: The usefulness of the Barthel Index and the EuroQoL-5D. Quality of Life Research, 15, 416-29         Occupational Therapy Evaluation Charge Determination   History Examination Decision-Making   MEDIUM Complexity : Expanded review of history including physical, cognitive and psychosocial  history  MEDIUM Complexity : 3-5 performance deficits relating to physical, cognitive , or psychosocial skils that result in activity limitations and / or participation restrictions MEDIUM Complexity : Patient may present with comorbidities that affect occupational performnce. Miniml to moderate modification of tasks or assistance (eg, physical or verbal ) with assesment(s) is necessary to enable patient to complete evaluation       Based on the above components, the patient evaluation is determined to be of the following complexity level: MEDIUM      Activity Tolerance:   Fair  Please refer to the flowsheet for vital signs taken during this treatment. After treatment patient left in no apparent distress:    Sitting in chair, Call bell within reach and Bed / chair alarm activated    COMMUNICATION/EDUCATION:   The patients plan of care was discussed with: Physical Therapist, Registered Nurse, Physician and family . Home safety education was provided and the patient/caregiver indicated understanding. and Patient/family have participated as able in goal setting and plan of care. This patients plan of care is appropriate for delegation to [facility].     Thank you for this referral.  Shane Chapman OT  Time Calculation: 26 mins

## 2019-08-20 NOTE — PROGRESS NOTES
Problem: Mobility Impaired (Adult and Pediatric)  Goal: *Acute Goals and Plan of Care (Insert Text)  Description  FUNCTIONAL STATUS PRIOR TO ADMISSION: The patient was functional at the wheelchair level, stating she used RW and x1 person assist of [facility] staff for bed <> chair transfers. Ambulates VERY short distances w/ RW support. Denies history of prior falls. Wears 2 LPM O2 at baseline. HOME SUPPORT PRIOR TO ADMISSION: The patient lived at [facility] with assist of [facility] staff. Physical Therapy Goals  Initiated 8/20/2019  1. Patient will move from supine to sit and sit to supine , scoot up and down and roll side to side in bed with supervision/set-up within 7 day(s). 2.  Patient will transfer from bed to chair and chair to bed with minimal assistance/contact guard assist using the least restrictive device within 7 day(s). 3.  Patient will perform sit to stand with minimal assistance/contact guard assist within 7 day(s). 4.  Patient will ambulate with minimal assistance/contact guard assist for 10 feet with the least restrictive device within 7 day(s). Outcome: Progressing Towards Goal   PHYSICAL THERAPY EVALUATION  Patient: Abelardo Lemos (80 y.o. 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 Pt with strong R lateral lean during sit>>stand transfer and brief ambulation trial, requiring modA to achieve and maintain midline position during ambulation. Overall, pt tolerated therapy session well and is likely close to her baseline level however would benefit from addition skilled intervention to decrease caregiver burden at discharge.     Current Level of Function Impacting Discharge (mobility/balance): CGAx1 for bed mobility, modAx2 for transfers and brief ambulation distance w/ RW support    Functional Outcome Measure: The patient scored 30/100 on the Barthel Index outcome measure which is indicative of 60-79% impairment in ADLs/functional mobility. Other factors to consider for discharge: none     Patient will benefit from skilled therapy intervention to address the above noted impairments. PLAN :  Recommendations and Planned Interventions: bed mobility training, transfer training, gait training, therapeutic exercises, patient and family training/education and therapeutic activities      Frequency/Duration: Patient will be followed by physical therapy:  3 times a week to address goals. Recommendation for discharge: (in order for the patient to meet his/her long term goals)  Recommend [facility]     This discharge recommendation:  Has been made in collaboration with the attending provider and/or case management    Equipment recommendations for successful discharge (if) home: none, owns necessary equipment          SUBJECTIVE:   Patient stated oh, thank you so much! Trina Hai    OBJECTIVE DATA SUMMARY:   HISTORY:    Past Medical History:   Diagnosis Date    Arthritis     generalized    COPD     GERD (gastroesophageal reflux disease)     Hypertension     Ill-defined condition     Vitamin deficiency    NSTEMI (non-ST elevated myocardial infarction) ([facility] [address] 5/1/2018    Other ill-defined conditions(799.89)     high cholesterol    Peripheral artery disease (HCC)     Psychiatric disorder     Anxiety disorder    Sleep disorder     Insomnia    TIA (transient ischemic attack)      Past Surgical History:   Procedure Laterality Date    HX CAROTID ENDARTERECTOMY      left 2 or 3 years ago. HX OTHER SURGICAL      colonoscopy    HX TONSILLECTOMY         Personal factors and/or comorbidities impacting plan of care: hx of CVA, dementia?     Home Situation  Home Environment: Assisted living  Care Facility Name: [facility]  # Steps to Enter: 0  One/Two Story Residence: One story  Living Alone: No  Support Systems: Assisted living  Patient Expects to be Discharged to[de-identified] Assisted living  Current DME Used/Available at Home: makayla Fernandes, Wheelchair  Tub or Shower Type: Shower    EXAMINATION/PRESENTATION/DECISION MAKING:   Critical Behavior:  Neurologic State: Alert           Hearing: Auditory  Auditory Impairment: Hard of hearing, bilateral  Skin:  intact  Edema: none noted   Range Of Motion:  AROM: Generally decreased, functional                       Strength:    Strength: Generally decreased, functional                    Tone & Sensation:   Tone: Normal              Sensation: Intact               Coordination:  Coordination: Within functional limits  Vision:      Functional Mobility:  Bed Mobility:  Rolling: Contact guard assistance  Supine to Sit: Contact guard assistance     Scooting: Minimum assistance  Transfers:  Sit to Stand: Moderate assistance;Assist x2  Stand to Sit: Moderate assistance;Assist x2        Bed to Chair: Moderate assistance;Assist x2              Balance:   Sitting: Intact  Standing: Impaired; With support  Standing - Static: Fair;Constant support  Standing - Dynamic : Fair;Constant support  Ambulation/Gait Training:  Distance (ft): 2 Feet (ft)  Assistive Device: Walker, rolling;Gait belt  Ambulation - Level of Assistance:  Moderate assistance;Assist x2        Gait Abnormalities: Decreased step clearance;Shuffling gait(strong R lateral lean)        Base of Support: Widened     Speed/Vera: Shuffled  Step Length: Left shortened;Right shortened                   Functional Measure:  Barthel Index:    Bathin  Bladder: 5  Bowels: 5  Groomin  Dressin  Feedin  Mobility: 0  Stairs: 0  Toilet Use: 5  Transfer (Bed to Chair and Back): 5  Total: 30/100       Percentage of impairment   0%   1-19%   20-39%   40-59%   60-79%   80-99%   100%   Barthel Score 0-100 100 99-80 79-60 59-40 20-39 1-19   0     The Barthel ADL Index: Guidelines  1. The index should be used as a record of what a patient does, not as a record of what a patient could do. 2. The main aim is to establish degree of independence from any help, physical or verbal, however minor and for whatever reason. 3. The need for supervision renders the patient not independent. 4. A patient's performance should be established using the best available evidence. Asking the patient, friends/relatives and nurses are the usual sources, but direct observation and common sense are also important. However direct testing is not needed. 5. Usually the patient's performance over the preceding 24-48 hours is important, but occasionally longer periods will be relevant. 6. Middle categories imply that the patient supplies over 50 per cent of the effort. 7. Use of aids to be independent is allowed. Megha Dickey., Barthel, D.W. ([id number]). Functional evaluation: the Barthel Index. [address] [facility] (14)2. Anuel Gerard naomie TAWNY Wheatley, Zach Maddox., Kaylen Perez., Dike, [address] [place] [address] (1999). Measuring the change indisability after inpatient rehabilitation; comparison of the responsiveness of the Barthel Index and Functional [place] Measure. Journal of Neurology, Neurosurgery, and Psychiatry, 66(4), 875-184. Kavitha Hopper, NMAYKEL, RAFAEL Benitez, & Kd Wolfe, M.A. (2004.) Assessment of post-stroke quality of life in cost-effectiveness studies: The usefulness of the Barthel Index and the EuroQoL-5D.  Quality of Life Research, 15, 481-34           Physical Therapy Evaluation Charge Determination   History Examination Presentation Decision-Making   MEDIUM  Complexity : 1-2 comorbidities / personal factors will impact the outcome/ POC  MEDIUM Complexity : 3 Standardized tests and measures addressing body structure, function, activity limitation and / or participation in recreation  MEDIUM Complexity : Evolving with changing characteristics  MEDIUM Complexity : FOTO score of 26-74      Based on the above components, the patient evaluation is determined to be of the following complexity level: MEDIUM    Pain Rating:  Denied complaints of pain    Activity Tolerance:   VSS on 2 LPM O2   Please refer to the flowsheet for vital signs taken during this treatment. After treatment patient left in no apparent distress:   Sitting in chair, Call bell within reach and Bed / chair alarm activated    COMMUNICATION/EDUCATION:   The patients plan of care was discussed with: Occupational Therapist and Registered Nurse. Fall prevention education was provided and the patient/caregiver indicated understanding., Patient/family have participated as able in goal setting and plan of care. and Patient/family agree to work toward stated goals and plan of care.     Thank you for this referral.  Sam Griffith, PT   Time Calculation: 20 mins

## 2019-08-20 NOTE — PROGRESS NOTES
0700: Bedside shift change report given to THAD Ambrocio RN (oncoming nurse) by Ramone Deal RN (offgoing nurse). Report included the following information SBAR and Kardex. 1215: Tele called pt had 12 beats of SVT, pt alert, stable, asymptomatic, Dr. Chin Carmichael made aware and also of vaginal discharge. 1900:   Bedside shift change report GIVEN TO Ramone Deal RN. Report included the following information SBAR and Kardex. SIGNIFICANT CHANGES DURING SHIFT:  Pt had a good day, BM x2, PT/OT worked with her, on NC.      CONCERNS TO ADDRESS WITH MD:  D/c planning? [facility] NURSING NOTE   Admission Date 8/19/2019   Admission Diagnosis Acute on chronic heart failure ([address] [address] [I50.9]   Consults IP CONSULT TO HOSPITALIST  IP CONSULT TO CARDIOLOGY      Cardiac Monitoring [x] Yes [] No      Purposeful Hourly Rounding [x] Yes    Arjun Score Total Score: 3   Arjun score 3 or > [x] Bed Alarm [] Avasys [] 1:1 sitter [] Patient refused (Signed refusal form in chart)   Rosas Score Rosas Score: 18   Rosas score 14 or < [] PMT consult [] Wound Care consult    []  Specialty bed  [] Nutrition consult      Influenza Vaccine Received Flu Vaccine for Current Season (usually Sept-March): Not Flu Season           Oxygen needs? [] Room air Oxygen @  []1L    [x]2L    []3L   []4L    []5L   []6L via  NC   Chronic home O2 use?  [x] Yes [] No  Perform O2 challenge test and document in progress note using smartphrase (.Homeoxygen)      Last bowel movement Last Bowel Movement Date: 08/20/19      Urinary Catheter       External Female Catheter 08/19/19-Urine Output (mL): 100 ml     LDAs               Peripheral IV 08/19/19 Left Antecubital (Active)   Site Assessment Clean, dry, & intact 8/20/2019  2:17 PM   Phlebitis Assessment 0 8/20/2019  2:17 PM   Infiltration Assessment 0 8/20/2019  2:17 PM   Dressing Status Clean, dry, & intact 8/20/2019  2:17 PM   Dressing Type Transparent;Tape 8/20/2019  2:17 PM   Hub Color/Line Status Blue 8/20/2019  2:17 PM Alcohol Cap Used Yes 8/19/2019  2:57 PM          External Female Catheter 08/19/19 (Active)   Site Assessment Clean, dry, & intact 8/20/2019  7:34 AM   Repositioned Yes 8/20/2019  7:34 AM   Perineal Care Yes 8/20/2019  7:34 AM   Wick Changed Yes 8/20/2019  7:34 AM   Suction Canister/Tubing Changed No 8/20/2019  7:34 AM   Urine Output (mL) 100 ml 8/20/2019 12:53 AM                   Readmission Risk Assessment Tool Score High Risk            32       Total Score        2 . Living with Significant Other. Assisted Living. LTAC. SNF. or   Rehab    5 Pt.  Coverage (Medicare=5 , Medicaid, or Self-Pay=4)    25 Charlson Comorbidity Score (Age + Comorbid Conditions)        Criteria that do not apply:    Has Seen PCP in Last 6 Months (Yes=3, No=0)    Patient Length of Stay (>5 days = 3)    IP Visits Last 12 Months (1-3=4, 4=9, >4=11)       Expected Length of Stay 4d 2h   Actual Length of Stay 1

## 2019-08-20 NOTE — ED PROVIDER NOTES
EMERGENCY DEPARTMENT HISTORY AND PHYSICAL EXAM      Date: 8/19/2019  Patient Name: Pasha Xiong    History of Presenting Illness     Chief Complaint   Patient presents with    Fatigue     pt has hx of COPD. EMS reports that she just feels weaker then normal today    Shortness of Breath       History Provided By: Patient and Patient's Son    HPI: Pasha Xiong, 80 y.o. female presents to the Emergency Dept via EMS from [facility] with her sons in attendance. They report they were contacted by the nursing staff at [facility] this morning when the nurse went to check on the patient \"and she didn't appear like herself\". He reports the patient was quite fatigued and was not interested in getting up to eat. He states \"she was weaker than normal today\". Pt with h/o COPD and is on continual oxygen. She states she has a cough \"but no more than what I have every day\". Pt denied chest pain. She reports her appetite is a bit depressed but her sons report the patient Lorena Mandes very well this weekend\". The patient denied dysuria/hematuria. No fever/chills. She denied increased swelling (legs) but has been admitted for same in the past.  She has not seen him in some time, but the family reports she has seen Dr. Rei Lindsay and S for cardiology evaluation previously. She denied focal weakness, visual changes, or problems with speech/ambulation. Chief Complaint: malaise, fatigue, shortness of breath  Duration: 1 Days  Timing:  Acute  Location: diffuse  Quality: fatigue, chest tightness  Severity: Moderate  Modifying Factors: increased fatigue and shortness of breath with any attempts to exert herself  Associated Symptoms: denies any other associated signs or symptoms        There are no other complaints, changes, or physical findings at this time.     PCP: Manish Hernandez MD    Current Facility-Administered Medications   Medication Dose Route Frequency Provider Last Rate Last Dose    ondansetron [facility]) injection 4 mg  4 mg IntraVENous Q4H PRN Jaydon Grissom MD        apixaban Mallorie Hair) tablet 2.5 mg  2.5 mg Oral Q12H Jaydon Grissom MD   2.5 mg at 08/19/19 2138    [START ON 8/20/2019] amLODIPine (NORVASC) tablet 5 mg  5 mg Oral DAILY Jaydon Grissom MD        [START ON 8/20/2019] oxybutynin chloride XL (DITROPAN XL) tablet 5 mg  5 mg Oral DAILY Jaydon Grissom MD        [START ON 8/20/2019] metoprolol succinate (TOPROL-XL) XL tablet 25 mg  25 mg Oral DAILY Jaydon Grissom MD        atorvastatin (LIPITOR) tablet 40 mg  40 mg Oral QHS Jaydon Grissom MD   40 mg at 08/19/19 2138    [START ON 8/20/2019] furosemide (LASIX) injection 40 mg  40 mg IntraVENous DAILY Jaydon Grissom MD        sodium chloride (NS) flush 5-40 mL  5-40 mL IntraVENous Q8H Jaydon Grissom MD   10 mL at 08/19/19 2142    sodium chloride (NS) flush 5-40 mL  5-40 mL IntraVENous PRN Jaydon Grissom MD        melatonin tablet 3 mg  3 mg Oral QHS PRN Garcia Harsh K, DO   3 mg at 08/19/19 2138       Past History     Past Medical History:  Past Medical History:   Diagnosis Date    Arthritis     generalized    COPD     GERD (gastroesophageal reflux disease)     Hypertension     Ill-defined condition     Vitamin deficiency    NSTEMI (non-ST elevated myocardial infarction) ([facility] 5/1/2018    Other ill-defined conditions(799.89)     high cholesterol    Peripheral artery disease (HCC)     Psychiatric disorder     Anxiety disorder    Sleep disorder     Insomnia    TIA (transient ischemic attack)        Past Surgical History:  Past Surgical History:   Procedure Laterality Date    HX CAROTID ENDARTERECTOMY      left 2 or 3 years ago.     HX OTHER SURGICAL      colonoscopy    HX TONSILLECTOMY         Family History:  Family History   Problem Relation Age of Onset    Other Mother         carotid endarterectomy/cardiomegaly    Other Father         carotid endarterectomy       Social History:  Social History     Tobacco Use    Smoking status: Former Smoker    Smokeless tobacco: Never Used    Tobacco comment: quit 3 years ago   Substance Use Topics    Alcohol use: Yes     Comment: glass of wine rarely    Drug use: Yes     Types: Prescription, OTC       Allergies: Allergies   Allergen Reactions    Penicillins Swelling    Sulfa (Sulfonamide Antibiotics) Itching    Propoxyphene-Acetaminophen Unknown (comments)     Pt. Not sure, thinks it was hallucination.  Sulfadiazine Unknown (comments)         Review of Systems   Review of Systems   Constitutional: Positive for activity change, appetite change and fatigue. Negative for chills and fever. HENT: Negative for congestion, rhinorrhea, sinus pressure, sinus pain, sore throat and trouble swallowing. Eyes: Negative for photophobia and visual disturbance. Respiratory: Positive for chest tightness and shortness of breath. Negative for cough. Cardiovascular: Negative for chest pain and palpitations. Gastrointestinal: Negative for abdominal pain, diarrhea, nausea and vomiting. Endocrine: Negative for polydipsia, polyphagia and polyuria. Genitourinary: Negative for dysuria and hematuria. Musculoskeletal: Negative for neck pain and neck stiffness. Skin: Negative for rash and wound. Allergic/Immunologic: Negative for food allergies and immunocompromised state. Neurological: Positive for weakness. Negative for dizziness and headaches. Generalized weakness   Hematological: Negative for adenopathy. Does not bruise/bleed easily. Psychiatric/Behavioral: Negative for agitation and confusion. Physical Exam   Physical Exam   Constitutional: She is oriented to person, place, and time. She appears well-developed and well-nourished. No distress. Obese elderly female, [place] but responds appropriately to questioning, A & O to place, situation, family members. Appears fatigued but o/w in NAD   HENT:   Head: Normocephalic and atraumatic.    Nose: Nose normal.   Mouth/Throat: Oropharynx is clear and moist. No oropharyngeal exudate. Eyes: Pupils are equal, round, and reactive to light. Conjunctivae and EOM are normal. Right eye exhibits no discharge. Left eye exhibits no discharge. No scleral icterus. Neck: Normal range of motion. Neck supple. No JVD present. No tracheal deviation present. No thyromegaly present. Cardiovascular: Normal rate, regular rhythm and normal heart sounds. Pulmonary/Chest: Effort normal. No respiratory distress. She has wheezes. She exhibits no tenderness. Trace expiratory wheezes noted   Abdominal: Soft. She exhibits no distension. There is no tenderness. There is no rebound and no guarding. Musculoskeletal: Normal range of motion. She exhibits edema. 1-2+ pretibial edema bilat   Lymphadenopathy:     She has no cervical adenopathy. Neurological: She is alert and oriented to person, place, and time. She exhibits normal muscle tone. Coordination normal.   Skin: Skin is warm and dry. She is not diaphoretic. No pallor. Psychiatric: She has a normal mood and affect. Her behavior is normal. Judgment normal.   Nursing note and vitals reviewed.       Diagnostic Study Results     Labs -     Recent Results (from the past 12 hour(s))   URINALYSIS W/ REFLEX CULTURE    Collection Time: 08/19/19 11:46 AM   Result Value Ref Range    Color YELLOW/STRAW      Appearance CLEAR CLEAR      Specific gravity 1.007 1.003 - 1.030      pH (UA) 6.5 5.0 - 8.0      Protein NEGATIVE  NEG mg/dL    Glucose NEGATIVE  NEG mg/dL    Ketone NEGATIVE  NEG mg/dL    Bilirubin NEGATIVE  NEG      Blood LARGE (A) NEG      Urobilinogen 0.2 0.2 - 1.0 EU/dL    Nitrites POSITIVE (A) NEG      Leukocyte Esterase TRACE (A) NEG      WBC 5-10 0 - 4 /hpf    RBC >100 (H) 0 - 5 /hpf    Epithelial cells FEW FEW /lpf    Bacteria 4+ (A) NEG /hpf    UA:UC IF INDICATED URINE CULTURE ORDERED (A) CNI     POC VENOUS BLOOD GAS    Collection Time: 08/19/19  2:06 PM   Result Value Ref Range    Device: SIMPLE MASK      pH, venous (POC) 7.371 7.32 - 7.42      pCO2, venous (POC) 52.5 (H) 41 - 51 MMHG    pO2, venous (POC) 33 25 - 40 mmHg    HCO3, venous (POC) 30.4 (H) 23.0 - 28.0 MMOL/L    sO2, venous (POC) 60 (L) 65 - 88 %    Base excess, venous (POC) 5 mmol/L    Allens test (POC) N/A      Site OTHER      Specimen type (POC) VENOUS BLOOD     TROPONIN I    Collection Time: 08/19/19  4:19 PM   Result Value Ref Range    Troponin-I, Qt. 0.14 (H) <0.05 ng/mL       Radiologic Studies -   XR CHEST PA LAT   Final Result   IMPRESSION:   Cardiomegaly, mild pulmonary edema and small bilateral pleural effusions. CXR Results  (Last 48 hours)               08/19/19 1129  XR CHEST PA LAT Final result    Impression:  IMPRESSION:   Cardiomegaly, mild pulmonary edema and small bilateral pleural effusions. Narrative:  INDICATION:   shortness of breath, fatigue, h/o COPD       COMPARISON: June 8, 2018       FINDINGS:       Frontal and lateral views of the chest demonstrate cardiomegaly. The lungs are   adequately expanded. Mild pulmonary edema and small pleural effusions. No   pneumothorax. The osseous structures are unremarkable. Medical Decision Making   I am the first provider for this patient. I reviewed the vital signs, available nursing notes, past medical history, past surgical history, family history and social history. Vital Signs-Reviewed the patient's vital signs.   Patient Vitals for the past 12 hrs:   Temp Pulse Resp BP SpO2   08/19/19 2242 98.2 °F (36.8 °C) 79 20 157/80 91 %   08/19/19 1917 97.7 °F (36.5 °C) 86 20 157/64 95 %   08/19/19 1718 98.1 °F (36.7 °C) 84 20 170/81 94 %   08/19/19 1630  87 26 146/86 91 %   08/19/19 1600  90 30 159/89 92 %   08/19/19 1415 99.8 °F (37.7 °C) 85 30 161/83 91 %   08/19/19 1413  82 (!) 33 158/82 91 %   08/19/19 1408  78  161/86    08/19/19 1400  87 29 161/86 91 %   08/19/19 1345  92 (!) 31 159/82 90 %   08/19/19 1315  90 20 (!) 157/91 93 %   08/19/19 1215  95 30 145/71 90 %   08/19/19 1202  94 27 149/83 (!) 89 %   08/19/19 1115  81 13 162/82 91 %         Records Reviewed: Nursing Notes, Old Medical Records, Ambulance Run Sheet, Previous Radiology Studies and Previous Laboratory Studies    Provider Notes (Medical Decision Making):   COPD exacerbation, PNA, UTI, ACS, arrhythmia, electrolyte dysfunction    ED Course:   Initial assessment performed. The patients presenting problems have been discussed, and they are in agreement with the care plan formulated and outlined with them. I have encouraged them to ask questions as they arise throughout their visit. Case d/w Dr. Mary Fields who will take over care of patient. Suspect patient will require admission, but awaiting remaining diagnostic studies prior to contacting hospitalist.    PROGRESS:  I evaluated the patient separately and do agree with history and exam as well as review of systems documented above by the MARY and is c/w with my findings. Findings consistent with acute on chronic respiratory failure secondary to heart failure versus COPD exacerbation. Patient was initially started on antibiotics and diuretics and placed on supplemental O2 with improvement in her symptoms. Critical Care Note      IMPENDING DETERIORATION -Airway and Respiratory  ASSOCIATED RISK FACTORS - Hypoxia  MANAGEMENT- Bedside Assessment and Supervision of Care  INTERPRETATION -  Xrays, ECG and Blood Pressure  INTERVENTIONS - supplemental O2  CASE REVIEW - Hospitalist  TREATMENT RESPONSE -Improved  PERFORMED BY - Self    NOTES   :  I have provided a total of 35 minutes of critical time. The reason for providing this level of medical care for this critically ill patient was due to a critical illness that impaired one or more vital organ systems such that there was a high probability of imminent or life threatening deterioration in the patients condition.  This care involved high complexity decision making to assess, manipulate, and support vital system functions,  lab review, consultations with specialist, family decision- making, bedside attention and documentation. During this entire length of time I was immediately available to the patient        PLAN:  1. Admit to hospitalist service    Diagnosis:  1. Respiratory failure with hypoxia   Acute on chronic CHF  2. COPD  4.  Malaise and fatigue

## 2019-08-20 NOTE — PROGRESS NOTES
Bedside shift change report GIVEN TO Selena Betts RN. Report included the following information SBAR. SIGNIFICANT CHANGES DURING SHIFT:          CONCERNS TO ADDRESS WITH MD:              [facility] NURSING NOTE   Admission Date 8/19/2019   Admission Diagnosis Acute on chronic heart failure ([address] [I50.9]   Consults IP CONSULT TO HOSPITALIST  IP CONSULT TO CARDIOLOGY      Cardiac Monitoring [x] Yes [] No      Purposeful Hourly Rounding [x] Yes    Arjun Score Total Score: 3   Arjun score 3 or > [x] Bed Alarm [] Avasys [] 1:1 sitter [] Patient refused (Signed refusal form in chart)   Rosas Score Rosas Score: 18   Rosas score 14 or < [x] PMT consult [] Wound Care consult    []  Specialty bed  [] Nutrition consult      Influenza Vaccine Received Flu Vaccine for Current Season (usually Sept-March): Not Flu Season           Oxygen needs? [] Room air Oxygen @  []1L    [x]2L    []3L   []4L    []5L   []6L via  NC   Chronic home O2 use?  [x] Yes [] No  Perform O2 challenge test and document in progress note using smartphrase (.Homeoxygen)      Last bowel movement Last Bowel Movement Date: 08/18/19      Urinary Catheter       External Female Catheter 08/19/19-Urine Output (mL): 300 ml     LDAs               Peripheral IV 08/19/19 Right Antecubital (Active)   Site Assessment Clean, dry, & intact 8/19/2019  7:17 PM   Phlebitis Assessment 0 8/19/2019  7:17 PM   Infiltration Assessment 0 8/19/2019  7:17 PM   Dressing Status Clean, dry, & intact 8/19/2019  7:17 PM   Dressing Type Transparent 8/19/2019  7:17 PM   Hub Color/Line Status Pink 8/19/2019  7:17 PM   Alcohol Cap Used Yes 8/19/2019 10:27 AM       Peripheral IV 08/19/19 Left Antecubital (Active)   Site Assessment Clean, dry, & intact 8/19/2019  7:17 PM   Phlebitis Assessment 0 8/19/2019  7:17 PM   Infiltration Assessment 0 8/19/2019  7:17 PM   Dressing Status Clean, dry, & intact 8/19/2019  7:17 PM   Dressing Type Transparent 8/19/2019  7:17 PM   Hub Color/Line Status Blue 8/19/2019  7:17 PM   Alcohol Cap Used Yes 8/19/2019  2:57 PM          External Female Catheter 08/19/19 (Active)   Site Assessment Clean, dry, & intact 8/19/2019  7:17 PM   Repositioned Yes 8/19/2019  7:17 PM   Perineal Care Yes 8/19/2019  7:17 PM   Wick Changed Yes 8/19/2019  7:17 PM   Suction Canister/Tubing Changed Yes 8/19/2019  7:17 PM   Urine Output (mL) 300 ml 8/19/2019  7:17 PM                   Readmission Risk Assessment Tool Score High Risk            32       Total Score        2 . Living with Significant Other. Assisted Living. LTAC. SNF. or   Rehab    5 Pt.  Coverage (Medicare=5 , Medicaid, or Self-Pay=4)    25 Charlson Comorbidity Score (Age + Comorbid Conditions)        Criteria that do not apply:    Has Seen PCP in Last 6 Months (Yes=3, No=0)    Patient Length of Stay (>5 days = 3)    IP Visits Last 12 Months (1-3=4, 4=9, >4=11)       Expected Length of Stay - - -   Actual Length of Stay 1

## 2019-08-20 NOTE — CONSULTS
Consult    NAME: Vania Moran   :  1928   MRN:  [id number]     Date/Time:  2019 2:28 PM    Patient PCP: Sanjeev Mckeon MD  ________________________________________________________________________        Jonathan To      - hospitalized 2018 with hypoxia, acute diastolic CHF, prox afib with RVR back in sinus, increased  troponin consistent with type II NSTEMI  -  No hx of CAD  -  Echo  with EF 55%, mod MR, mild AI  - Prox Afib currently in sinus with LVH  -  HTN, with hypertensive heart disease with heart failure, and CKD cr 1.4  -  Dyslipidemia  -  Hx of CVA s/p TPA in   -  PVD with hx of left CEA  - COPD now on home O2  -  GERD  -  DJD  -  , lives at [facility], poor functional capacity in a wheelchair,  sleeps most of the day                                 Plan:                   Does not appear ischemic. This is not clearly CHF, pBNP mildly elevated  No edema on exam.  PAF currently in sinus.      1.  Cont eliquis 2.5mg po bid, given CHADS2 of 5  2. Cont toprol xl  3. Cont amlodipine  4. Diurese lasix 40mg iv bid, will likely need higher dose on discharge  5. Cont atorvastatin      PT/OT  Continue home O2           [x]        High complexity decision making was performed      Subjective:   CHIEF COMPLAINT:  Shortness of breath    HISTORY OF PRESENT ILLNESS:       Nguyễn Tatum is a 80 y.o.  female who presents with CC listed above. Pt is a poor historian due to her advanced age. Her daughter attempts to fill in the void, however she is not up to date on pt's medical conditions. She does state that she went to visit her mother yesterday and did notice anything different. However this morning pt was complaining of SOB. Pt denies any CP, fever, chills or cough.  She does not ambulate and denies any worsening leg swelling - she is already wearing knee high compression socks.       Currently in room without complaint, feeling better. We were asked to consult for work up and evaluation of the above problems. Past Medical History:   Diagnosis Date    Arthritis     generalized    COPD     GERD (gastroesophageal reflux disease)     Hypertension     Ill-defined condition     Vitamin deficiency    NSTEMI (non-ST elevated myocardial infarction) ([facility] [address] 5/1/2018    Other ill-defined conditions(799.89)     high cholesterol    Peripheral artery disease (HCC)     Psychiatric disorder     Anxiety disorder    Sleep disorder     Insomnia    TIA (transient ischemic attack)       Past Surgical History:   Procedure Laterality Date    HX CAROTID ENDARTERECTOMY      left 2 or 3 years ago.  HX OTHER SURGICAL      colonoscopy    HX TONSILLECTOMY       Allergies   Allergen Reactions    Penicillins Swelling    Sulfa (Sulfonamide Antibiotics) Itching    Propoxyphene-Acetaminophen Unknown (comments)     Pt. Not sure, thinks it was hallucination.  Sulfadiazine Unknown (comments)      Meds:  See below  Social History     Tobacco Use    Smoking status: Former Smoker    Smokeless tobacco: Never Used    Tobacco comment: quit 3 years ago   Substance Use Topics    Alcohol use: Yes     Comment: glass of wine rarely      Family History   Problem Relation Age of Onset    Other Mother         carotid endarterectomy/cardiomegaly    Other Father         carotid endarterectomy       REVIEW OF SYSTEMS:     []         Unable to obtain  ROS due to ---   [x]         Total of 12 systems reviewed as follows:     Total of 12 systems reviewed as follows:       POSITIVE= Bold text  Negative = normal text  General:  fever, chills, sweats, generalized weakness, weight loss/gain,      loss of appetite   Eyes:    blurred vision, eye pain, loss of vision, double vision  ENT:    rhinorrhea, pharyngitis   Respiratory:   cough, sputum production, SOB, ALCARAZ, wheezing, pleuritic pain   Cardiology:   chest pain, palpitations, orthopnea, PND, edema, syncope   Gastrointestinal:  abdominal pain , N/V, diarrhea, dysphagia, constipation, bleeding   Genitourinary:  frequency, urgency, dysuria, hematuria, incontinence   Muskuloskeletal :  arthralgia, myalgia, back pain  Hematology:  easy bruising, nose or gum bleeding, lymphadenopathy   Dermatological: rash, ulceration, pruritis, color change / jaundice  Endocrine:   hot flashes or polydipsia   Neurological:  headache, dizziness, confusion, focal weakness, paresthesia,     Speech difficulties, memory loss, gait difficulty  Psychological: Feelings of anxiety, depression, agitation    Objective:      Physical Exam:    Last 24hrs VS reviewed since prior progress note. Most recent are:    Visit Vitals  /80   Pulse 75   Temp 98.5 °F (36.9 °C)   Resp 18   Ht 5' 5\" (1.651 m)   Wt 67.6 kg (149 lb)   SpO2 94%   BMI 24.79 kg/m²       Intake/Output Summary (Last 24 hours) at 8/20/2019 1428  Last data filed at 8/20/2019 1045  Gross per 24 hour   Intake 580 ml   Output 2000 ml   Net -1420 ml        General Appearance: Well developed, elderly, alert & oriented x 3,    no acute distress. Ears/Nose/Mouth/Throat: Pupils equal and round, Hearing grossly normal.  Neck: Supple. JVP within normal limits. Carotids good upstrokes, with no bruit. Chest: Lungs few ronchi to auscultation bilaterally. Cardiovascular: Regular rate and rhythm, S1S2 normal, no murmur, rubs, gallops. Abdomen: Soft, non-tender, bowel sounds are active. No organomegaly. Extremities: No edema bilaterally. Femoral pulses +1, Distal Pulses +trace. Skin: Warm and dry. Neuro: CN II-XII grossly intact, Strength and sensation grossly intact. Data:      Prior to Admission medications    Medication Sig Start Date End Date Taking? Authorizing Provider   senna-docusate (PERICOLACE) 8.6-50 mg per tablet Take 2 Tabs by mouth two (2) times a day.    Yes Provider, Historical   potassium chloride SR (KLOR-CON 10) 10 mEq tablet Take 10 mEq by mouth two (2) times a day. Yes Provider, Historical   atorvastatin (LIPITOR) 40 mg tablet Take 1 Tab by mouth nightly. 5/11/18  Yes Joie Byrne MD   apixaban (ELIQUIS) 2.5 mg tablet Take 1 Tab by mouth two (2) times a day. 5/11/18  Yes Joie Byrne MD   furosemide (LASIX) 40 mg tablet Take 1 Tab by mouth two (2) times a day. 5/11/18  Yes Joie Byrne MD   metoprolol succinate (TOPROL-XL) 25 mg XL tablet Take 1 Tab by mouth daily. 5/11/18  Yes Joie Byrne MD   amLODIPine (NORVASC) 5 mg tablet Take 5 mg by mouth daily. Yes Provider, Historical   oxybutynin chloride XL (DITROPAN XL) 5 mg CR tablet Take 5 mg by mouth daily. For overactive bladder   Yes Provider, Historical   pantoprazole (PROTONIX) 20 mg tablet Take 20 mg by mouth Before breakfast and dinner. For GERD   Yes Provider, Historical   cetirizine (ZYRTEC) 10 mg tablet Take 10 mg by mouth nightly. For itching   Yes Other, MD Sunita   BMikeinfantis-B.ani-B.long-B.bifi (PROBIOTIC 4X) 10-15 mg TbEC Take 1 Cap by mouth two (2) times a day. Yes Other, MD Sunita   MULTIVITS W-FE,OTHER MIN/LUT (CENTRUM SILVER ULTRA WOMEN'S PO) Take 1 Tab by mouth daily. 3/14/11  Yes Provider, Historical   peg 400-propylene glycol (SYSTANE, PROPYLENE GLYCOL,) 0.4-0.3 % drop Administer 1 Drop to both eyes two (2) times a day. Provider, Historical   acetaminophen (TYLENOL) 325 mg tablet Take 650 mg by mouth every four to six (4-6) hours as needed for Pain. Provider, Historical   ammonium lactate (LAC-HYDRIN) 12 % lotion Apply  to affected area every twelve (12) hours as needed (Apply to chest, back, arms as needed for eczema). rub in to affected area well    Provider, Historical   clobetasol (TEMOVATE) 0.05 % topical cream Apply  to affected area daily. Apply to vulva/inner labia or whitish area    Other, MD Sunita   olopatadine (PATANOL) 0.1 % ophthalmic solution Administer 1 Drop to both eyes two (2) times a day.     Other, MD Sunita       Recent Results (from the past 24 hour(s))   TROPONIN I    Collection Time: 08/19/19  4:19 PM   Result Value Ref Range    Troponin-I, Qt. 0.14 (H) <2.09 ng/mL   METABOLIC PANEL, COMPREHENSIVE    Collection Time: 08/20/19  2:50 AM   Result Value Ref Range    Sodium 138 136 - 145 mmol/L    Potassium 3.2 (L) 3.5 - 5.1 mmol/L    Chloride 100 97 - 108 mmol/L    CO2 29 21 - 32 mmol/L    Anion gap 9 5 - 15 mmol/L    Glucose 96 65 - 100 mg/dL    BUN 12 6 - 20 MG/DL    Creatinine 0.87 0.55 - 1.02 MG/DL    BUN/Creatinine ratio 14 12 - 20      GFR est AA >60 >60 ml/min/1.73m2    GFR est non-AA >60 >60 ml/min/1.73m2    Calcium 9.0 8.5 - 10.1 MG/DL    Bilirubin, total 1.0 0.2 - 1.0 MG/DL    ALT (SGPT) 18 12 - 78 U/L    AST (SGOT) 20 15 - 37 U/L    Alk. phosphatase 68 45 - 117 U/L    Protein, total 7.2 6.4 - 8.2 g/dL    Albumin 3.1 (L) 3.5 - 5.0 g/dL    Globulin 4.1 (H) 2.0 - 4.0 g/dL    A-G Ratio 0.8 (L) 1.1 - 2.2     MAGNESIUM    Collection Time: 08/20/19  2:50 AM   Result Value Ref Range    Magnesium 2.1 1.6 - 2.4 mg/dL   PHOSPHORUS    Collection Time: 08/20/19  2:50 AM   Result Value Ref Range    Phosphorus 3.7 2.6 - 4.7 MG/DL   CBC WITH AUTOMATED DIFF    Collection Time: 08/20/19  2:50 AM   Result Value Ref Range    WBC 9.9 3.6 - 11.0 K/uL    RBC 4.02 3.80 - 5.20 M/uL    HGB 13.4 11.5 - 16.0 g/dL    HCT 39.9 35.0 - 47.0 %    MCV 99.3 (H) 80.0 - 99.0 FL    MCH 33.3 26.0 - 34.0 PG    MCHC 33.6 30.0 - 36.5 g/dL    RDW 12.9 11.5 - 14.5 %    PLATELET 621 982 - 731 K/uL    MPV 11.2 8.9 - 12.9 FL    NRBC 0.0 0  WBC    ABSOLUTE NRBC 0.00 0.00 - 0.01 K/uL    NEUTROPHILS 72 32 - 75 %    LYMPHOCYTES 18 12 - 49 %    MONOCYTES 9 5 - 13 %    EOSINOPHILS 1 0 - 7 %    BASOPHILS 0 0 - 1 %    IMMATURE GRANULOCYTES 0 0.0 - 0.5 %    ABS. NEUTROPHILS 7.2 1.8 - 8.0 K/UL    ABS. LYMPHOCYTES 1.8 0.8 - 3.5 K/UL    ABS. MONOCYTES 0.9 0.0 - 1.0 K/UL    ABS. EOSINOPHILS 0.1 0.0 - 0.4 K/UL    ABS. BASOPHILS 0.0 0.0 - 0.1 K/UL    ABS. IMM.  GRANS. 0.0 0.00 - 0.04 K/UL    DF AUTOMATED     ECHO ADULT COMPLETE    Collection Time: 08/20/19  1:43 PM   Result Value Ref Range    LV E' Lateral Velocity 5.94 cm/s    LV E' Septal Velocity 3.26 cm/s    Tapse 1.33 (A) 1.5 - 2.0 cm    Aortic Valve Systolic Peak Velocity 808.97 cm/s    AoV VTI 31.94 cm    Aortic Valve Area by Continuity of Peak Velocity 2.1 cm2    Aortic Valve Area by Continuity of VTI 2.2 cm2    AoV PG 20.6 mmHg    LVIDd 3.65 (A) 3.9 - 5.3 cm    LVPWd 1.63 (A) 0.6 - 0.9 cm    LVIDs 3.08 cm    IVSd 2.08 (A) 0.6 - 0.9 cm    LVOT d 2.06 cm    LVOT Peak Velocity 140.55 cm/s    LVOT Peak Gradient 7.9 mmHg    LVOT VTI 21.46 cm    MVA (PHT) 5.4 cm2    MV A Teo 135.81 cm/s    MV E Teo 70.44 cm/s    MV E/A 0.50     MV Mean Gradient 3.1 mmHg    Mitral Valve Annulus Velocity Time Integral 17.83 cm    Pulmonic Valve Systolic Mean Gradient 1.5 mmHg    Pulmonic Valve Systolic Velocity Time Integral 12.54 cm    Aortic Valve Systolic Mean Gradient 8.4 mmHg    LV Mass .2 (A) 67 - 162 g    LV Mass AL Index 166.1 (A) 43 - 95 g/m2    E/E' lateral 11.90     E/E' septal 21.61     MV Peak Gradient 7.2 mmHg    E/E' ratio (averaged) 16.73     Mitral Regurgitant Velocity Time Integral 86.50 cm    Mitral Regurgitant Peak Velocity 465.84 cm/s    Mitral Valve E Wave Deceleration Time 95.6 ms    Mitral Valve Pressure Half-time 41.0 ms    Left Atrium Major Axis 3.74 cm    Aortic Regurgitant Pressure Half-time 1,541.0 cm    Pulmonic Valve Max Velocity 81.80 cm/s    Mitral Valve Max Velocity 134.02 cm/s    MVA VTI 4.0 cm2    LVOT SV 71.7 ml    MR Peak Gradient 86.8 mmHg    ABRAHAN/BSA Pk Teo 1.2 cm2/m2    ABRAHAN/BSA VTI 1.3 cm2/m2    AR Max Teo 353.02 cm/s    PV peak gradient 2.7 mmHg

## 2019-08-21 ENCOUNTER — DOCUMENTATION ONLY (OUTPATIENT)
Dept: CASE MANAGEMENT | Age: 84
End: 2019-08-21

## 2019-08-21 LAB
ANION GAP SERPL CALC-SCNC: 8 MMOL/L (ref 5–15)
BACTERIA SPEC CULT: ABNORMAL
BACTERIA SPEC CULT: NORMAL
BASOPHILS # BLD: 0 K/UL (ref 0–0.1)
BASOPHILS NFR BLD: 1 % (ref 0–1)
BUN SERPL-MCNC: 18 MG/DL (ref 6–20)
BUN/CREAT SERPL: 19 (ref 12–20)
CALCIUM SERPL-MCNC: 8.8 MG/DL (ref 8.5–10.1)
CC UR VC: ABNORMAL
CC UR VC: NORMAL
CHLORIDE SERPL-SCNC: 102 MMOL/L (ref 97–108)
CO2 SERPL-SCNC: 29 MMOL/L (ref 21–32)
CREAT SERPL-MCNC: 0.97 MG/DL (ref 0.55–1.02)
DIFFERENTIAL METHOD BLD: ABNORMAL
EOSINOPHIL # BLD: 0.1 K/UL (ref 0–0.4)
EOSINOPHIL NFR BLD: 2 % (ref 0–7)
ERYTHROCYTE [DISTWIDTH] IN BLOOD BY AUTOMATED COUNT: 13 % (ref 11.5–14.5)
GLUCOSE SERPL-MCNC: 101 MG/DL (ref 65–100)
HCT VFR BLD AUTO: 39.1 % (ref 35–47)
HGB BLD-MCNC: 12.7 G/DL (ref 11.5–16)
IMM GRANULOCYTES # BLD AUTO: 0 K/UL (ref 0–0.04)
IMM GRANULOCYTES NFR BLD AUTO: 1 % (ref 0–0.5)
LYMPHOCYTES # BLD: 1.8 K/UL (ref 0.8–3.5)
LYMPHOCYTES NFR BLD: 21 % (ref 12–49)
MCH RBC QN AUTO: 32.2 PG (ref 26–34)
MCHC RBC AUTO-ENTMCNC: 32.5 G/DL (ref 30–36.5)
MCV RBC AUTO: 99.2 FL (ref 80–99)
MONOCYTES # BLD: 0.8 K/UL (ref 0–1)
MONOCYTES NFR BLD: 10 % (ref 5–13)
NEUTS SEG # BLD: 6 K/UL (ref 1.8–8)
NEUTS SEG NFR BLD: 65 % (ref 32–75)
NRBC # BLD: 0 K/UL (ref 0–0.01)
NRBC BLD-RTO: 0 PER 100 WBC
PLATELET # BLD AUTO: 240 K/UL (ref 150–400)
PMV BLD AUTO: 11.5 FL (ref 8.9–12.9)
POTASSIUM SERPL-SCNC: 3.4 MMOL/L (ref 3.5–5.1)
RBC # BLD AUTO: 3.94 M/UL (ref 3.8–5.2)
SERVICE CMNT-IMP: ABNORMAL
SERVICE CMNT-IMP: NORMAL
SODIUM SERPL-SCNC: 139 MMOL/L (ref 136–145)
WBC # BLD AUTO: 8.9 K/UL (ref 3.6–11)

## 2019-08-21 PROCEDURE — 74011250636 HC RX REV CODE- 250/636: Performed by: HOSPITALIST

## 2019-08-21 PROCEDURE — 85025 COMPLETE CBC W/AUTO DIFF WBC: CPT

## 2019-08-21 PROCEDURE — 97535 SELF CARE MNGMENT TRAINING: CPT

## 2019-08-21 PROCEDURE — 97110 THERAPEUTIC EXERCISES: CPT

## 2019-08-21 PROCEDURE — 74011250637 HC RX REV CODE- 250/637: Performed by: FAMILY MEDICINE

## 2019-08-21 PROCEDURE — 80048 BASIC METABOLIC PNL TOTAL CA: CPT

## 2019-08-21 PROCEDURE — 74011250637 HC RX REV CODE- 250/637: Performed by: HOSPITALIST

## 2019-08-21 PROCEDURE — 36415 COLL VENOUS BLD VENIPUNCTURE: CPT

## 2019-08-21 PROCEDURE — 74011250637 HC RX REV CODE- 250/637: Performed by: INTERNAL MEDICINE

## 2019-08-21 PROCEDURE — 74011250636 HC RX REV CODE- 250/636: Performed by: INTERNAL MEDICINE

## 2019-08-21 PROCEDURE — 65660000000 HC RM CCU STEPDOWN

## 2019-08-21 PROCEDURE — 97116 GAIT TRAINING THERAPY: CPT

## 2019-08-21 PROCEDURE — 74011250637 HC RX REV CODE- 250/637: Performed by: GENERAL ACUTE CARE HOSPITAL

## 2019-08-21 PROCEDURE — 94760 N-INVAS EAR/PLS OXIMETRY 1: CPT

## 2019-08-21 PROCEDURE — 77010033678 HC OXYGEN DAILY

## 2019-08-21 PROCEDURE — 77030038269 HC DRN EXT URIN PURWCK BARD -A

## 2019-08-21 RX ORDER — POTASSIUM CHLORIDE 750 MG/1
30 TABLET, FILM COATED, EXTENDED RELEASE ORAL
Status: COMPLETED | OUTPATIENT
Start: 2019-08-21 | End: 2019-08-21

## 2019-08-21 RX ORDER — HYDRALAZINE HYDROCHLORIDE 20 MG/ML
10 INJECTION INTRAMUSCULAR; INTRAVENOUS
Status: DISCONTINUED | OUTPATIENT
Start: 2019-08-21 | End: 2019-08-22 | Stop reason: HOSPADM

## 2019-08-21 RX ORDER — POLYETHYLENE GLYCOL 3350 17 G/17G
17 POWDER, FOR SOLUTION ORAL DAILY
Status: DISCONTINUED | OUTPATIENT
Start: 2019-08-21 | End: 2019-08-22 | Stop reason: HOSPADM

## 2019-08-21 RX ORDER — IBUPROFEN 400 MG/1
400 TABLET ORAL
Status: DISCONTINUED | OUTPATIENT
Start: 2019-08-21 | End: 2019-08-22

## 2019-08-21 RX ADMIN — POTASSIUM CHLORIDE 10 MEQ: 750 TABLET, EXTENDED RELEASE ORAL at 17:34

## 2019-08-21 RX ADMIN — IBUPROFEN 400 MG: 400 TABLET ORAL at 17:34

## 2019-08-21 RX ADMIN — Medication 10 ML: at 16:08

## 2019-08-21 RX ADMIN — AMLODIPINE BESYLATE 5 MG: 5 TABLET ORAL at 08:31

## 2019-08-21 RX ADMIN — Medication 10 ML: at 05:55

## 2019-08-21 RX ADMIN — POTASSIUM CHLORIDE 30 MEQ: 750 TABLET, EXTENDED RELEASE ORAL at 10:02

## 2019-08-21 RX ADMIN — ATORVASTATIN CALCIUM 40 MG: 40 TABLET, FILM COATED ORAL at 21:03

## 2019-08-21 RX ADMIN — HYDRALAZINE HYDROCHLORIDE 10 MG: 20 INJECTION INTRAMUSCULAR; INTRAVENOUS at 16:08

## 2019-08-21 RX ADMIN — Medication 10 ML: at 21:04

## 2019-08-21 RX ADMIN — POLYETHYLENE GLYCOL 3350 17 G: 17 POWDER, FOR SOLUTION ORAL at 11:06

## 2019-08-21 RX ADMIN — METOPROLOL SUCCINATE 25 MG: 25 TABLET, EXTENDED RELEASE ORAL at 08:32

## 2019-08-21 RX ADMIN — MELATONIN TAB 3 MG 3 MG: 3 TAB at 21:03

## 2019-08-21 RX ADMIN — FUROSEMIDE 40 MG: 10 INJECTION, SOLUTION INTRAMUSCULAR; INTRAVENOUS at 08:32

## 2019-08-21 RX ADMIN — APIXABAN 2.5 MG: 2.5 TABLET, FILM COATED ORAL at 08:32

## 2019-08-21 RX ADMIN — Medication 10 ML: at 08:32

## 2019-08-21 RX ADMIN — APIXABAN 2.5 MG: 2.5 TABLET, FILM COATED ORAL at 21:03

## 2019-08-21 RX ADMIN — POTASSIUM CHLORIDE 10 MEQ: 750 TABLET, EXTENDED RELEASE ORAL at 08:32

## 2019-08-21 RX ADMIN — OXYBUTYNIN CHLORIDE 5 MG: 5 TABLET, EXTENDED RELEASE ORAL at 08:32

## 2019-08-21 RX ADMIN — FUROSEMIDE 40 MG: 10 INJECTION, SOLUTION INTRAMUSCULAR; INTRAVENOUS at 17:34

## 2019-08-21 NOTE — PROGRESS NOTES
BARRERA:  1. Return to [place] [address] - confirmed with facility   2. Daughter will transport in personal vehicle at 9:00 am on 8/22/19  3. OP f/u with PCP and Cardiologist - HF Bundle NN will schedule  4. In Basket message to Ambulatory NN Gela Koehler - sent      Reason for Admission: CHF                 RRAT Score: 32                 Resources/supports as identified by patient/family:  Strong familial support and support from [facility] staff                 Top Challenges facing patient (as identified by patient/family and CM): Finances/Medication cost? Has Medicare A&B with [employer] supp                   Transportation? Daughter provides transportation and will transport upon d/c               Support system or lack thereof? Strong familial support and support from [facility] staff                      Living arrangements? Lives at [place] [address] since April 2018                Self-care/ADLs/Cognition? Receives assistance with ADL/IADLs from staff            Current Advanced Directive/Advance Care Plan:  AMD not on file. Daughter stated she and her siblings have POA. DNR code (changed from Full code during this admission). Plan for utilizing home health:  Not indicated at this time. Pt is wheelchair bound and not interested in PT/OT. No HH hx. Has been to [facility] and [facility] in the past.                   Transition of Care Plan:  Return to [place] [address] met with patient and daughter, Dima Dobson, at the bedside to introduce role, verify demographics, and discuss discharge planning. Patient was awake and alert/oriented x4. HF Bundle/Sound Bundle/ACO. Pt is a 81 yo  female admitted to [facility] on 8/19/19 for CHF. PMHx includes COPD, Hypokalemia, HTN, CAD, PAD, PAF, etc. Cardiology is following. Pt lives at [place] [address] and plans to return there upon d/c. Pt/family does not wish to have therapy services at the [facility]. Per daughter, pt does not ambulate without assistance of a wheelchair and is content with that. Pt is on chronic O2 which is supplied through [facility]. CM spoke with Tamela Becker at [facility] (ph. [phone]) who confirmed pt can return to the [facility] tomorrow morning. Pt's daughter plans to transport in a personal vehicle. CM faxed MD notes and MAR to facility (fx. [phone]). CM will continue to follow and facilitate an appropriate BARRERA plan. PCP: Dr. Stacie Whittington at [facility]  Cardiologist: Dr. Lay Hinojosa NN: Joe Durham  HF Bundle NN: Marie De Leon Dr: [address] Management Interventions  PCP Verified by CM: Yes(Dr. Stacie Solano and MD at [facility])  Palliative Care Criteria Met (RRAT>21 & CHF Dx)?: Yes  Palliative Consult Recommended?: Yes  Mode of Transport at Discharge:  Other (see comment)(daughter)  Transition of Care Consult (CM Consult): Assisted Living([facility])  MyChart Signup: No  Discharge Durable Medical Equipment: No(has access to WC, RW, rollator, O2 through [facility])  Physical Therapy Consult: Yes  Occupational Therapy Consult: Yes  Speech Therapy Consult: No  Current Support Network: Assisted Living, Family Lives Nearby(L[address] - daughter visits frequently)  Confirm Follow Up Transport: Family  Plan discussed with Pt/Family/Caregiver: Yes(discusesd with pt and daughter at the bedside)  Discharge Location  Discharge Placement: [facility])    VERA Austin  Care Manager  [phone]

## 2019-08-21 NOTE — PROGRESS NOTES
Problem: Self Care Deficits Care Plan (Adult)  Goal: *Acute Goals and Plan of Care (Insert Text)  Description    FUNCTIONAL STATUS PRIOR TO ADMISSION: Patient required moderate assistance for basic and instrumental ADLs. HOME SUPPORT: The patient lived at [facility] and per family, pt had assist with ADLs and her showers 2x a week, but pt was able to transfer to and from wheelchair<>toilet and bed but had been asked to call staff for assist. .    Occupational Therapy Goals  Initiated 8/20/2019  1. Patient will perform grooming at the sink with supervision/set-up within 7 day(s). 2.  Patient will perform bathing at the sink with supervision/set-up within 7 day(s). 3.  Patient will perform lower body dressing with supervision/set-up within 7 day(s). 4.  Patient will perform toilet transfers with modified independence within 7 day(s). 5.  Patient will perform all aspects of toileting with independence within 7 day(s). 6.  Patient will participate in upper extremity therapeutic exercise/activities with supervision/set-up for 7 minutes within 7 day(s). 7.  Patient will utilize energy conservation techniques during functional activities with verbal cues within 7 day(s). Outcome: Progressing Towards Goal   OCCUPATIONAL THERAPY TREATMENT  Patient: Pasha Xiong (38 y.o. female)  Date: 8/21/2019  Diagnosis: Acute on chronic heart failure ([facility] [address] [I50.9] <principal problem not specified>       Precautions:    Chart, occupational therapy assessment, plan of care, and goals were reviewed. ASSESSMENT  Based on the objective data described below, generalized weakness, decreased endurance, strength, and memory. Pt was calling for assist to use bathroom and was CGA for transfer to [facility]. She was able to wash sydney area with CGA and setup and washed UB with setup and verbal cues. Pt was concerned that she had not had a BM and nursing notified.       Current Level of Function Impacting Discharge (ADLs): decreased cognition, endurance, and strength               PLAN :  Patient continues to benefit from skilled intervention to address the above impairments. Continue treatment per established plan of care. to address goals. Recommend with staff: Mckayla Thompson for meals and walk pt to bathroom, if she is able     Recommend next OT session: work on standing for grooming    Recommendation for discharge: (in order for the patient to meet his/her long term goals)  Therapy up to 5 days/week in SNF setting    This discharge recommendation:  Has been made in collaboration with the attending provider and/or case management    Equipment recommendations for successful discharge (if) home: to be determined by rehab facility       SUBJECTIVE:   Patient stated I don't think I have had a BM in a while.     OBJECTIVE DATA SUMMARY:   Cognitive/Behavioral Status:  Neurologic State: Alert  Orientation Level: Oriented to person;Oriented to place;Oriented to situation  Cognition: Follows commands  Perception: Appears intact  Perseveration: Perseverates during conversation  Safety/Judgement: Fall prevention    Functional Mobility and Transfers for ADLs:         Transfers:  Sit to Stand: Minimum assistance;Assist x1  Functional Transfers  Bathroom Mobility: Minimum assistance  Toilet Transfer : Minimum assistance  Tub Transfer: Minimum assistance       Balance:  Sitting: Intact; Without support  Standing: Intact; With support  Standing - Static: Fair;Constant support  Standing - Dynamic : Fair;Constant support    ADL Intervention:  Feeding  Feeding Assistance: Independent    Grooming  Grooming Assistance: Set-up  Washing Face: Set-up  Washing Hands: Set-up  Brushing Teeth: Set-up  Brushing/Combing Hair: Set-up    Upper Body Bathing  Bathing Assistance: Set-up(verbal cues)  Position Performed: Seated in chair    Lower Body Bathing  Bathing Assistance:  Moderate assistance;Minimum assistance  Perineal  : Moderate assistance;Minimum assistance  Position Performed: Standing     Toileting  Toileting Assistance: Contact guard assistance  Bladder Hygiene: Contact guard assistance  Bowel Hygiene: Contact guard assistance    Cognitive Retraining  Safety/Judgement: Fall prevention      Activity Tolerance:   Fair  Please refer to the flowsheet for vital signs taken during this treatment.     After treatment patient left in no apparent distress:   Sitting in chair, Call bell within reach and Bed / chair alarm activated    COMMUNICATION/COLLABORATION:   The patients plan of care was discussed with: Physical Therapist, Registered Nurse and     Ariadne Potter OT  Time Calculation: 28 mins

## 2019-08-21 NOTE — ACP (ADVANCE CARE PLANNING)
Advance Care Planning Note      NAME: Albertina Russ   :  1928   MRN:  [id number]     Date/Time:  2019 11:54 AM    Active Diagnoses:  Hospital Problems  Date Reviewed: 6/3/2018          Codes Class Noted POA    Acute on chronic heart failure ([facility] [address] ICD-10-CM: I50.9  ICD-9-CM: 428.0  2019 Unknown              These active diagnoses are of sufficient risk that focused discussion on advance care planning is indicated in order to allow the patient to thoughtfully consider personal goals of care, and if situations arise that prevent the ability to personally give input, to ensure appropriate representation of their personal desires for different levels and aggressiveness of care. Discussion:   Code status addressed and wants to be a DNR / DNI. Patient wants central line and vasopressors if needed. Patient would also want a feeding tube, if needed, for nutritional support. Patient  would like to assign all children (but Daughter Valentino Boggs is primary care provider) as the surrogate decision maker. Persons present and participating in discussion: Erlinda Mallory and Melisa Arias MD.      Time Spent:   Total time spent face-to-face in education and discussion:   16  minutes.          Melisa Arias MD   Hospitalist

## 2019-08-21 NOTE — PROGRESS NOTES
Problem: Mobility Impaired (Adult and Pediatric)  Goal: *Acute Goals and Plan of Care (Insert Text)  Description  FUNCTIONAL STATUS PRIOR TO ADMISSION: The patient was functional at the wheelchair level, stating she used RW and x1 person assist of [facility] staff for bed <> chair transfers. Ambulates VERY short distances w/ RW support. Denies history of prior falls. Wears 2 LPM O2 at baseline. HOME SUPPORT PRIOR TO ADMISSION: The patient lived at [facility] with assist of [facility] staff. Physical Therapy Goals  Initiated 8/20/2019  1. Patient will move from supine to sit and sit to supine , scoot up and down and roll side to side in bed with supervision/set-up within 7 day(s). 2.  Patient will transfer from bed to chair and chair to bed with minimal assistance/contact guard assist using the least restrictive device within 7 day(s). 3.  Patient will perform sit to stand with minimal assistance/contact guard assist within 7 day(s). 4.  Patient will ambulate with minimal assistance/contact guard assist for 10 feet with the least restrictive device within 7 day(s). Outcome: Progressing Towards Goal  Note:   PHYSICAL THERAPY TREATMENT  Patient: Emmanuel Villavicencio (34 y.o. female)  Date: 8/21/2019  Diagnosis: Acute on chronic heart failure ([facility] [address] [I50.9]        Precautions:  falls  Chart, physical therapy assessment, plan of care and goals were reviewed. ASSESSMENT  Based on the objective data described below, pt tolerated tx well, no LOB or SOB, did well with transfers and ther-ex, good motivation, vc's for safety and proper RW use. Current Level of Function Impacting Discharge (mobility/balance): min assist x1           PLAN :  Patient continues to benefit from skilled intervention to address the above impairments. Continue treatment per established plan of care. to address goals.     Recommendation for discharge: (in order for the patient to meet his/her long term goals)  Recommend patient return to skilled nursing     This discharge recommendation:  Has been made in collaboration with the attending provider and/or case management    Equipment recommendations for successful discharge (if) home: patient owns DME required for discharge     OBJECTIVE DATA SUMMARY:     Critical Behavior:  Neurologic State: Alert  Orientation Level: Oriented to person, Oriented to place, Oriented to situation  Cognition: Follows commands  Safety/Judgement: Fall prevention    Functional Mobility Training:  Bed Mobility: Pt sitting in chair on arrival.    Transfers:  Sit to Stand: Minimum assistance;Assist x1  Stand to Sit: Minimum assistance;Assist x1  Interventions: Tactile cues; Verbal cues  Level of Assistance: Minimum assistance    Balance:  Sitting: Intact; Without support  Standing: Intact; With support  Standing - Static: Fair;Constant support  Standing - Dynamic : Fair;Constant support    Ambulation/Gait Training:  Distance (ft): 25 Feet (ft)  Assistive Device: Walker, rolling;Gait belt  Ambulation - Level of Assistance: Minimal assistance;Assist x1  Gait Abnormalities: Decreased step clearance;Shuffling gait  Right Side Weight Bearing: Full  Left Side Weight Bearing: Full  Base of Support: Widened  Speed/Vera: Slow;Shuffled  Step Length: Left shortened;Right shortened    Therapeutic Exercises:   sitting  EXERCISE   Sets   Reps   Active Active Assist   Passive   Comments   Ankle pumps 1 10 ? ? ? bilat   Heel raises 1 10 ? ? ? \"   Toe tap 1 10 ? ? ? \"   Knee ext 1 10 ? ? ? \"   Hip flex 1 10 ? ? ? \"     Pain Ratin/10    Activity Tolerance: Fair    After treatment patient left in no apparent distress: Sitting in chair and Call bell within reach    COMMUNICATION/COLLABORATION:   The patients plan of care was discussed with: Registered Nurse    Ana Preciado PTA   Time Calculation: 25 mins

## 2019-08-21 NOTE — PROGRESS NOTES
Bedside shift change report GIVEN TO Marcus Roman RN. Report included the following information SBAR. SIGNIFICANT CHANGES DURING SHIFT:          CONCERNS TO ADDRESS WITH MD:              [address] NURSING NOTE   Admission Date 8/19/2019   Admission Diagnosis Acute on chronic heart failure ([facility] [address] [I50.9]   Consults IP CONSULT TO HOSPITALIST  IP CONSULT TO CARDIOLOGY      Cardiac Monitoring [x] Yes [] No      Purposeful Hourly Rounding [x] Yes    Arjun Score Total Score: 3   Arjun score 3 or > [x] Bed Alarm [] Avasys [] 1:1 sitter [] Patient refused (Signed refusal form in chart)   Rosas Score Rosas Score: 18   Rosas score 14 or < [x] PMT consult [] Wound Care consult    []  Specialty bed  [] Nutrition consult      Influenza Vaccine Received Flu Vaccine for Current Season (usually Sept-March): Not Flu Season           Oxygen needs? [] Room air Oxygen @  []1L    [x]2L    []3L   []4L    []5L   []6L via  NC   Chronic home O2 use?  [x] Yes [] No  Perform O2 challenge test and document in progress note using smartphrase (.Homeoxygen)      Last bowel movement Last Bowel Movement Date: 08/20/19      Urinary Catheter       External Female Catheter 08/19/19-Urine Output (mL): 100 ml     LDAs               Peripheral IV 08/19/19 Left Antecubital (Active)   Site Assessment Clean, dry, & intact 8/21/2019  3:00 AM   Phlebitis Assessment 0 8/21/2019  3:00 AM   Infiltration Assessment 0 8/21/2019  3:00 AM   Dressing Status Clean, dry, & intact 8/21/2019  3:00 AM   Dressing Type Transparent 8/21/2019  3:00 AM   Hub Color/Line Status Blue 8/21/2019  3:00 AM   Alcohol Cap Used Yes 8/21/2019  3:00 AM          External Female Catheter 08/19/19 (Active)   Site Assessment Clean, dry, & intact 8/21/2019  3:00 AM   Repositioned Yes 8/21/2019  3:00 AM   Perineal Care Yes 8/21/2019  3:00 AM   Wick Changed Yes 8/21/2019  3:00 AM   Suction Canister/Tubing Changed No 8/21/2019  3:00 AM   Urine Output (mL) 100 ml 8/21/2019  3:00 AM Readmission Risk Assessment Tool Score High Risk            32       Total Score        2 . Living with Significant Other. Assisted Living. LTAC. SNF. or   Rehab    5 Pt.  Coverage (Medicare=5 , Medicaid, or Self-Pay=4)    25 Charlson Comorbidity Score (Age + Comorbid Conditions)        Criteria that do not apply:    Has Seen PCP in Last 6 Months (Yes=3, No=0)    Patient Length of Stay (>5 days = 3)    IP Visits Last 12 Months (1-3=4, 4=9, >4=11)       Expected Length of Stay 4d 2h   Actual Length of Stay 2

## 2019-08-21 NOTE — PROGRESS NOTES
Hospitalist Progress Note    NAME: Ketan Carrasco   :  1928   MRN:  [id number]       Assessment / Plan:    Acute on chronic systolic HF  Acute on chronic hypoxic respiratory failure due to above  COPD on home O2 2L  -probnp 3184  -troponin 0.14 x 2  -CXR Cardiomegaly, mild pulmonary edema and small bilateral pleural effusions.   -blood cx NGTD, procalcitonin 0.1, will continue to hold on abx  -Cont suppl oxygen to keeps sats above 90  -IV diuresis, -Monitor I/Os, daily weight  -Cardiology Consult appreciated  -Echo  Mild concentric hypertrophy. Mild systolic dysfunction. Estimated left ventricular ejection fraction is 46 - 50%. Hypokalemia likely from aggressive diuresis  -on replacement    HTN, essential POA  -continue amlodipine    CAD  PAD  Paroxysmal Atrial Fibrillation, rate controlled, on chronic AC  --on BB, eliquis    Abnormal UA  -5-10 WBC, no symptoms, follow up UCx - likely colonization  -off abx     Code Status: DNR, pt made herself DNR  Surrogate Decision Maker: all children but Daughter Mandeep Hernandez is primary care provider  DVT Prophylaxis: Eliquis  GI Prophylaxis: not indicated  Baseline: Wheelchair bound, lives at [facility]:     Chief Complaint / Reason for Physician Visit  \"I am doing ok, just feel little tired\". Discussed with RN events overnight. Review of Systems:  Symptom Y/N Comments  Symptom Y/N Comments   Fever/Chills n   Chest Pain n    Poor Appetite y   Edema y    Cough n   Abdominal Pain     Sputum n   Joint Pain n    SOB/ALCARAZ y   Pruritis/Rash     Nausea/vomit n   Tolerating PT/OT y    Diarrhea    Tolerating Diet y    Constipation    Other       Could NOT obtain due to:      Objective:     VITALS:   Last 24hrs VS reviewed since prior progress note.  Most recent are:  Patient Vitals for the past 24 hrs:   Temp Pulse Resp BP SpO2   19 1100 97.6 °F (36.4 °C) 82 16 169/74 96 %   19 0746 97.4 °F (36.3 °C) 74 16 177/69 93 %   08/21/19 0300 98.6 °F (37 °C) 71 20 179/86 93 %   08/20/19 2312 98.5 °F (36.9 °C) 73 24 152/63 95 %   08/20/19 1928 97.9 °F (36.6 °C) 79 18 173/70 96 %   08/20/19 1458 98.3 °F (36.8 °C) 84 18 163/70 94 %   08/20/19 1344    164/80        Intake/Output Summary (Last 24 hours) at 8/21/2019 1146  Last data filed at 8/21/2019 1000  Gross per 24 hour   Intake 1280 ml   Output 1150 ml   Net 130 ml        PHYSICAL EXAM:  General: WD, WN. Alert, cooperative, no acute distress    EENT:  EOMI. Anicteric sclerae. MMM  Resp:  B/l fine crackles  CV:  Regular  rhythm,  No edema  GI:  Soft, Non distended, Non tender.  +Bowel sounds  Neurologic:  Alert and oriented X 3, normal speech  Psych:   Good insight. Not anxious nor agitated  Skin:  No rashes. No jaundice    Reviewed most current lab test results and cultures  YES  Reviewed most current radiology test results   YES  Review and summation of old records today    NO  Reviewed patient's current orders and MAR    YES  PMH/SH reviewed - no change compared to H&P  ________________________________________________________________________  Care Plan discussed with:    Comments   Patient x    Family      RN x    Care Manager     Consultant                        Multidiciplinary team rounds were held today with , nursing, pharmacist and clinical coordinator. Patient's plan of care was discussed; medications were reviewed and discharge planning was addressed.      ________________________________________________________________________  Total NON critical care TIME:     Minutes    Total CRITICAL CARE TIME Spent:   Minutes non procedure based      Comments   >50% of visit spent in counseling and coordination of care     ________________________________________________________________________  Mary Lou Mohamud MD     Procedures: see electronic medical records for all procedures/Xrays and details which were not copied into this note but were reviewed prior to creation of Plan. LABS:  I reviewed today's most current labs and imaging studies.   Pertinent labs include:  Recent Labs     08/21/19 0316 08/20/19  0250 08/19/19  1024   WBC 8.9 9.9 12.0*   HGB 12.7 13.4 13.5   HCT 39.1 39.9 41.7    245 246     Recent Labs     08/21/19 0316 08/20/19  0250 08/19/19  1024    138 140   K 3.4* 3.2* 3.6    100 104   CO2 29 29 31   * 96 98   BUN 18 12 12   CREA 0.97 0.87 1.00   CA 8.8 9.0 8.9   MG  --  2.1  --    PHOS  --  3.7  --    ALB  --  3.1* 3.5   TBILI  --  1.0 0.8   SGOT  --  20 19   ALT  --  18 19       Signed: Noreen Ortega MD

## 2019-08-21 NOTE — PROGRESS NOTES
Transitional Care Team: Initial G Note    Date of Assessment: 08/21/19  Time of Assessment:  3:02 PM    Chrissy Menezes is a 80 y.o. female inpatient at  [facility]. Assessment & Plan   Pt with memory faults due to advanced age. Sleeps long periods. No respiratory distress at this time and minimal lower extremity edema. while her health care proxy was with her earlier when MD rounded, pt has upgraded from hospital  DNR to [facility]. Anticipate to the [facility] facility tomorrow. Primary Diagnosis: heart failure  Advance Directive:  No AMD.      Current Code Status:  DDNR    Referral to Hospice/ Palliative Care Appropriate: yes. .  Awareness of Medical Conditions: (Trajectory of illness and pts expectations). aware her disease is progressing  Discharge Needs: (to include safety issues) return to memory care facility    Barriers Identified: poor memory    Patient is willing to go to SNF/Inpatient Rehab if recommended: yes    Medication Review:  Await AVS.    Can patient afford medications:  yes. Patient is Compliant with Medication regimen:yes  Who manages medications at home: facility staff  Best Patient Contact Number: Ronnie Castillo- [phone]    HUG (Healthy Understanding of Goals) program introduced to patient/family. The Transitional Care Team bridges the gaps in care and education surrounding discharge from the acute care facility. The objective is to empower the patient and family in taking a proactive role in preventing readmission within the first thirty days after discharge. The team is also involved in the efforts to reduce readmission to the acute care setting after stabilization and discharge from the acute care environment either to skilled nursing facilities or community. G RN/NP will return with [facility] Calendar/ follow up appointments/ Ambulatory Nurse Navigator name and contact information when the patient is ready for discharge.     Future Appointments   Date Time Provider Inocencio Tong   8/29/2019 10:30 AM Tamia Massey MD [address]       Non-[facility] follow up appointment(s): none yet          Patient education focused on readmission zones as described as: The Red Zone: High risk for readmission, days 1-21  The Yellow Zone: Moderate  risk for readmission, days 22-29   The Green Zone: Lower risk for readmission, days                30 and after    [facility] Team will attempt to follow the patient from a distance while inpatient as well as be available for further transition/disposition needs. The [facility] team will continue to offer support during the 30- 90 day discharge from acute care setting. Will notify Ambulatory {Blank Single Select Template:[id number][de-identified] \"BARRERA   RN.     Past Medical History:   Diagnosis Date    Arthritis     generalized    COPD     GERD (gastroesophageal reflux disease)     Hypertension     Ill-defined condition     Vitamin deficiency    NSTEMI (non-ST elevated myocardial infarction) ([facility] [address] 5/1/2018    Other ill-defined conditions(799.89)     high cholesterol    Peripheral artery disease (HCC)     Psychiatric disorder     Anxiety disorder    Sleep disorder     Insomnia    TIA (transient ischemic attack)

## 2019-08-21 NOTE — PROGRESS NOTES
0715 - Bedside and Verbal shift change report given to carlo schmitt (oncoming nurse) by Junior Rivera (offgoing nurse). Report included the following information SBAR, Kardex, Intake/Output, MAR, Recent Results and Cardiac Rhythm NSR.   1550 - informed Dr. Sai Mcgill o f patient's sbp of 181. Md will input new order. 1715 - patient with new c/o of R neck pain. No pain medicine on MAR. Call placed to Dr Sai Mcgill and informed. MD will input new orders. 1900 - Verbal shift change report given to armando (oncoming nurse) by Margy Lyon (offgoing nurse). Report included the following information SBAR, Kardex, Intake/Output, MAR, Recent Results and Cardiac Rhythm NSR.

## 2019-08-21 NOTE — PROGRESS NOTES
Follow up with PCP Víctor Shown 8/29/19 10:30 and cardiology NP Ragini Hale 8/28/19 2:30.   On AVS and Hug team notified

## 2019-08-21 NOTE — PROGRESS NOTES
BARRERA: Return to [facility]    Daughter informed CM that pt's son will be at [facility] tomorrow morning at 9:00 to pick pt up and transport to [facility]. Nursing will need to call report to [facility] at [phone] and send AVS, MAR, and any written prescriptions to facility in an envelope. Will discuss with RN and RN lead regarding morning d/c.      VERA Matthews  Care Manager  [phone]

## 2019-08-21 NOTE — PROGRESS NOTES
Progress Note      8/21/2019 8:45 AM  NAME: Shyla Thompson   MRN:  [id number]   Admit Diagnosis: Acute on chronic heart failure ([facility] [address] [I50.9]                            [id number]:                 - Dyspnea          - hospitalized 5/2018 with hypoxia, acute diastolic CHF, prox afib with RVR back in sinus, increased  troponin consistent with type II NSTEMI  -  No hx of CAD  -  Echo 2018 with EF 55%, mod MR, mild AI  - Prox Afib currently in sinus with LVH  -  HTN, with hypertensive heart disease with heart failure, and CKD cr 1.4  -  Dyslipidemia  -  Hx of CVA s/p TPA in 2015  -  PVD with hx of left CEA  - COPD now on home O2  -  GERD  -  DJD  -  , lives at [facility], poor functional capacity in a wheelchair,  sleeps most of the day                                 Plan:                   Does not appear ischemic. This is not clearly CHF, pBNP mildly elevated  No edema on exam.  PAF currently in sinus.      1.  Cont eliquis 2.5mg po bid, given CHADS2 of 5  2. Cont toprol xl  3. Cont amlodipine  4. Diurese lasix 40mg iv bid, would discharge on higher dose 60mg bid, repelet K  5. Cont atorvastatin      PT/OT  Continue home O2    Goal back to SNF today/tomorrow.           [x]        High complexity decision making was performed      Subjective:     Gokul Allen denies chest pain, dyspnea. Discussed with RN events overnight. Review of Systems:    Symptom Y/N Comments  Symptom Y/N Comments   Fever/Chills N   Chest Pain N    Poor Appetite N   Edema N    Cough N   Abdominal Pain N    Sputum N   Joint Pain N    SOB/ALCARAZ N   Pruritis/Rash N    Nausea/vomit N   Tolerating PT/OT Y    Diarrhea N   Tolerating Diet Y    Constipation N   Other       Could NOT obtain due to:      Objective:      Physical Exam:    Last 24hrs VS reviewed since prior progress note.  Most recent are:    Visit Vitals  /69 (BP 1 Location: Left arm, BP Patient Position: At rest)   Pulse 74   Temp 97.4 °F (36.3 °C)   Resp 16   Ht 5' 5\" (1.651 m)   Wt 69.7 kg (153 lb 11.2 oz)   SpO2 93%   BMI 25.58 kg/m²       Intake/Output Summary (Last 24 hours) at 8/21/2019 0845  Last data filed at 8/21/2019 0300  Gross per 24 hour   Intake 920 ml   Output 850 ml   Net 70 ml        General Appearance: Well developed, well nourished, alert & oriented x 3,    no acute distress. Ears/Nose/Mouth/Throat: Hearing grossly normal.  Neck: Supple. Chest: Lungs clear to auscultation bilaterally. Cardiovascular: Regular rate and rhythm, S1S2 normal, no murmur. Abdomen: Soft, non-tender, bowel sounds are active. Extremities: No edema bilaterally. Skin: Warm and dry. PMH/SH reviewed - no change compared to H&P    Data Review    Telemetry: normal sinus rhythm     Lab Data Personally Reviewed:    Recent Labs     08/21/19 0316 08/20/19  0250   WBC 8.9 9.9   HGB 12.7 13.4   HCT 39.1 39.9    245     No results for input(s): INR, PTP, APTT in the last 72 hours. No lab exists for component: INREXT   Recent Labs     08/21/19  0316 08/20/19  0250 08/19/19  1024    138 140   K 3.4* 3.2* 3.6    100 104   CO2 29 29 31   BUN 18 12 12   CREA 0.97 0.87 1.00   * 96 98   CA 8.8 9.0 8.9   MG  --  2.1  --      Recent Labs     08/19/19  1619 08/19/19  1024   TROIQ 0.14* 0.14*     Lab Results   Component Value Date/Time    Cholesterol, total 182 02/19/2015 04:30 AM    HDL Cholesterol 38 02/19/2015 04:30 AM    LDL, calculated 99.4 02/19/2015 04:30 AM    Triglyceride 223 (H) 02/19/2015 04:30 AM    CHOL/HDL Ratio 4.8 02/19/2015 04:30 AM       Recent Labs     08/20/19  0250 08/19/19  1024   SGOT 20 19   AP 68 77   TP 7.2 7.7   ALB 3.1* 3.5   GLOB 4.1* 4.2*     No results for input(s): PH, PCO2, PO2 in the last 72 hours.     Medications Personally Reviewed:    Current Facility-Administered Medications   Medication Dose Route Frequency    potassium chloride SR (KLOR-CON 10) tablet 30 mEq  30 mEq Oral NOW    potassium chloride SR (KLOR-CON 10) tablet 10 mEq  10 mEq Oral BID    furosemide (LASIX) injection 40 mg  40 mg IntraVENous BID    ondansetron (ZOFRAN) injection 4 mg  4 mg IntraVENous Q4H PRN    apixaban (ELIQUIS) tablet 2.5 mg  2.5 mg Oral Q12H    amLODIPine (NORVASC) tablet 5 mg  5 mg Oral DAILY    oxybutynin chloride XL (DITROPAN XL) tablet 5 mg  5 mg Oral DAILY    metoprolol succinate (TOPROL-XL) XL tablet 25 mg  25 mg Oral DAILY    atorvastatin (LIPITOR) tablet 40 mg  40 mg Oral QHS    sodium chloride (NS) flush 5-40 mL  5-40 mL IntraVENous Q8H    sodium chloride (NS) flush 5-40 mL  5-40 mL IntraVENous PRN    melatonin tablet 3 mg  3 mg Oral QHS PRN         Usman Jordan MD

## 2019-08-22 ENCOUNTER — DOCUMENTATION ONLY (OUTPATIENT)
Dept: CASE MANAGEMENT | Age: 84
End: 2019-08-22

## 2019-08-22 VITALS
RESPIRATION RATE: 18 BRPM | SYSTOLIC BLOOD PRESSURE: 170 MMHG | OXYGEN SATURATION: 98 % | WEIGHT: 149.4 LBS | TEMPERATURE: 98.2 F | HEART RATE: 62 BPM | HEIGHT: 65 IN | BODY MASS INDEX: 24.89 KG/M2 | DIASTOLIC BLOOD PRESSURE: 65 MMHG

## 2019-08-22 PROCEDURE — 74011250637 HC RX REV CODE- 250/637: Performed by: HOSPITALIST

## 2019-08-22 PROCEDURE — 74011250636 HC RX REV CODE- 250/636: Performed by: INTERNAL MEDICINE

## 2019-08-22 PROCEDURE — 74011250637 HC RX REV CODE- 250/637: Performed by: INTERNAL MEDICINE

## 2019-08-22 PROCEDURE — 94760 N-INVAS EAR/PLS OXIMETRY 1: CPT

## 2019-08-22 PROCEDURE — 74011250637 HC RX REV CODE- 250/637: Performed by: GENERAL ACUTE CARE HOSPITAL

## 2019-08-22 PROCEDURE — 77010033678 HC OXYGEN DAILY

## 2019-08-22 RX ORDER — FACIAL-BODY WIPES
10 EACH TOPICAL
Status: COMPLETED | OUTPATIENT
Start: 2019-08-22 | End: 2019-08-22

## 2019-08-22 RX ORDER — FUROSEMIDE 40 MG/1
60 TABLET ORAL 2 TIMES DAILY
Qty: 90 TAB | Refills: 0 | Status: SHIPPED
Start: 2019-08-22

## 2019-08-22 RX ADMIN — OXYBUTYNIN CHLORIDE 5 MG: 5 TABLET, EXTENDED RELEASE ORAL at 08:55

## 2019-08-22 RX ADMIN — APIXABAN 2.5 MG: 2.5 TABLET, FILM COATED ORAL at 08:55

## 2019-08-22 RX ADMIN — BISACODYL 10 MG: 10 SUPPOSITORY RECTAL at 07:37

## 2019-08-22 RX ADMIN — AMLODIPINE BESYLATE 5 MG: 5 TABLET ORAL at 08:55

## 2019-08-22 RX ADMIN — FUROSEMIDE 40 MG: 10 INJECTION, SOLUTION INTRAMUSCULAR; INTRAVENOUS at 08:55

## 2019-08-22 RX ADMIN — METOPROLOL SUCCINATE 25 MG: 25 TABLET, EXTENDED RELEASE ORAL at 09:22

## 2019-08-22 RX ADMIN — Medication 10 ML: at 05:12

## 2019-08-22 RX ADMIN — POTASSIUM CHLORIDE 10 MEQ: 750 TABLET, EXTENDED RELEASE ORAL at 08:55

## 2019-08-22 RX ADMIN — POLYETHYLENE GLYCOL 3350 17 G: 17 POWDER, FOR SOLUTION ORAL at 08:55

## 2019-08-22 NOTE — PROGRESS NOTES
Pharmacist Discharge Medication Reconciliation    Significant PMH:   Past Medical History:   Diagnosis Date    Arthritis     generalized    COPD     GERD (gastroesophageal reflux disease)     Hypertension     Ill-defined condition     Vitamin deficiency    NSTEMI (non-ST elevated myocardial infarction) ([facility] [address] 5/1/2018    Other ill-defined conditions(799.89)     high cholesterol    Peripheral artery disease (HCC)     Psychiatric disorder     Anxiety disorder    Sleep disorder     Insomnia    TIA (transient ischemic attack)      Chief Complaint for this Admission:   Chief Complaint   Patient presents with    Fatigue     pt has hx of COPD. EMS reports that she just feels weaker then normal today    Shortness of Breath     Allergies: Penicillins; Sulfa (sulfonamide antibiotics); Propoxyphene-acetaminophen; and Sulfadiazine    Discharge Medications:   Current Discharge Medication List        CONTINUE these medications which have NOT CHANGED    Details   senna-docusate (PERICOLACE) 8.6-50 mg per tablet Take 2 Tabs by mouth two (2) times a day. potassium chloride SR (KLOR-CON 10) 10 mEq tablet Take 10 mEq by mouth two (2) times a day. atorvastatin (LIPITOR) 40 mg tablet Take 1 Tab by mouth nightly. Qty: 30 Tab, Refills: 0      apixaban (ELIQUIS) 2.5 mg tablet Take 1 Tab by mouth two (2) times a day. Qty: 30 Tab, Refills: 0      furosemide (LASIX) 40 mg tablet Take 1 Tab by mouth two (2) times a day. Qty: 60 Tab, Refills: 0      metoprolol succinate (TOPROL-XL) 25 mg XL tablet Take 1 Tab by mouth daily. Qty: 30 Tab, Refills: 0      amLODIPine (NORVASC) 5 mg tablet Take 5 mg by mouth daily. oxybutynin chloride XL (DITROPAN XL) 5 mg CR tablet Take 5 mg by mouth daily. For overactive bladder      pantoprazole (PROTONIX) 20 mg tablet Take 20 mg by mouth Before breakfast and dinner. For GERD      cetirizine (ZYRTEC) 10 mg tablet Take 10 mg by mouth nightly.  For itching      B.infantis-B.ani-B.long-Silvana (PROBIOTIC 4X) 10-15 mg TbEC Take 1 Cap by mouth two (2) times a day. MULTIVITS W-FE,OTHER MIN/LUT (CENTRUM SILVER ULTRA WOMEN'S PO) Take 1 Tab by mouth daily. peg 400-propylene glycol (SYSTANE, PROPYLENE GLYCOL,) 0.4-0.3 % drop Administer 1 Drop to both eyes two (2) times a day. acetaminophen (TYLENOL) 325 mg tablet Take 650 mg by mouth every four to six (4-6) hours as needed for Pain. ammonium lactate (LAC-HYDRIN) 12 % lotion Apply  to affected area every twelve (12) hours as needed (Apply to chest, back, arms as needed for eczema). rub in to affected area well      clobetasol (TEMOVATE) 0.05 % topical cream Apply  to affected area daily. Apply to vulva/inner labia or whitish area      olopatadine (PATANOL) 0.1 % ophthalmic solution Administer 1 Drop to both eyes two (2) times a day.              The patient's chart, MAR and AVS were reviewed by Josefina Machuca, PHARMD.    Discharging Provider: Dr. Antonietta Melton      Thank You,     TIAN Thurston

## 2019-08-22 NOTE — DISCHARGE SUMMARY
Hospitalist Discharge Summary     Patient ID:  Jenise Selby  [id number]  51 y.o.  2/1/1928 8/19/2019    PCP on record: Sofya Menendez MD    Admit date: 8/19/2019  Discharge date and time: 8/22/2019    DISCHARGE DIAGNOSIS:    Acute on chronic systolic HF  Acute on chronic hypoxic respiratory failure due to above  COPD on home O2 2L  Hypokalemia   HTN, essential POA  CAD  PAD  Paroxysmal Atrial Fibrillation, rate controlled, on chronic AC  Abnormal UA    CONSULTATIONS:  IP CONSULT TO HOSPITALIST  IP CONSULT TO CARDIOLOGY    Excerpted HPI from H&P of Giuliano Syed MD:  Nikolas Pa is a 80 y.o.  female who presents with CC listed above. Pt is a poor historian due to her advanced age. Her daughter attempts to fill in the void, however she is not up to date on pt's medical conditions. She does state that she went to visit her mother yesterday and did notice anything different. However this morning pt was complaining of SOB. Pt denies any CP, fever, chills or cough. She does not ambulate and denies any worsening leg swelling - she is already wearing knee high compression socks.     We were asked to admit for work up and evaluation of the above problems. ______________________________________________________________________  DISCHARGE SUMMARY/HOSPITAL COURSE:  for full details see H&P, daily progress notes, labs, consult notes. Pt was admitted for Acute on chronic hypoxic respiratory failure, on 2 liters of oxygen at baseline. Admitting diagnosis is Acute on chronic systolic heart Failure, Labs showed probnp 3184, troponin 0.14 x 2. CXR Cardiomegaly, mild pulmonary edema and small bilateral pleural effusions. Blood cx NGTD, procalcitonin 0.1, abx d/kadie after 2 days. Treated with IV diuretics , monitoring I and O and monitoring daily weights. Cardiology Consult appreciated  -Echo 8/20 Mild concentric hypertrophy. Mild systolic dysfunction.  Estimated left ventricular ejection fraction is 46 - 50%. Pt had Abnormal UA on admission with 5-10 WBC, and no symptoms, urine cx did not grow any organisam, pt also has >100 RBC in urine that is actually contamination from vaginal discharge that is bloody off and on. Pt's family told that she is following up with GYN for this problem, cont outpt f/u. Pt's Hgb remained stable and wnl during hospitalization. Pt is stable from cardiology stand point to discharge. If vaginal bleeding remains a concern, may consider discontinuing eliquis. At baseline pt is Wheelchair bound and will return to [facility]. Off Note made herself DDNR.         _______________________________________________________________________  Patient seen and examined by me on discharge day. Pertinent Findings:  Gen:    Not in distress  Chest: Clear lungs  CVS:   Regular rhythm. No edema  Abd:  Soft, not distended, not tender  Neuro:  Alert, CN 2-12 grossly intact  _______________________________________________________________________  DISCHARGE MEDICATIONS:   Current Discharge Medication List      CONTINUE these medications which have NOT CHANGED    Details   senna-docusate (PERICOLACE) 8.6-50 mg per tablet Take 2 Tabs by mouth two (2) times a day. potassium chloride SR (KLOR-CON 10) 10 mEq tablet Take 10 mEq by mouth two (2) times a day. atorvastatin (LIPITOR) 40 mg tablet Take 1 Tab by mouth nightly. Qty: 30 Tab, Refills: 0      apixaban (ELIQUIS) 2.5 mg tablet Take 1 Tab by mouth two (2) times a day. Qty: 30 Tab, Refills: 0      furosemide (LASIX) 40 mg tablet Take 1 Tab by mouth two (2) times a day. Qty: 60 Tab, Refills: 0      metoprolol succinate (TOPROL-XL) 25 mg XL tablet Take 1 Tab by mouth daily. Qty: 30 Tab, Refills: 0      amLODIPine (NORVASC) 5 mg tablet Take 5 mg by mouth daily. oxybutynin chloride XL (DITROPAN XL) 5 mg CR tablet Take 5 mg by mouth daily.  For overactive bladder      pantoprazole (PROTONIX) 20 mg tablet Take 20 mg by mouth Before breakfast and dinner. For GERD      cetirizine (ZYRTEC) 10 mg tablet Take 10 mg by mouth nightly. For itching      B.infantis-B.ani-B.long-B.bifi (PROBIOTIC 4X) 10-15 mg TbEC Take 1 Cap by mouth two (2) times a day. MULTIVITS W-FE,OTHER MIN/LUT (CENTRUM SILVER ULTRA WOMEN'S PO) Take 1 Tab by mouth daily. peg 400-propylene glycol (SYSTANE, PROPYLENE GLYCOL,) 0.4-0.3 % drop Administer 1 Drop to both eyes two (2) times a day. acetaminophen (TYLENOL) 325 mg tablet Take 650 mg by mouth every four to six (4-6) hours as needed for Pain. ammonium lactate (LAC-HYDRIN) 12 % lotion Apply  to affected area every twelve (12) hours as needed (Apply to chest, back, arms as needed for eczema). rub in to affected area well      clobetasol (TEMOVATE) 0.05 % topical cream Apply  to affected area daily. Apply to vulva/inner labia or whitish area      olopatadine (PATANOL) 0.1 % ophthalmic solution Administer 1 Drop to both eyes two (2) times a day. Patient Follow Up Instructions: Activity: Activity as tolerated  Diet: Regular Diet  Wound Care: None needed    Follow-up with PCP, cardiology, GYN.   Follow-up tests/labs none  Follow-up Information     Follow up With Specialties Details Why Contact Info    Chidi Brownlee MD Internal Medicine Go on 8/29/2019 hospital follow up at 10:30 am [address] [facility] [address]      Quin Macario MD Cardiology Go on 8/28/2019 see JASMYN Casas at 2:30 pm [address]  [id number]  [address] [phone]          ________________________________________________________________    Risk of deterioration: High    Condition at Discharge:  Stable  __________________________________________________________________    Disposition  Home with family and home health services    ____________________________________________________________________    Code Status: DNR/DNI  ___________________________________________________________________      Total time in minutes spent coordinating this discharge (includes going over instructions, follow-up, prescriptions, and preparing report for sign off to her PCP) :  40 minutes    Signed:  Kati Maharaj MD

## 2019-08-22 NOTE — PROGRESS NOTES
Problem: Falls - Risk of  Goal: *Absence of Falls  Description  Document Chloe Calhoun Fall Risk and appropriate interventions in the flowsheet. Outcome: Progressing Towards Goal  Note:   Fall Risk Interventions:  Mobility Interventions: Bed/chair exit alarm    Mentation Interventions: Adequate sleep, hydration, pain control    Medication Interventions: Bed/chair exit alarm    Elimination Interventions: Bed/chair exit alarm              Problem: Patient Education: Go to Patient Education Activity  Goal: Patient/Family Education  Outcome: Progressing Towards Goal     Problem: Pressure Injury - Risk of  Goal: *Prevention of pressure injury  Description  Document Rosas Scale and appropriate interventions in the flowsheet.   Outcome: Progressing Towards Goal  Note:   Pressure Injury Interventions:  Sensory Interventions: Assess changes in LOC    Moisture Interventions: Absorbent underpads, Apply protective barrier, creams and emollients, Assess need for specialty bed, Check for incontinence Q2 hours and as needed    Activity Interventions: Assess need for specialty bed, Chair cushion, Increase time out of bed    Mobility Interventions: Assess need for specialty bed, Chair cushion, Float heels, HOB 30 degrees or less, Pressure redistribution bed/mattress (bed type)    Nutrition Interventions: Document food/fluid/supplement intake, Offer support with meals,snacks and hydration    Friction and Shear Interventions: Apply protective barrier, creams and emollients, Feet elevated on foot rest, Foam dressings/transparent film/skin sealants, HOB 30 degrees or less, Lift team/patient mobility team, Lift sheet                Problem: Patient Education: Go to Patient Education Activity  Goal: Patient/Family Education  Outcome: Progressing Towards Goal     Problem: Breathing Pattern - Ineffective  Goal: *Absence of hypoxia  Outcome: Progressing Towards Goal  Goal: *Use of effective breathing techniques  Outcome: Progressing Towards Goal  Goal: *PALLIATIVE CARE:  Alleviation of Dyspnea  Outcome: Progressing Towards Goal     Problem: Patient Education: Go to Patient Education Activity  Goal: Patient/Family Education  Outcome: Progressing Towards Goal     Problem: Chronic Obstructive Pulmonary Disease (COPD)  Goal: *Oxygen saturation during activity within specified parameters  Outcome: Progressing Towards Goal  Goal: *Able to remain out of bed as prescribed  Outcome: Progressing Towards Goal  Goal: *Absence of hypoxia  Outcome: Progressing Towards Goal  Goal: *Optimize nutritional status  Outcome: Progressing Towards Goal     Problem: Patient Education: Go to Patient Education Activity  Goal: Patient/Family Education  Outcome: Progressing Towards Goal     Problem: Patient Education: Go to Patient Education Activity  Goal: Patient/Family Education  Outcome: Progressing Towards Goal     Problem: Heart Failure: Day 1  Goal: Off Pathway (Use only if patient is Off Pathway)  Outcome: Progressing Towards Goal  Goal: Activity/Safety  Outcome: Progressing Towards Goal  Goal: Consults, if ordered  Outcome: Progressing Towards Goal  Goal: Diagnostic Test/Procedures  Outcome: Progressing Towards Goal  Goal: Nutrition/Diet  Outcome: Progressing Towards Goal  Goal: Discharge Planning  Outcome: Progressing Towards Goal  Goal: Medications  Outcome: Progressing Towards Goal  Goal: Respiratory  Outcome: Progressing Towards Goal  Goal: Treatments/Interventions/Procedures  Outcome: Progressing Towards Goal  Goal: Psychosocial  Outcome: Progressing Towards Goal  Goal: *Oxygen saturation within defined limits  Outcome: Progressing Towards Goal  Goal: *Hemodynamically stable  Outcome: Progressing Towards Goal  Goal: *Optimal pain control at patient's stated goal  Outcome: Progressing Towards Goal  Goal: *Anxiety reduced or absent  Outcome: Progressing Towards Goal     Problem: Patient Education: Go to Patient Education Activity  Goal: Patient/Family Education  Outcome: Progressing Towards Goal     Problem: Patient Education: Go to Patient Education Activity  Goal: Patient/Family Education  Outcome: Progressing Towards Goal

## 2019-08-22 NOTE — PROGRESS NOTES
Progress Note      8/22/2019 8:45 AM  NAME: Luz Levine   MRN:  [id number]   Admit Diagnosis: Acute on chronic heart failure ([facility] [address] [I50.9]                            ZLAZBZNPEB:                 - Dyspnea          - hospitalized 5/2018 with hypoxia, acute diastolic CHF, prox afib with RVR back in sinus, increased  troponin consistent with type II NSTEMI  -  No hx of CAD  -  Echo 2018 with EF 55%, mod MR, mild AI  - Prox Afib currently in sinus with LVH  -  HTN, with hypertensive heart disease with heart failure, and CKD cr 1.4  -  Dyslipidemia  -  Hx of CVA s/p TPA in 2015  -  PVD with hx of left CEA  - COPD now on home O2  -  GERD  -  DJD  -  , lives at [facility], poor functional capacity in a wheelchair,  sleeps most of the day                                 Plan:                   Does not appear ischemic. This is not clearly CHF, pBNP mildly elevated  No edema on exam.  PAF currently in sinus.      1.  Cont eliquis 2.5mg po bid, given CHADS2 of 5  2. Cont toprol xl  3. Cont amlodipine  4. Diurese lasix 40mg iv bid, would discharge on higher dose 60mg bid, repelet K  5. Cont atorvastatin      PT/OT  Continue home O2    Goal back to SNF today           [x]        High complexity decision making was performed      Subjective:     Ermias Allen denies chest pain, dyspnea. Discussed with RN events overnight. Review of Systems:    Symptom Y/N Comments  Symptom Y/N Comments   Fever/Chills N   Chest Pain N    Poor Appetite N   Edema N    Cough N   Abdominal Pain N    Sputum N   Joint Pain N    SOB/ALCARAZ N   Pruritis/Rash N    Nausea/vomit N   Tolerating PT/OT Y    Diarrhea N   Tolerating Diet Y    Constipation N   Other       Could NOT obtain due to:      Objective:      Physical Exam:    Last 24hrs VS reviewed since prior progress note.  Most recent are:    Visit Vitals  /65 (BP 1 Location: Left arm, BP Patient Position: At rest)   Pulse 62   Temp 98.2 °F (36.8 °C)   Resp 18   Ht 5' 5\" (1.651 m)   Wt 67.8 kg (149 lb 6.4 oz)   SpO2 98%   BMI 24.86 kg/m²       Intake/Output Summary (Last 24 hours) at 8/22/2019 0845  Last data filed at 8/22/2019 1318  Gross per 24 hour   Intake 600 ml   Output 600 ml   Net 0 ml        General Appearance: Well developed, well nourished, alert & oriented x 3,    no acute distress. Ears/Nose/Mouth/Throat: Hearing grossly normal.  Neck: Supple. Chest: Lungs clear to auscultation bilaterally. Cardiovascular: Regular rate and rhythm, S1S2 normal, no murmur. Abdomen: Soft, non-tender, bowel sounds are active. Extremities: No edema bilaterally. Skin: Warm and dry. PMH/SH reviewed - no change compared to H&P    Data Review    Telemetry: normal sinus rhythm     Lab Data Personally Reviewed:    Recent Labs     08/21/19  0316 08/20/19  0250   WBC 8.9 9.9   HGB 12.7 13.4   HCT 39.1 39.9    245     No results for input(s): INR, PTP, APTT in the last 72 hours. No lab exists for component: Katerina Green   Recent Labs     08/21/19  0316 08/20/19  0250 08/19/19  1024    138 140   K 3.4* 3.2* 3.6    100 104   CO2 29 29 31   BUN 18 12 12   CREA 0.97 0.87 1.00   * 96 98   CA 8.8 9.0 8.9   MG  --  2.1  --      Recent Labs     08/19/19  1619 08/19/19  1024   TROIQ 0.14* 0.14*     Lab Results   Component Value Date/Time    Cholesterol, total 182 02/19/2015 04:30 AM    HDL Cholesterol 38 02/19/2015 04:30 AM    LDL, calculated 99.4 02/19/2015 04:30 AM    Triglyceride 223 (H) 02/19/2015 04:30 AM    CHOL/HDL Ratio 4.8 02/19/2015 04:30 AM       Recent Labs     08/20/19  0250 08/19/19  1024   SGOT 20 19   AP 68 77   TP 7.2 7.7   ALB 3.1* 3.5   GLOB 4.1* 4.2*     No results for input(s): PH, PCO2, PO2 in the last 72 hours.     Medications Personally Reviewed:    Current Facility-Administered Medications   Medication Dose Route Frequency    polyethylene glycol (MIRALAX) packet 17 g  17 g Oral DAILY    hydrALAZINE (APRESOLINE) 20 mg/mL injection 10 mg  10 mg IntraVENous Q6H PRN    potassium chloride SR (KLOR-CON 10) tablet 10 mEq  10 mEq Oral BID    furosemide (LASIX) injection 40 mg  40 mg IntraVENous BID    ondansetron (ZOFRAN) injection 4 mg  4 mg IntraVENous Q4H PRN    apixaban (ELIQUIS) tablet 2.5 mg  2.5 mg Oral Q12H    amLODIPine (NORVASC) tablet 5 mg  5 mg Oral DAILY    oxybutynin chloride XL (DITROPAN XL) tablet 5 mg  5 mg Oral DAILY    metoprolol succinate (TOPROL-XL) XL tablet 25 mg  25 mg Oral DAILY    atorvastatin (LIPITOR) tablet 40 mg  40 mg Oral QHS    sodium chloride (NS) flush 5-40 mL  5-40 mL IntraVENous Q8H    sodium chloride (NS) flush 5-40 mL  5-40 mL IntraVENous PRN    melatonin tablet 3 mg  3 mg Oral QHS PRN         Megan Weinstein MD

## 2019-08-22 NOTE — PROGRESS NOTES
Hospital to [address] [place] [address] Brittney Shankar                                                                        80 y.o.   female    [facility]   Room: 2220/01    [facility] CARDIOPULMONARY CARE  Unit Phone# :  [phone]      [address]  [facility] [place] [address]  Dept: [phone]  Loc: [phone]                    SITUATION     Admitted:  8/19/2019         Attending Provider: Aubree Claire MD       Consultations:  IP CONSULT TO HOSPITALIST  IP CONSULT TO CARDIOLOGY    PCP:  Sanjeev Mckeon MD   [phone]    Treatment Team: Attending Provider: Aubree Claire MD; Consulting Provider: Micah Hopkins MD; Consulting Provider: Soraida Ramirez MD; Utilization Review: Laine Snyder RN; Care Manager: Mono Mckinney    Admitting Dx:  Acute on chronic heart failure [facility]) [I50.9]       Principal Problem: <principal problem not specified>    * No surgery found * of      BY: * Surgery not found *             ON: * No surgery found *                  Code Status: DNR                Advance Directives:   Advance Care Planning 8/19/2019   Patient's Healthcare Decision Maker is: -   Primary Decision Maker Name -   Primary Decision Maker Phone Number -   Confirm Advance Directive None    (Send w/patient)   No Doesnt Have       Isolation:  There are currently no Active Isolations       MDRO: No current active infections    Pain Medications given:  N/A    Last dose: 8/22/2019 at  N/A    Special Equipment needed: no  Type of equipment:         BACKGROUND     Allergies: Allergies   Allergen Reactions    Penicillins Swelling    Sulfa (Sulfonamide Antibiotics) Itching    Propoxyphene-Acetaminophen Unknown (comments)     Pt. Not sure, thinks it was hallucination.     Sulfadiazine Unknown (comments)       Past Medical History:   Diagnosis Date    Arthritis     generalized    COPD     GERD (gastroesophageal reflux disease)     Hypertension     Ill-defined condition     Vitamin deficiency    NSTEMI (non-ST elevated myocardial infarction) ([facility] [address] 5/1/2018    Other ill-defined conditions(799.89)     high cholesterol    Peripheral artery disease (HCC)     Psychiatric disorder     Anxiety disorder    Sleep disorder     Insomnia    TIA (transient ischemic attack)        Past Surgical History:   Procedure Laterality Date    HX CAROTID ENDARTERECTOMY      left 2 or 3 years ago.  HX OTHER SURGICAL      colonoscopy    HX TONSILLECTOMY         Medications Prior to Admission   Medication Sig    senna-docusate (PERICOLACE) 8.6-50 mg per tablet Take 2 Tabs by mouth two (2) times a day.  potassium chloride SR (KLOR-CON 10) 10 mEq tablet Take 10 mEq by mouth two (2) times a day.  atorvastatin (LIPITOR) 40 mg tablet Take 1 Tab by mouth nightly.  apixaban (ELIQUIS) 2.5 mg tablet Take 1 Tab by mouth two (2) times a day.  metoprolol succinate (TOPROL-XL) 25 mg XL tablet Take 1 Tab by mouth daily.  amLODIPine (NORVASC) 5 mg tablet Take 5 mg by mouth daily.  oxybutynin chloride XL (DITROPAN XL) 5 mg CR tablet Take 5 mg by mouth daily. For overactive bladder    pantoprazole (PROTONIX) 20 mg tablet Take 20 mg by mouth Before breakfast and dinner. For GERD    cetirizine (ZYRTEC) 10 mg tablet Take 10 mg by mouth nightly. For itching    B.infantis-B.ani-B.long-B.bifi (PROBIOTIC 4X) 10-15 mg TbEC Take 1 Cap by mouth two (2) times a day.  MULTIVITS W-FE,OTHER MIN/LUT (CENTRUM SILVER ULTRA WOMEN'S PO) Take 1 Tab by mouth daily.  peg 400-propylene glycol (SYSTANE, PROPYLENE GLYCOL,) 0.4-0.3 % drop Administer 1 Drop to both eyes two (2) times a day.  [DISCONTINUED] furosemide (LASIX) 40 mg tablet Take 1 Tab by mouth two (2) times a day.  acetaminophen (TYLENOL) 325 mg tablet Take 650 mg by mouth every four to six (4-6) hours as needed for Pain.     ammonium lactate (LAC-HYDRIN) 12 % lotion Apply  to affected area every twelve (12) hours as needed (Apply to chest, back, arms as needed for eczema). rub in to affected area well    clobetasol (TEMOVATE) 0.05 % topical cream Apply  to affected area daily. Apply to vulva/inner labia or whitish area    olopatadine (PATANOL) 0.1 % ophthalmic solution Administer 1 Drop to both eyes two (2) times a day. Hard scripts included in transfer packet no and N/A    Vaccinations:    Immunization History   Administered Date(s) Administered    Influenza Vaccine 10/01/2014    Pneumococcal Vaccine (Unspecified Type) 10/01/2010       Readmission Risks:    Known Risks: None        The Charlson CoMorbitiy Index tool is an evidenced based tool that has more automatic generated information. The tool looks at many different items such as the age of the patient, how many times they were admitted in the last calendar year, current length of stay in the hospital and their diagnosis. All of these items are pulled automatically from information documented in the chart from various places and will generate a score that predicts whether a patient is at low (less than 13), medium (13-20) or high (21 or greater) risk of being readmitted.         ASSESSMENT                Temp: 98.2 °F (36.8 °C) (08/22/19 0713) Pulse (Heart Rate): 62 (08/22/19 0713)     Resp Rate: 18 (08/22/19 0713)           BP: 170/65 (08/22/19 0713)     O2 Sat (%): 98 % (08/22/19 0713)     Weight: 67.8 kg (149 lb 6.4 oz)    Height: 5' 5\" (165.1 cm) (08/20/19 1344)       If above not within 1 hour of discharge:    BP:_____  P:____  R:____ T:_____ O2 Sat: ___%  O2: ______    Active Orders   Diet    DIET CARDIAC Regular         Orientation: oriented to time, place, person and situation     Active Behaviors: None                                   Active Lines/Drains:  (Peg Tube / Pickard / CL or S/L?): no    Urinary Status: Voiding, External catheter     Last BM: Last Bowel Movement Date: 08/18/19     Skin Integrity: Tiana Jordan Mobility: Slightly limited   Weight Bearing Status: WBAT (Weight Bearing as Tolerated)      Gait Training  Assistive Device: Walker, rolling, Gait belt  Ambulation - Level of Assistance: Minimal assistance, Assist x1  Distance (ft): 25 Feet (ft)         Lab Results   Component Value Date/Time    Glucose 101 (H) 08/21/2019 03:16 AM    Hemoglobin A1c 6.1 02/19/2015 04:30 AM    INR 1.2 (H) 06/08/2018 11:58 AM    INR 1.1 02/18/2015 11:20 AM    HGB 12.7 08/21/2019 03:16 AM    HGB 13.4 08/20/2019 02:50 AM        RECOMMENDATION     See After Visit Summary (AVS) for:  · Discharge instructions  · After [address]   · Special equipment needed (entered pre-discharge by Care Management)  · Medication Reconciliation    · Follow up Appointment(s)         Report given/sent by:  Cherelle Renteria                    Verbal report given to: Les  FAXED to:  Hard copy sent with patient         Estimated discharge time:  8/22/2019 at 9:45AM

## 2019-08-22 NOTE — PROGRESS NOTES
Discharge delayed. Pt's son did not bring pt's portable O2.  Son will retrieve O2 from [employer] and come back to hospital.     Ryann Ibrahim MSW  Care Manager  [phone]

## 2019-08-22 NOTE — PROGRESS NOTES
Transitional Care Team: [facility] [place]    Date of Assessment: 08/22/19  Time of Assessment:  11:07 AM      Kateryna Cooper is a 80 y.o. female inpatient at [facility] with heart failure on  8/19. Assessment & Plan   Pt A+O. Denies chest pain dyspnea. OOB with steady gait. Has discharge orders. Aware of follow up appointments. Son coming to transport to home. Current Code Status:  DDNR    Medication Reconciliation:  was performed. Can patient afford medications:  yes    Who manages medications at home family  Emergency Contact  DTR Kylee Baker [phone]    Chart reviewed by me and HUG (Healthy Understanding of Goals) program introduced to patient/family. The Transitional Care Team bridges the gaps in care and education surrounding discharge from the acute care facility. The objective is to empower the patient and family in taking a proactive role in the task of preventing readmission within the first thirty days after discharge from the acute care setting, The team is also involved in the efforts to reduce readmission to the acute care setting after stabilization and discharge from the acute care environment either to skilled nursing facilities or community. Discharge With The Following Arrangements in place:    Future Appointments   Date Time Provider Inocencio Tong   8/29/2019 10:30 AM Joie Byrne MD [address] [facility]       Non-[facility] follow up appointment(s): cardiologist Wednesday 8/28    [facility] call information given to pt     Patient / Family was instructed on specific signs/symptoms to look for with regards to worsening of their medical conditions. Learning was assessed using teach back. The patient / family have added upcoming appointment dates to their [facility] Calendar prior to discharge. Patient education focused on readmission zones as described as: The Red Zone: High risk for readmission, days 1-21   The Yellow Zone:  Moderate  risk for readmission, days 22-29   The Green Zone: Lower risk for readmission, days 30 and after    The [facility] [place] Team will follow patient from a distance while inpatient as well as be available for further transition disposition as needed. The [facility] TEAM will continue to offer support during the 30- 90 day discharge from acute care setting. Notified Ambulatory {Blank Single Select Template:20061[de-identified] ,BARRERA RN.       Signed By: Caridad Buckley RN     August 22, 2019

## 2019-08-22 NOTE — PROGRESS NOTES
BARRERA plan: Pt will discharge back to [facility] today, transported by her son in a personal vehicle at 9:00 am. CM confirmed plan with pt, family, and [facility]. All in agreement. Pt is scheduled to follow-up with her PCP and Cardiologist. Pt will also be seen by the physician at the [facility]. No further concerns indicated at this time. AVS updated. Nursing will need to call report to [facility] at [phone]. Please send AVS, MAR, and any written prescriptions to facility in an envelope. Medicare pt has received, reviewed, and signed 2nd IM letter informing them of their right to appeal the discharge. Signed copy has been placed on pt bedside chart. Care Management Interventions  PCP Verified by CM: Yes  Palliative Care Criteria Met (RRAT>21 & CHF Dx)?: Yes  Palliative Consult Recommended?: Yes  Mode of Transport at Discharge:  Other (see comment)(son)  Hospital Transport Time of Discharge: 0900  Transition of Care Consult (CM Consult): [facility]  Brayant Signup: No  Discharge Durable Medical Equipment: No  Physical Therapy Consult: Yes  Occupational Therapy Consult: Yes  Speech Therapy Consult: No  Current Support Network: Assisted Living, Family Lives Nearby([address] - daughter visits frequently)  Confirm Follow Up Transport: Family  Plan discussed with Pt/Family/Caregiver: Yes  Discharge Location  Discharge Placement: Assisted Living([facility])    VERA Banerjee  Care Manager  [phone]

## 2019-08-23 ENCOUNTER — PATIENT OUTREACH (OUTPATIENT)
Dept: FAMILY MEDICINE CLINIC | Age: 84
End: 2019-08-23

## 2019-08-23 NOTE — PROGRESS NOTES
Hospital Discharge Follow-Up      Date/Time:  8/23/2019 11:20 AM    Patient was admitted to [facility] on 8/19/19 and discharged on 8/22/19 for CHF. The physician discharge summary was available at the time of outreach. Patient was contacted within 1 business days of discharge. Inpatient RRAT score: 28  Was this a readmission? no   Patient stated reason for the readmission: n/a    Barriers to care? lack of knowledge about disease    Medication(s):   New Medications at Discharge: none  Changed Medications at Discharge: none  Discontinued Medications at Discharge: none    Referral to Pharm D needed: no     Current Outpatient Medications   Medication Sig    furosemide (LASIX) 40 mg tablet Take 1.5 Tabs by mouth two (2) times a day.  peg 400-propylene glycol (SYSTANE, PROPYLENE GLYCOL,) 0.4-0.3 % drop Administer 1 Drop to both eyes two (2) times a day.  senna-docusate (PERICOLACE) 8.6-50 mg per tablet Take 2 Tabs by mouth two (2) times a day.  potassium chloride SR (KLOR-CON 10) 10 mEq tablet Take 10 mEq by mouth two (2) times a day.  atorvastatin (LIPITOR) 40 mg tablet Take 1 Tab by mouth nightly.  apixaban (ELIQUIS) 2.5 mg tablet Take 1 Tab by mouth two (2) times a day.  metoprolol succinate (TOPROL-XL) 25 mg XL tablet Take 1 Tab by mouth daily.  amLODIPine (NORVASC) 5 mg tablet Take 5 mg by mouth daily.  oxybutynin chloride XL (DITROPAN XL) 5 mg CR tablet Take 5 mg by mouth daily. For overactive bladder    pantoprazole (PROTONIX) 20 mg tablet Take 20 mg by mouth Before breakfast and dinner. For GERD    acetaminophen (TYLENOL) 325 mg tablet Take 650 mg by mouth every four to six (4-6) hours as needed for Pain.  ammonium lactate (LAC-HYDRIN) 12 % lotion Apply  to affected area every twelve (12) hours as needed (Apply to chest, back, arms as needed for eczema). rub in to affected area well    cetirizine (ZYRTEC) 10 mg tablet Take 10 mg by mouth nightly.  For itching    clobetasol (TEMOVATE) 0.05 % topical cream Apply  to affected area daily. Apply to vulva/inner labia or whitish area    olopatadine (PATANOL) 0.1 % ophthalmic solution Administer 1 Drop to both eyes two (2) times a day.  B.infantis-B.ani-B.long-B.bifi (PROBIOTIC 4X) 10-15 mg TbEC Take 1 Cap by mouth two (2) times a day.  MULTIVITS W-FE,OTHER MIN/LUT (CENTRUM SILVER ULTRA WOMEN'S PO) Take 1 Tab by mouth daily. No current facility-administered medications for this visit. There are no discontinued medications. [facility] follow up appointment(s):   Future Appointments   Date Time Provider Inocencio Tong   8/29/2019 10:30 AM Dean Spencer MD 3 [facility]      Non-[facility] follow up appointment(s): GIOVANNY 8/28/19 @ 2140  [facility]  information provided as a resource       Goals      Attend follow up appointments on schedule      Reduce risk of CHF exacerbations and complications.

## 2019-08-24 LAB
BACTERIA SPEC CULT: NORMAL
SERVICE CMNT-IMP: NORMAL

## 2019-09-23 ENCOUNTER — PATIENT OUTREACH (OUTPATIENT)
Dept: FAMILY MEDICINE CLINIC | Age: 84
End: 2019-09-23

## 2019-10-02 ENCOUNTER — PATIENT OUTREACH (OUTPATIENT)
Dept: FAMILY MEDICINE CLINIC | Age: 84
End: 2019-10-02

## 2022-04-17 NOTE — PROGRESS NOTES
9/6/18-NN left message for Destiny, head nurse at [facility], to call back with an update on patient's condition. / vs 
 Patient/Collateral...

## 2024-02-07 NOTE — PROGRESS NOTES
5mL motrin liquid given to patient with assist of father.  Patient tolerated this well without vomiting.  Observed for 15 min after medication.     Progress Note      5/1/2018 8:31 AM  NAME: Allison Wyman   MRN:  [id number]   Admit Diagnosis: Rapid atrial fibrillation [facility])                          Assessment:                 1. Hypoxia, acute diastolic CHF, prox afib with RVR back in sinus, increased troponin consistent with type II NSTEMI  2. No hx of CAD  3. Echo 2015 with EF 55%, mild to mod MR, mild AI  4. Prox Afib currently in sinus with LVH  5. HTN, with hypertensive heart disease with heart failure, and CKD cr 1.4  6. Dyslipidemia  7. Hx of CVA s/p TPA in 2015  8. PVD with hx of left CEA  9. COPD has had home O2 in past  10. GERD  11. DJD  12. , lives at [facility], poor functional capacity in a wheelchair, sleeps most of the day                            Plan:                  Poor functional capacity, since CVA mostly in wheelchair, sleeps in between meals. Some increase in dyspnea. No chest pain. Afib with RVR back in sinus on cardizem ggt  pulm edema on cxr currently on non-rebreather  Increased troponin, consistent with type II NSTEMI, discussed with family would like to avoid invasive therapy, plan for medical management of CAD.     Check echo     1. Cont ASA  2. Cont plavix for now, consider change to eliquis not ideal anticoagulation candidate will review with Dr. Chrissy Worley  3. Cont added amiodarone 200mg tid, discharge on 100mg daily  4. Cont metoprolol 25mg bid  5. Cont amlodipine  6. Decrease lasix 40mg iv every day, replete K, follow bmp, taper O2 currently on 5L  7. Cont atorvastatin    PT/OT  Assess for home O2       [x]        High complexity decision making was performed         Subjective:     Juli Silvavivienne Barretoumer denies chest pain, dyspnea. Discussed with RN events overnight.      Review of Systems:    Symptom Y/N Comments  Symptom Y/N Comments   Fever/Chills N   Chest Pain N    Poor Appetite N   Edema N    Cough N   Abdominal Pain N    Sputum N   Joint Pain N    SOB/ALCARAZ N   Pruritis/Rash N    Nausea/vomit N Tolerating PT/OT Y    Diarrhea N   Tolerating Diet Y    Constipation N   Other       Could NOT obtain due to:      Objective:      Physical Exam:    Last 24hrs VS reviewed since prior progress note. Most recent are:    Visit Vitals    /70 (BP 1 Location: Left arm, BP Patient Position: At rest;Supine)    Pulse 85    Temp 97.9 °F (36.6 °C)    Resp 20    Ht 5' 4\" (1.626 m)    Wt 72.2 kg (159 lb 2.8 oz)    SpO2 92%    BMI 27.32 kg/m2       Intake/Output Summary (Last 24 hours) at 05/01/18 0831  Last data filed at 05/01/18 0529   Gross per 24 hour   Intake                0 ml   Output              625 ml   Net             -625 ml        General Appearance: Well developed, well nourished, alert & oriented x 3,    no acute distress. Ears/Nose/Mouth/Throat: Hearing grossly normal.  Neck: Supple. Chest: Lungs clear to auscultation bilaterally. Cardiovascular: Regular rate and rhythm, S1S2 normal, no murmur. Abdomen: Soft, non-tender, bowel sounds are active. Extremities: No edema bilaterally. Skin: Warm and dry. PMH/ reviewed - no change compared to H&P    Data Review    Telemetry: normal sinus rhythm     Lab Data Personally Reviewed:    Recent Labs      05/01/18 0058 04/30/18   0947   WBC  9.1  10.6   HGB  11.1*  12.6   HCT  34.4*  38.5   PLT  265  299     No results for input(s): INR, PTP, APTT in the last 72 hours.     No lab exists for component: INREXT   Recent Labs      05/01/18 0058 04/30/18   0947   NA  139  140   K  3.2*  4.1   CL  102  103   CO2  30  28   BUN  27*  25*   CREA  1.29*  1.45*   GLU  91  141*   CA  8.5  9.8   MG   --   2.2     Recent Labs      05/01/18 0058 04/30/18   0947   TROIQ  0.28*  0.33*     Lab Results   Component Value Date/Time    Cholesterol, total 182 02/19/2015 04:30 AM    HDL Cholesterol 38 02/19/2015 04:30 AM    LDL, calculated 99.4 02/19/2015 04:30 AM    Triglyceride 223 (H) 02/19/2015 04:30 AM    CHOL/HDL Ratio 4.8 02/19/2015 04:30 AM       Recent Labs 05/01/18   0058  04/30/18   0947   SGOT  22  28   AP  59  67   TP  6.7  7.6   ALB  2.9*  3.3*   GLOB  3.8  4.3*     No results for input(s): PH, PCO2, PO2 in the last 72 hours.     Medications Personally Reviewed:    Current Facility-Administered Medications   Medication Dose Route Frequency    furosemide (LASIX) injection 40 mg  40 mg IntraVENous DAILY    potassium chloride SR (KLOR-CON 10) tablet 40 mEq  40 mEq Oral NOW    [START ON 5/2/2018] potassium chloride SR (KLOR-CON 10) tablet 10 mEq  10 mEq Oral DAILY    amiodarone (CORDARONE) tablet 200 mg  200 mg Oral TID    [START ON 5/3/2018] amiodarone (CORDARONE) tablet 100 mg  100 mg Oral DAILY    metoprolol tartrate (LOPRESSOR) tablet 25 mg  25 mg Oral BID    albuterol (PROVENTIL HFA, VENTOLIN HFA, PROAIR HFA) inhaler 2 Puff  2 Puff Inhalation Q4H PRN    amLODIPine (NORVASC) tablet 2.5 mg  2.5 mg Oral DAILY    aspirin chewable tablet 81 mg  81 mg Oral DAILY    atorvastatin (LIPITOR) tablet 40 mg  40 mg Oral QHS    clopidogrel (PLAVIX) tablet 75 mg  75 mg Oral DAILY    pantoprazole (PROTONIX) granules for oral suspension 40 mg  40 mg Oral DAILY    umeclidinium (INCRUSE ELLIPTA) 62.5 mcg/actuation  1 Puff Inhalation DAILY    sodium chloride (NS) flush 5-10 mL  5-10 mL IntraVENous Q8H    sodium chloride (NS) flush 5-10 mL  5-10 mL IntraVENous PRN    acetaminophen (TYLENOL) tablet 650 mg  650 mg Oral Q4H PRN    ondansetron (ZOFRAN) injection 4 mg  4 mg IntraVENous Q4H PRN    heparin (porcine) injection 5,000 Units  5,000 Units SubCUTAneous Q12H    polyethylene glycol (MIRALAX) packet 17 g  17 g Oral DAILY    oxybutynin chloride XL (DITROPAN XL) tablet 15 mg  15 mg Oral DAILY         Michael Fish MD